# Patient Record
Sex: MALE | Race: BLACK OR AFRICAN AMERICAN | NOT HISPANIC OR LATINO | Employment: OTHER | URBAN - METROPOLITAN AREA
[De-identification: names, ages, dates, MRNs, and addresses within clinical notes are randomized per-mention and may not be internally consistent; named-entity substitution may affect disease eponyms.]

---

## 2017-04-13 DIAGNOSIS — E11.9 TYPE 2 DIABETES MELLITUS WITHOUT COMPLICATION: ICD-10-CM

## 2017-05-08 ENCOUNTER — PATIENT OUTREACH (OUTPATIENT)
Dept: ADMINISTRATIVE | Facility: HOSPITAL | Age: 62
End: 2017-05-08

## 2017-05-08 NOTE — PROGRESS NOTES
SPOKE WITH PATIENT WHO STATED LAST EYE EXAM 03/15 WITH [facility]. UNABLE TO OBTAIN MEDICAL RECORDS D/T RECORDS DAMAGED IN THE FLOOD. PATIENT STATED WILL CALL TO SCHEDULE [facility]. INSTRUCTED PT  TO HAVE EYE MD SEND DR. VYAS COPY OF EYE EXAM. PT VERBALIZED UNDERSTANDING.

## 2017-08-04 DIAGNOSIS — E11.9 TYPE 2 DIABETES MELLITUS WITHOUT COMPLICATION: ICD-10-CM

## 2017-08-12 DIAGNOSIS — E78.5 HYPERLIPIDEMIA: ICD-10-CM

## 2017-08-12 DIAGNOSIS — E11.69 DIABETES MELLITUS TYPE 2 IN OBESE: ICD-10-CM

## 2017-08-12 DIAGNOSIS — E66.9 DIABETES MELLITUS TYPE 2 IN OBESE: ICD-10-CM

## 2017-08-15 RX ORDER — GLYBURIDE-METFORMIN HYDROCHLORIDE 5; 500 MG/1; MG/1
TABLET ORAL
Qty: 180 TABLET | Refills: 0 | Status: SHIPPED | OUTPATIENT
Start: 2017-08-15 | End: 2017-11-13 | Stop reason: SDUPTHER

## 2017-08-15 RX ORDER — ATORVASTATIN CALCIUM 40 MG/1
TABLET, FILM COATED ORAL
Qty: 90 TABLET | Refills: 0 | Status: SHIPPED | OUTPATIENT
Start: 2017-08-15 | End: 2017-11-15 | Stop reason: SDUPTHER

## 2017-09-27 ENCOUNTER — TELEPHONE (OUTPATIENT)
Dept: INTERNAL MEDICINE | Facility: CLINIC | Age: 62
End: 2017-09-27

## 2017-09-27 NOTE — TELEPHONE ENCOUNTER
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

## 2017-10-03 ENCOUNTER — OFFICE VISIT (OUTPATIENT)
Dept: INTERNAL MEDICINE | Facility: CLINIC | Age: 62
End: 2017-10-03
Payer: COMMERCIAL

## 2017-10-03 ENCOUNTER — TELEPHONE (OUTPATIENT)
Dept: INTERNAL MEDICINE | Facility: CLINIC | Age: 62
End: 2017-10-03

## 2017-10-03 VITALS
BODY MASS INDEX: 45.96 KG/M2 | TEMPERATURE: 99 F | SYSTOLIC BLOOD PRESSURE: 170 MMHG | OXYGEN SATURATION: 96 % | WEIGHT: 315 LBS | HEART RATE: 58 BPM | DIASTOLIC BLOOD PRESSURE: 87 MMHG

## 2017-10-03 DIAGNOSIS — B35.1 ONYCHOMYCOSIS OF TOENAIL: ICD-10-CM

## 2017-10-03 DIAGNOSIS — M10.379 ACUTE GOUT DUE TO RENAL IMPAIRMENT INVOLVING FOOT, UNSPECIFIED LATERALITY: ICD-10-CM

## 2017-10-03 DIAGNOSIS — Z28.39 IMMUNIZATION DEFICIENCY: ICD-10-CM

## 2017-10-03 DIAGNOSIS — Z12.5 SCREENING FOR PROSTATE CANCER: ICD-10-CM

## 2017-10-03 DIAGNOSIS — I10 ESSENTIAL HYPERTENSION: ICD-10-CM

## 2017-10-03 DIAGNOSIS — E78.5 HYPERLIPIDEMIA, UNSPECIFIED HYPERLIPIDEMIA TYPE: ICD-10-CM

## 2017-10-03 LAB
ALBUMIN SERPL BCP-MCNC: 3.2 G/DL
ALP SERPL-CCNC: 70 U/L
ALT SERPL W/O P-5'-P-CCNC: 17 U/L
ANION GAP SERPL CALC-SCNC: 11 MMOL/L
AST SERPL-CCNC: 16 U/L
BACTERIA #/AREA URNS AUTO: ABNORMAL /HPF
BASOPHILS # BLD AUTO: 0.02 K/UL
BASOPHILS NFR BLD: 0.3 %
BILIRUB SERPL-MCNC: 0.7 MG/DL
BILIRUB UR QL STRIP: NEGATIVE
BUN SERPL-MCNC: 16 MG/DL
CALCIUM SERPL-MCNC: 9.3 MG/DL
CHLORIDE SERPL-SCNC: 105 MMOL/L
CHOLEST SERPL-MCNC: 151 MG/DL
CHOLEST/HDLC SERPL: 5 {RATIO}
CLARITY UR REFRACT.AUTO: ABNORMAL
CO2 SERPL-SCNC: 27 MMOL/L
COLOR UR AUTO: YELLOW
CREAT SERPL-MCNC: 1.4 MG/DL
CREAT UR-MCNC: 245 MG/DL
DIFFERENTIAL METHOD: ABNORMAL
EOSINOPHIL # BLD AUTO: 0.2 K/UL
EOSINOPHIL NFR BLD: 3.3 %
ERYTHROCYTE [DISTWIDTH] IN BLOOD BY AUTOMATED COUNT: 14.9 %
EST. GFR  (AFRICAN AMERICAN): >60 ML/MIN/1.73 M^2
EST. GFR  (NON AFRICAN AMERICAN): 53.1 ML/MIN/1.73 M^2
GLUCOSE SERPL-MCNC: 124 MG/DL
GLUCOSE UR QL STRIP: NEGATIVE
HCT VFR BLD AUTO: 44.6 %
HDLC SERPL-MCNC: 30 MG/DL
HDLC SERPL: 19.9 %
HGB BLD-MCNC: 14.8 G/DL
HGB UR QL STRIP: NEGATIVE
KETONES UR QL STRIP: NEGATIVE
LDLC SERPL CALC-MCNC: 99 MG/DL
LEUKOCYTE ESTERASE UR QL STRIP: ABNORMAL
LYMPHOCYTES # BLD AUTO: 2 K/UL
LYMPHOCYTES NFR BLD: 29.7 %
MCH RBC QN AUTO: 29.1 PG
MCHC RBC AUTO-ENTMCNC: 33.2 G/DL
MCV RBC AUTO: 88 FL
MICROALBUMIN UR DL<=1MG/L-MCNC: 10 UG/ML
MICROALBUMIN/CREATININE RATIO: 4.1 UG/MG
MICROSCOPIC COMMENT: ABNORMAL
MONOCYTES # BLD AUTO: 0.6 K/UL
MONOCYTES NFR BLD: 8.3 %
NEUTROPHILS # BLD AUTO: 3.9 K/UL
NEUTROPHILS NFR BLD: 58.2 %
NITRITE UR QL STRIP: NEGATIVE
NONHDLC SERPL-MCNC: 121 MG/DL
PH UR STRIP: 5 [PH] (ref 5–8)
PLATELET # BLD AUTO: 223 K/UL
PMV BLD AUTO: 11.3 FL
POTASSIUM SERPL-SCNC: 3.9 MMOL/L
PROT SERPL-MCNC: 7.2 G/DL
PROT UR QL STRIP: NEGATIVE
RBC # BLD AUTO: 5.08 M/UL
RBC #/AREA URNS AUTO: 0 /HPF (ref 0–4)
SODIUM SERPL-SCNC: 143 MMOL/L
SP GR UR STRIP: 1.02 (ref 1–1.03)
SQUAMOUS #/AREA URNS AUTO: 1 /HPF
TRIGL SERPL-MCNC: 110 MG/DL
TSH SERPL DL<=0.005 MIU/L-ACNC: 2.16 UIU/ML
URATE SERPL-MCNC: 7.8 MG/DL
URN SPEC COLLECT METH UR: ABNORMAL
UROBILINOGEN UR STRIP-ACNC: 2 EU/DL
WBC # BLD AUTO: 6.61 K/UL
WBC #/AREA URNS AUTO: 8 /HPF (ref 0–5)

## 2017-10-03 PROCEDURE — 84550 ASSAY OF BLOOD/URIC ACID: CPT

## 2017-10-03 PROCEDURE — 81001 URINALYSIS AUTO W/SCOPE: CPT

## 2017-10-03 PROCEDURE — 82570 ASSAY OF URINE CREATININE: CPT

## 2017-10-03 PROCEDURE — 85025 COMPLETE CBC W/AUTO DIFF WBC: CPT

## 2017-10-03 PROCEDURE — 84153 ASSAY OF PSA TOTAL: CPT

## 2017-10-03 PROCEDURE — 80061 LIPID PANEL: CPT

## 2017-10-03 PROCEDURE — 90686 IIV4 VACC NO PRSV 0.5 ML IM: CPT | Mod: S$GLB,,, | Performed by: FAMILY MEDICINE

## 2017-10-03 PROCEDURE — 83036 HEMOGLOBIN GLYCOSYLATED A1C: CPT

## 2017-10-03 PROCEDURE — 84443 ASSAY THYROID STIM HORMONE: CPT

## 2017-10-03 PROCEDURE — 99999 PR PBB SHADOW E&M-EST. PATIENT-LVL IV: CPT | Mod: PBBFAC,,, | Performed by: FAMILY MEDICINE

## 2017-10-03 PROCEDURE — 90471 IMMUNIZATION ADMIN: CPT | Mod: S$GLB,,, | Performed by: FAMILY MEDICINE

## 2017-10-03 PROCEDURE — 99214 OFFICE O/P EST MOD 30 MIN: CPT | Mod: 25,S$GLB,, | Performed by: FAMILY MEDICINE

## 2017-10-03 PROCEDURE — 80053 COMPREHEN METABOLIC PANEL: CPT

## 2017-10-03 RX ORDER — HYDROCHLOROTHIAZIDE 25 MG/1
25 TABLET ORAL DAILY
Qty: 90 TABLET | Refills: 1 | Status: SHIPPED | OUTPATIENT
Start: 2017-10-03 | End: 2018-05-28 | Stop reason: SDUPTHER

## 2017-10-03 NOTE — PROGRESS NOTES
Subjective:       Patient ID: Tom Tavarez is a 62 y.o. male.    Chief Complaint: Annual Exam; Gout; and lab work    Annual exam.  Follow-up diabetes, hypertension, hyperlipidemia, elevated BMI.  He reports home glucose is usually 125.  His blood pressure is elevated tod  He had episode 2 weeks ago of gout right ankle followed by left plantar MTP joints.  This resolved with black cherry juice and tylenol.   He reports he has occasional gout flare about every 2 years.  He denies headache chest pain palpitations or edema.  He denies polyuria or polydipsia.  He had an episode last year  glucose.  500 and was seen in emergency room   This was improved with IV fluids      Review of Systems   Constitutional: Negative for activity change, appetite change, fatigue and unexpected weight change.   HENT: Negative for congestion, ear pain, hearing loss, sneezing, sore throat, tinnitus and trouble swallowing.    Eyes: Negative for pain, redness and visual disturbance.        His routine diabetic eye exam.  He reports he will schedule this.   Respiratory: Negative for cough, chest tightness, shortness of breath and wheezing.    Cardiovascular: Negative for chest pain, palpitations and leg swelling.   Gastrointestinal: Negative for abdominal distention, abdominal pain, blood in stool, constipation, diarrhea, nausea, rectal pain and vomiting.   Endocrine: Negative for polydipsia and polyuria.   Genitourinary: Negative for difficulty urinating, dysuria, frequency, hematuria and urgency.   Musculoskeletal: Positive for arthralgias. Negative for back pain, joint swelling, myalgias, neck pain and neck stiffness.        He has occasional hip pain with prolonged walking.  He denies claudication   Skin: Negative for rash and wound.   Neurological: Negative for dizziness, tremors, syncope, light-headedness, numbness and headaches.   Hematological: Negative for adenopathy. Does not bruise/bleed easily.   Psychiatric/Behavioral:  Negative for agitation, confusion, dysphoric mood and sleep disturbance. The patient is not nervous/anxious.        Objective:      Physical Exam   Constitutional: He is oriented to person, place, and time. He appears well-developed and well-nourished.   HENT:   Right Ear: External ear normal.   Left Ear: External ear normal.   Nose: Nose normal.   Mouth/Throat: Oropharynx is clear and moist.   Eyes: Conjunctivae and EOM are normal. Pupils are equal, round, and reactive to light.   Neck: Normal range of motion. Neck supple. No JVD present. No thyromegaly present.   Cardiovascular: Normal rate, regular rhythm, normal heart sounds and intact distal pulses.  Exam reveals no gallop and no friction rub.    No murmur heard.  Pulses:       Dorsalis pedis pulses are 2+ on the right side, and 2+ on the left side.   Pulmonary/Chest: Effort normal and breath sounds normal. No respiratory distress. He has no wheezes. He has no rales.   Abdominal: Soft. Bowel sounds are normal. He exhibits no distension and no mass. There is no tenderness.   Musculoskeletal: Normal range of motion. He exhibits no edema or tenderness.        Right foot: There is deformity (performed thickened toenailsreflected onychomycosis).        Left foot: There is deformity (performed thickened toenailsreflect onychomycosis).   Mild swelling of the right ankle with no pain     Feet:   Right Foot:   Protective Sensation: 10 sites tested. 10 sites sensed.   Skin Integrity: Negative for ulcer, blister, skin breakdown, erythema, warmth, callus or dry skin.   Left Foot:   Protective Sensation: 10 sites tested. 10 sites sensed.   Skin Integrity: Negative for ulcer, blister, skin breakdown, erythema, warmth, callus or dry skin.   Lymphadenopathy:     He has no cervical adenopathy.   Neurological: He is alert and oriented to person, place, and time. He has normal reflexes. He displays normal reflexes. No cranial nerve deficit. He exhibits normal muscle tone.  Coordination normal.   Skin: Skin is warm and dry. No rash noted. He is not diaphoretic.   Psychiatric: He has a normal mood and affect. His behavior is normal. Judgment and thought content normal.       Office Visit on 09/12/2016   Component Date Value Ref Range Status    Hemoglobin A1C 09/13/2016 8.9* 4.5 - 6.2 % Final    Estimated Avg Glucose 09/13/2016 209* 68 - 131 mg/dL Final     Assessment:       1. Uncontrolled type 2 diabetes mellitus with foot ulcer, without long-term current use of insulin    2. Essential hypertension    3. BMI 40.0-44.9, adult    4. Hyperlipidemia, unspecified hyperlipidemia type    5. Acute gout due to renal impairment involving foot, unspecified laterality    6. Screening for prostate cancer    7. Immunization deficiency        Plan:   Flu vaccination today.  He will call insurance regarding Zostavax.  He is interested in bariatric surgery.  He wants to insurance first.  Lab was ordered.he declined podiatry evaluation for onychomycosis of the toenails.  He wants to try Vicks hyperactive in 3 months.  Routine follow-up in 3 months.      Uncontrolled type 2 diabetes mellitus with foot ulcer, without long-term current use of insulin  -     Hemoglobin A1c  -     Microalbumin/creatinine urine ratio    Essential hypertension  -     CBC auto differential  -     Urinalysis  -     Comprehensive metabolic panel  -     TSH    BMI 40.0-44.9, adult    Hyperlipidemia, unspecified hyperlipidemia type  -     Lipid panel    Acute gout due to renal impairment involving foot, unspecified laterality  -     Uric acid    Screening for prostate cancer  -     PSA, Screening    Immunization deficiency  -     Influenza - Quadrivalent (3 years & older) (PF)

## 2017-10-03 NOTE — TELEPHONE ENCOUNTER
Pt states that in his visit it was discussed about something to help for the swelling in his ankles and legs, please advise.

## 2017-10-04 ENCOUNTER — TELEPHONE (OUTPATIENT)
Dept: INTERNAL MEDICINE | Facility: CLINIC | Age: 62
End: 2017-10-04

## 2017-10-04 LAB
COMPLEXED PSA SERPL-MCNC: 2.2 NG/ML
ESTIMATED AVG GLUCOSE: 146 MG/DL
HBA1C MFR BLD HPLC: 6.7 %

## 2017-10-04 NOTE — TELEPHONE ENCOUNTER
----- Message from Angie Hart sent at 10/4/2017  3:03 PM CDT -----  Contact: self   Patient returning call but he received your message and if anything else he will need to know please call him back. Please call back at [phone].      Thanks,  Angie Hart

## 2017-10-06 NOTE — TELEPHONE ENCOUNTER
----- Message from Mariajose Poole sent at 10/5/2017  4:22 PM CDT -----  Contact: patient  Calling concerning a diagnostic code for shingles vaccine for his insurance reference. Please call patient ASAP today @ [phone]. Thanks, gama

## 2017-10-06 NOTE — TELEPHONE ENCOUNTER
----- Message from Fátima Zaldivar sent at 10/6/2017  1:47 PM CDT -----  Contact: self [phone]  States that he is calling to check status on rx for fluid pill. Pt wants rx sent to     [id number] - [place], [place] - [address] AT [facility] [address]  [address]  [place] [address]  Phone: [phone] Fax: [phone]    Please call back at [phone]//thank you acc

## 2017-10-08 RX ORDER — BLOOD SUGAR DIAGNOSTIC
STRIP MISCELLANEOUS
Qty: 200 STRIP | Refills: 3 | Status: SHIPPED | OUTPATIENT
Start: 2017-10-08 | End: 2018-06-05 | Stop reason: SDUPTHER

## 2017-11-13 DIAGNOSIS — E11.69 DIABETES MELLITUS TYPE 2 IN OBESE: ICD-10-CM

## 2017-11-13 DIAGNOSIS — E66.9 DIABETES MELLITUS TYPE 2 IN OBESE: ICD-10-CM

## 2017-11-13 RX ORDER — GLYBURIDE-METFORMIN HYDROCHLORIDE 5; 500 MG/1; MG/1
TABLET ORAL
Qty: 180 TABLET | Refills: 0 | Status: SHIPPED | OUTPATIENT
Start: 2017-11-13 | End: 2018-05-28

## 2017-11-15 DIAGNOSIS — E78.5 HYPERLIPIDEMIA: ICD-10-CM

## 2017-11-15 RX ORDER — ATORVASTATIN CALCIUM 40 MG/1
TABLET, FILM COATED ORAL
Qty: 90 TABLET | Refills: 0 | Status: SHIPPED | OUTPATIENT
Start: 2017-11-15 | End: 2018-05-28 | Stop reason: SDUPTHER

## 2017-12-01 DIAGNOSIS — I10 ESSENTIAL HYPERTENSION: ICD-10-CM

## 2017-12-04 RX ORDER — OLMESARTAN MEDOXOMIL, AMLODIPINE AND HYDROCHLOROTHIAZIDE TABLET 40/10/25 MG 40; 10; 25 MG/1; MG/1; MG/1
1 TABLET ORAL DAILY
Qty: 90 TABLET | Refills: 3
Start: 2017-12-04 | End: 2018-05-28 | Stop reason: SDUPTHER

## 2018-01-16 ENCOUNTER — TELEPHONE (OUTPATIENT)
Dept: INTERNAL MEDICINE | Facility: CLINIC | Age: 63
End: 2018-01-16

## 2018-01-16 NOTE — TELEPHONE ENCOUNTER
----- Message from Michael Mendenhall sent at 1/16/2018  8:13 AM CST -----  Contact: Pt  Pt states he is returning the nurse call, please contact the pt at [phone]

## 2018-05-28 ENCOUNTER — OFFICE VISIT (OUTPATIENT)
Dept: INTERNAL MEDICINE | Facility: CLINIC | Age: 63
End: 2018-05-28
Payer: COMMERCIAL

## 2018-05-28 VITALS
WEIGHT: 307.31 LBS | SYSTOLIC BLOOD PRESSURE: 170 MMHG | HEART RATE: 72 BPM | DIASTOLIC BLOOD PRESSURE: 110 MMHG | OXYGEN SATURATION: 98 % | TEMPERATURE: 99 F | BODY MASS INDEX: 44.1 KG/M2

## 2018-05-28 DIAGNOSIS — E66.9 DIABETES MELLITUS TYPE 2 IN OBESE: ICD-10-CM

## 2018-05-28 DIAGNOSIS — R09.82 POST-NASAL DRIP: ICD-10-CM

## 2018-05-28 DIAGNOSIS — E78.5 HYPERLIPIDEMIA, UNSPECIFIED HYPERLIPIDEMIA TYPE: ICD-10-CM

## 2018-05-28 DIAGNOSIS — I10 ESSENTIAL HYPERTENSION: ICD-10-CM

## 2018-05-28 DIAGNOSIS — B37.2 YEAST DERMATITIS: ICD-10-CM

## 2018-05-28 DIAGNOSIS — E11.69 DIABETES MELLITUS TYPE 2 IN OBESE: ICD-10-CM

## 2018-05-28 DIAGNOSIS — R03.0 ELEVATED BLOOD PRESSURE READING: ICD-10-CM

## 2018-05-28 DIAGNOSIS — N34.2 URETHRITIS: ICD-10-CM

## 2018-05-28 LAB
ALBUMIN SERPL BCP-MCNC: 3.5 G/DL
ALP SERPL-CCNC: 80 U/L
ALT SERPL W/O P-5'-P-CCNC: 15 U/L
ANION GAP SERPL CALC-SCNC: 11 MMOL/L
AST SERPL-CCNC: 14 U/L
BASOPHILS # BLD AUTO: 0.04 K/UL
BASOPHILS NFR BLD: 0.6 %
BILIRUB SERPL-MCNC: 0.8 MG/DL
BUN SERPL-MCNC: 14 MG/DL
CALCIUM SERPL-MCNC: 10 MG/DL
CHLORIDE SERPL-SCNC: 105 MMOL/L
CHOLEST SERPL-MCNC: 198 MG/DL
CHOLEST/HDLC SERPL: 6.4 {RATIO}
CO2 SERPL-SCNC: 27 MMOL/L
CREAT SERPL-MCNC: 1.5 MG/DL
DIFFERENTIAL METHOD: NORMAL
EOSINOPHIL # BLD AUTO: 0.2 K/UL
EOSINOPHIL NFR BLD: 3.7 %
ERYTHROCYTE [DISTWIDTH] IN BLOOD BY AUTOMATED COUNT: 14.4 %
EST. GFR  (AFRICAN AMERICAN): 56.1 ML/MIN/1.73 M^2
EST. GFR  (NON AFRICAN AMERICAN): 48.5 ML/MIN/1.73 M^2
ESTIMATED AVG GLUCOSE: 275 MG/DL
GLUCOSE SERPL-MCNC: 296 MG/DL
HBA1C MFR BLD HPLC: 11.2 %
HCT VFR BLD AUTO: 53 %
HDLC SERPL-MCNC: 31 MG/DL
HDLC SERPL: 15.7 %
HGB BLD-MCNC: 17.7 G/DL
IMM GRANULOCYTES # BLD AUTO: 0.01 K/UL
IMM GRANULOCYTES NFR BLD AUTO: 0.2 %
LDLC SERPL CALC-MCNC: 128.8 MG/DL
LYMPHOCYTES # BLD AUTO: 2.1 K/UL
LYMPHOCYTES NFR BLD: 32.3 %
MCH RBC QN AUTO: 29.5 PG
MCHC RBC AUTO-ENTMCNC: 33.4 G/DL
MCV RBC AUTO: 88 FL
MONOCYTES # BLD AUTO: 0.6 K/UL
MONOCYTES NFR BLD: 9.5 %
NEUTROPHILS # BLD AUTO: 3.5 K/UL
NEUTROPHILS NFR BLD: 53.7 %
NONHDLC SERPL-MCNC: 167 MG/DL
NRBC BLD-RTO: 0 /100 WBC
PLATELET # BLD AUTO: 175 K/UL
PMV BLD AUTO: 12.1 FL
POTASSIUM SERPL-SCNC: 5.1 MMOL/L
PROT SERPL-MCNC: 7.5 G/DL
RBC # BLD AUTO: 6 M/UL
SODIUM SERPL-SCNC: 143 MMOL/L
TRIGL SERPL-MCNC: 191 MG/DL
TSH SERPL DL<=0.005 MIU/L-ACNC: 2.51 UIU/ML
WBC # BLD AUTO: 6.44 K/UL

## 2018-05-28 PROCEDURE — 81001 URINALYSIS AUTO W/SCOPE: CPT

## 2018-05-28 PROCEDURE — 84443 ASSAY THYROID STIM HORMONE: CPT

## 2018-05-28 PROCEDURE — 80053 COMPREHEN METABOLIC PANEL: CPT

## 2018-05-28 PROCEDURE — 3008F BODY MASS INDEX DOCD: CPT | Mod: CPTII,S$GLB,, | Performed by: FAMILY MEDICINE

## 2018-05-28 PROCEDURE — 80061 LIPID PANEL: CPT

## 2018-05-28 PROCEDURE — 3080F DIAST BP >= 90 MM HG: CPT | Mod: CPTII,S$GLB,, | Performed by: FAMILY MEDICINE

## 2018-05-28 PROCEDURE — 82043 UR ALBUMIN QUANTITATIVE: CPT

## 2018-05-28 PROCEDURE — 3077F SYST BP >= 140 MM HG: CPT | Mod: CPTII,S$GLB,, | Performed by: FAMILY MEDICINE

## 2018-05-28 PROCEDURE — 85025 COMPLETE CBC W/AUTO DIFF WBC: CPT

## 2018-05-28 PROCEDURE — 87491 CHLMYD TRACH DNA AMP PROBE: CPT

## 2018-05-28 PROCEDURE — 99214 OFFICE O/P EST MOD 30 MIN: CPT | Mod: S$GLB,,, | Performed by: FAMILY MEDICINE

## 2018-05-28 PROCEDURE — 3044F HG A1C LEVEL LT 7.0%: CPT | Mod: CPTII,S$GLB,, | Performed by: FAMILY MEDICINE

## 2018-05-28 PROCEDURE — 83036 HEMOGLOBIN GLYCOSYLATED A1C: CPT

## 2018-05-28 PROCEDURE — 99999 PR PBB SHADOW E&M-EST. PATIENT-LVL IV: CPT | Mod: PBBFAC,,, | Performed by: FAMILY MEDICINE

## 2018-05-28 RX ORDER — ATORVASTATIN CALCIUM 40 MG/1
TABLET, FILM COATED ORAL
Qty: 30 TABLET | Refills: 0 | Status: SHIPPED | OUTPATIENT
Start: 2018-05-28 | End: 2018-05-28 | Stop reason: SDUPTHER

## 2018-05-28 RX ORDER — FLUTICASONE PROPIONATE 50 MCG
2 SPRAY, SUSPENSION (ML) NASAL DAILY
Qty: 1 BOTTLE | Refills: 11 | Status: SHIPPED | OUTPATIENT
Start: 2018-05-28 | End: 2019-01-23

## 2018-05-28 RX ORDER — ATORVASTATIN CALCIUM 40 MG/1
TABLET, FILM COATED ORAL
Qty: 90 TABLET | Refills: 3 | Status: SHIPPED | OUTPATIENT
Start: 2018-05-28 | End: 2019-06-02 | Stop reason: SDUPTHER

## 2018-05-28 RX ORDER — OLMESARTAN MEDOXOMIL, AMLODIPINE AND HYDROCHLOROTHIAZIDE TABLET 40/10/25 MG 40; 10; 25 MG/1; MG/1; MG/1
1 TABLET ORAL DAILY
Qty: 30 TABLET | Refills: 2
Start: 2018-05-28 | End: 2018-05-28 | Stop reason: SDUPTHER

## 2018-05-28 RX ORDER — METFORMIN HYDROCHLORIDE 500 MG/1
500 TABLET ORAL 2 TIMES DAILY WITH MEALS
Qty: 60 TABLET | Refills: 2 | Status: SHIPPED | OUTPATIENT
Start: 2018-05-28 | End: 2018-09-17 | Stop reason: SDUPTHER

## 2018-05-28 RX ORDER — GLYBURIDE-METFORMIN HYDROCHLORIDE 5; 500 MG/1; MG/1
1 TABLET ORAL 2 TIMES DAILY WITH MEALS
Qty: 180 TABLET | Refills: 0 | Status: CANCELLED | OUTPATIENT
Start: 2018-05-28

## 2018-05-28 RX ORDER — INSULIN GLARGINE 100 [IU]/ML
20 INJECTION, SOLUTION SUBCUTANEOUS NIGHTLY
Qty: 1 BOX | Refills: 2 | Status: SHIPPED | OUTPATIENT
Start: 2018-05-28 | End: 2018-06-05 | Stop reason: SDUPTHER

## 2018-05-28 RX ORDER — HYDROCHLOROTHIAZIDE 25 MG/1
25 TABLET ORAL DAILY
Qty: 90 TABLET | Refills: 1 | Status: SHIPPED | OUTPATIENT
Start: 2018-05-28 | End: 2019-05-28

## 2018-05-28 RX ORDER — FLUCONAZOLE 100 MG/1
100 TABLET ORAL DAILY
Qty: 10 TABLET | Refills: 0 | Status: SHIPPED | OUTPATIENT
Start: 2018-05-28 | End: 2018-06-27

## 2018-05-28 RX ORDER — OLMESARTAN MEDOXOMIL, AMLODIPINE AND HYDROCHLOROTHIAZIDE TABLET 40/10/25 MG 40; 10; 25 MG/1; MG/1; MG/1
1 TABLET ORAL DAILY
Qty: 90 TABLET | Refills: 3 | Status: SHIPPED | OUTPATIENT
Start: 2018-05-28 | End: 2018-05-28 | Stop reason: SDUPTHER

## 2018-05-28 RX ORDER — OLMESARTAN MEDOXOMIL, AMLODIPINE AND HYDROCHLOROTHIAZIDE TABLET 40/10/25 MG 40; 10; 25 MG/1; MG/1; MG/1
1 TABLET ORAL DAILY
Qty: 30 TABLET | Refills: 2 | Status: SHIPPED | OUTPATIENT
Start: 2018-05-28 | End: 2018-09-17 | Stop reason: SDUPTHER

## 2018-05-28 NOTE — PROGRESS NOTES
Subjective:       Patient ID: Tom Tavarez is a 63 y.o. male.    Chief Complaint: Blood Sugar Problem (running 400 for 1 week)    He has diabetes hypertension hyperlipidemia obesity.  He reports being out of his medications for several days.  His blood sugar has been 400.  He took his wife's Lantus 30 units this morning.  He also has some discomfort and itchiness in the perineal region.  He has some penile discharge.  Denies dysuria.  He feels weak.  He denies any chest pain palpitations or shortness of breath or edema.  He denies fever chills.      Review of Systems   Constitutional: Positive for fatigue. Negative for appetite change, chills, fever and unexpected weight change.   HENT: Negative for congestion.    Respiratory: Negative for cough, chest tightness, shortness of breath and wheezing.    Cardiovascular: Negative for chest pain, palpitations and leg swelling.        Lightheadedness but no syncope   Gastrointestinal: Negative for abdominal pain, diarrhea, nausea and vomiting.   Genitourinary: Positive for discharge and frequency. Negative for difficulty urinating, dysuria and hematuria.   Neurological: Positive for weakness and light-headedness. Negative for syncope, facial asymmetry, speech difficulty, numbness and headaches.       Objective:      Physical Exam   Constitutional: He appears well-developed and well-nourished. No distress.   Eyes: Conjunctivae are normal.   Cardiovascular: Normal rate and regular rhythm.  Exam reveals no gallop.    No murmur heard.  Pulmonary/Chest: Effort normal. No respiratory distress. He has no wheezes. He has no rales.   Genitourinary:   Genitourinary Comments: He has otherwise discharge from his penis.   Lymphadenopathy:     He has no cervical adenopathy.   Skin: He is not diaphoretic.   He has some perineal scaling.       Office Visit on 10/03/2017   Component Date Value Ref Range Status    WBC 10/03/2017 6.61  3.90 - 12.70 K/uL Final    RBC 10/03/2017 5.08   4.60 - 6.20 M/uL Final    Hemoglobin 10/03/2017 14.8  14.0 - 18.0 g/dL Final    Hematocrit 10/03/2017 44.6  40.0 - 54.0 % Final    MCV 10/03/2017 88  82 - 98 fL Final    MCH 10/03/2017 29.1  27.0 - 31.0 pg Final    MCHC 10/03/2017 33.2  32.0 - 36.0 g/dL Final    RDW 10/03/2017 14.9* 11.5 - 14.5 % Final    Platelets 10/03/2017 223  150 - 350 K/uL Final    MPV 10/03/2017 11.3  9.2 - 12.9 fL Final    Gran # (ANC) 10/03/2017 3.9  1.8 - 7.7 K/uL Final    Lymph # 10/03/2017 2.0  1.0 - 4.8 K/uL Final    Mono # 10/03/2017 0.6  0.3 - 1.0 K/uL Final    Eos # 10/03/2017 0.2  0.0 - 0.5 K/uL Final    Baso # 10/03/2017 0.02  0.00 - 0.20 K/uL Final    Gran% 10/03/2017 58.2  38.0 - 73.0 % Final    Lymph% 10/03/2017 29.7  18.0 - 48.0 % Final    Mono% 10/03/2017 8.3  4.0 - 15.0 % Final    Eosinophil% 10/03/2017 3.3  0.0 - 8.0 % Final    Basophil% 10/03/2017 0.3  0.0 - 1.9 % Final    Differential Method 10/03/2017 Automated   Final    Specimen UA 10/03/2017 Urine, Clean Catch   Final    Color, UA 10/03/2017 Yellow  Yellow, Straw, Radha Final    Appearance, UA 10/03/2017 Hazy* Clear Final    pH, UA 10/03/2017 5.0  5.0 - 8.0 Final    Specific Gravity, UA 10/03/2017 1.025  1.005 - 1.030 Final    Protein, UA 10/03/2017 Negative  Negative Final    Glucose, UA 10/03/2017 Negative  Negative Final    Ketones, UA 10/03/2017 Negative  Negative Final    Bilirubin (UA) 10/03/2017 Negative  Negative Final    Occult Blood UA 10/03/2017 Negative  Negative Final    Nitrite, UA 10/03/2017 Negative  Negative Final    Urobilinogen, UA 10/03/2017 2.0  <2.0 EU/dL Final    Leukocytes, UA 10/03/2017 Trace* Negative Final    Sodium 10/03/2017 143  136 - 145 mmol/L Final    Potassium 10/03/2017 3.9  3.5 - 5.1 mmol/L Final    Chloride 10/03/2017 105  95 - 110 mmol/L Final    CO2 10/03/2017 27  23 - 29 mmol/L Final    Glucose 10/03/2017 124* 70 - 110 mg/dL Final    BUN, Bld 10/03/2017 16  8 - 23 mg/dL Final    Creatinine  10/03/2017 1.4  0.5 - 1.4 mg/dL Final    Calcium 10/03/2017 9.3  8.7 - 10.5 mg/dL Final    Total Protein 10/03/2017 7.2  6.0 - 8.4 g/dL Final    Albumin 10/03/2017 3.2* 3.5 - 5.2 g/dL Final    Total Bilirubin 10/03/2017 0.7  0.1 - 1.0 mg/dL Final    Alkaline Phosphatase 10/03/2017 70  55 - 135 U/L Final    AST 10/03/2017 16  10 - 40 U/L Final    ALT 10/03/2017 17  10 - 44 U/L Final    Anion Gap 10/03/2017 11  8 - 16 mmol/L Final    eGFR if African American 10/03/2017 >60.0  >60 mL/min/1.73 m^2 Final    eGFR if non African American 10/03/2017 53.1* >60 mL/min/1.73 m^2 Final    Cholesterol 10/03/2017 151  120 - 199 mg/dL Final    Triglycerides 10/03/2017 110  30 - 150 mg/dL Final    HDL 10/03/2017 30* 40 - 75 mg/dL Final    LDL Cholesterol 10/03/2017 99.0  63.0 - 159.0 mg/dL Final    HDL/Chol Ratio 10/03/2017 19.9* 20.0 - 50.0 % Final    Total Cholesterol/HDL Ratio 10/03/2017 5.0  2.0 - 5.0 Final    Non-HDL Cholesterol 10/03/2017 121  mg/dL Final    TSH 10/03/2017 2.158  0.400 - 4.000 uIU/mL Final    Hemoglobin A1C 10/03/2017 6.7* 4.0 - 5.6 % Final    Estimated Avg Glucose 10/03/2017 146* 68 - 131 mg/dL Final    Microalbum.,U,Random 10/03/2017 10.0  ug/mL Final    Creatinine, Random Ur 10/03/2017 245.0  23.0 - 375.0 mg/dL Final    Microalb Creat Ratio 10/03/2017 4.1  0.0 - 30.0 ug/mg Final    Uric Acid 10/03/2017 7.8* 3.4 - 7.0 mg/dL Final    PSA, SCREEN 10/03/2017 2.2  0.00 - 4.00 ng/mL Final    RBC, UA 10/03/2017 0  0 - 4 /hpf Final    WBC, UA 10/03/2017 8* 0 - 5 /hpf Final    Bacteria, UA 10/03/2017 Occasional  None-Occ /hpf Final    Squam Epithel, UA 10/03/2017 1  /hpf Final    Microscopic Comment 10/03/2017 SEE COMMENT   Final     Assessment:       1. Urethritis    2. Hyperlipidemia, unspecified hyperlipidemia type    3. Post-nasal drip    4. Diabetes mellitus type 2 in obese    5. Essential hypertension    6. Yeast dermatitis        Plan:   BMI is 44.  Blood pressure diabetes  uncontrolled.  Probable perineal yeast dermatitis.  Lab was ordered.  Medications refilled.  Start Lantus 20 units at bedtime and metformin 500 mg twice a day.  Diabetic diet was discussed.  Resume blood pressure medications.  Discontinue metformin and glyburide for now.  GC chlamydia culture was done.  Increase water intake.  Rest.  Follow-up in 2 days.    BMI discussed.  Weight reduction with diet and exercise  Urethritis  -     C. trachomatis/N. gonorrhoeae by AMP DNA Urine    Hyperlipidemia, unspecified hyperlipidemia type  -     atorvastatin (LIPITOR) 40 MG tablet; TAKE 1 TABLET DAILY (MAKE AN APPOINTMENT FOR YEARLY EXAM, NEED OFFICE VISIT AND LAB FOR ADDITIONAL REFILLS)  Dispense: 30 tablet; Refill: 0  -     Lipid panel    Post-nasal drip  -     fluticasone (FLONASE) 50 mcg/actuation nasal spray; 2 sprays (100 mcg total) by Each Nare route once daily.  Dispense: 1 Bottle; Refill: 11    Diabetes mellitus type 2 in obese  -     Hemoglobin A1c  -     Microalbumin/creatinine urine ratio  -     insulin glargine (LANTUS SOLOSTAR) 100 unit/mL (3 mL) InPn pen; Inject 20 Units into the skin every evening.  Dispense: 1 Box; Refill: 2  -     metFORMIN (GLUCOPHAGE) 500 MG tablet; Take 1 tablet (500 mg total) by mouth 2 (two) times daily with meals.  Dispense: 60 tablet; Refill: 2    Essential hypertension  -     olmesartan-amLODIPin-hcthiazid (TRIBENZOR) 40-10-25 mg Tab; Take 1 tablet by mouth once daily.  Dispense: 30 tablet; Refill: 2  -     CBC auto differential  -     Urinalysis  -     Comprehensive metabolic panel  -     TSH    Yeast dermatitis  -     fluconazole (DIFLUCAN) 100 MG tablet; Take 1 tablet (100 mg total) by mouth once daily.  Dispense: 10 tablet; Refill: 0    Other orders  -     Cancel: glyBURIDE-metformin 5-500 mg (GLUCOVANCE) 5-500 mg Tab; Take 1 tablet by mouth 2 (two) times daily with meals.  Dispense: 180 tablet; Refill: 0  -     hydroCHLOROthiazide (HYDRODIURIL) 25 MG tablet; Take 1 tablet (25 mg  total) by mouth once daily.  Dispense: 90 tablet; Refill: 1  -     Cancel: blood sugar diagnostic (ONETOUCH ULTRA TEST) Strp;   Dispense: 200 strip; Refill: 3

## 2018-05-29 LAB
BACTERIA #/AREA URNS AUTO: ABNORMAL /HPF
BILIRUB UR QL STRIP: NEGATIVE
C TRACH DNA SPEC QL NAA+PROBE: NOT DETECTED
CLARITY UR REFRACT.AUTO: ABNORMAL
COLOR UR AUTO: YELLOW
CREAT UR-MCNC: 70 MG/DL
GLUCOSE UR QL STRIP: ABNORMAL
HGB UR QL STRIP: ABNORMAL
KETONES UR QL STRIP: ABNORMAL
LEUKOCYTE ESTERASE UR QL STRIP: ABNORMAL
MICROALBUMIN UR DL<=1MG/L-MCNC: 18 UG/ML
MICROALBUMIN/CREATININE RATIO: 25.7 UG/MG
MICROSCOPIC COMMENT: ABNORMAL
N GONORRHOEA DNA SPEC QL NAA+PROBE: NOT DETECTED
NITRITE UR QL STRIP: NEGATIVE
PH UR STRIP: 5 [PH] (ref 5–8)
PROT UR QL STRIP: NEGATIVE
RBC #/AREA URNS AUTO: 21 /HPF (ref 0–4)
SP GR UR STRIP: 1.03 (ref 1–1.03)
SQUAMOUS #/AREA URNS AUTO: 2 /HPF
URN SPEC COLLECT METH UR: ABNORMAL
UROBILINOGEN UR STRIP-ACNC: NEGATIVE EU/DL
WBC #/AREA URNS AUTO: 54 /HPF (ref 0–5)
YEAST UR QL AUTO: ABNORMAL

## 2018-05-30 ENCOUNTER — OFFICE VISIT (OUTPATIENT)
Dept: INTERNAL MEDICINE | Facility: CLINIC | Age: 63
End: 2018-05-30
Payer: COMMERCIAL

## 2018-05-30 VITALS
TEMPERATURE: 98 F | HEART RATE: 66 BPM | HEIGHT: 70 IN | BODY MASS INDEX: 43.81 KG/M2 | OXYGEN SATURATION: 96 % | SYSTOLIC BLOOD PRESSURE: 136 MMHG | DIASTOLIC BLOOD PRESSURE: 90 MMHG | WEIGHT: 306 LBS

## 2018-05-30 DIAGNOSIS — B37.2 YEAST DERMATITIS: ICD-10-CM

## 2018-05-30 DIAGNOSIS — R03.0 ELEVATED BLOOD PRESSURE READING: ICD-10-CM

## 2018-05-30 DIAGNOSIS — I10 ESSENTIAL HYPERTENSION: ICD-10-CM

## 2018-05-30 DIAGNOSIS — N30.01 ACUTE CYSTITIS WITH HEMATURIA: ICD-10-CM

## 2018-05-30 PROCEDURE — 99999 PR PBB SHADOW E&M-EST. PATIENT-LVL III: CPT | Mod: PBBFAC,,, | Performed by: FAMILY MEDICINE

## 2018-05-30 PROCEDURE — 3075F SYST BP GE 130 - 139MM HG: CPT | Mod: CPTII,S$GLB,, | Performed by: FAMILY MEDICINE

## 2018-05-30 PROCEDURE — 99214 OFFICE O/P EST MOD 30 MIN: CPT | Mod: S$GLB,,, | Performed by: FAMILY MEDICINE

## 2018-05-30 PROCEDURE — 3046F HEMOGLOBIN A1C LEVEL >9.0%: CPT | Mod: CPTII,S$GLB,, | Performed by: FAMILY MEDICINE

## 2018-05-30 PROCEDURE — 3008F BODY MASS INDEX DOCD: CPT | Mod: CPTII,S$GLB,, | Performed by: FAMILY MEDICINE

## 2018-05-30 PROCEDURE — 3080F DIAST BP >= 90 MM HG: CPT | Mod: CPTII,S$GLB,, | Performed by: FAMILY MEDICINE

## 2018-05-30 RX ORDER — INSULIN LISPRO 100 [IU]/ML
3-9 INJECTION, SOLUTION INTRAVENOUS; SUBCUTANEOUS
Qty: 10 ML | Refills: 5 | Status: SHIPPED | OUTPATIENT
Start: 2018-05-30 | End: 2019-01-23 | Stop reason: SDUPTHER

## 2018-05-30 RX ORDER — DOXYCYCLINE 100 MG/1
100 CAPSULE ORAL 2 TIMES DAILY
Qty: 20 CAPSULE | Refills: 0 | Status: SHIPPED | OUTPATIENT
Start: 2018-05-30 | End: 2018-07-16 | Stop reason: ALTCHOICE

## 2018-05-30 RX ORDER — INSULIN LISPRO 100 [IU]/ML
3-9 INJECTION, SOLUTION INTRAVENOUS; SUBCUTANEOUS
Qty: 10 ML | Refills: 5 | Status: SHIPPED | OUTPATIENT
Start: 2018-05-30 | End: 2018-05-30 | Stop reason: SDUPTHER

## 2018-05-30 NOTE — PROGRESS NOTES
Subjective:       Patient ID: Tom Tavarez is a 63 y.o. male.    Chief Complaint: discuss labs and blood sugars    Follow-up diabetes uncontrolled, hypertension, elevated blood pressure, yeast dermatitis.  He reports some weakness today.  He has resumed all medications.  He denies headache chest pain palpitations or shortness of breath or edema.  He reports inguinal yeast dermatitis symptomatically is improved.      Review of Systems   Constitutional: Positive for fatigue. Negative for appetite change, chills, diaphoresis, fever and unexpected weight change.   HENT: Negative for congestion.    Respiratory: Negative for cough, chest tightness, shortness of breath and wheezing.    Cardiovascular: Negative for chest pain, palpitations and leg swelling.        Denies lightheadedness or syncope   Gastrointestinal: Negative for abdominal pain.   Genitourinary: Negative for difficulty urinating, dysuria, frequency, hematuria and urgency.   Neurological: Positive for weakness. Negative for syncope, facial asymmetry, speech difficulty, light-headedness and headaches.       Objective:      Physical Exam   Constitutional: He is oriented to person, place, and time. He appears well-developed and well-nourished. No distress.   Eyes: Conjunctivae are normal.   Neck: Neck supple. No JVD present. No thyromegaly present.   Cardiovascular: Normal rate, regular rhythm and normal heart sounds.    No murmur heard.  Pulmonary/Chest: Effort normal and breath sounds normal. No respiratory distress. He has no wheezes.   Abdominal: Soft. Bowel sounds are normal. He exhibits no mass. There is no tenderness.   Lymphadenopathy:     He has no cervical adenopathy.   Neurological: He is alert and oriented to person, place, and time.   Skin: He is not diaphoretic.       Office Visit on 05/28/2018   Component Date Value Ref Range Status    WBC 05/28/2018 6.44  3.90 - 12.70 K/uL Final    RBC 05/28/2018 6.00  4.60 - 6.20 M/uL Final     Hemoglobin 05/28/2018 17.7  14.0 - 18.0 g/dL Final    Hematocrit 05/28/2018 53.0  40.0 - 54.0 % Final    MCV 05/28/2018 88  82 - 98 fL Final    MCH 05/28/2018 29.5  27.0 - 31.0 pg Final    MCHC 05/28/2018 33.4  32.0 - 36.0 g/dL Final    RDW 05/28/2018 14.4  11.5 - 14.5 % Final    Platelets 05/28/2018 175  150 - 350 K/uL Final    MPV 05/28/2018 12.1  9.2 - 12.9 fL Final    Immature Granulocytes 05/28/2018 0.2  0.0 - 0.5 % Final    Gran # (ANC) 05/28/2018 3.5  1.8 - 7.7 K/uL Final    Immature Grans (Abs) 05/28/2018 0.01  0.00 - 0.04 K/uL Final    Lymph # 05/28/2018 2.1  1.0 - 4.8 K/uL Final    Mono # 05/28/2018 0.6  0.3 - 1.0 K/uL Final    Eos # 05/28/2018 0.2  0.0 - 0.5 K/uL Final    Baso # 05/28/2018 0.04  0.00 - 0.20 K/uL Final    nRBC 05/28/2018 0  0 /100 WBC Final    Gran% 05/28/2018 53.7  38.0 - 73.0 % Final    Lymph% 05/28/2018 32.3  18.0 - 48.0 % Final    Mono% 05/28/2018 9.5  4.0 - 15.0 % Final    Eosinophil% 05/28/2018 3.7  0.0 - 8.0 % Final    Basophil% 05/28/2018 0.6  0.0 - 1.9 % Final    Differential Method 05/28/2018 Automated   Final    Specimen UA 05/29/2018 Urine, Clean Catch   Final    Color, UA 05/29/2018 Yellow  Yellow, Straw, Radha Final    Appearance, UA 05/29/2018 Hazy* Clear Final    pH, UA 05/29/2018 5.0  5.0 - 8.0 Final    Specific Gravity, UA 05/29/2018 1.030  1.005 - 1.030 Final    Protein, UA 05/29/2018 Negative  Negative Final    Glucose, UA 05/29/2018 3+* Negative Final    Ketones, UA 05/29/2018 1+* Negative Final    Bilirubin (UA) 05/29/2018 Negative  Negative Final    Occult Blood UA 05/29/2018 2+* Negative Final    Nitrite, UA 05/29/2018 Negative  Negative Final    Urobilinogen, UA 05/29/2018 Negative  <2.0 EU/dL Final    Leukocytes, UA 05/29/2018 3+* Negative Final    Sodium 05/28/2018 143  136 - 145 mmol/L Final    Potassium 05/28/2018 5.1  3.5 - 5.1 mmol/L Final    Chloride 05/28/2018 105  95 - 110 mmol/L Final    CO2 05/28/2018 27  23 - 29  mmol/L Final    Glucose 05/28/2018 296* 70 - 110 mg/dL Final    BUN, Bld 05/28/2018 14  8 - 23 mg/dL Final    Creatinine 05/28/2018 1.5* 0.5 - 1.4 mg/dL Final    Calcium 05/28/2018 10.0  8.7 - 10.5 mg/dL Final    Total Protein 05/28/2018 7.5  6.0 - 8.4 g/dL Final    Albumin 05/28/2018 3.5  3.5 - 5.2 g/dL Final    Total Bilirubin 05/28/2018 0.8  0.1 - 1.0 mg/dL Final    Alkaline Phosphatase 05/28/2018 80  55 - 135 U/L Final    AST 05/28/2018 14  10 - 40 U/L Final    ALT 05/28/2018 15  10 - 44 U/L Final    Anion Gap 05/28/2018 11  8 - 16 mmol/L Final    eGFR if  05/28/2018 56.1* >60 mL/min/1.73 m^2 Final    eGFR if non African American 05/28/2018 48.5* >60 mL/min/1.73 m^2 Final    Cholesterol 05/28/2018 198  120 - 199 mg/dL Final    Triglycerides 05/28/2018 191* 30 - 150 mg/dL Final    HDL 05/28/2018 31* 40 - 75 mg/dL Final    LDL Cholesterol 05/28/2018 128.8  63.0 - 159.0 mg/dL Final    HDL/Chol Ratio 05/28/2018 15.7* 20.0 - 50.0 % Final    Total Cholesterol/HDL Ratio 05/28/2018 6.4* 2.0 - 5.0 Final    Non-HDL Cholesterol 05/28/2018 167  mg/dL Final    TSH 05/28/2018 2.507  0.400 - 4.000 uIU/mL Final    Hemoglobin A1C 05/28/2018 11.2* 4.0 - 5.6 % Final    Estimated Avg Glucose 05/28/2018 275* 68 - 131 mg/dL Final    Microalbum.,U,Random 05/29/2018 18.0  ug/mL Final    Creatinine, Random Ur 05/29/2018 70.0  23.0 - 375.0 mg/dL Final    Microalb Creat Ratio 05/29/2018 25.7  0.0 - 30.0 ug/mg Final    Chlamydia, Amplified DNA 05/28/2018 Not Detected  Not Detected Final    N gonorrhoeae, amplified DNA 05/28/2018 Not Detected  Not Detected Final    RBC, UA 05/29/2018 21* 0 - 4 /hpf Final    WBC, UA 05/29/2018 54* 0 - 5 /hpf Final    Bacteria, UA 05/29/2018 Rare  None-Occ /hpf Final    Yeast, UA 05/29/2018 MODERATE  None Final    Squam Epithel, UA 05/29/2018 2  /hpf Final    Microscopic Comment 05/29/2018 SEE COMMENT   Final     Assessment:       1. Uncontrolled type 2  diabetes mellitus without complication, with long-term current use of insulin        Plan:     1. Uncontrolled type 2 diabetes mellitus without complication, with long-term current use of insulin    - POCT glucose 450  Start sliding scale with humalog and increase lantus 20 to 40 units HS ov 1wk   Increase water intake  Diet discussed    2. Essential hypertension  Blood pressure improved and now 130/90 continue current medication    3. Elevated blood pressure reading  Blood pressure improved    4. Yeast dermatitis  Symptomatically improved and continue Diflucan.    5. Acute cystitis with hematuria  Increase water intake and doxycycline 100 mg twice a day.      Uncontrolled type 2 diabetes mellitus without complication, with long-term current use of insulin  -     POCT glucose

## 2018-05-31 ENCOUNTER — TELEPHONE (OUTPATIENT)
Dept: INTERNAL MEDICINE | Facility: CLINIC | Age: 63
End: 2018-05-31

## 2018-05-31 NOTE — TELEPHONE ENCOUNTER
----- Message from Agustin Cottrell sent at 5/31/2018  1:38 PM CDT -----  Contact: pt  Call pt at [phone] (home)   Regarding prescriptions going to wrong pharmacy

## 2018-06-01 ENCOUNTER — TELEPHONE (OUTPATIENT)
Dept: INTERNAL MEDICINE | Facility: CLINIC | Age: 63
End: 2018-06-01

## 2018-06-01 RX ORDER — INSULIN LISPRO 100 [IU]/ML
INJECTION, SOLUTION INTRAVENOUS; SUBCUTANEOUS
Qty: 1 BOX | Refills: 3 | Status: SHIPPED | OUTPATIENT
Start: 2018-06-01 | End: 2019-04-17 | Stop reason: SDUPTHER

## 2018-06-01 NOTE — TELEPHONE ENCOUNTER
Patient states that Dr. Lemus had just prescribed this medication for him. He says that he has the Humalog vial with no syringes to draw up the insulin with, because he was not prescribed any. He states that this is his first time being prescribed this medication, he doesn't have any other information to go off of.    Please Advise

## 2018-06-01 NOTE — TELEPHONE ENCOUNTER
Returned patient phone call.  Informed patient that previous message was sent to Dr. Sandhu for an order for the syringes since Dr. Lemus is out of the office today.  Patient verbally understood.

## 2018-06-01 NOTE — TELEPHONE ENCOUNTER
Patient called stating that he needs an order for syringes to be sent to his pharmacy.  Called patient pharmacy to see which ones he uses.  Patient does not have an order on file at the pharmacy.  Please advise if an order could be sent in.

## 2018-06-01 NOTE — TELEPHONE ENCOUNTER
I need the patient to tell us which kind of syringes he is using.  We don't have any orders and apparently his pharmacy does not either.  He should be able to read you the information from his syringes.  Check that these are for his Humalog/insulin lispro, 3 times daily.

## 2018-06-01 NOTE — TELEPHONE ENCOUNTER
----- Message from Jimmy Ortiz sent at 6/1/2018  1:41 PM CDT -----  Contact: pt   Pt returning call from Lisha. paty call back  ..[phone] (home)

## 2018-06-01 NOTE — TELEPHONE ENCOUNTER
----- Message from Fátima Zaldivar sent at 6/1/2018  8:26 AM CDT -----  Contact: self [phone]  States that he needs and order for insulin needles. Pt uses     [id number] - [place], [place] - [address] AT [facility] OF [address]  [address]  [place] [address]  Phone: [phone] Fax: [phone]    Please call back at [phone]//thank you acc

## 2018-06-01 NOTE — TELEPHONE ENCOUNTER
Order for syringes created.  I spoke with the patient.  He was on Humalog pens in the past.  I am sending a prescription to the pharmacy for the Humalog pen with the same instructions for sliding scale per Dr. Lemus's orders.  He may not be able to get that filled now because he already got the vial form.  He will use the syringes if necessary for now with vial until he can fill the pen order

## 2018-06-01 NOTE — TELEPHONE ENCOUNTER
----- Message from Rohini Owens sent at 6/1/2018  8:58 AM CDT -----  Contact: PT   Pt returning nurse call, request call back ..[phone] (zrtu)

## 2018-06-05 ENCOUNTER — OFFICE VISIT (OUTPATIENT)
Dept: INTERNAL MEDICINE | Facility: CLINIC | Age: 63
End: 2018-06-05
Payer: COMMERCIAL

## 2018-06-05 VITALS
SYSTOLIC BLOOD PRESSURE: 138 MMHG | BODY MASS INDEX: 43.51 KG/M2 | HEART RATE: 79 BPM | WEIGHT: 303.25 LBS | TEMPERATURE: 99 F | OXYGEN SATURATION: 95 % | DIASTOLIC BLOOD PRESSURE: 83 MMHG

## 2018-06-05 DIAGNOSIS — R11.10 VOMITING, INTRACTABILITY OF VOMITING NOT SPECIFIED, PRESENCE OF NAUSEA NOT SPECIFIED, UNSPECIFIED VOMITING TYPE: ICD-10-CM

## 2018-06-05 DIAGNOSIS — E66.9 DIABETES MELLITUS TYPE 2 IN OBESE: ICD-10-CM

## 2018-06-05 DIAGNOSIS — B37.2 YEAST DERMATITIS: ICD-10-CM

## 2018-06-05 DIAGNOSIS — E11.69 DIABETES MELLITUS TYPE 2 IN OBESE: ICD-10-CM

## 2018-06-05 DIAGNOSIS — I10 ESSENTIAL HYPERTENSION: ICD-10-CM

## 2018-06-05 DIAGNOSIS — R53.1 WEAKNESS: ICD-10-CM

## 2018-06-05 LAB
ALBUMIN SERPL BCP-MCNC: 3.4 G/DL
ALP SERPL-CCNC: 72 U/L
ALT SERPL W/O P-5'-P-CCNC: 18 U/L
ANION GAP SERPL CALC-SCNC: 13 MMOL/L
AST SERPL-CCNC: 18 U/L
BASOPHILS # BLD AUTO: 0.04 K/UL
BASOPHILS NFR BLD: 0.5 %
BILIRUB SERPL-MCNC: 0.7 MG/DL
BUN SERPL-MCNC: 31 MG/DL
CALCIUM SERPL-MCNC: 9.4 MG/DL
CHLORIDE SERPL-SCNC: 101 MMOL/L
CO2 SERPL-SCNC: 24 MMOL/L
CREAT SERPL-MCNC: 1.7 MG/DL
DIFFERENTIAL METHOD: NORMAL
EOSINOPHIL # BLD AUTO: 0.2 K/UL
EOSINOPHIL NFR BLD: 2 %
ERYTHROCYTE [DISTWIDTH] IN BLOOD BY AUTOMATED COUNT: 14.2 %
EST. GFR  (AFRICAN AMERICAN): 48.2 ML/MIN/1.73 M^2
EST. GFR  (NON AFRICAN AMERICAN): 41.7 ML/MIN/1.73 M^2
GLUCOSE SERPL-MCNC: 269 MG/DL
HCT VFR BLD AUTO: 51.4 %
HGB BLD-MCNC: 17.2 G/DL
IMM GRANULOCYTES # BLD AUTO: 0.01 K/UL
IMM GRANULOCYTES NFR BLD AUTO: 0.1 %
LYMPHOCYTES # BLD AUTO: 2.3 K/UL
LYMPHOCYTES NFR BLD: 30.4 %
MCH RBC QN AUTO: 29.4 PG
MCHC RBC AUTO-ENTMCNC: 33.5 G/DL
MCV RBC AUTO: 88 FL
MONOCYTES # BLD AUTO: 0.8 K/UL
MONOCYTES NFR BLD: 10.4 %
NEUTROPHILS # BLD AUTO: 4.2 K/UL
NEUTROPHILS NFR BLD: 56.6 %
NRBC BLD-RTO: 0 /100 WBC
PLATELET # BLD AUTO: 202 K/UL
PMV BLD AUTO: 12.7 FL
POTASSIUM SERPL-SCNC: 3.7 MMOL/L
PROT SERPL-MCNC: 7.3 G/DL
RBC # BLD AUTO: 5.85 M/UL
SODIUM SERPL-SCNC: 138 MMOL/L
WBC # BLD AUTO: 7.4 K/UL

## 2018-06-05 PROCEDURE — 3075F SYST BP GE 130 - 139MM HG: CPT | Mod: CPTII,S$GLB,, | Performed by: FAMILY MEDICINE

## 2018-06-05 PROCEDURE — 99214 OFFICE O/P EST MOD 30 MIN: CPT | Mod: S$GLB,,, | Performed by: FAMILY MEDICINE

## 2018-06-05 PROCEDURE — 3079F DIAST BP 80-89 MM HG: CPT | Mod: CPTII,S$GLB,, | Performed by: FAMILY MEDICINE

## 2018-06-05 PROCEDURE — 80053 COMPREHEN METABOLIC PANEL: CPT

## 2018-06-05 PROCEDURE — 99999 PR PBB SHADOW E&M-EST. PATIENT-LVL III: CPT | Mod: PBBFAC,,, | Performed by: FAMILY MEDICINE

## 2018-06-05 PROCEDURE — 85025 COMPLETE CBC W/AUTO DIFF WBC: CPT

## 2018-06-05 PROCEDURE — 3008F BODY MASS INDEX DOCD: CPT | Mod: CPTII,S$GLB,, | Performed by: FAMILY MEDICINE

## 2018-06-05 PROCEDURE — 3046F HEMOGLOBIN A1C LEVEL >9.0%: CPT | Mod: CPTII,S$GLB,, | Performed by: FAMILY MEDICINE

## 2018-06-05 RX ORDER — INSULIN GLARGINE 100 [IU]/ML
60 INJECTION, SOLUTION SUBCUTANEOUS NIGHTLY
Qty: 1 BOX | Refills: 2
Start: 2018-06-05 | End: 2018-08-13 | Stop reason: SDUPTHER

## 2018-06-05 NOTE — PATIENT INSTRUCTIONS
Diet: Diabetes  Food is an important tool that you can use to control diabetes and stay healthy. Eating well-balanced meals in the correct amounts will help you control your blood glucose levels and prevent low blood sugar reactions. It will also help you reduce the health risks of diabetes. There is no one specific diet that is right for everyone with diabetes. But there are general guidelines to follow. A registered dietitian (RD) will create a tailored diet approach thats just right for you. He or she will also help you plan healthy meals and snacks. If you have any questions, call your dietitian for advice.     Guidelines for success  Talk with your healthcare provider before starting a diabetes diet or weight loss program. If you haven't talked with a dietitian yet, ask your provider for a referral. The following guidelines can help you succeed:  · Select foods from the 6 food groups below. Your dietitian will help you find food choices within each group. He or she will also show you serving sizes and how many servings you can have at each meal.  ¨ Grains, beans, and starchy vegetables  ¨ Vegetables  ¨ Fruit  ¨ Milk or yogurt  ¨ Meat, poultry, fish, or tofu  ¨ Healthy fats  · Check your blood sugar levels as directed by your provider. Take any medicine as prescribed by your provider.  · Learn to read food labels and pick the right portion sizes.  · Eat only the amount of food in your meal plan. Eat about the same amount of food at regular times each day. Dont skip meals. Eat meals 4 to 5 hours apart, with snacks in between.  · Limit alcohol. It raises blood sugar levels. Drink water or calorie-free diet drinks that use safe sweeteners.  · Eat less fat to help lower your risk of heart disease. Use nonfat or low-fat dairy products and lean meats. Avoid fried foods. Use cooking oils that are unsaturated, such as olive, canola, or peanut oil.  · Talk with your dietitian about safe sugar substitutes.  · Avoid  added salt. It can contribute to high blood pressure, which can cause heart disease. People with diabetes already have a risk of high blood pressure and heart disease.  · Stay at a healthy weight. If you need to lose weight, cut down on your portion sizes. But dont skip meals. Exercise is an important part of any weight management program. Talk with your provider about an exercise program thats right for you.  · For more information about the best diet plan for you, talk with a registered dietitian (RD). To find an RD in your area, contact:  ¨ Academy of Nutrition and Dietetics www.eatright.org  ¨ The American Diabetes Association [phone] www.diabetes.org  Date Last Reviewed: 8/1/2016 © 2000-2017 [employer]. [address] [place] [address], [place] [address]. All rights reserved. This information is not intended as a substitute for professional medical care. Always follow your healthcare professional's instructions.

## 2018-06-05 NOTE — PROGRESS NOTES
Subjective:       Patient ID: Tom Tavarez is a 63 y.o. male.    Chief Complaint: f/u on blood sugar; weakness from the waist down; and Emesis    Follow-up uncontrolled diabetes hypertension weakness.  He continues polyuria polydipsia.  He feels weak in general.  He had 3 episodes of vomiting the last being 2 days ago.  Home blood sugars on a sliding scale (260, 262, 240, 234.  He is on Levemir 40 units a day and sliding scale Humalog.  He denies headache chest pain palpitations or edema.      Emesis    Associated symptoms include coughing. Pertinent negatives include no abdominal pain, chest pain, chills, diarrhea, fever or headaches.     Review of Systems   Constitutional: Positive for appetite change and fatigue. Negative for activity change, chills, diaphoresis and fever.   HENT: Negative for congestion.    Respiratory: Positive for cough. Negative for chest tightness, shortness of breath and wheezing.         Occasional cough.   Cardiovascular: Negative for chest pain, palpitations and leg swelling.        Denies syncope   Gastrointestinal: Positive for nausea and vomiting. Negative for abdominal distention, abdominal pain, constipation and diarrhea.   Endocrine: Positive for polydipsia and polyuria.   Genitourinary: Positive for frequency. Negative for urgency.   Skin:        He reports improvement in yeast  dermatitis   Neurological: Positive for weakness. Negative for syncope, speech difficulty, light-headedness and headaches.       Objective:      Physical Exam   Constitutional: He appears well-developed and well-nourished. No distress.   Eyes: Conjunctivae are normal.   Cardiovascular: Normal rate and regular rhythm.  Exam reveals no gallop.    No murmur heard.  Pulmonary/Chest: Effort normal and breath sounds normal. No respiratory distress. He has no wheezes. He has no rales.   Abdominal: Soft. Bowel sounds are normal. He exhibits no distension and no mass. There is no tenderness.   Lymphadenopathy:      He has no cervical adenopathy.   Skin: He is not diaphoretic.       Office Visit on 05/28/2018   Component Date Value Ref Range Status    WBC 05/28/2018 6.44  3.90 - 12.70 K/uL Final    RBC 05/28/2018 6.00  4.60 - 6.20 M/uL Final    Hemoglobin 05/28/2018 17.7  14.0 - 18.0 g/dL Final    Hematocrit 05/28/2018 53.0  40.0 - 54.0 % Final    MCV 05/28/2018 88  82 - 98 fL Final    MCH 05/28/2018 29.5  27.0 - 31.0 pg Final    MCHC 05/28/2018 33.4  32.0 - 36.0 g/dL Final    RDW 05/28/2018 14.4  11.5 - 14.5 % Final    Platelets 05/28/2018 175  150 - 350 K/uL Final    MPV 05/28/2018 12.1  9.2 - 12.9 fL Final    Immature Granulocytes 05/28/2018 0.2  0.0 - 0.5 % Final    Gran # (ANC) 05/28/2018 3.5  1.8 - 7.7 K/uL Final    Immature Grans (Abs) 05/28/2018 0.01  0.00 - 0.04 K/uL Final    Lymph # 05/28/2018 2.1  1.0 - 4.8 K/uL Final    Mono # 05/28/2018 0.6  0.3 - 1.0 K/uL Final    Eos # 05/28/2018 0.2  0.0 - 0.5 K/uL Final    Baso # 05/28/2018 0.04  0.00 - 0.20 K/uL Final    nRBC 05/28/2018 0  0 /100 WBC Final    Gran% 05/28/2018 53.7  38.0 - 73.0 % Final    Lymph% 05/28/2018 32.3  18.0 - 48.0 % Final    Mono% 05/28/2018 9.5  4.0 - 15.0 % Final    Eosinophil% 05/28/2018 3.7  0.0 - 8.0 % Final    Basophil% 05/28/2018 0.6  0.0 - 1.9 % Final    Differential Method 05/28/2018 Automated   Final    Specimen UA 05/29/2018 Urine, Clean Catch   Final    Color, UA 05/29/2018 Yellow  Yellow, Straw, Radha Final    Appearance, UA 05/29/2018 Hazy* Clear Final    pH, UA 05/29/2018 5.0  5.0 - 8.0 Final    Specific Gravity, UA 05/29/2018 1.030  1.005 - 1.030 Final    Protein, UA 05/29/2018 Negative  Negative Final    Glucose, UA 05/29/2018 3+* Negative Final    Ketones, UA 05/29/2018 1+* Negative Final    Bilirubin (UA) 05/29/2018 Negative  Negative Final    Occult Blood UA 05/29/2018 2+* Negative Final    Nitrite, UA 05/29/2018 Negative  Negative Final    Urobilinogen, UA 05/29/2018 Negative  <2.0 EU/dL  Final    Leukocytes, UA 05/29/2018 3+* Negative Final    Sodium 05/28/2018 143  136 - 145 mmol/L Final    Potassium 05/28/2018 5.1  3.5 - 5.1 mmol/L Final    Chloride 05/28/2018 105  95 - 110 mmol/L Final    CO2 05/28/2018 27  23 - 29 mmol/L Final    Glucose 05/28/2018 296* 70 - 110 mg/dL Final    BUN, Bld 05/28/2018 14  8 - 23 mg/dL Final    Creatinine 05/28/2018 1.5* 0.5 - 1.4 mg/dL Final    Calcium 05/28/2018 10.0  8.7 - 10.5 mg/dL Final    Total Protein 05/28/2018 7.5  6.0 - 8.4 g/dL Final    Albumin 05/28/2018 3.5  3.5 - 5.2 g/dL Final    Total Bilirubin 05/28/2018 0.8  0.1 - 1.0 mg/dL Final    Alkaline Phosphatase 05/28/2018 80  55 - 135 U/L Final    AST 05/28/2018 14  10 - 40 U/L Final    ALT 05/28/2018 15  10 - 44 U/L Final    Anion Gap 05/28/2018 11  8 - 16 mmol/L Final    eGFR if  05/28/2018 56.1* >60 mL/min/1.73 m^2 Final    eGFR if non African American 05/28/2018 48.5* >60 mL/min/1.73 m^2 Final    Cholesterol 05/28/2018 198  120 - 199 mg/dL Final    Triglycerides 05/28/2018 191* 30 - 150 mg/dL Final    HDL 05/28/2018 31* 40 - 75 mg/dL Final    LDL Cholesterol 05/28/2018 128.8  63.0 - 159.0 mg/dL Final    HDL/Chol Ratio 05/28/2018 15.7* 20.0 - 50.0 % Final    Total Cholesterol/HDL Ratio 05/28/2018 6.4* 2.0 - 5.0 Final    Non-HDL Cholesterol 05/28/2018 167  mg/dL Final    TSH 05/28/2018 2.507  0.400 - 4.000 uIU/mL Final    Hemoglobin A1C 05/28/2018 11.2* 4.0 - 5.6 % Final    Estimated Avg Glucose 05/28/2018 275* 68 - 131 mg/dL Final    Microalbum.,U,Random 05/29/2018 18.0  ug/mL Final    Creatinine, Random Ur 05/29/2018 70.0  23.0 - 375.0 mg/dL Final    Microalb Creat Ratio 05/29/2018 25.7  0.0 - 30.0 ug/mg Final    Chlamydia, Amplified DNA 05/28/2018 Not Detected  Not Detected Final    N gonorrhoeae, amplified DNA 05/28/2018 Not Detected  Not Detected Final    RBC, UA 05/29/2018 21* 0 - 4 /hpf Final    WBC, UA 05/29/2018 54* 0 - 5 /hpf Final    Bacteria,  UA 05/29/2018 Rare  None-Occ /hpf Final    Yeast, UA 05/29/2018 MODERATE  None Final    Squam Epithel, UA 05/29/2018 2  /hpf Final    Microscopic Comment 05/29/2018 SEE COMMENT   Final     Assessment:       1. Diabetes mellitus type 2 in obese        Plan:   Discussed improved hydration.  Increase Levemir 40 units and start 60 units a day.  He currently is taking 9 units of Humalog 3 times a day .  Continue sliding scale.  CBC CMP ordered.  Discussed diabetic diet detail.  Follow-up in one week.  Blood pressure is 138 over 83 control.  Pulse of 79 and regular.      Diabetes mellitus type 2 in obese  -     blood sugar diagnostic (ONETOUCH ULTRA TEST) Strp; USE 1 STRIP TWICE A DAY  Dispense: 200 strip; Refill: 3  -     insulin glargine (LANTUS SOLOSTAR) 100 unit/mL (3 mL) InPn pen; Inject 60 Units into the skin every evening.  Dispense: 1 Box; Refill: 2  -     CBC auto differential  -     Comprehensive metabolic panel

## 2018-06-12 ENCOUNTER — OFFICE VISIT (OUTPATIENT)
Dept: INTERNAL MEDICINE | Facility: CLINIC | Age: 63
End: 2018-06-12
Payer: COMMERCIAL

## 2018-06-12 VITALS
TEMPERATURE: 98 F | BODY MASS INDEX: 43.81 KG/M2 | SYSTOLIC BLOOD PRESSURE: 107 MMHG | DIASTOLIC BLOOD PRESSURE: 77 MMHG | WEIGHT: 305.31 LBS | OXYGEN SATURATION: 100 % | HEART RATE: 68 BPM

## 2018-06-12 DIAGNOSIS — I10 ESSENTIAL HYPERTENSION: ICD-10-CM

## 2018-06-12 LAB
ESTIMATED AVG GLUCOSE: 289 MG/DL
GLUCOSE SERPL-MCNC: 145 MG/DL
HBA1C MFR BLD HPLC: 11.7 %

## 2018-06-12 PROCEDURE — 83036 HEMOGLOBIN GLYCOSYLATED A1C: CPT

## 2018-06-12 PROCEDURE — 3008F BODY MASS INDEX DOCD: CPT | Mod: CPTII,S$GLB,, | Performed by: FAMILY MEDICINE

## 2018-06-12 PROCEDURE — 99213 OFFICE O/P EST LOW 20 MIN: CPT | Mod: S$GLB,,, | Performed by: FAMILY MEDICINE

## 2018-06-12 PROCEDURE — 3046F HEMOGLOBIN A1C LEVEL >9.0%: CPT | Mod: CPTII,S$GLB,, | Performed by: FAMILY MEDICINE

## 2018-06-12 PROCEDURE — 3078F DIAST BP <80 MM HG: CPT | Mod: CPTII,S$GLB,, | Performed by: FAMILY MEDICINE

## 2018-06-12 PROCEDURE — 3074F SYST BP LT 130 MM HG: CPT | Mod: CPTII,S$GLB,, | Performed by: FAMILY MEDICINE

## 2018-06-12 PROCEDURE — 82947 ASSAY GLUCOSE BLOOD QUANT: CPT

## 2018-06-12 PROCEDURE — 99999 PR PBB SHADOW E&M-EST. PATIENT-LVL IV: CPT | Mod: PBBFAC,,, | Performed by: FAMILY MEDICINE

## 2018-06-12 NOTE — PROGRESS NOTES
Subjective:       Patient ID: Tom Tavarez is a 63 y.o. male.    Chief Complaint: Follow-up    Follow-up diabetes uncontrolled and hypertension.  He reduced Lantus 60 units and now taking 40 units a day.  His home glucose fasting is been 104 and 135.  He has not used Humalog in several days due to glucose  improvement.  He feels better.  Energy levels are improved and appetite has improved.  Denies chest pain and palpitations shortness of breath or edema denies hypoglycemic symptoms.      Review of Systems   Constitutional: Negative for activity change, appetite change, fatigue and unexpected weight change.   Respiratory: Negative for cough and shortness of breath.    Cardiovascular: Negative for chest pain and palpitations.   Endocrine: Negative for polydipsia and polyuria.   Genitourinary: Negative for frequency.   Neurological: Negative for weakness and headaches.       Objective:      Physical Exam   Constitutional: He appears well-developed and well-nourished. No distress.   Cardiovascular: Normal rate and regular rhythm.    No murmur heard.  Pulmonary/Chest: Effort normal and breath sounds normal.       Office Visit on 06/05/2018   Component Date Value Ref Range Status    WBC 06/05/2018 7.40  3.90 - 12.70 K/uL Final    RBC 06/05/2018 5.85  4.60 - 6.20 M/uL Final    Hemoglobin 06/05/2018 17.2  14.0 - 18.0 g/dL Final    Hematocrit 06/05/2018 51.4  40.0 - 54.0 % Final    MCV 06/05/2018 88  82 - 98 fL Final    MCH 06/05/2018 29.4  27.0 - 31.0 pg Final    MCHC 06/05/2018 33.5  32.0 - 36.0 g/dL Final    RDW 06/05/2018 14.2  11.5 - 14.5 % Final    Platelets 06/05/2018 202  150 - 350 K/uL Final    MPV 06/05/2018 12.7  9.2 - 12.9 fL Final    Immature Granulocytes 06/05/2018 0.1  0.0 - 0.5 % Final    Gran # (ANC) 06/05/2018 4.2  1.8 - 7.7 K/uL Final    Immature Grans (Abs) 06/05/2018 0.01  0.00 - 0.04 K/uL Final    Lymph # 06/05/2018 2.3  1.0 - 4.8 K/uL Final    Mono # 06/05/2018 0.8  0.3 - 1.0 K/uL  Final    Eos # 06/05/2018 0.2  0.0 - 0.5 K/uL Final    Baso # 06/05/2018 0.04  0.00 - 0.20 K/uL Final    nRBC 06/05/2018 0  0 /100 WBC Final    Gran% 06/05/2018 56.6  38.0 - 73.0 % Final    Lymph% 06/05/2018 30.4  18.0 - 48.0 % Final    Mono% 06/05/2018 10.4  4.0 - 15.0 % Final    Eosinophil% 06/05/2018 2.0  0.0 - 8.0 % Final    Basophil% 06/05/2018 0.5  0.0 - 1.9 % Final    Differential Method 06/05/2018 Automated   Final    Sodium 06/05/2018 138  136 - 145 mmol/L Final    Potassium 06/05/2018 3.7  3.5 - 5.1 mmol/L Final    Chloride 06/05/2018 101  95 - 110 mmol/L Final    CO2 06/05/2018 24  23 - 29 mmol/L Final    Glucose 06/05/2018 269* 70 - 110 mg/dL Final    BUN, Bld 06/05/2018 31* 8 - 23 mg/dL Final    Creatinine 06/05/2018 1.7* 0.5 - 1.4 mg/dL Final    Calcium 06/05/2018 9.4  8.7 - 10.5 mg/dL Final    Total Protein 06/05/2018 7.3  6.0 - 8.4 g/dL Final    Albumin 06/05/2018 3.4* 3.5 - 5.2 g/dL Final    Total Bilirubin 06/05/2018 0.7  0.1 - 1.0 mg/dL Final    Alkaline Phosphatase 06/05/2018 72  55 - 135 U/L Final    AST 06/05/2018 18  10 - 40 U/L Final    ALT 06/05/2018 18  10 - 44 U/L Final    Anion Gap 06/05/2018 13  8 - 16 mmol/L Final    eGFR if  06/05/2018 48.2* >60 mL/min/1.73 m^2 Final    eGFR if non African American 06/05/2018 41.7* >60 mL/min/1.73 m^2 Final     Assessment:       1. Uncontrolled type 2 diabetes mellitus without complication, with long-term current use of insulin        Plan:     Blood  medication.  Lab was ordered.  Continue as is and follow-up in one month.  Referral for diabetic eye exam.  Discussed deferring  for one month due to recent elevated glucose.    Uncontrolled type 2 diabetes mellitus without complication, with long-term current use of insulin  -     Glucose, fasting  -     Hemoglobin A1c  -     Ambulatory referral to Optometry

## 2018-06-14 ENCOUNTER — TELEPHONE (OUTPATIENT)
Dept: INTERNAL MEDICINE | Facility: CLINIC | Age: 63
End: 2018-06-14

## 2018-06-14 NOTE — TELEPHONE ENCOUNTER
----- Message from Jackie Wild sent at 6/14/2018 12:27 PM CDT -----  Contact: pt   States he's rtn nurses call and can be reached at [phone]//thanks/dbw

## 2018-06-19 ENCOUNTER — PATIENT OUTREACH (OUTPATIENT)
Dept: ADMINISTRATIVE | Facility: HOSPITAL | Age: 63
End: 2018-06-19

## 2018-06-19 NOTE — PROGRESS NOTES
Schedule diabetic eye exam on 07/10/18 at 10:00 am with Dr. Lucia Langley. Patient in agreement and vocalize understanding. I will send appointment reminder in mail today.

## 2018-07-02 ENCOUNTER — PATIENT OUTREACH (OUTPATIENT)
Dept: ADMINISTRATIVE | Facility: HOSPITAL | Age: 63
End: 2018-07-02

## 2018-07-02 DIAGNOSIS — E78.5 HYPERLIPIDEMIA, UNSPECIFIED HYPERLIPIDEMIA TYPE: Primary | Chronic | ICD-10-CM

## 2018-07-02 DIAGNOSIS — I10 ESSENTIAL HYPERTENSION: Chronic | ICD-10-CM

## 2018-07-02 DIAGNOSIS — M10.379 ACUTE GOUT DUE TO RENAL IMPAIRMENT INVOLVING FOOT, UNSPECIFIED LATERALITY: ICD-10-CM

## 2018-07-02 NOTE — LETTER
July 2, 2018        Tom Tavarez  [address] [place] [address]      Dear Mr. Tavarez,    In the process of reviewing your chart for your upcoming appointment, Adolfo Lemus MD has selected you for enrollment in [facility] Outpatient Case Management Program.  Adolfo Lemus MD feels you may benefit from this ENTIRELY FREE SERVICE due to your chronic health conditions and multiple medications.     Here at [facility], we care about all of your healthcare needs.  A team of nurses and social workers will work closely with you and your doctors to develop an individualized care plan that can assist you in achieving your healthcare goals. This may include teaching you about your health conditions and medications, discussions about diet and beneficial lifestyle changes, as well as connections to helpful community resources and support services for you and your caregivers.     If you are interested in this FREE SERVICE, you may reply to this message through your [facility] portal and I will have a  you to enroll in the program.  If you have any questions or concerns, please do not hesitate to contact me or you can talk to your Physician during your upcoming appointment.     As always, thank you for choosing [facility] for your healthcare needs.     Sincerely,   Yeni ALBERTO LPN Care Coordinator  [facility]  [phone]

## 2018-07-16 ENCOUNTER — OFFICE VISIT (OUTPATIENT)
Dept: INTERNAL MEDICINE | Facility: CLINIC | Age: 63
End: 2018-07-16
Payer: COMMERCIAL

## 2018-07-16 ENCOUNTER — TELEPHONE (OUTPATIENT)
Dept: OPHTHALMOLOGY | Facility: CLINIC | Age: 63
End: 2018-07-16

## 2018-07-16 VITALS
HEART RATE: 56 BPM | SYSTOLIC BLOOD PRESSURE: 132 MMHG | TEMPERATURE: 98 F | DIASTOLIC BLOOD PRESSURE: 74 MMHG | WEIGHT: 309.75 LBS | HEIGHT: 70 IN | OXYGEN SATURATION: 99 % | BODY MASS INDEX: 44.34 KG/M2

## 2018-07-16 DIAGNOSIS — I10 ESSENTIAL HYPERTENSION: ICD-10-CM

## 2018-07-16 PROBLEM — R11.10 VOMITING: Status: RESOLVED | Noted: 2018-06-05 | Resolved: 2018-07-16

## 2018-07-16 PROBLEM — N34.2 URETHRITIS: Status: RESOLVED | Noted: 2018-05-28 | Resolved: 2018-07-16

## 2018-07-16 PROBLEM — Z12.5 SCREENING FOR PROSTATE CANCER: Status: RESOLVED | Noted: 2017-10-03 | Resolved: 2018-07-16

## 2018-07-16 PROCEDURE — 82947 ASSAY GLUCOSE BLOOD QUANT: CPT

## 2018-07-16 PROCEDURE — 3075F SYST BP GE 130 - 139MM HG: CPT | Mod: CPTII,S$GLB,, | Performed by: FAMILY MEDICINE

## 2018-07-16 PROCEDURE — 83036 HEMOGLOBIN GLYCOSYLATED A1C: CPT

## 2018-07-16 PROCEDURE — 99999 PR PBB SHADOW E&M-EST. PATIENT-LVL III: CPT | Mod: PBBFAC,,, | Performed by: FAMILY MEDICINE

## 2018-07-16 PROCEDURE — 3078F DIAST BP <80 MM HG: CPT | Mod: CPTII,S$GLB,, | Performed by: FAMILY MEDICINE

## 2018-07-16 PROCEDURE — 3046F HEMOGLOBIN A1C LEVEL >9.0%: CPT | Mod: CPTII,S$GLB,, | Performed by: FAMILY MEDICINE

## 2018-07-16 PROCEDURE — 3008F BODY MASS INDEX DOCD: CPT | Mod: CPTII,S$GLB,, | Performed by: FAMILY MEDICINE

## 2018-07-16 PROCEDURE — 99213 OFFICE O/P EST LOW 20 MIN: CPT | Mod: S$GLB,,, | Performed by: FAMILY MEDICINE

## 2018-07-16 NOTE — TELEPHONE ENCOUNTER
----- Message from Angie Hart sent at 7/16/2018 11:53 AM CDT -----  Contact: self   Patient would like to know if he can be seen today or if not if there is any paperwork to be completed can he come in today to do it. Patient is currently in the area. Please call back at [phone].      Thanks,  Angie Hart

## 2018-07-16 NOTE — TELEPHONE ENCOUNTER
Spoke with patient and told him that there is no paperwork to be done tomorrow at the check in desk just verification of personal information .

## 2018-07-16 NOTE — PROGRESS NOTES
Subjective:       Patient ID: Tom Tavarez is a 63 y.o. male.    Chief Complaint: Follow-up    Follow-up diabetes hypertension.  He reports improvement in home fasting glucoses.  He denies hypoglycemic symptoms.  He has used Lantus 60 units at bedtime and NovoLog sliding scale 3-5-7 or 9 units as needed.      Review of Systems   Constitutional: Negative for appetite change, diaphoresis, fatigue and unexpected weight change.   Respiratory: Negative for cough, shortness of breath and wheezing.    Cardiovascular: Negative for chest pain, palpitations and leg swelling.   Gastrointestinal: Negative for abdominal pain.   Endocrine: Negative for polydipsia and polyuria.   Genitourinary: Negative for difficulty urinating and flank pain.   Neurological: Negative for headaches.       Objective:      Physical Exam   Constitutional: He appears well-developed and well-nourished. No distress.   Cardiovascular: Normal rate and regular rhythm.    No murmur heard.  Pulmonary/Chest: Effort normal and breath sounds normal.       Office Visit on 06/12/2018   Component Date Value Ref Range Status    Glucose, Fasting 06/12/2018 145* 70 - 110 mg/dL Final    Hemoglobin A1C 06/12/2018 11.7* 4.0 - 5.6 % Final    Estimated Avg Glucose 06/12/2018 289* 68 - 131 mg/dL Final     Assessment:       1. Uncontrolled type 2 diabetes mellitus without complication, with long-term current use of insulin    2. Essential hypertension        Plan:   Glucose A1c ordered.  Blood pressure is controlled on current medications.  Discussed medications diet and exercise.  Continue increase water intake.  Follow up in 1 month.      Uncontrolled type 2 diabetes mellitus without complication, with long-term current use of insulin  -     Glucose, fasting  -     Hemoglobin A1c    Essential hypertension

## 2018-07-17 LAB
ESTIMATED AVG GLUCOSE: 249 MG/DL
GLUCOSE SERPL-MCNC: 125 MG/DL
HBA1C MFR BLD HPLC: 10.3 %

## 2018-08-10 ENCOUNTER — TELEPHONE (OUTPATIENT)
Dept: INTERNAL MEDICINE | Facility: CLINIC | Age: 63
End: 2018-08-10

## 2018-08-10 NOTE — TELEPHONE ENCOUNTER
----- Message from Rohini Owens sent at 8/10/2018  9:36 AM CDT -----  Contact: Pt  Pt request call back to get an refill for 90 day supply on his Lances sent to [facility].  States the need Prior Authorization .[phone] (home)     ...  [facility] - [place], [place] - [address] [place] [address]  Phone: [phone] Fax: [phone]

## 2018-08-10 NOTE — TELEPHONE ENCOUNTER
Patient called in regards to needing a PA for his test strips. He states that [employer] sent over a PA fax form that needs to be filled out. Pt was advised that the PA form would be complete. Pt verbally understood the information given.

## 2018-08-13 ENCOUNTER — PATIENT OUTREACH (OUTPATIENT)
Dept: ADMINISTRATIVE | Facility: HOSPITAL | Age: 63
End: 2018-08-13

## 2018-08-13 DIAGNOSIS — E66.9 DIABETES MELLITUS TYPE 2 IN OBESE: ICD-10-CM

## 2018-08-13 DIAGNOSIS — E11.69 DIABETES MELLITUS TYPE 2 IN OBESE: ICD-10-CM

## 2018-08-13 RX ORDER — INSULIN GLARGINE 100 [IU]/ML
60 INJECTION, SOLUTION SUBCUTANEOUS NIGHTLY
Qty: 1 BOX | Refills: 2 | Status: SHIPPED | OUTPATIENT
Start: 2018-08-13 | End: 2018-11-13 | Stop reason: SDUPTHER

## 2018-08-13 NOTE — TELEPHONE ENCOUNTER
----- Message from Chelsy Pearson sent at 8/13/2018  9:35 AM CDT -----  Pt at [phone]///states he is needing a prescription [facility] to [facility] for med//Lantus//he is out of town so he rescheduled his appt with Dr Lemus to 8-27-18//please call when sent in//he may not be able to answer so please leave a message//isamar//pam

## 2018-08-13 NOTE — TELEPHONE ENCOUNTER
Called patient to advised.  Patient then stated that he has been out of his lantus for 6 days. Patient then asked how much would 1 pen cost at the [facility].  I advised patient that he would have to check with [facility] to get the price. Patient verbally understood.  Patient did not want prescription to [facility] to [facility], due to the fact that [facility] and less [facility].

## 2018-08-13 NOTE — LETTER
August 13, 2018        [place] Tavarez  [address] [place] [place] [address]      Dear Mr. Tavarez,    You have an upcoming appointment with Adolfo Lemus MD on 08/27/18.      Your chart is indicating you may be due for the following and I will be happy to assist you in scheduling any needed appointments:  Health Maintenance Due   Topic    Zoster Vaccine     Eye Exam     Influenza Vaccine     Foot Exam           If you have had any of the above done at another facility, please bring the records or information with you so that your record at [facility] will be complete.    We will be happy to assist you with scheduling any necessary appointments or you may contact the [facility] appointment desk at [phone] to schedule at your convenience.     Thank you for choosing [facility] for your healthcare needs,    Yeni ALBERTO LPN Care Coordinator  [facility]  [phone]

## 2018-09-17 ENCOUNTER — APPOINTMENT (OUTPATIENT)
Dept: RADIOLOGY | Facility: HOSPITAL | Age: 63
End: 2018-09-17
Attending: FAMILY MEDICINE
Payer: COMMERCIAL

## 2018-09-17 ENCOUNTER — OFFICE VISIT (OUTPATIENT)
Dept: INTERNAL MEDICINE | Facility: CLINIC | Age: 63
End: 2018-09-17
Payer: COMMERCIAL

## 2018-09-17 VITALS
WEIGHT: 310.88 LBS | TEMPERATURE: 98 F | SYSTOLIC BLOOD PRESSURE: 138 MMHG | OXYGEN SATURATION: 100 % | BODY MASS INDEX: 44.6 KG/M2 | HEART RATE: 54 BPM | DIASTOLIC BLOOD PRESSURE: 80 MMHG

## 2018-09-17 DIAGNOSIS — I10 ESSENTIAL HYPERTENSION: Chronic | ICD-10-CM

## 2018-09-17 DIAGNOSIS — Z28.39 IMMUNIZATION DEFICIENCY: ICD-10-CM

## 2018-09-17 DIAGNOSIS — N18.30 CHRONIC KIDNEY DISEASE (CKD), STAGE III (MODERATE): ICD-10-CM

## 2018-09-17 DIAGNOSIS — Z82.49 FH: THORACIC AORTIC ANEURYSM: ICD-10-CM

## 2018-09-17 DIAGNOSIS — E11.69 DIABETES MELLITUS TYPE 2 IN OBESE: ICD-10-CM

## 2018-09-17 DIAGNOSIS — E66.9 DIABETES MELLITUS TYPE 2 IN OBESE: ICD-10-CM

## 2018-09-17 LAB
ALBUMIN SERPL BCP-MCNC: 3.5 G/DL
ALP SERPL-CCNC: 65 U/L
ALT SERPL W/O P-5'-P-CCNC: 14 U/L
ANION GAP SERPL CALC-SCNC: 12 MMOL/L
AST SERPL-CCNC: 16 U/L
BILIRUB SERPL-MCNC: 0.8 MG/DL
BUN SERPL-MCNC: 21 MG/DL
CALCIUM SERPL-MCNC: 9.6 MG/DL
CHLORIDE SERPL-SCNC: 107 MMOL/L
CO2 SERPL-SCNC: 27 MMOL/L
CREAT SERPL-MCNC: 1.5 MG/DL
EST. GFR  (AFRICAN AMERICAN): 56.1 ML/MIN/1.73 M^2
EST. GFR  (NON AFRICAN AMERICAN): 48.5 ML/MIN/1.73 M^2
ESTIMATED AVG GLUCOSE: 192 MG/DL
GLUCOSE SERPL-MCNC: 148 MG/DL
HBA1C MFR BLD HPLC: 8.3 %
POTASSIUM SERPL-SCNC: 4.5 MMOL/L
PROT SERPL-MCNC: 7.6 G/DL
SODIUM SERPL-SCNC: 146 MMOL/L

## 2018-09-17 PROCEDURE — 90471 IMMUNIZATION ADMIN: CPT | Mod: S$GLB,,, | Performed by: FAMILY MEDICINE

## 2018-09-17 PROCEDURE — 90686 IIV4 VACC NO PRSV 0.5 ML IM: CPT | Mod: S$GLB,,, | Performed by: FAMILY MEDICINE

## 2018-09-17 PROCEDURE — 99999 PR PBB SHADOW E&M-EST. PATIENT-LVL V: CPT | Mod: PBBFAC,,, | Performed by: FAMILY MEDICINE

## 2018-09-17 PROCEDURE — 71046 X-RAY EXAM CHEST 2 VIEWS: CPT | Mod: TC,FY,PO

## 2018-09-17 PROCEDURE — 80053 COMPREHEN METABOLIC PANEL: CPT

## 2018-09-17 PROCEDURE — 3008F BODY MASS INDEX DOCD: CPT | Mod: CPTII,S$GLB,, | Performed by: FAMILY MEDICINE

## 2018-09-17 PROCEDURE — 3079F DIAST BP 80-89 MM HG: CPT | Mod: CPTII,S$GLB,, | Performed by: FAMILY MEDICINE

## 2018-09-17 PROCEDURE — 3046F HEMOGLOBIN A1C LEVEL >9.0%: CPT | Mod: CPTII,S$GLB,, | Performed by: FAMILY MEDICINE

## 2018-09-17 PROCEDURE — 99214 OFFICE O/P EST MOD 30 MIN: CPT | Mod: 25,S$GLB,, | Performed by: FAMILY MEDICINE

## 2018-09-17 PROCEDURE — 3075F SYST BP GE 130 - 139MM HG: CPT | Mod: CPTII,S$GLB,, | Performed by: FAMILY MEDICINE

## 2018-09-17 PROCEDURE — 83036 HEMOGLOBIN GLYCOSYLATED A1C: CPT

## 2018-09-17 PROCEDURE — 71046 X-RAY EXAM CHEST 2 VIEWS: CPT | Mod: 26,,, | Performed by: RADIOLOGY

## 2018-09-17 RX ORDER — METFORMIN HYDROCHLORIDE 500 MG/1
500 TABLET ORAL 2 TIMES DAILY WITH MEALS
Qty: 180 TABLET | Refills: 3 | Status: SHIPPED | OUTPATIENT
Start: 2018-09-17 | End: 2020-04-23 | Stop reason: SDUPTHER

## 2018-09-17 RX ORDER — OLMESARTAN MEDOXOMIL, AMLODIPINE AND HYDROCHLOROTHIAZIDE TABLET 40/10/25 MG 40; 10; 25 MG/1; MG/1; MG/1
1 TABLET ORAL DAILY
Qty: 90 TABLET | Refills: 3 | Status: SHIPPED | OUTPATIENT
Start: 2018-09-17 | End: 2020-04-23 | Stop reason: SDUPTHER

## 2018-09-17 NOTE — PROGRESS NOTES
Subjective:       Patient ID: Tom Tavarez is a 63 y.o. male.    Chief Complaint: Follow-up (diabetes)    Follow-up diabetes hypertension chronic kidney disease. He reports nocturia has improved from every hour to currently 1 time at night.  Fasting glucose at home was 140.  He denies increased thirst but states he drinks much water during the day and has polyuria.  He denies headache chest pain palpitations shortness of breath or edema. The he has avoided NSAIDs due to chronic kidney disease. He is scheduled for diabetic eye exam today.      Review of Systems   Constitutional: Negative for appetite change, diaphoresis, fatigue and unexpected weight change.   Respiratory: Negative for cough, chest tightness, shortness of breath and wheezing.    Cardiovascular: Negative for chest pain, palpitations and leg swelling.        Denies lightheadedness syncope   Gastrointestinal: Negative for abdominal distention, abdominal pain, diarrhea and nausea.   Endocrine: Positive for polydipsia and polyuria.   Genitourinary: Positive for frequency. Negative for difficulty urinating, dysuria, hematuria and urgency.   Neurological: Negative for dizziness, syncope, weakness, light-headedness and headaches.       Objective:      Physical Exam   Constitutional: He is oriented to person, place, and time. He appears well-developed and well-nourished. No distress.   Neck: Neck supple. No thyromegaly present.   Cardiovascular: Normal rate, regular rhythm and normal heart sounds.   No murmur heard.  Pulmonary/Chest: Effort normal and breath sounds normal. No respiratory distress. He has no wheezes.   Abdominal: Soft. Bowel sounds are normal. He exhibits no mass. There is no tenderness.   Lymphadenopathy:     He has no cervical adenopathy.   Neurological: He is alert and oriented to person, place, and time.   Skin: He is not diaphoretic.       Office Visit on 07/16/2018   Component Date Value Ref Range Status    Glucose, Fasting  07/16/2018 125* 70 - 110 mg/dL Final    Hemoglobin A1C 07/16/2018 10.3* 4.0 - 5.6 % Final    Estimated Avg Glucose 07/16/2018 249* 68 - 131 mg/dL Final     Assessment:       1. Essential hypertension    2. Uncontrolled type 2 diabetes mellitus without complication, with long-term current use of insulin    3. Immunization deficiency    4. FH: thoracic aortic aneurysm    5. Diabetes mellitus type 2 in obese        Plan:   Blood pressure is controlled.  Refill medication.  CMP A1c was ordered.  Refill metformin.  Health maintenance reviewed.  He is scheduled for diabetic eye exam today.  Flu vaccination given today.  His mother sister and grandmother had aneurysms probably of the thoracic aorta.  Chest x-ray ordered.  Follow-up in 1 month.      Essential hypertension  -     Comprehensive metabolic panel  -     olmesartan-amLODIPin-hcthiazid (TRIBENZOR) 40-10-25 mg Tab; Take 1 tablet by mouth once daily.  Dispense: 90 tablet; Refill: 3    Uncontrolled type 2 diabetes mellitus without complication, with long-term current use of insulin  -     Comprehensive metabolic panel  -     Hemoglobin A1c    Immunization deficiency    FH: thoracic aortic aneurysm  -     X-Ray Chest PA And Lateral; Future; Expected date: 09/17/2018    Diabetes mellitus type 2 in obese  -     metFORMIN (GLUCOPHAGE) 500 MG tablet; Take 1 tablet (500 mg total) by mouth 2 (two) times daily with meals.  Dispense: 180 tablet; Refill: 3    Other orders  -     Influenza - Quadrivalent (3 years & older) (PF)

## 2018-09-19 ENCOUNTER — TELEPHONE (OUTPATIENT)
Dept: INTERNAL MEDICINE | Facility: CLINIC | Age: 63
End: 2018-09-19

## 2018-09-19 NOTE — TELEPHONE ENCOUNTER
----- Message from Lady Hawkins sent at 9/19/2018  4:31 PM CDT -----  Contact: Pt  Pt called and stated he was returning a call regarding his results. He also stated to leave his results on his voicemail. He be reached at [phone].      Thanks,  TF

## 2018-09-19 NOTE — TELEPHONE ENCOUNTER
----- Message from Marii Bravo sent at 9/19/2018  4:00 PM CDT -----  Contact: pt  States he's returning a nurse call. Please call pt at [phone]. Thank you

## 2018-10-17 RX ORDER — HYDROCHLOROTHIAZIDE 25 MG/1
TABLET ORAL
Qty: 90 TABLET | Refills: 1 | Status: SHIPPED | OUTPATIENT
Start: 2018-10-17 | End: 2020-03-19

## 2018-10-23 ENCOUNTER — OFFICE VISIT (OUTPATIENT)
Dept: INTERNAL MEDICINE | Facility: CLINIC | Age: 63
End: 2018-10-23
Payer: COMMERCIAL

## 2018-10-23 ENCOUNTER — TELEPHONE (OUTPATIENT)
Dept: INTERNAL MEDICINE | Facility: CLINIC | Age: 63
End: 2018-10-23

## 2018-10-23 VITALS
OXYGEN SATURATION: 99 % | DIASTOLIC BLOOD PRESSURE: 84 MMHG | SYSTOLIC BLOOD PRESSURE: 129 MMHG | BODY MASS INDEX: 45.1 KG/M2 | HEART RATE: 56 BPM | WEIGHT: 315 LBS | TEMPERATURE: 97 F | HEIGHT: 70 IN

## 2018-10-23 DIAGNOSIS — I10 ESSENTIAL HYPERTENSION: ICD-10-CM

## 2018-10-23 DIAGNOSIS — B35.1 ONYCHOMYCOSIS OF TOENAIL: ICD-10-CM

## 2018-10-23 DIAGNOSIS — E78.5 HYPERLIPIDEMIA, UNSPECIFIED HYPERLIPIDEMIA TYPE: ICD-10-CM

## 2018-10-23 LAB
ALBUMIN SERPL BCP-MCNC: 3.3 G/DL
ALP SERPL-CCNC: 65 U/L
ALT SERPL W/O P-5'-P-CCNC: 17 U/L
ANION GAP SERPL CALC-SCNC: 8 MMOL/L
AST SERPL-CCNC: 16 U/L
BILIRUB SERPL-MCNC: 0.7 MG/DL
BUN SERPL-MCNC: 21 MG/DL
CALCIUM SERPL-MCNC: 9.8 MG/DL
CHLORIDE SERPL-SCNC: 105 MMOL/L
CHOLEST SERPL-MCNC: 144 MG/DL
CHOLEST/HDLC SERPL: 4.6 {RATIO}
CO2 SERPL-SCNC: 25 MMOL/L
CREAT SERPL-MCNC: 1.4 MG/DL
EST. GFR  (AFRICAN AMERICAN): >60 ML/MIN/1.73 M^2
EST. GFR  (NON AFRICAN AMERICAN): 52.7 ML/MIN/1.73 M^2
ESTIMATED AVG GLUCOSE: 160 MG/DL
GLUCOSE SERPL-MCNC: 124 MG/DL
HBA1C MFR BLD HPLC: 7.2 %
HDLC SERPL-MCNC: 31 MG/DL
HDLC SERPL: 21.5 %
LDLC SERPL CALC-MCNC: 88.8 MG/DL
NONHDLC SERPL-MCNC: 113 MG/DL
POTASSIUM SERPL-SCNC: 4.2 MMOL/L
PROT SERPL-MCNC: 7.3 G/DL
SODIUM SERPL-SCNC: 138 MMOL/L
TRIGL SERPL-MCNC: 121 MG/DL

## 2018-10-23 PROCEDURE — 99214 OFFICE O/P EST MOD 30 MIN: CPT | Mod: S$GLB,,, | Performed by: FAMILY MEDICINE

## 2018-10-23 PROCEDURE — 3045F PR MOST RECENT HEMOGLOBIN A1C LEVEL 7.0-9.0%: CPT | Mod: CPTII,S$GLB,, | Performed by: FAMILY MEDICINE

## 2018-10-23 PROCEDURE — 99999 PR PBB SHADOW E&M-EST. PATIENT-LVL III: CPT | Mod: PBBFAC,,, | Performed by: FAMILY MEDICINE

## 2018-10-23 PROCEDURE — 3079F DIAST BP 80-89 MM HG: CPT | Mod: CPTII,S$GLB,, | Performed by: FAMILY MEDICINE

## 2018-10-23 PROCEDURE — 3008F BODY MASS INDEX DOCD: CPT | Mod: CPTII,S$GLB,, | Performed by: FAMILY MEDICINE

## 2018-10-23 PROCEDURE — 80053 COMPREHEN METABOLIC PANEL: CPT

## 2018-10-23 PROCEDURE — 80061 LIPID PANEL: CPT

## 2018-10-23 PROCEDURE — 83036 HEMOGLOBIN GLYCOSYLATED A1C: CPT

## 2018-10-23 PROCEDURE — 3074F SYST BP LT 130 MM HG: CPT | Mod: CPTII,S$GLB,, | Performed by: FAMILY MEDICINE

## 2018-10-23 NOTE — TELEPHONE ENCOUNTER
----- Message from Nu Cameron sent at 10/23/2018  8:04 AM CDT -----  [place], advised he might have to wait to be seen..[phone]

## 2018-10-23 NOTE — PROGRESS NOTES
Subjective:       Patient ID: Tom Tavarez is a 63 y.o. male.    Chief Complaint: Follow-up and Sinus Problem    Follow-up uncontrolled diabetes hypertension hyperlipidemia.  He denies polyuria polydipsia or hypoglycemic symptoms.  He denies headache chest pain palpitations shortness of breath.  Has occasional ankle swelling. He reports diabetic eye exam was done 1 month ago and will get a copy for record.  He has had weight gain since last visit that he feels is related to decreased level of physical activity.  He continues to follow diabetic diet.      Review of Systems   Constitutional: Negative for appetite change, diaphoresis, fatigue and unexpected weight change.   Respiratory: Negative for cough, chest tightness, shortness of breath and wheezing.    Cardiovascular: Positive for leg swelling. Negative for chest pain and palpitations.        No lightheadedness syncope.  Occasional ankle swelling   Gastrointestinal: Negative for abdominal pain.   Endocrine: Negative for polydipsia and polyuria.   Genitourinary: Negative for difficulty urinating.   Neurological: Negative for dizziness, syncope, speech difficulty, weakness, light-headedness and headaches.       Objective:      Physical Exam   Constitutional: He is oriented to person, place, and time. He appears well-developed and well-nourished. No distress.   Neck: Neck supple. No thyromegaly present.   Cardiovascular: Normal rate, regular rhythm and normal heart sounds.   No murmur heard.  Pulses:       Dorsalis pedis pulses are 2+ on the right side, and 2+ on the left side.   Pulmonary/Chest: Effort normal and breath sounds normal. No respiratory distress. He has no wheezes.   Abdominal: Soft. Bowel sounds are normal. He exhibits no mass. There is no tenderness.   Musculoskeletal:        Right foot: There is deformity (Mild thickening of the [facility] toenail related to onychomycosis is treatments with over-the-counter antifungal medication).        Left foot:  There is deformity ( onychomycosis of big toenail with mild thickening).   Feet:   Right Foot:   Protective Sensation: 10 sites tested. 10 sites sensed.   Skin Integrity: Negative for ulcer, blister, skin breakdown, erythema, warmth, callus or dry skin.   Left Foot:   Protective Sensation: 10 sites tested. 10 sites sensed.   Skin Integrity: Negative for ulcer, blister, skin breakdown, erythema, warmth, callus or dry skin.   Lymphadenopathy:     He has no cervical adenopathy.   Neurological: He is alert and oriented to person, place, and time.   Skin: He is not diaphoretic.       Office Visit on 09/17/2018   Component Date Value Ref Range Status    Sodium 09/17/2018 146* 136 - 145 mmol/L Final    Potassium 09/17/2018 4.5  3.5 - 5.1 mmol/L Final    Chloride 09/17/2018 107  95 - 110 mmol/L Final    CO2 09/17/2018 27  23 - 29 mmol/L Final    Glucose 09/17/2018 148* 70 - 110 mg/dL Final    BUN, Bld 09/17/2018 21  8 - 23 mg/dL Final    Creatinine 09/17/2018 1.5* 0.5 - 1.4 mg/dL Final    Calcium 09/17/2018 9.6  8.7 - 10.5 mg/dL Final    Total Protein 09/17/2018 7.6  6.0 - 8.4 g/dL Final    Albumin 09/17/2018 3.5  3.5 - 5.2 g/dL Final    Total Bilirubin 09/17/2018 0.8  0.1 - 1.0 mg/dL Final    Alkaline Phosphatase 09/17/2018 65  55 - 135 U/L Final    AST 09/17/2018 16  10 - 40 U/L Final    ALT 09/17/2018 14  10 - 44 U/L Final    Anion Gap 09/17/2018 12  8 - 16 mmol/L Final    eGFR if  09/17/2018 56.1* >60 mL/min/1.73 m^2 Final    eGFR if non African American 09/17/2018 48.5* >60 mL/min/1.73 m^2 Final    Hemoglobin A1C 09/17/2018 8.3* 4.0 - 5.6 % Final    Estimated Avg Glucose 09/17/2018 192* 68 - 131 mg/dL Final     Assessment:       1. Uncontrolled type 2 diabetes mellitus without complication, with long-term current use of insulin        Plan:     Blood pressure is controlled 129/84.  Continue current medications.  CMP A1c lipids was ordered.  The copy of recent diabetic eye exam.  Foot  exam was done today.  Discussed diet exercise and return in 3 months    Uncontrolled type 2 diabetes mellitus without complication, with long-term current use of insulin  -     Comprehensive metabolic panel  -     Lipid panel  -     Hemoglobin A1c

## 2018-10-24 ENCOUNTER — TELEPHONE (OUTPATIENT)
Dept: INTERNAL MEDICINE | Facility: CLINIC | Age: 63
End: 2018-10-24

## 2018-10-24 NOTE — TELEPHONE ENCOUNTER
----- Message from Pretty Walters sent at 10/24/2018  2:05 PM CDT -----  Contact: Patient  Patient needs to know if any eye exam results has been sent to them, Dr Sheets office [facility], please call patient back at  [phone]. Thank you

## 2018-10-25 ENCOUNTER — TELEPHONE (OUTPATIENT)
Dept: INTERNAL MEDICINE | Facility: CLINIC | Age: 63
End: 2018-10-25

## 2018-10-25 ENCOUNTER — PATIENT MESSAGE (OUTPATIENT)
Dept: INTERNAL MEDICINE | Facility: CLINIC | Age: 63
End: 2018-10-25

## 2018-10-25 NOTE — TELEPHONE ENCOUNTER
----- Message from Mariajose Poole sent at 10/25/2018  4:03 PM CDT -----  Contact: Clarita with [facility]  Patient states the physician notes that were sent was from [facility] were not received by Dr. Lemus's office. Please call Clarita @ [phone]. Thanks, gama

## 2018-11-13 DIAGNOSIS — E66.9 DIABETES MELLITUS TYPE 2 IN OBESE: ICD-10-CM

## 2018-11-13 DIAGNOSIS — E11.69 DIABETES MELLITUS TYPE 2 IN OBESE: ICD-10-CM

## 2018-11-14 RX ORDER — INSULIN GLARGINE 100 [IU]/ML
INJECTION, SOLUTION SUBCUTANEOUS
Qty: 45 ML | Refills: 0 | Status: SHIPPED | OUTPATIENT
Start: 2018-11-14 | End: 2019-01-23

## 2019-01-09 ENCOUNTER — PATIENT OUTREACH (OUTPATIENT)
Dept: ADMINISTRATIVE | Facility: HOSPITAL | Age: 64
End: 2019-01-09

## 2019-01-23 ENCOUNTER — OFFICE VISIT (OUTPATIENT)
Dept: INTERNAL MEDICINE | Facility: CLINIC | Age: 64
End: 2019-01-23
Payer: COMMERCIAL

## 2019-01-23 VITALS
WEIGHT: 315 LBS | SYSTOLIC BLOOD PRESSURE: 128 MMHG | BODY MASS INDEX: 45.1 KG/M2 | HEART RATE: 53 BPM | OXYGEN SATURATION: 96 % | DIASTOLIC BLOOD PRESSURE: 62 MMHG | TEMPERATURE: 98 F | HEIGHT: 70 IN

## 2019-01-23 DIAGNOSIS — E11.69 DIABETES MELLITUS TYPE 2 IN OBESE: ICD-10-CM

## 2019-01-23 DIAGNOSIS — E66.9 DIABETES MELLITUS TYPE 2 IN OBESE: ICD-10-CM

## 2019-01-23 DIAGNOSIS — N18.30 CHRONIC KIDNEY DISEASE (CKD), STAGE III (MODERATE): ICD-10-CM

## 2019-01-23 DIAGNOSIS — J30.9 CHRONIC ALLERGIC RHINITIS: ICD-10-CM

## 2019-01-23 DIAGNOSIS — I10 ESSENTIAL HYPERTENSION: ICD-10-CM

## 2019-01-23 LAB
ALBUMIN SERPL BCP-MCNC: 3.2 G/DL
ALP SERPL-CCNC: 69 U/L
ALT SERPL W/O P-5'-P-CCNC: 15 U/L
ANION GAP SERPL CALC-SCNC: 11 MMOL/L
AST SERPL-CCNC: 14 U/L
BILIRUB SERPL-MCNC: 0.6 MG/DL
BUN SERPL-MCNC: 18 MG/DL
CALCIUM SERPL-MCNC: 9.4 MG/DL
CHLORIDE SERPL-SCNC: 105 MMOL/L
CO2 SERPL-SCNC: 24 MMOL/L
CREAT SERPL-MCNC: 1.3 MG/DL
EST. GFR  (AFRICAN AMERICAN): >60 ML/MIN/1.73 M^2
EST. GFR  (NON AFRICAN AMERICAN): 57.7 ML/MIN/1.73 M^2
ESTIMATED AVG GLUCOSE: 200 MG/DL
GLUCOSE SERPL-MCNC: 80 MG/DL
HBA1C MFR BLD HPLC: 8.6 %
POTASSIUM SERPL-SCNC: 4.3 MMOL/L
PROT SERPL-MCNC: 7.4 G/DL
SODIUM SERPL-SCNC: 140 MMOL/L

## 2019-01-23 PROCEDURE — 3078F DIAST BP <80 MM HG: CPT | Mod: CPTII,S$GLB,, | Performed by: FAMILY MEDICINE

## 2019-01-23 PROCEDURE — 99999 PR PBB SHADOW E&M-EST. PATIENT-LVL IV: CPT | Mod: PBBFAC,,, | Performed by: FAMILY MEDICINE

## 2019-01-23 PROCEDURE — 99999 PR PBB SHADOW E&M-EST. PATIENT-LVL IV: ICD-10-PCS | Mod: PBBFAC,,, | Performed by: FAMILY MEDICINE

## 2019-01-23 PROCEDURE — 3078F PR MOST RECENT DIASTOLIC BLOOD PRESSURE < 80 MM HG: ICD-10-PCS | Mod: CPTII,S$GLB,, | Performed by: FAMILY MEDICINE

## 2019-01-23 PROCEDURE — 3008F PR BODY MASS INDEX (BMI) DOCUMENTED: ICD-10-PCS | Mod: CPTII,S$GLB,, | Performed by: FAMILY MEDICINE

## 2019-01-23 PROCEDURE — 3008F BODY MASS INDEX DOCD: CPT | Mod: CPTII,S$GLB,, | Performed by: FAMILY MEDICINE

## 2019-01-23 PROCEDURE — 83036 HEMOGLOBIN GLYCOSYLATED A1C: CPT

## 2019-01-23 PROCEDURE — 99214 OFFICE O/P EST MOD 30 MIN: CPT | Mod: S$GLB,,, | Performed by: FAMILY MEDICINE

## 2019-01-23 PROCEDURE — 3074F SYST BP LT 130 MM HG: CPT | Mod: CPTII,S$GLB,, | Performed by: FAMILY MEDICINE

## 2019-01-23 PROCEDURE — 99214 PR OFFICE/OUTPT VISIT, EST, LEVL IV, 30-39 MIN: ICD-10-PCS | Mod: S$GLB,,, | Performed by: FAMILY MEDICINE

## 2019-01-23 PROCEDURE — 3045F PR MOST RECENT HEMOGLOBIN A1C LEVEL 7.0-9.0%: CPT | Mod: CPTII,S$GLB,, | Performed by: FAMILY MEDICINE

## 2019-01-23 PROCEDURE — 80053 COMPREHEN METABOLIC PANEL: CPT

## 2019-01-23 PROCEDURE — 3045F PR MOST RECENT HEMOGLOBIN A1C LEVEL 7.0-9.0%: ICD-10-PCS | Mod: CPTII,S$GLB,, | Performed by: FAMILY MEDICINE

## 2019-01-23 PROCEDURE — 3074F PR MOST RECENT SYSTOLIC BLOOD PRESSURE < 130 MM HG: ICD-10-PCS | Mod: CPTII,S$GLB,, | Performed by: FAMILY MEDICINE

## 2019-01-23 RX ORDER — AZELASTINE 1 MG/ML
1 SPRAY, METERED NASAL 2 TIMES DAILY
Qty: 30 ML | Refills: 2 | Status: SHIPPED | OUTPATIENT
Start: 2019-01-23 | End: 2020-11-09 | Stop reason: SDUPTHER

## 2019-01-23 RX ORDER — INSULIN LISPRO 100 [IU]/ML
3-9 INJECTION, SOLUTION INTRAVENOUS; SUBCUTANEOUS
Qty: 10 ML | Refills: 5 | Status: SHIPPED | OUTPATIENT
Start: 2019-01-23 | End: 2019-06-24

## 2019-01-23 RX ORDER — INSULIN GLARGINE 100 [IU]/ML
64 INJECTION, SOLUTION SUBCUTANEOUS NIGHTLY
Qty: 45 ML | Refills: 0 | Status: SHIPPED | OUTPATIENT
Start: 2019-01-23 | End: 2019-04-17 | Stop reason: SDUPTHER

## 2019-01-23 NOTE — PROGRESS NOTES
Subjective:       Patient ID: Tom Tavarez is a 63 y.o. male.    Chief Complaint: Follow-up    Follow-up uncontrolled diabetes elevated BMI hypertension hyperlipidemia he gained several lb since last visit.  He denies headache chest pain palpitations shortness of breath or edema. He denies polyuria polydipsia hypoglycemic symptoms.  He reports home glucoses have been elevated in the 160-200 range.  He reports not always follow diabetic diet.  He does report walk much during the day.      Diabetes   He has type 2 diabetes mellitus. No MedicAlert identification noted. Pertinent negatives for hypoglycemia include no confusion, dizziness, headaches, hunger, mood changes, nervousness/anxiousness, pallor, seizures, sleepiness, speech difficulty, sweats or tremors. Associated symptoms include blurred vision, fatigue and polyuria. Pertinent negatives for diabetes include no chest pain, no foot paresthesias, no foot ulcerations, no polydipsia, no polyphagia, no visual change, no weakness and no weight loss. Pertinent negatives for hypoglycemia complications include no blackouts, no hospitalization, no nocturnal hypoglycemia, no required assistance and no required glucagon injection. Symptoms are stable. Pertinent negatives for diabetic complications include no autonomic neuropathy, CVA, heart disease, impotence, nephropathy, peripheral neuropathy, PVD or retinopathy. There are no known risk factors for coronary artery disease. Current diabetic treatment includes diet, insulin injections and oral agent (monotherapy). He is compliant with treatment most of the time. He is currently taking insulin pre-breakfast and at bedtime. Insulin injections are given by patient. Rotation sites for injection include the abdominal wall. His weight is stable. He is following a generally healthy and low salt diet. Meal planning includes avoidance of concentrated sweets. He has not had a previous visit with a dietitian. He participates in  exercise every other day. He monitors blood glucose at home 3-4 x per day. Blood glucose monitoring compliance is good. His home blood glucose trend is fluctuating minimally. He does not see a podiatrist.Eye exam is current.     Review of Systems   Constitutional: Positive for fatigue. Negative for activity change, appetite change, diaphoresis, unexpected weight change and weight loss.   HENT: Positive for congestion, sinus pressure and sneezing. Negative for sore throat.         Flonase is no longer working   Eyes: Positive for blurred vision. Negative for visual disturbance.   Respiratory: Positive for cough. Negative for chest tightness, shortness of breath and wheezing.         One-week duration of slightly productive cough   Cardiovascular: Negative for chest pain, palpitations and leg swelling.        Denies lightheadedness   Endocrine: Positive for polyuria. Negative for polydipsia and polyphagia.        Denies hypoglycemic symptoms   Genitourinary: Negative for impotence.   Skin: Negative for pallor.   Neurological: Negative for dizziness, tremors, seizures, speech difficulty, weakness and headaches.   Psychiatric/Behavioral: Negative for confusion. The patient is not nervous/anxious.        Objective:      Physical Exam   Constitutional: He is oriented to person, place, and time. He appears well-developed and well-nourished. No distress.   HENT:   Right Ear: External ear normal.   Left Ear: External ear normal.   Mouth/Throat: Oropharynx is clear and moist.   Nasal congestion   Neck: Neck supple. No thyromegaly present.   Cardiovascular: Normal rate, regular rhythm and normal heart sounds.   No murmur heard.  Pulmonary/Chest: Effort normal and breath sounds normal. No respiratory distress. He has no wheezes.   Abdominal: Soft. Bowel sounds are normal. He exhibits no mass. There is no tenderness.   Lymphadenopathy:     He has no cervical adenopathy.   Neurological: He is alert and oriented to person, place,  and time.   Skin: He is not diaphoretic.       Office Visit on 10/23/2018   Component Date Value Ref Range Status    Sodium 10/23/2018 138  136 - 145 mmol/L Final    Potassium 10/23/2018 4.2  3.5 - 5.1 mmol/L Final    Chloride 10/23/2018 105  95 - 110 mmol/L Final    CO2 10/23/2018 25  23 - 29 mmol/L Final    Glucose 10/23/2018 124* 70 - 110 mg/dL Final    BUN, Bld 10/23/2018 21  8 - 23 mg/dL Final    Creatinine 10/23/2018 1.4  0.5 - 1.4 mg/dL Final    Calcium 10/23/2018 9.8  8.7 - 10.5 mg/dL Final    Total Protein 10/23/2018 7.3  6.0 - 8.4 g/dL Final    Albumin 10/23/2018 3.3* 3.5 - 5.2 g/dL Final    Total Bilirubin 10/23/2018 0.7  0.1 - 1.0 mg/dL Final    Alkaline Phosphatase 10/23/2018 65  55 - 135 U/L Final    AST 10/23/2018 16  10 - 40 U/L Final    ALT 10/23/2018 17  10 - 44 U/L Final    Anion Gap 10/23/2018 8  8 - 16 mmol/L Final    eGFR if African American 10/23/2018 >60.0  >60 mL/min/1.73 m^2 Final    eGFR if non  10/23/2018 52.7* >60 mL/min/1.73 m^2 Final    Cholesterol 10/23/2018 144  120 - 199 mg/dL Final    Triglycerides 10/23/2018 121  30 - 150 mg/dL Final    HDL 10/23/2018 31* 40 - 75 mg/dL Final    LDL Cholesterol 10/23/2018 88.8  63.0 - 159.0 mg/dL Final    HDL/Chol Ratio 10/23/2018 21.5  20.0 - 50.0 % Final    Total Cholesterol/HDL Ratio 10/23/2018 4.6  2.0 - 5.0 Final    Non-HDL Cholesterol 10/23/2018 113  mg/dL Final    Hemoglobin A1C 10/23/2018 7.2* 4.0 - 5.6 % Final    Estimated Avg Glucose 10/23/2018 160* 68 - 131 mg/dL Final     Assessment:       1. Uncontrolled type 2 diabetes mellitus without complication, with long-term current use of insulin    2. Diabetes mellitus type 2 in obese    3. BMI 45.0-49.9, adult        Plan:   Discussed elevated BMI.  He is interested in bariatric surgery evaluation.  Orders placed.  Will increase nighttime Lantus by 4 units.  Start 64 units.  Saline nose spray acid spray for nasal congestion. Lab was ordered.  He  declined hypertension and diabetic digital medicine program.  Follow-up in 3 months.      Uncontrolled type 2 diabetes mellitus without complication, with long-term current use of insulin    Diabetes mellitus type 2 in obese  -     insulin (LANTUS SOLOSTAR U-100 INSULIN) glargine 100 units/mL (3mL) SubQ pen; Inject 64 Units into the skin every evening.  Dispense: 45 mL; Refill: 0  -     Comprehensive metabolic panel  -     Hemoglobin A1c    BMI 45.0-49.9, adult  -     Ambulatory consult to Bariatric Surgery    Other orders  -     Cancel: Diabetes Digital Medicine (DDMP) Enrollment Order  -     Cancel: Diabetes Digital Medicine (DDMP): Assign Onboarding Questionnaires  -     Cancel: Hypertension Digital Medicine (HDMP) Enrollment Order  -     Cancel: Hypertension Digital Medicine (HDMP): Assign Onboarding Questionnaires  -     azelastine (ASTELIN) 137 mcg (0.1 %) nasal spray; 1 spray (137 mcg total) by Nasal route 2 (two) times daily.  Dispense: 30 mL; Refill: 2

## 2019-01-30 ENCOUNTER — CLINICAL SUPPORT (OUTPATIENT)
Dept: CARDIOLOGY | Facility: CLINIC | Age: 64
End: 2019-01-30
Payer: COMMERCIAL

## 2019-01-30 ENCOUNTER — HOSPITAL ENCOUNTER (OUTPATIENT)
Dept: RADIOLOGY | Facility: HOSPITAL | Age: 64
Discharge: HOME | End: 2019-01-30
Attending: SURGERY
Payer: COMMERCIAL

## 2019-01-30 ENCOUNTER — OFFICE VISIT (OUTPATIENT)
Dept: SURGERY | Facility: CLINIC | Age: 64
End: 2019-01-30
Payer: COMMERCIAL

## 2019-01-30 VITALS
DIASTOLIC BLOOD PRESSURE: 90 MMHG | TEMPERATURE: 100 F | SYSTOLIC BLOOD PRESSURE: 166 MMHG | HEIGHT: 71 IN | BODY MASS INDEX: 44.1 KG/M2 | WEIGHT: 315 LBS | HEART RATE: 66 BPM

## 2019-01-30 DIAGNOSIS — E78.5 HYPERLIPIDEMIA, UNSPECIFIED HYPERLIPIDEMIA TYPE: Chronic | ICD-10-CM

## 2019-01-30 DIAGNOSIS — E66.01 OBESITY, CLASS III, BMI 40-49.9 (MORBID OBESITY): ICD-10-CM

## 2019-01-30 DIAGNOSIS — E78.5 HYPERLIPIDEMIA, UNSPECIFIED HYPERLIPIDEMIA TYPE: Primary | Chronic | ICD-10-CM

## 2019-01-30 DIAGNOSIS — I10 ESSENTIAL HYPERTENSION: Chronic | ICD-10-CM

## 2019-01-30 DIAGNOSIS — K42.9 UMBILICAL HERNIA WITHOUT OBSTRUCTION AND WITHOUT GANGRENE: ICD-10-CM

## 2019-01-30 PROCEDURE — 71046 XR CHEST PA AND LATERAL: ICD-10-PCS | Mod: 26,,, | Performed by: RADIOLOGY

## 2019-01-30 PROCEDURE — 3008F PR BODY MASS INDEX (BMI) DOCUMENTED: ICD-10-PCS | Mod: CPTII,S$GLB,, | Performed by: SURGERY

## 2019-01-30 PROCEDURE — 93000 ELECTROCARDIOGRAM COMPLETE: CPT | Mod: S$GLB,,, | Performed by: INTERNAL MEDICINE

## 2019-01-30 PROCEDURE — 3080F PR MOST RECENT DIASTOLIC BLOOD PRESSURE >= 90 MM HG: ICD-10-PCS | Mod: CPTII,S$GLB,, | Performed by: SURGERY

## 2019-01-30 PROCEDURE — 3077F SYST BP >= 140 MM HG: CPT | Mod: CPTII,S$GLB,, | Performed by: SURGERY

## 2019-01-30 PROCEDURE — 99204 OFFICE O/P NEW MOD 45 MIN: CPT | Mod: S$GLB,,, | Performed by: SURGERY

## 2019-01-30 PROCEDURE — 3008F BODY MASS INDEX DOCD: CPT | Mod: CPTII,S$GLB,, | Performed by: SURGERY

## 2019-01-30 PROCEDURE — 93000 EKG 12-LEAD: ICD-10-PCS | Mod: S$GLB,,, | Performed by: INTERNAL MEDICINE

## 2019-01-30 PROCEDURE — 71046 X-RAY EXAM CHEST 2 VIEWS: CPT | Mod: 26,,, | Performed by: RADIOLOGY

## 2019-01-30 PROCEDURE — 3077F PR MOST RECENT SYSTOLIC BLOOD PRESSURE >= 140 MM HG: ICD-10-PCS | Mod: CPTII,S$GLB,, | Performed by: SURGERY

## 2019-01-30 PROCEDURE — 3045F PR MOST RECENT HEMOGLOBIN A1C LEVEL 7.0-9.0%: ICD-10-PCS | Mod: CPTII,S$GLB,, | Performed by: SURGERY

## 2019-01-30 PROCEDURE — 99999 PR PBB SHADOW E&M-EST. PATIENT-LVL IV: CPT | Mod: PBBFAC,,, | Performed by: SURGERY

## 2019-01-30 PROCEDURE — 3045F PR MOST RECENT HEMOGLOBIN A1C LEVEL 7.0-9.0%: CPT | Mod: CPTII,S$GLB,, | Performed by: SURGERY

## 2019-01-30 PROCEDURE — 3080F DIAST BP >= 90 MM HG: CPT | Mod: CPTII,S$GLB,, | Performed by: SURGERY

## 2019-01-30 PROCEDURE — 99204 PR OFFICE/OUTPT VISIT, NEW, LEVL IV, 45-59 MIN: ICD-10-PCS | Mod: S$GLB,,, | Performed by: SURGERY

## 2019-01-30 PROCEDURE — 99999 PR PBB SHADOW E&M-EST. PATIENT-LVL IV: ICD-10-PCS | Mod: PBBFAC,,, | Performed by: SURGERY

## 2019-01-30 PROCEDURE — 71046 X-RAY EXAM CHEST 2 VIEWS: CPT | Mod: TC

## 2019-01-30 NOTE — LETTER
January 30, 2019      Adolfo Lemus MD  [address] [place] [address] [address] [place] [id number]           [facility]  [address] [facility]  [place] [address]  Phone: [phone]  Fax: [phone]          Patient: Tom Tavarez   MR Number: [id number]   YOB: 1955   Date of Visit: 1/30/2019       Dear Dr. Adolof Lemus:    Thank you for referring Tom Tavarez to me for evaluation. Attached you will find relevant portions of my assessment and plan of care.    If you have questions, please do not hesitate to call me. I look forward to following Tom Tavarez along with you.    Sincerely,    To Stovall MD    Enclosure  CC:  No Recipients    If you would like to receive this communication electronically, please contact [email or web address] or [phone] to request more information on [employer] [facility] access.    For providers and/or their staff who would like to refer a patient to [facility], please contact us through our one-stop-shop provider referral line, [facility] , at [phone].    If you feel you have received this communication in error or would no longer like to receive these types of communications, please e-mail [email or web address]

## 2019-01-31 NOTE — PROGRESS NOTES
BARIATRIC NEW PATIENT EVALUATION    CHIEF COMPLAINT:   Morbid obesity with a BMI of 45.5 cm and inability to lose weight.    HISTORY OF PRESENT ILLNESS:  Tom Tavarez is a 63 y.o.-year-old male presenting for morbid obesity with a BMI of 45.56 and inability to lose weight. The patient has tried diet and exercise but unable to keep down to the weight he would like to be at.  He is concerned about his health conditions and would like to be healthy years who is not taking as many medications.    HPI    CO-MORBIDITIES:  diabetes mellitus, dyslipidemia and hypertension    PAST MEDICAL HISTORY:  Past Medical History:   Diagnosis Date    Diabetes mellitus type 2 in obese 11/2006    Hyperlipidemia     Hypertension 1985    Obesity, Class III, BMI 40-49.9 (morbid obesity) 1985    Umbilical hernia         PAST SURGICAL HISTORY:  Past Surgical History:   Procedure Laterality Date    G U surgery  1982    biopsy of testicle infertility w/u       FAMILY HISTORY:  Family History   Problem Relation Age of Onset    Hypertension Mother     Heart failure Mother         SOCIAL HISTORY:   reports that he has quit smoking. he has never used smokeless tobacco. He reports that he does not drink alcohol or use drugs.     MEDICATIONS:  Current Outpatient Medications on File Prior to Visit   Medication Sig Dispense Refill    atorvastatin (LIPITOR) 40 MG tablet TAKE 1 TABLET DAILY (MAKE AN APPOINTMENT FOR YEARLY EXAM, NEED OFFICE VISIT AND LAB FOR ADDITIONAL REFILLS) 90 tablet 3    azelastine (ASTELIN) 137 mcg (0.1 %) nasal spray 1 spray (137 mcg total) by Nasal route 2 (two) times daily. 30 mL 2    blood sugar diagnostic (ONETOUCH ULTRA TEST) Strp USE 1 STRIP TWICE A  strip 3    hydroCHLOROthiazide (HYDRODIURIL) 25 MG tablet Take 1 tablet (25 mg total) by mouth once daily. 90 tablet 1    hydroCHLOROthiazide (HYDRODIURIL) 25 MG tablet TAKE 1 TABLET DAILY 90 tablet 1    insulin (LANTUS SOLOSTAR U-100 INSULIN) glargine  "100 units/mL (3mL) SubQ pen Inject 64 Units into the skin every evening. 45 mL 0    insulin lispro (HUMALOG KWIKPEN) 100 unit/mL InPn pen Inject 3-9 U SQ TID AC. For -180 use 3U. If -210 use 5U. If -240 use 7U. If  or more use 9U 1 Box 3    insulin lispro 100 unit/mL injection Inject 3-9 Units into the skin 3 (three) times daily before meals. If glucose 150-180 use 3 units  If glucose 181-210 use 5 units   If glucose 211-240 use 7 units   If glucose 241 or more use 9 units 10 mL 5    insulin syr/ndl U100 half florencio (BD INSULIN SYRINGE HALF UNIT) 0.3 mL 31 gauge x 5/16" Syrg 1 each by Misc.(Non-Drug; Combo Route) route before meals as needed. 100 Syringe 6    metFORMIN (GLUCOPHAGE) 500 MG tablet Take 1 tablet (500 mg total) by mouth 2 (two) times daily with meals. 180 tablet 3    olmesartan-amLODIPin-hcthiazid (TRIBENZOR) 40-10-25 mg Tab Take 1 tablet by mouth once daily. 90 tablet 3    ONETOUCH ULTRA BLUE TEST STRIP Strp USE 1 STRIP TWICE A  strip 3     No current facility-administered medications on file prior to visit.        Medications have been reviewed.    ALLERGIES:  Review of patient's allergies indicates:  No Known Allergies    Allergies have been reviewed.    ROS:  Review of Systems   Constitutional: Negative for activity change, appetite change, chills, diaphoresis, fatigue, fever and unexpected weight change.   HENT: Negative for congestion, hearing loss, rhinorrhea, sore throat and trouble swallowing.    Eyes: Negative for discharge and visual disturbance.   Respiratory: Negative for apnea, cough, choking, chest tightness, shortness of breath, wheezing and stridor.    Cardiovascular: Negative for chest pain, palpitations and leg swelling.   Gastrointestinal: Negative for abdominal distention, abdominal pain, anal bleeding, blood in stool, constipation, diarrhea, nausea, rectal pain and vomiting.   Endocrine: Negative for cold intolerance, heat intolerance, polydipsia, " polyphagia and polyuria.   Genitourinary: Negative for difficulty urinating, dysuria, frequency, hematuria and urgency.   Musculoskeletal: Negative for arthralgias, back pain, joint swelling, myalgias and neck pain.   Skin: Negative for color change, pallor, rash and wound.   Neurological: Negative for dizziness, syncope, weakness, light-headedness, numbness and headaches.   Hematological: Negative for adenopathy. Does not bruise/bleed easily.   Psychiatric/Behavioral: Negative for agitation, confusion, decreased concentration, dysphoric mood and sleep disturbance. The patient is not nervous/anxious.        PE:  Physical Exam   Constitutional: He is oriented to person, place, and time. He appears well-developed and well-nourished. No distress.   HENT:   Head: Normocephalic and atraumatic.   Right Ear: External ear normal.   Left Ear: External ear normal.   Eyes: Conjunctivae and EOM are normal. Pupils are equal, round, and reactive to light. No scleral icterus.   Neck: Normal range of motion. Neck supple. No tracheal deviation present. No thyromegaly present.   Cardiovascular: Normal rate, regular rhythm, normal heart sounds and intact distal pulses. Exam reveals no gallop and no friction rub.   No murmur heard.  Pulmonary/Chest: Effort normal and breath sounds normal. No respiratory distress. He has no wheezes. He has no rales. He exhibits no tenderness.   Abdominal: Soft. Bowel sounds are normal. He exhibits no distension. There is no tenderness. A hernia (Umbilical reducible) is present.   Musculoskeletal: Normal range of motion. He exhibits no edema, tenderness or deformity.   Lymphadenopathy:     He has no cervical adenopathy.   Neurological: He is alert and oriented to person, place, and time.   Skin: Skin is warm and dry. No rash noted. He is not diaphoretic. No erythema. No pallor.   Psychiatric: He has a normal mood and affect. His behavior is normal. Judgment and thought content normal.   Vitals  reviewed.      DIAGNOSIS:  1.  Morbid obesity with a BMI of 5.56 and inability to lose weight.  2. Co-morbidities: diabetes mellitus, dyslipidemia and hypertension    PLAN:  The patient is a good candidate for Bariatric Surgery. He is interested in sleeve gastrectomy. The surgery and post-op care was discussed in detail with the patient. All questions were answered.    He understands that bariatric surgery is a tool to aid in weight loss and that he needs to be committed to the diet and exercise post-operatively for successful weight loss. Discussed with patient that bariatric surgery is not the easy way out and that it will take plenty of dedication on the patient's part to be successful. Also discussed the possibility of weight regain if the patient strays from the diet guidelines or exercise requirements. Patient verbalized understanding and wishes to proceed with the work-up.    The patient is a good candidate for operatively-induced weight loss.  The patient's comorbidities can significantly improve from weight loss following surgery.    We discussed preliminary steps of the program including the evaluation process.  I have outlined the risks of the procedures including, but not limited to, bleeding, slippage, erosion, stricture, death, deep venous thrombosis, pulmonary embolus, healing issues including intra-abdominal leakage or perforation with abscess or need for drainage or reoperation, wound infection, need for open surgery, injury to intra-abdominal organs or bleeding requiring transfusion, intensive care unit care, and possible prolonged hospitalization.      Other long-term issues were discussed including, but not limited to, permanent change in volume of food and type of foods eaten and importance of ongoing long-term followup.  I advised the patient that if they can lose weight before surgery, it will reduce his operative risk.  The patient understands and wishes to proceed.    PLAN: The patient is  interested in proceeding with laparoscopic vertical sleeve gastrectomy with possible robotic assistance. We will start the next step of the evaluation process to include acquiring letters of medical necessity and insurance preapproval.  In addition, he will be sent for a pre-surgical psychological evaluation.  Once insurance approves request or the patient has made other financial arrangements for surgery, we will schedule the following preoperative tests:  EKG, chest x-ray, full panel of baseline labs and an upper endoscopy to evaluate the extent of reflux and/or presence of a hiatal hernia.  The patient will also see the dietician preoperatively.  We will see him back for a final visit after the above have been completed.      Referring Physician: Adolfo Lemus MD  RTC: As scheduled.

## 2019-02-05 ENCOUNTER — TELEPHONE (OUTPATIENT)
Dept: ENDOSCOPY | Facility: HOSPITAL | Age: 64
End: 2019-02-05

## 2019-02-05 NOTE — TELEPHONE ENCOUNTER
Endoscopy Scheduling Questionnaire:    Call Type:Outgoing call    1. Have you been admitted overnight to the hospital in the past 3 months? no  2. Do you get CP and SOB while walking up a flight of stairs? no  3. Have you had a stent placed in the past 12 months? no  4. Have you had a stroke or heart attack in the past 6 months? no  5. Have you had any chest pain in the past 3 months? no      If so, have you been evaluated by your PCP or Cardiologist? no  6. Do you take weight loss medications? no  7. Have you been diagnosed with Diverticulitis within the past 3 months? no  8. Are you having any GI symptoms that you feel need to be evaluated prior to your procedure? no  9. Are you on dialysis? no  10. Are you diabetic? yes  11. Do you have any other health issues that you feel might limit your ability to safely have the procedure and/or sedation? no  12. Is the patient over 81 yo? no        If so, has the patient been seen by their PCP or GI in the last 3 months? N/A       -I have reviewed the last colonoscopy for recommendations regarding surveillance and bowel prep  Yes  -I have reviewed the patient's medications and allergies. He is not on high risk medications and will require cardiac clearance. A clearance request NA  -I have verified the pharmacy information. The prep being used is NA.    Date Endoscopy Scheduled: Date: 3/8/2019  Or  Date Gastro office visit Scheduled: NA

## 2019-02-06 ENCOUNTER — INITIAL CONSULT (OUTPATIENT)
Dept: PSYCHIATRY | Facility: CLINIC | Age: 64
End: 2019-02-06
Payer: COMMERCIAL

## 2019-02-06 ENCOUNTER — NUTRITION (OUTPATIENT)
Dept: NUTRITION | Facility: CLINIC | Age: 64
End: 2019-02-06
Payer: COMMERCIAL

## 2019-02-06 VITALS — BODY MASS INDEX: 44.1 KG/M2 | HEIGHT: 71 IN | WEIGHT: 315 LBS

## 2019-02-06 DIAGNOSIS — Z01.818 ENCOUNTER FOR OTHER PREPROCEDURAL EXAMINATION: Primary | ICD-10-CM

## 2019-02-06 DIAGNOSIS — E11.9 TYPE 2 DIABETES MELLITUS WITHOUT COMPLICATION, UNSPECIFIED WHETHER LONG TERM INSULIN USE: ICD-10-CM

## 2019-02-06 DIAGNOSIS — E78.5 HYPERLIPIDEMIA, UNSPECIFIED HYPERLIPIDEMIA TYPE: Primary | ICD-10-CM

## 2019-02-06 DIAGNOSIS — E66.01 MORBID (SEVERE) OBESITY DUE TO EXCESS CALORIES: ICD-10-CM

## 2019-02-06 DIAGNOSIS — I10 ESSENTIAL HYPERTENSION: ICD-10-CM

## 2019-02-06 DIAGNOSIS — E66.01 OBESITY, CLASS III, BMI 40-49.9 (MORBID OBESITY): ICD-10-CM

## 2019-02-06 PROCEDURE — G0108 PR DIAB MANAGE TRN  PER INDIV: ICD-10-PCS | Mod: S$GLB,,, | Performed by: NUTRITIONIST

## 2019-02-06 PROCEDURE — 99999 PR PBB SHADOW E&M-EST. PATIENT-LVL I: ICD-10-PCS | Mod: PBBFAC,,, | Performed by: SOCIAL WORKER

## 2019-02-06 PROCEDURE — 99999 PR PBB SHADOW E&M-EST. PATIENT-LVL III: CPT | Mod: PBBFAC,,,

## 2019-02-06 PROCEDURE — 90791 PSYCH DIAGNOSTIC EVALUATION: CPT | Mod: S$GLB,,, | Performed by: SOCIAL WORKER

## 2019-02-06 PROCEDURE — 90791 PR PSYCHIATRIC DIAGNOSTIC EVALUATION: ICD-10-PCS | Mod: S$GLB,,, | Performed by: SOCIAL WORKER

## 2019-02-06 PROCEDURE — 99999 PR PBB SHADOW E&M-EST. PATIENT-LVL III: ICD-10-PCS | Mod: PBBFAC,,,

## 2019-02-06 PROCEDURE — G0108 DIAB MANAGE TRN  PER INDIV: HCPCS | Mod: S$GLB,,, | Performed by: NUTRITIONIST

## 2019-02-06 PROCEDURE — 99999 PR PBB SHADOW E&M-EST. PATIENT-LVL I: CPT | Mod: PBBFAC,,, | Performed by: SOCIAL WORKER

## 2019-02-06 NOTE — PROGRESS NOTES
"NUTRITIONAL CONSULT    Referring Physician: Dr. Stovall  Reason for MNT Referral: Initial assessment for sleeve gastrectomy work-up    PAST MEDICAL HISTORY:   63 y.o. male presents with a BMI of Body mass index is 44.43 kg/m²..  Weight history includes highest weight @ 330 lbs and lowest weight @ 169 lbs. Pt reports significant weight gains when he quit smoking.  Dieting attempts include exercising, working on portions (portion control), and following a diabetic diet.    Past Medical History:   Diagnosis Date    Diabetes mellitus type 2 in obese 2006    Hyperlipidemia     Hypertension     Obesity, Class III, BMI 40-49.9 (morbid obesity)     Umbilical hernia        CLINICAL DATA:  63 y.o.-year-old Black or  male.  Height: 71"  Weight: 318 lbs  IBW: 166 lbs  BMI: 44.43  The patient's goal weight (60 % EBW): 227 lbs  Personal goal weight: 200 lbs    Goal for Bariatric Surgery: to improve health, to improve quality of life, to lose weight and to prevent future medical conditions. Pt also reports he would like to be able to be more active with his grandson.    NUTRITION & HEALTH HISTORY:  Greatest challenge: irregular meal patterns    Current diet recall:   Breakfast: (7:00am) oatmeal OR a piece of toast with 1 egg OR a bowl of cereal (cheerios or wheaties) and unsweetened vanilla almond milk  Lunch: (11:00am-12:00pm) saltines (unsalted) crackers OR glass of orange juice   Dinner: (7:00pm) mustard greens or cabbage with baked chicken or fish or turkey   Pt reports going to bed around 10-12pm   Beverages: 7-8 16 oz bottles of water, coffee with splenda    Current Diet:  Meal pattern: 3 eating times daily, pt doesn't eat enough throughout the day leading to larger portions consumed at next meal time  Protein supplements: none  Snackin / day  Vegetables: Likes a variety. Eats daily.  Fruits: Likes a variety. Eats 2-3 times per week.  Beverages: water and coffee without sugar  Dining out: " Weekly. Mostly fast food, restaurants and take-out.  Cooking at home: Daily. Mostly baked, grilled, smothered and fried meat, fish and vegetables.    Exercise:  Past exercise: Adequate. Walking mostly, pt reports going to [employer] 2-3 times weekly to walk around.    Current exercise: Pt reports plans to start going to the [employer], pt plans to engage in weight training and swim     Restrictions to exercise: none    Vitamins / Minerals / Herbs:   Vitamin D and MVI daily per pt during consult.    Food and Medication Interactions: Reviewed.  Hydrochlorothiazide: caution with calcium or vitamin D supplementation, avoid natural licorice.  Metformin: prescribed diabetic diet, avoid guar gum >6 hours upon taking rx (decreases absorption)    Labs:  (1/30/19)  H, Creatinine 1.5 H, Albumin 3.2 L, GFR 56.1/48.5 L Vitamin D 8 L, Fe 44 L, HgbA1c 8.9 H    Food Allergies:   NKFA    Social:  No work.  Lives with self.  Grocery shopping and food prep by self.  Patient believes family will be supportive after surgery.  Alcohol: None.  Smoking: None.    ASSESSMENT:  · Patient reports attempts at weight loss, only to regain lost weight.  · Patient demonstrated knowledge of healthy eating behaviors and exercise patterns; admits to not eating healthy and not exercising at this point.  · Patient states willingness to change lifestyle and make behavior modifications.  · Expect good  compliance after surgery at this time.    PES:  Overweight/Obesity related to disordered eating pattern as evidenced by BMI of 44 (obesity class III), increased body adiposity, and pt reports of inability to lose a significant amount of weight through conventional methods alone.    BARIATRIC DIET DISCUSSION:  Discussed diet after surgery and related to patients food record.  Reviewed diet progression before and after surgery.  Stressed importance of exercise and its role in achieving weight loss goals.  Answered all questions.    RECOMMENDATIONS:  Patient is a  good candidate for bariatric surgery.    Needs additional visit(s) with RD.    PLAN:  1084-7417 calories daily  Increase number of meals daily and decrease portions (pt @ 3 meals daily - increase to 5 meals daily)  Have a source of protein at each meal  Resume work-up for surgery.  Continue to review Bariatric Nutrition Guidebook at home and call with any questions.  Work on Bariatric Nutrition Checklist.  Work on gradually cutting back on starchy CHO in the diet.  Begin trying various protein supplements to determine preference.  5-6 meals per day.  Start including protein supplements in the diet plan daily.  Increase exercise.  Start shopping for bariatric vitamins & minerals.  Return to clinic.    SESSION TIME:  60 minutes

## 2019-02-07 ENCOUNTER — TELEPHONE (OUTPATIENT)
Dept: ENDOSCOPY | Facility: HOSPITAL | Age: 64
End: 2019-02-07

## 2019-02-07 NOTE — PROGRESS NOTES
Psychiatry Initial Visit (PhD/LCSW)  Diagnostic Interview - CPT 14994    Date: 2/6/2019    Site: [place]    Referral source:   To Stovall MD, General Surgery    Clinical status of patient: Outpatient    Tom Tavarez, a 63 y.o. male, for initial evaluation visit.  Met with patient.    Chief complaint/reason for encounter: Psychiatric Evaluation prior to bariatric surgery    History of present illness:   63 year old  male presented as referral from the [facility] bariatric team for psychiatric pre-procedural examination prior to proposed bariatric surgery.  The patient has a current BMI of around 45, has gain the preponderance of his excess weight in the past 10 years--which was when he quit cold turkey his decades-long 3-pack per day cigarette habit, -- and was diagnosed with type II diabetes after the weight gain.  He reported he realized he replaced smoking with eating and is concerned for his health, with hypertension and hyperlipidemia as well as the diabetes.  Identified as highly athletic earlier in life and now is frustrated by his physical limitations that the excess weight creates for him.  Patient denied any symptoms of depression, chronic anxiety, mood swings, cognitive or psychosis symptoms, or anger management issues.  He presented as quite personable, well-spoken, expressing logical, linear thought.  Denied having smoked for the past roughly 15 years.  Denied any alcohol consumed in over 40 years, denied any recreational drug use.  He endorsed a 2 to 3 cup per day coffee habit.  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  His weight gain did not significantly accelerate after [facility], but instead, according to the patient, had been on a steady climb over the past 10 years, so roughly 7 of his last gainfully employed years.  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   40 years and  counting.  The patient has no identified mental health concerns, though endorsing that he believes food may have replaced cigarettes as an emotional habit, and he stated awareness that he needs to rebuild a healthy relationship with food.      Pain: noncontributory    Symptoms:   · Mood: denied  · Anxiety: denied  · Substance abuse: denied  · Cognitive functioning: denied  · Health behaviors: increased calories consumed since quiting smoking    Psychiatric history: none    Medical history: Type II diabetes mellitis, hypertension, hyperlipidemia, controlled, and morbid obesity with BMI of 45.56.      Family history of psychiatric illness: none    Social history (marriage, employment, etc.):  [place] born [place] raised,  2nd oldest of 13 siblings, same father, 3 different mothers.  Father just  in Mid-January.  Described childhood as happy, having been a good student, raised primarily by his father and step-mother.  He was active in athletic activities and enjoyed friendships.  Graduated high school in , atteniding 3 years of college at [employer] And some summer terms at [employer].   for 40 years and counting, has raised 2 biological nieces as his own daughters with his wife; now enjoying grandchildren.  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  No  history; career in insurance sales, retiring as of  due to health.  No guns at home.  He said he helps look after his very young grandsons, which is enjoyable on some level but physically taxing, especially with his obesity related limitations.      Substance use:   Alcohol: none--in over 40 years   Drugs: none   Tobacco: none   Caffeine: two to three cups of coffee per day lately    Current medications and drug reactions (include OTC, herbal): see medication list      Strengths and liabilities: Strength: Patient accepts guidance/feedback, Strength: Patient is  expressive/articulate., Strength: Patient is intelligent., Strength: Patient is motivated for change., Strength: Patient has positive support network., Strength: Patient has reasonable judgment.    Current Evaluation:     Mental Status Exam:  General Appearance:  age appropriate, casually dressed, obese   Speech: normal tone, normal rate, normal pitch, normal volume      Level of Cooperation: cooperative      Thought Processes: normal and logical   Mood: steady      Thought Content: normal, no suicidality, no homicidality, delusions, or paranoia   Affect: congruent and appropriate   Orientation: Oriented x3   Memory: remote >  intact; recent > intact   Attention Span & Concentration: intact   Fund of General Knowledge: intact and appropriate to age and level of education   Abstract Reasoning: not formally assessed   Judgment & Insight: good     Language  intact     Diagnostic Impression - Plan:       ICD-10-CM ICD-9-CM   1. Encounter for other preprocedural examination Z01.818 V72.83   2. Morbid (severe) obesity due to excess calories E66.01 278.01   3. Type 2 diabetes mellitus without complication, unspecified whether long term insulin use E11.9 250.00   4. Body mass index 45.0-49.9, adult Z68.42 V85.42       Plan:psychiatrically cleared for bariatric surgery     Return to Clinic: as needed    Length of Service (minutes): 45

## 2019-02-08 ENCOUNTER — PATIENT MESSAGE (OUTPATIENT)
Dept: DIABETES | Facility: CLINIC | Age: 64
End: 2019-02-08

## 2019-02-20 ENCOUNTER — TELEPHONE (OUTPATIENT)
Dept: SURGERY | Facility: CLINIC | Age: 64
End: 2019-02-20

## 2019-02-20 NOTE — TELEPHONE ENCOUNTER
----- Message from Shilpa Bhat RD sent at 2/20/2019 12:14 PM CST -----  Kaiden Hensley!    We need to reschedule Mr. Tavarez's pre-op appt so we can get a sx date. :)    Could you assist?    Thanks!!!  Shilpa

## 2019-03-05 PROBLEM — K21.9 GERD (GASTROESOPHAGEAL REFLUX DISEASE): Status: ACTIVE | Noted: 2019-03-05

## 2019-03-06 ENCOUNTER — TELEPHONE (OUTPATIENT)
Dept: GASTROENTEROLOGY | Facility: CLINIC | Age: 64
End: 2019-03-06

## 2019-03-06 NOTE — TELEPHONE ENCOUNTER
Called and left message for pt - told that we had rec'd his message about cancelling his colonoscopy for now.  I have taken him off of the list for 03/08/2019.

## 2019-03-06 NOTE — TELEPHONE ENCOUNTER
Regarding: RE:Reminder for Upcoming Procedure  Contact: [phone]  ----- Message from [facility], System Message sent at 3/2/2019  8:49 AM CST -----    Dr. Hill I ask to put procedure on hold until I see if its worthwhile to switch to Medicare or stay where I am, the $4400 is a factor for me to wait. I'm waiting to do surgery until a later date.Thanks for understanding  ----- Message -----  From: Karina Hill MD  Sent: 3/1/2019  8:30 AM CST  To: Tom Tavarez  Subject: Reminder for Upcoming Procedure  [facility] [address] [place] [id number]    03/01/2019      Dear your,      This is a reminder for your upcoming procedure with Karina Hill MD on 3/8/2019. We will contact you again before the day of your procedure with your scheduled arrival time.       If you have questions or scheduling concerns, you can contact your physicians office:   Karina Hill MD  Phone Number: [phone]      Sincerely,     [facility] [address] [place] [id number]

## 2019-04-03 ENCOUNTER — TELEPHONE (OUTPATIENT)
Dept: INTERNAL MEDICINE | Facility: CLINIC | Age: 64
End: 2019-04-03

## 2019-04-03 NOTE — TELEPHONE ENCOUNTER
----- Message from Brina Quiroz sent at 4/3/2019 12:12 PM CDT -----  Contact: Patient  Patient called to speak with the nurse; he stated his blood sugar has been going up and he's been trying monitor it for about 2 weeks now. He stated he just checked it and it was 419 and that was 1 hour after taking the Humalog. He didn't take the Lantus. He has dry mouth and is urinating a lot. He can be contacted at [phone].    Thanks,  Brina

## 2019-04-03 NOTE — TELEPHONE ENCOUNTER
Ov tomorrow morning.  He needs to take his Lantus today if he did not take it.  Also needs to take an additional 10 units of Humalog now.  Drink plenty of water.

## 2019-04-04 ENCOUNTER — OFFICE VISIT (OUTPATIENT)
Dept: INTERNAL MEDICINE | Facility: CLINIC | Age: 64
End: 2019-04-04
Payer: COMMERCIAL

## 2019-04-04 VITALS
BODY MASS INDEX: 43.52 KG/M2 | DIASTOLIC BLOOD PRESSURE: 74 MMHG | HEIGHT: 71 IN | SYSTOLIC BLOOD PRESSURE: 126 MMHG | WEIGHT: 310.88 LBS | TEMPERATURE: 98 F | HEART RATE: 55 BPM | OXYGEN SATURATION: 96 %

## 2019-04-04 DIAGNOSIS — Z28.39 IMMUNIZATION DEFICIENCY: ICD-10-CM

## 2019-04-04 DIAGNOSIS — I10 ESSENTIAL HYPERTENSION: ICD-10-CM

## 2019-04-04 DIAGNOSIS — Z12.5 SCREENING FOR PROSTATE CANCER: ICD-10-CM

## 2019-04-04 DIAGNOSIS — R53.1 WEAKNESS: ICD-10-CM

## 2019-04-04 LAB
ALBUMIN SERPL BCP-MCNC: 3.4 G/DL (ref 3.5–5.2)
ALP SERPL-CCNC: 63 U/L (ref 55–135)
ALT SERPL W/O P-5'-P-CCNC: 23 U/L (ref 10–44)
ANION GAP SERPL CALC-SCNC: 11 MMOL/L (ref 8–16)
AST SERPL-CCNC: 22 U/L (ref 10–40)
BILIRUB SERPL-MCNC: 0.9 MG/DL (ref 0.1–1)
BUN SERPL-MCNC: 22 MG/DL (ref 8–23)
CALCIUM SERPL-MCNC: 10.1 MG/DL (ref 8.7–10.5)
CHLORIDE SERPL-SCNC: 102 MMOL/L (ref 95–110)
CO2 SERPL-SCNC: 28 MMOL/L (ref 23–29)
COMPLEXED PSA SERPL-MCNC: 3.3 NG/ML (ref 0–4)
CREAT SERPL-MCNC: 1.5 MG/DL (ref 0.5–1.4)
EST. GFR  (AFRICAN AMERICAN): 55.7 ML/MIN/1.73 M^2
EST. GFR  (NON AFRICAN AMERICAN): 48.2 ML/MIN/1.73 M^2
ESTIMATED AVG GLUCOSE: 275 MG/DL (ref 68–131)
GLUCOSE SERPL-MCNC: 147 MG/DL (ref 70–110)
HBA1C MFR BLD HPLC: 11.2 % (ref 4–5.6)
MAGNESIUM SERPL-MCNC: 2.1 MG/DL (ref 1.6–2.6)
POTASSIUM SERPL-SCNC: 3.9 MMOL/L (ref 3.5–5.1)
PROT SERPL-MCNC: 7.4 G/DL (ref 6–8.4)
SODIUM SERPL-SCNC: 141 MMOL/L (ref 136–145)

## 2019-04-04 PROCEDURE — 3008F PR BODY MASS INDEX (BMI) DOCUMENTED: ICD-10-PCS | Mod: CPTII,S$GLB,, | Performed by: FAMILY MEDICINE

## 2019-04-04 PROCEDURE — 3074F PR MOST RECENT SYSTOLIC BLOOD PRESSURE < 130 MM HG: ICD-10-PCS | Mod: CPTII,S$GLB,, | Performed by: FAMILY MEDICINE

## 2019-04-04 PROCEDURE — 90670 PCV13 VACCINE IM: CPT | Mod: S$GLB,,, | Performed by: FAMILY MEDICINE

## 2019-04-04 PROCEDURE — 3078F PR MOST RECENT DIASTOLIC BLOOD PRESSURE < 80 MM HG: ICD-10-PCS | Mod: CPTII,S$GLB,, | Performed by: FAMILY MEDICINE

## 2019-04-04 PROCEDURE — 80053 COMPREHEN METABOLIC PANEL: CPT

## 2019-04-04 PROCEDURE — 1101F PT FALLS ASSESS-DOCD LE1/YR: CPT | Mod: CPTII,S$GLB,, | Performed by: FAMILY MEDICINE

## 2019-04-04 PROCEDURE — 90670 PNEUMOCOCCAL CONJUGATE VACCINE 13-VALENT LESS THAN 5YO & GREATER THAN: ICD-10-PCS | Mod: S$GLB,,, | Performed by: FAMILY MEDICINE

## 2019-04-04 PROCEDURE — 84153 ASSAY OF PSA TOTAL: CPT

## 2019-04-04 PROCEDURE — 83036 HEMOGLOBIN GLYCOSYLATED A1C: CPT

## 2019-04-04 PROCEDURE — 99999 PR PBB SHADOW E&M-EST. PATIENT-LVL III: CPT | Mod: PBBFAC,,, | Performed by: FAMILY MEDICINE

## 2019-04-04 PROCEDURE — 3045F PR MOST RECENT HEMOGLOBIN A1C LEVEL 7.0-9.0%: ICD-10-PCS | Mod: CPTII,S$GLB,, | Performed by: FAMILY MEDICINE

## 2019-04-04 PROCEDURE — 99999 PR PBB SHADOW E&M-EST. PATIENT-LVL III: ICD-10-PCS | Mod: PBBFAC,,, | Performed by: FAMILY MEDICINE

## 2019-04-04 PROCEDURE — 99214 OFFICE O/P EST MOD 30 MIN: CPT | Mod: 25,S$GLB,, | Performed by: FAMILY MEDICINE

## 2019-04-04 PROCEDURE — 83735 ASSAY OF MAGNESIUM: CPT

## 2019-04-04 PROCEDURE — 1101F PR PT FALLS ASSESS DOC 0-1 FALLS W/OUT INJ PAST YR: ICD-10-PCS | Mod: CPTII,S$GLB,, | Performed by: FAMILY MEDICINE

## 2019-04-04 PROCEDURE — 3078F DIAST BP <80 MM HG: CPT | Mod: CPTII,S$GLB,, | Performed by: FAMILY MEDICINE

## 2019-04-04 PROCEDURE — 36415 COLL VENOUS BLD VENIPUNCTURE: CPT | Mod: S$GLB,,, | Performed by: FAMILY MEDICINE

## 2019-04-04 PROCEDURE — 3074F SYST BP LT 130 MM HG: CPT | Mod: CPTII,S$GLB,, | Performed by: FAMILY MEDICINE

## 2019-04-04 PROCEDURE — 36415 PR COLLECTION VENOUS BLOOD,VENIPUNCTURE: ICD-10-PCS | Mod: S$GLB,,, | Performed by: FAMILY MEDICINE

## 2019-04-04 PROCEDURE — 99214 PR OFFICE/OUTPT VISIT, EST, LEVL IV, 30-39 MIN: ICD-10-PCS | Mod: 25,S$GLB,, | Performed by: FAMILY MEDICINE

## 2019-04-04 PROCEDURE — 90471 IMMUNIZATION ADMIN: CPT | Mod: S$GLB,,, | Performed by: FAMILY MEDICINE

## 2019-04-04 PROCEDURE — 90471 PNEUMOCOCCAL CONJUGATE VACCINE 13-VALENT LESS THAN 5YO & GREATER THAN: ICD-10-PCS | Mod: S$GLB,,, | Performed by: FAMILY MEDICINE

## 2019-04-04 PROCEDURE — 3008F BODY MASS INDEX DOCD: CPT | Mod: CPTII,S$GLB,, | Performed by: FAMILY MEDICINE

## 2019-04-04 PROCEDURE — 3045F PR MOST RECENT HEMOGLOBIN A1C LEVEL 7.0-9.0%: CPT | Mod: CPTII,S$GLB,, | Performed by: FAMILY MEDICINE

## 2019-04-04 RX ORDER — CALCIUM CARB/VITAMIN D3/VIT K1 500-100-40
TABLET,CHEWABLE ORAL
Refills: 6 | COMMUNITY
Start: 2019-02-13 | End: 2020-11-09 | Stop reason: SDUPTHER

## 2019-04-04 NOTE — PROGRESS NOTES
Subjective:       Patient ID: Tom Tavarez is a 64 y.o. male.    Chief Complaint: Blood Sugar Problem (elevated)    Reports polyuria polydipsia fatigue muscle weakness.  Glucoses at home have been in the 400 range.  He has been using Lantus 64 units at night metformin 500 mg twice a day she will longer sliding scale using 3-5-7-9 units twice a day as needed.  He reports glucose today was in the 200 range.  He was given additional dose of the Humalog yesterday at 10 units.  Recent lab reviewed including normal CBC and thyroid function.  He reports he has had diabetic teaching in the past.  He understands his diet and follows it mostly.  He also has tried to walk more for exercise.    Review of Systems   Constitutional: Positive for fatigue. Negative for activity change, appetite change, chills, fever and unexpected weight change.   HENT: Positive for congestion and sneezing.    Eyes: Negative for visual disturbance.   Respiratory: Negative for cough, chest tightness, shortness of breath and wheezing.    Cardiovascular: Negative for chest pain, palpitations and leg swelling.        Denies lightheadedness   Gastrointestinal: Negative for abdominal pain, diarrhea, nausea and vomiting.   Endocrine: Negative for cold intolerance, heat intolerance, polydipsia and polyuria.   Genitourinary: Positive for frequency. Negative for difficulty urinating, hematuria and urgency.   Neurological: Positive for weakness. Negative for light-headedness, numbness and headaches.       Objective:      Physical Exam   Constitutional: He is oriented to person, place, and time. He appears well-developed and well-nourished. No distress.   Neck: Neck supple. No JVD present. No thyromegaly present.   Cardiovascular: Normal rate, regular rhythm and normal heart sounds.   No murmur heard.  Pulmonary/Chest: Effort normal and breath sounds normal. No respiratory distress. He has no wheezes.   Abdominal: Soft. Bowel sounds are normal. He exhibits  no mass. There is no tenderness.   Lymphadenopathy:     He has no cervical adenopathy.   Neurological: He is alert and oriented to person, place, and time.   Skin: He is not diaphoretic.       Lab Visit on 01/30/2019   Component Date Value Ref Range Status    WBC 01/30/2019 7.19  3.90 - 12.70 K/uL Final    RBC 01/30/2019 5.42  4.60 - 6.20 M/uL Final    Hemoglobin 01/30/2019 15.6  14.0 - 18.0 g/dL Final    Hematocrit 01/30/2019 48.5  40.0 - 54.0 % Final    MCV 01/30/2019 90  82 - 98 fL Final    MCH 01/30/2019 28.8  27.0 - 31.0 pg Final    MCHC 01/30/2019 32.2  32.0 - 36.0 g/dL Final    RDW 01/30/2019 14.3  11.5 - 14.5 % Final    Platelets 01/30/2019 241  150 - 350 K/uL Final    MPV 01/30/2019 10.8  9.2 - 12.9 fL Final    Immature Granulocytes 01/30/2019 0.3  0.0 - 0.5 % Final    Gran # (ANC) 01/30/2019 4.3  1.8 - 7.7 K/uL Final    Immature Grans (Abs) 01/30/2019 0.02  0.00 - 0.04 K/uL Final    Lymph # 01/30/2019 2.0  1.0 - 4.8 K/uL Final    Mono # 01/30/2019 0.5  0.3 - 1.0 K/uL Final    Eos # 01/30/2019 0.3  0.0 - 0.5 K/uL Final    Baso # 01/30/2019 0.04  0.00 - 0.20 K/uL Final    nRBC 01/30/2019 0  0 /100 WBC Final    Gran% 01/30/2019 59.8  38.0 - 73.0 % Final    Lymph% 01/30/2019 28.4  18.0 - 48.0 % Final    Mono% 01/30/2019 7.4  4.0 - 15.0 % Final    Eosinophil% 01/30/2019 3.5  0.0 - 8.0 % Final    Basophil% 01/30/2019 0.6  0.0 - 1.9 % Final    Differential Method 01/30/2019 Automated   Final    Sodium 01/30/2019 136  136 - 145 mmol/L Final    Potassium 01/30/2019 4.0  3.5 - 5.1 mmol/L Final    Chloride 01/30/2019 101  95 - 110 mmol/L Final    CO2 01/30/2019 25  23 - 29 mmol/L Final    Glucose 01/30/2019 171* 70 - 110 mg/dL Final    BUN, Bld 01/30/2019 15  8 - 23 mg/dL Final    Creatinine 01/30/2019 1.5* 0.5 - 1.4 mg/dL Final    Calcium 01/30/2019 9.3  8.7 - 10.5 mg/dL Final    Total Protein 01/30/2019 7.5  6.0 - 8.4 g/dL Final    Albumin 01/30/2019 3.2* 3.5 - 5.2 g/dL Final     Total Bilirubin 01/30/2019 0.6  0.1 - 1.0 mg/dL Final    Alkaline Phosphatase 01/30/2019 72  55 - 135 U/L Final    AST 01/30/2019 12  10 - 40 U/L Final    ALT 01/30/2019 17  10 - 44 U/L Final    Anion Gap 01/30/2019 10  8 - 16 mmol/L Final    eGFR if  01/30/2019 56.1* >60 mL/min/1.73 m^2 Final    eGFR if non African American 01/30/2019 48.5* >60 mL/min/1.73 m^2 Final    Vit D, 25-Hydroxy 01/30/2019 8* 30 - 96 ng/mL Final    Vitamin B-12 01/30/2019 534  210 - 950 pg/mL Final    Iron 01/30/2019 44* 45 - 160 ug/dL Final    Transferrin 01/30/2019 198* 200 - 375 mg/dL Final    TIBC 01/30/2019 293  250 - 450 ug/dL Final    Saturated Iron 01/30/2019 15* 20 - 50 % Final    Prealbumin 01/30/2019 16* 20 - 43 mg/dL Final    Hemoglobin A1C 01/30/2019 8.9* 4.0 - 5.6 % Final    Estimated Avg Glucose 01/30/2019 209* 68 - 131 mg/dL Final    Free T4 01/30/2019 0.83  0.71 - 1.51 ng/dL Final    TSH 01/30/2019 3.088  0.400 - 4.000 uIU/mL Final    Thiamine 01/30/2019 60  38 - 122 ug/L Final     Assessment:       1. Uncontrolled type 2 diabetes mellitus without complication, with long-term current use of insulin    2. Essential hypertension    3. BMI 45.0-49.9, adult    4. Screening for prostate cancer        Plan:     Blood pressure is controlled 126/74.  Pulse is 55.  CMP A1c magnesium level ordered.  Prevnar 13 given today.  Health maintenance reviewed.  Increase Lantus 64 units and start taking 70 units at bedtime.  Increase you walk sliding scale to 5-7-9 units.  He is eating 2 meals a day.  Diet discussed.  Exercise discussed.  Maintain hydration discussed.  Follow-up in 1 week.    Uncontrolled type 2 diabetes mellitus without complication, with long-term current use of insulin  -     Comprehensive metabolic panel  -     Hemoglobin A1c  -     Magnesium; Future; Expected date: 04/04/2019    Essential hypertension    BMI 45.0-49.9, adult    Screening for prostate cancer  -     PSA, Screening    Other  orders  -     (In Office Administered) Pneumococcal Conjugate Vaccine (13 Valent) (IM)

## 2019-04-17 ENCOUNTER — OFFICE VISIT (OUTPATIENT)
Dept: INTERNAL MEDICINE | Facility: CLINIC | Age: 64
End: 2019-04-17
Payer: COMMERCIAL

## 2019-04-17 VITALS
WEIGHT: 314.06 LBS | BODY MASS INDEX: 43.97 KG/M2 | HEIGHT: 71 IN | TEMPERATURE: 98 F | OXYGEN SATURATION: 96 % | HEART RATE: 54 BPM | SYSTOLIC BLOOD PRESSURE: 124 MMHG | DIASTOLIC BLOOD PRESSURE: 72 MMHG

## 2019-04-17 DIAGNOSIS — I10 ESSENTIAL HYPERTENSION: ICD-10-CM

## 2019-04-17 DIAGNOSIS — E11.69 DIABETES MELLITUS TYPE 2 IN OBESE: ICD-10-CM

## 2019-04-17 DIAGNOSIS — E66.9 DIABETES MELLITUS TYPE 2 IN OBESE: ICD-10-CM

## 2019-04-17 PROBLEM — R03.0 ELEVATED BLOOD PRESSURE READING: Status: RESOLVED | Noted: 2018-05-28 | Resolved: 2019-04-17

## 2019-04-17 LAB
ESTIMATED AVG GLUCOSE: 269 MG/DL (ref 68–131)
GLUCOSE SERPL-MCNC: 172 MG/DL (ref 70–110)
HBA1C MFR BLD HPLC: 11 % (ref 4–5.6)

## 2019-04-17 PROCEDURE — 3078F DIAST BP <80 MM HG: CPT | Mod: CPTII,S$GLB,, | Performed by: FAMILY MEDICINE

## 2019-04-17 PROCEDURE — 36415 COLL VENOUS BLD VENIPUNCTURE: CPT | Mod: S$GLB,,, | Performed by: FAMILY MEDICINE

## 2019-04-17 PROCEDURE — 3008F BODY MASS INDEX DOCD: CPT | Mod: CPTII,S$GLB,, | Performed by: FAMILY MEDICINE

## 2019-04-17 PROCEDURE — 1101F PR PT FALLS ASSESS DOC 0-1 FALLS W/OUT INJ PAST YR: ICD-10-PCS | Mod: CPTII,S$GLB,, | Performed by: FAMILY MEDICINE

## 2019-04-17 PROCEDURE — 3046F HEMOGLOBIN A1C LEVEL >9.0%: CPT | Mod: CPTII,S$GLB,, | Performed by: FAMILY MEDICINE

## 2019-04-17 PROCEDURE — 99213 OFFICE O/P EST LOW 20 MIN: CPT | Mod: S$GLB,,, | Performed by: FAMILY MEDICINE

## 2019-04-17 PROCEDURE — 1101F PT FALLS ASSESS-DOCD LE1/YR: CPT | Mod: CPTII,S$GLB,, | Performed by: FAMILY MEDICINE

## 2019-04-17 PROCEDURE — 99999 PR PBB SHADOW E&M-EST. PATIENT-LVL III: ICD-10-PCS | Mod: PBBFAC,,, | Performed by: FAMILY MEDICINE

## 2019-04-17 PROCEDURE — 3046F PR MOST RECENT HEMOGLOBIN A1C LEVEL > 9.0%: ICD-10-PCS | Mod: CPTII,S$GLB,, | Performed by: FAMILY MEDICINE

## 2019-04-17 PROCEDURE — 99999 PR PBB SHADOW E&M-EST. PATIENT-LVL III: CPT | Mod: PBBFAC,,, | Performed by: FAMILY MEDICINE

## 2019-04-17 PROCEDURE — 99213 PR OFFICE/OUTPT VISIT, EST, LEVL III, 20-29 MIN: ICD-10-PCS | Mod: S$GLB,,, | Performed by: FAMILY MEDICINE

## 2019-04-17 PROCEDURE — 3074F SYST BP LT 130 MM HG: CPT | Mod: CPTII,S$GLB,, | Performed by: FAMILY MEDICINE

## 2019-04-17 PROCEDURE — 3074F PR MOST RECENT SYSTOLIC BLOOD PRESSURE < 130 MM HG: ICD-10-PCS | Mod: CPTII,S$GLB,, | Performed by: FAMILY MEDICINE

## 2019-04-17 PROCEDURE — 82947 ASSAY GLUCOSE BLOOD QUANT: CPT

## 2019-04-17 PROCEDURE — 3078F PR MOST RECENT DIASTOLIC BLOOD PRESSURE < 80 MM HG: ICD-10-PCS | Mod: CPTII,S$GLB,, | Performed by: FAMILY MEDICINE

## 2019-04-17 PROCEDURE — 3008F PR BODY MASS INDEX (BMI) DOCUMENTED: ICD-10-PCS | Mod: CPTII,S$GLB,, | Performed by: FAMILY MEDICINE

## 2019-04-17 PROCEDURE — 36415 PR COLLECTION VENOUS BLOOD,VENIPUNCTURE: ICD-10-PCS | Mod: S$GLB,,, | Performed by: FAMILY MEDICINE

## 2019-04-17 PROCEDURE — 83036 HEMOGLOBIN GLYCOSYLATED A1C: CPT

## 2019-04-17 RX ORDER — INSULIN LISPRO 100 [IU]/ML
INJECTION, SOLUTION INTRAVENOUS; SUBCUTANEOUS
Qty: 1 BOX | Refills: 3 | Status: SHIPPED | OUTPATIENT
Start: 2019-04-17 | End: 2019-06-24

## 2019-04-17 RX ORDER — INSULIN GLARGINE 100 [IU]/ML
64 INJECTION, SOLUTION SUBCUTANEOUS NIGHTLY
Qty: 45 ML | Refills: 0 | Status: SHIPPED | OUTPATIENT
Start: 2019-04-17 | End: 2019-06-18 | Stop reason: SDUPTHER

## 2019-04-17 NOTE — PROGRESS NOTES
Subjective:       Patient ID: Tom Tavarez is a 64 y.o. male.    Chief Complaint: Follow-up and Blood Sugar Problem (running high)    Follow-up uncontrolled diabetes.  He currently is on Lantus 70 units at bedtime and sliding scale Humalog twice a day using to 10 units at bedtime.  He is followed his diet he reports.  He also reports that he had a donut this morning.  Home glucoses were in the 400 range at home and now 200 range.  He denies hypoglycemic symptoms.    Review of Systems   Constitutional: Negative for chills and fever.   Respiratory: Negative for cough and shortness of breath.    Cardiovascular: Negative for chest pain and palpitations.   Neurological: Negative for light-headedness and headaches.       Objective:      Physical Exam   Constitutional: He appears well-developed. No distress.   Cardiovascular: Normal rate and regular rhythm.   Pulmonary/Chest: Effort normal and breath sounds normal.       Office Visit on 04/04/2019   Component Date Value Ref Range Status    Sodium 04/04/2019 141  136 - 145 mmol/L Final    Potassium 04/04/2019 3.9  3.5 - 5.1 mmol/L Final    Chloride 04/04/2019 102  95 - 110 mmol/L Final    CO2 04/04/2019 28  23 - 29 mmol/L Final    Glucose 04/04/2019 147* 70 - 110 mg/dL Final    BUN, Bld 04/04/2019 22  8 - 23 mg/dL Final    Creatinine 04/04/2019 1.5* 0.5 - 1.4 mg/dL Final    Calcium 04/04/2019 10.1  8.7 - 10.5 mg/dL Final    Total Protein 04/04/2019 7.4  6.0 - 8.4 g/dL Final    Albumin 04/04/2019 3.4* 3.5 - 5.2 g/dL Final    Total Bilirubin 04/04/2019 0.9  0.1 - 1.0 mg/dL Final    Alkaline Phosphatase 04/04/2019 63  55 - 135 U/L Final    AST 04/04/2019 22  10 - 40 U/L Final    ALT 04/04/2019 23  10 - 44 U/L Final    Anion Gap 04/04/2019 11  8 - 16 mmol/L Final    eGFR if  04/04/2019 55.7* >60 mL/min/1.73 m^2 Final    eGFR if non African American 04/04/2019 48.2* >60 mL/min/1.73 m^2 Final    Hemoglobin A1C 04/04/2019 11.2* 4.0 - 5.6 %  Final    Estimated Avg Glucose 04/04/2019 275* 68 - 131 mg/dL Final    PSA, SCREEN 04/04/2019 3.3  0.00 - 4.00 ng/mL Final    Magnesium 04/04/2019 2.1  1.6 - 2.6 mg/dL Final     Assessment:       1. Uncontrolled type 2 diabetes mellitus without complication, with long-term current use of insulin    2. Diabetes mellitus type 2 in obese        Plan:     Glucose A1c ordered.  Discussed need for AAA screening colonoscopy.  He wants to defer these.  Follow-up in 1 month.  Blood pressure controlled 124/72    Uncontrolled type 2 diabetes mellitus without complication, with long-term current use of insulin  -     Glucose, fasting  -     Hemoglobin A1c    Diabetes mellitus type 2 in obese

## 2019-04-18 DIAGNOSIS — E11.65 UNCONTROLLED TYPE 2 DIABETES MELLITUS WITH HYPERGLYCEMIA: Primary | ICD-10-CM

## 2019-05-06 RX ORDER — HYDROCHLOROTHIAZIDE 25 MG/1
TABLET ORAL
Qty: 90 TABLET | Refills: 1 | Status: SHIPPED | OUTPATIENT
Start: 2019-05-06 | End: 2020-03-19

## 2019-05-14 ENCOUNTER — PATIENT OUTREACH (OUTPATIENT)
Dept: ADMINISTRATIVE | Facility: HOSPITAL | Age: 64
End: 2019-05-14

## 2019-06-02 DIAGNOSIS — E78.5 HYPERLIPIDEMIA, UNSPECIFIED HYPERLIPIDEMIA TYPE: ICD-10-CM

## 2019-06-03 RX ORDER — ATORVASTATIN CALCIUM 40 MG/1
TABLET, FILM COATED ORAL
Qty: 90 TABLET | Refills: 3 | Status: SHIPPED | OUTPATIENT
Start: 2019-06-03 | End: 2020-04-23 | Stop reason: SDUPTHER

## 2019-06-18 ENCOUNTER — TELEPHONE (OUTPATIENT)
Dept: INTERNAL MEDICINE | Facility: CLINIC | Age: 64
End: 2019-06-18

## 2019-06-18 DIAGNOSIS — E11.69 DIABETES MELLITUS TYPE 2 IN OBESE: ICD-10-CM

## 2019-06-18 DIAGNOSIS — E66.9 DIABETES MELLITUS TYPE 2 IN OBESE: ICD-10-CM

## 2019-06-18 RX ORDER — INSULIN GLARGINE 100 [IU]/ML
INJECTION, SOLUTION SUBCUTANEOUS
Qty: 45 ML | Refills: 0 | Status: SHIPPED | OUTPATIENT
Start: 2019-06-18 | End: 2019-08-19 | Stop reason: SDUPTHER

## 2019-06-18 NOTE — TELEPHONE ENCOUNTER
----- Message from Gunnar Cortes sent at 6/18/2019 11:05 AM CDT -----  Contact: Pt  Please give pt a call at .[phone] (home) he is calling to speak with nurse regarding some questions about Humalog.

## 2019-06-18 NOTE — TELEPHONE ENCOUNTER
Pt states that the Humalog is not covered. Pt states that he will call back to let us know what they are.

## 2019-06-20 ENCOUNTER — PATIENT MESSAGE (OUTPATIENT)
Dept: INTERNAL MEDICINE | Facility: CLINIC | Age: 64
End: 2019-06-20

## 2019-06-20 DIAGNOSIS — E66.9 DIABETES MELLITUS TYPE 2 IN OBESE: ICD-10-CM

## 2019-06-20 DIAGNOSIS — E11.69 DIABETES MELLITUS TYPE 2 IN OBESE: ICD-10-CM

## 2019-06-20 RX ORDER — INSULIN GLARGINE 100 [IU]/ML
INJECTION, SOLUTION SUBCUTANEOUS
Qty: 45 ML | Refills: 0 | Status: CANCELLED | OUTPATIENT
Start: 2019-06-20

## 2019-06-24 RX ORDER — INSULIN ASPART 100 [IU]/ML
INJECTION, SOLUTION INTRAVENOUS; SUBCUTANEOUS
Qty: 1 BOX | Refills: 2 | Status: SHIPPED | OUTPATIENT
Start: 2019-06-24 | End: 2019-07-01 | Stop reason: SDUPTHER

## 2019-06-24 NOTE — TELEPHONE ENCOUNTER
Sig - Route: Inject 3-9 Units into the skin 3 (three) times daily before meals. If glucose 150-180 use 3 units   If glucose 181-210 use 5 units   If glucose 211-240 use 7 units   If glucose 241 or more use 9 units - Subcutaneous

## 2019-06-28 ENCOUNTER — PATIENT MESSAGE (OUTPATIENT)
Dept: INTERNAL MEDICINE | Facility: CLINIC | Age: 64
End: 2019-06-28

## 2019-07-01 ENCOUNTER — TELEPHONE (OUTPATIENT)
Dept: INTERNAL MEDICINE | Facility: CLINIC | Age: 64
End: 2019-07-01

## 2019-07-01 ENCOUNTER — PATIENT MESSAGE (OUTPATIENT)
Dept: INTERNAL MEDICINE | Facility: CLINIC | Age: 64
End: 2019-07-01

## 2019-07-01 RX ORDER — INSULIN LISPRO 100 [IU]/ML
3-9 INJECTION, SOLUTION INTRAVENOUS; SUBCUTANEOUS
Qty: 10 ML | Refills: 3 | Status: SHIPPED | OUTPATIENT
Start: 2019-07-01 | End: 2020-01-26

## 2019-07-01 NOTE — TELEPHONE ENCOUNTER
----- Message from [place] sent at 7/1/2019 10:08 AM CDT -----  ..Type:  Patient Returning Call    Who Called:pt   Who Left Message for Patient:  Does the patient know what this is regarding?: discuss medication   Would the patient rather a call back or a response via [facility]? Call back   Best Call Back Number: [phone]  Additional Information: pt is requesting a call to discuss a prescription

## 2019-07-01 NOTE — TELEPHONE ENCOUNTER
Pt states that he was going to change insurances and was being changed to Novolog instead of Humalog. Pt is no longer going with that insurance and is going to stay with current insurance and would like to have the Humalog instead of Novolog. Raina advise

## 2019-07-15 ENCOUNTER — PATIENT OUTREACH (OUTPATIENT)
Dept: ADMINISTRATIVE | Facility: HOSPITAL | Age: 64
End: 2019-07-15

## 2019-07-23 ENCOUNTER — OFFICE VISIT (OUTPATIENT)
Dept: INTERNAL MEDICINE | Facility: CLINIC | Age: 64
End: 2019-07-23
Payer: COMMERCIAL

## 2019-07-23 VITALS
HEART RATE: 59 BPM | WEIGHT: 315 LBS | TEMPERATURE: 99 F | DIASTOLIC BLOOD PRESSURE: 78 MMHG | SYSTOLIC BLOOD PRESSURE: 118 MMHG | OXYGEN SATURATION: 98 % | HEIGHT: 71 IN | BODY MASS INDEX: 44.1 KG/M2

## 2019-07-23 DIAGNOSIS — L29.9 ITCHY SKIN: ICD-10-CM

## 2019-07-23 DIAGNOSIS — I10 ESSENTIAL HYPERTENSION: ICD-10-CM

## 2019-07-23 DIAGNOSIS — N50.89 TESTICLE SWELLING: ICD-10-CM

## 2019-07-23 DIAGNOSIS — N18.30 CHRONIC KIDNEY DISEASE (CKD), STAGE III (MODERATE): ICD-10-CM

## 2019-07-23 LAB
ALBUMIN SERPL BCP-MCNC: 3.1 G/DL (ref 3.5–5.2)
ALP SERPL-CCNC: 64 U/L (ref 55–135)
ALT SERPL W/O P-5'-P-CCNC: 16 U/L (ref 10–44)
ANION GAP SERPL CALC-SCNC: 10 MMOL/L (ref 8–16)
AST SERPL-CCNC: 19 U/L (ref 10–40)
BILIRUB SERPL-MCNC: 0.4 MG/DL (ref 0.1–1)
BUN SERPL-MCNC: 17 MG/DL (ref 8–23)
CALCIUM SERPL-MCNC: 10 MG/DL (ref 8.7–10.5)
CHLORIDE SERPL-SCNC: 103 MMOL/L (ref 95–110)
CO2 SERPL-SCNC: 28 MMOL/L (ref 23–29)
CREAT SERPL-MCNC: 1.5 MG/DL (ref 0.5–1.4)
EST. GFR  (AFRICAN AMERICAN): 55.7 ML/MIN/1.73 M^2
EST. GFR  (NON AFRICAN AMERICAN): 48.2 ML/MIN/1.73 M^2
ESTIMATED AVG GLUCOSE: 226 MG/DL (ref 68–131)
GLUCOSE SERPL-MCNC: 82 MG/DL (ref 70–110)
HBA1C MFR BLD HPLC: 9.5 % (ref 4–5.6)
POTASSIUM SERPL-SCNC: 4.3 MMOL/L (ref 3.5–5.1)
PROT SERPL-MCNC: 7.7 G/DL (ref 6–8.4)
SODIUM SERPL-SCNC: 141 MMOL/L (ref 136–145)

## 2019-07-23 PROCEDURE — 1101F PT FALLS ASSESS-DOCD LE1/YR: CPT | Mod: CPTII,S$GLB,, | Performed by: FAMILY MEDICINE

## 2019-07-23 PROCEDURE — 3074F SYST BP LT 130 MM HG: CPT | Mod: CPTII,S$GLB,, | Performed by: FAMILY MEDICINE

## 2019-07-23 PROCEDURE — 1101F PR PT FALLS ASSESS DOC 0-1 FALLS W/OUT INJ PAST YR: ICD-10-PCS | Mod: CPTII,S$GLB,, | Performed by: FAMILY MEDICINE

## 2019-07-23 PROCEDURE — 3046F PR MOST RECENT HEMOGLOBIN A1C LEVEL > 9.0%: ICD-10-PCS | Mod: CPTII,S$GLB,, | Performed by: FAMILY MEDICINE

## 2019-07-23 PROCEDURE — 3078F DIAST BP <80 MM HG: CPT | Mod: CPTII,S$GLB,, | Performed by: FAMILY MEDICINE

## 2019-07-23 PROCEDURE — 99999 PR PBB SHADOW E&M-EST. PATIENT-LVL III: CPT | Mod: PBBFAC,,, | Performed by: FAMILY MEDICINE

## 2019-07-23 PROCEDURE — 81001 URINALYSIS AUTO W/SCOPE: CPT

## 2019-07-23 PROCEDURE — 80053 COMPREHEN METABOLIC PANEL: CPT

## 2019-07-23 PROCEDURE — 3046F HEMOGLOBIN A1C LEVEL >9.0%: CPT | Mod: CPTII,S$GLB,, | Performed by: FAMILY MEDICINE

## 2019-07-23 PROCEDURE — 3078F PR MOST RECENT DIASTOLIC BLOOD PRESSURE < 80 MM HG: ICD-10-PCS | Mod: CPTII,S$GLB,, | Performed by: FAMILY MEDICINE

## 2019-07-23 PROCEDURE — 99214 PR OFFICE/OUTPT VISIT, EST, LEVL IV, 30-39 MIN: ICD-10-PCS | Mod: S$GLB,,, | Performed by: FAMILY MEDICINE

## 2019-07-23 PROCEDURE — 3008F BODY MASS INDEX DOCD: CPT | Mod: CPTII,S$GLB,, | Performed by: FAMILY MEDICINE

## 2019-07-23 PROCEDURE — 83036 HEMOGLOBIN GLYCOSYLATED A1C: CPT

## 2019-07-23 PROCEDURE — 99999 PR PBB SHADOW E&M-EST. PATIENT-LVL III: ICD-10-PCS | Mod: PBBFAC,,, | Performed by: FAMILY MEDICINE

## 2019-07-23 PROCEDURE — 99214 OFFICE O/P EST MOD 30 MIN: CPT | Mod: S$GLB,,, | Performed by: FAMILY MEDICINE

## 2019-07-23 PROCEDURE — 3074F PR MOST RECENT SYSTOLIC BLOOD PRESSURE < 130 MM HG: ICD-10-PCS | Mod: CPTII,S$GLB,, | Performed by: FAMILY MEDICINE

## 2019-07-23 PROCEDURE — 3008F PR BODY MASS INDEX (BMI) DOCUMENTED: ICD-10-PCS | Mod: CPTII,S$GLB,, | Performed by: FAMILY MEDICINE

## 2019-07-23 NOTE — PROGRESS NOTES
Subjective:       Patient ID: Tom Tavarez is a 64 y.o. male.    Chief Complaint: Diabetes    Follow-up uncontrolled diabetes hypertension itchiness left scrotal swelling. He is taking Lantus 70 units at bedtime and using Humalog if needed for elevated glucose.  He does not use home log and a daily basis.  His fasting blood sugars at home have been in the 140 range.  He denies headache chest pain palpitations shortness of breath.  He reports 1-2 weeks duration of itchiness mild in various spots.  It is possibly associated with his traveling to [place].  He has had 1-2 weeks duration of left scrotal swelling with no pain. No penile discharge. No fever chills.  No scrotal pain.    Review of Systems   Constitutional: Negative for activity change, appetite change, chills, diaphoresis and fever.   Respiratory: Negative for cough, chest tightness, shortness of breath and wheezing.    Cardiovascular: Negative for chest pain, palpitations and leg swelling.        Denies lightheadedness   Gastrointestinal: Negative for abdominal distention, abdominal pain, diarrhea and nausea.   Genitourinary: Positive for scrotal swelling. Negative for difficulty urinating, discharge, dysuria, flank pain, frequency, genital sores, hematuria, penile pain, penile swelling, testicular pain and urgency.   Skin:        Itchiness       Objective:      Physical Exam   Constitutional: He is oriented to person, place, and time. He appears well-developed and well-nourished. No distress.   Neck: Neck supple. No thyromegaly present.   Cardiovascular: Normal rate, regular rhythm and normal heart sounds.   No murmur heard.  Pulmonary/Chest: Effort normal and breath sounds normal. No respiratory distress. He has no wheezes.   Abdominal: Soft. Bowel sounds are normal. He exhibits no mass. There is no tenderness.   Genitourinary:   Genitourinary Comments: Mildly enlarged left testicle.  Mild tenderness to palpation.  Epididymis is in normal position  with no tenderness. No scrotal redness.  No scrotal edema.   Lymphadenopathy:     He has no cervical adenopathy.   Neurological: He is alert and oriented to person, place, and time.   Skin: Skin is warm and dry. No rash noted. He is not diaphoretic. No erythema.       Office Visit on 04/17/2019   Component Date Value Ref Range Status    Glucose, Fasting 04/17/2019 172* 70 - 110 mg/dL Final    Hemoglobin A1C 04/17/2019 11.0* 4.0 - 5.6 % Final    Estimated Avg Glucose 04/17/2019 269* 68 - 131 mg/dL Final     Assessment:       1. Uncontrolled type 2 diabetes mellitus without complication, with long-term current use of insulin    2. Testicle swelling        Plan:     A1c CMP ordered to check for diabetes as well as chronic kidney disease. Ultrasound testicle for scrotal swelling.  Urinalysis ordered.  Recommendations pending results.    Uncontrolled type 2 diabetes mellitus without complication, with long-term current use of insulin  -     Comprehensive metabolic panel  -     Hemoglobin A1c    Testicle swelling  -     US Scrotum And Testicles; Future; Expected date: 07/23/2019  -     Cancel: Urinalysis  -     Urinalysis; Future; Expected date: 07/23/2019

## 2019-07-24 ENCOUNTER — HOSPITAL ENCOUNTER (OUTPATIENT)
Dept: RADIOLOGY | Facility: HOSPITAL | Age: 64
Discharge: HOME | End: 2019-07-24
Attending: FAMILY MEDICINE
Payer: COMMERCIAL

## 2019-07-24 DIAGNOSIS — N50.89 TESTICLE SWELLING: ICD-10-CM

## 2019-07-24 LAB
BACTERIA #/AREA URNS AUTO: ABNORMAL /HPF
BILIRUB UR QL STRIP: NEGATIVE
CLARITY UR REFRACT.AUTO: ABNORMAL
COLOR UR AUTO: YELLOW
GLUCOSE UR QL STRIP: NEGATIVE
HGB UR QL STRIP: NEGATIVE
KETONES UR QL STRIP: NEGATIVE
LEUKOCYTE ESTERASE UR QL STRIP: ABNORMAL
MICROSCOPIC COMMENT: ABNORMAL
NITRITE UR QL STRIP: NEGATIVE
PH UR STRIP: 6 [PH] (ref 5–8)
PROT UR QL STRIP: NEGATIVE
RBC #/AREA URNS AUTO: 1 /HPF (ref 0–4)
SP GR UR STRIP: 1.02 (ref 1–1.03)
URN SPEC COLLECT METH UR: ABNORMAL
WBC #/AREA URNS AUTO: 36 /HPF (ref 0–5)

## 2019-07-24 PROCEDURE — 76870 US EXAM SCROTUM: CPT | Mod: TC

## 2019-07-24 PROCEDURE — 76870 US EXAM SCROTUM: CPT | Mod: 26,,, | Performed by: RADIOLOGY

## 2019-07-24 PROCEDURE — 76870 US SCROTUM AND TESTICLES: ICD-10-PCS | Mod: 26,,, | Performed by: RADIOLOGY

## 2019-07-24 RX ORDER — DOXYCYCLINE HYCLATE 100 MG
100 TABLET ORAL 2 TIMES DAILY
Qty: 20 TABLET | Refills: 0 | Status: SHIPPED | OUTPATIENT
Start: 2019-07-24 | End: 2019-08-14

## 2019-07-25 ENCOUNTER — PATIENT OUTREACH (OUTPATIENT)
Dept: ADMINISTRATIVE | Facility: HOSPITAL | Age: 64
End: 2019-07-25

## 2019-07-25 ENCOUNTER — PATIENT MESSAGE (OUTPATIENT)
Dept: INTERNAL MEDICINE | Facility: CLINIC | Age: 64
End: 2019-07-25

## 2019-07-25 NOTE — PROGRESS NOTES
DIABETES REPORT:Spoke to patient about scheduling PCP 10-28-19 appt, patient verbalized understanding of needed appt. Pt would like lab same day as krzysztof 10-28-19 Lab scheduled . Krzysztof scheduled.  Appt slip mailed.

## 2019-08-05 ENCOUNTER — TELEPHONE (OUTPATIENT)
Dept: INTERNAL MEDICINE | Facility: CLINIC | Age: 64
End: 2019-08-05

## 2019-08-05 RX ORDER — CIPROFLOXACIN 500 MG/1
500 TABLET ORAL 2 TIMES DAILY
Qty: 20 TABLET | Refills: 0 | Status: SHIPPED | OUTPATIENT
Start: 2019-08-05 | End: 2020-03-19

## 2019-08-05 NOTE — TELEPHONE ENCOUNTER
----- Message from Gunnar Cortes sent at 8/5/2019 10:20 AM CDT -----  Contact: Pt  Please give pt a call at .[phone] (home) regarding some issues he is still having with a prescription.

## 2019-08-05 NOTE — TELEPHONE ENCOUNTER
Patient was informed of his new Rx being sent to the pharmacy and verbally understood the information given. Patient was also informed of him needing to do sit baths twice per day and stated that he only takes showers, no baths. Patient sis schedule an appointment on 8/14/19 for his f/u and verbally understood.

## 2019-08-05 NOTE — TELEPHONE ENCOUNTER
Patient called in regards to informing of his doxycycline not working for him. He states that both of his testicles are swollen now. LOV: 07/23/19. Patient wants to know if another antibiotic can be called in for him.      Please Advise

## 2019-08-14 ENCOUNTER — OFFICE VISIT (OUTPATIENT)
Dept: INTERNAL MEDICINE | Facility: CLINIC | Age: 64
End: 2019-08-14
Payer: COMMERCIAL

## 2019-08-14 VITALS
HEART RATE: 56 BPM | SYSTOLIC BLOOD PRESSURE: 136 MMHG | TEMPERATURE: 98 F | HEIGHT: 71 IN | DIASTOLIC BLOOD PRESSURE: 70 MMHG | BODY MASS INDEX: 44.1 KG/M2 | WEIGHT: 315 LBS | OXYGEN SATURATION: 97 %

## 2019-08-14 DIAGNOSIS — E11.69 DIABETES MELLITUS TYPE 2 IN OBESE: ICD-10-CM

## 2019-08-14 DIAGNOSIS — N50.89 TESTICLE SWELLING: Primary | ICD-10-CM

## 2019-08-14 DIAGNOSIS — E66.9 DIABETES MELLITUS TYPE 2 IN OBESE: ICD-10-CM

## 2019-08-14 LAB
BILIRUB UR QL STRIP: NEGATIVE
CLARITY UR REFRACT.AUTO: CLEAR
COLOR UR AUTO: YELLOW
GLUCOSE UR QL STRIP: NEGATIVE
HGB UR QL STRIP: NEGATIVE
KETONES UR QL STRIP: NEGATIVE
LEUKOCYTE ESTERASE UR QL STRIP: NEGATIVE
NITRITE UR QL STRIP: NEGATIVE
PH UR STRIP: 5 [PH] (ref 5–8)
PROT UR QL STRIP: NEGATIVE
SP GR UR STRIP: 1.02 (ref 1–1.03)
URN SPEC COLLECT METH UR: NORMAL

## 2019-08-14 PROCEDURE — 3046F HEMOGLOBIN A1C LEVEL >9.0%: CPT | Mod: CPTII,S$GLB,, | Performed by: FAMILY MEDICINE

## 2019-08-14 PROCEDURE — 83036 HEMOGLOBIN GLYCOSYLATED A1C: CPT

## 2019-08-14 PROCEDURE — 3075F PR MOST RECENT SYSTOLIC BLOOD PRESS GE 130-139MM HG: ICD-10-PCS | Mod: CPTII,S$GLB,, | Performed by: FAMILY MEDICINE

## 2019-08-14 PROCEDURE — 99999 PR PBB SHADOW E&M-EST. PATIENT-LVL IV: ICD-10-PCS | Mod: PBBFAC,,, | Performed by: FAMILY MEDICINE

## 2019-08-14 PROCEDURE — 3078F PR MOST RECENT DIASTOLIC BLOOD PRESSURE < 80 MM HG: ICD-10-PCS | Mod: CPTII,S$GLB,, | Performed by: FAMILY MEDICINE

## 2019-08-14 PROCEDURE — 99213 OFFICE O/P EST LOW 20 MIN: CPT | Mod: S$GLB,,, | Performed by: FAMILY MEDICINE

## 2019-08-14 PROCEDURE — 99213 PR OFFICE/OUTPT VISIT, EST, LEVL III, 20-29 MIN: ICD-10-PCS | Mod: S$GLB,,, | Performed by: FAMILY MEDICINE

## 2019-08-14 PROCEDURE — 3008F PR BODY MASS INDEX (BMI) DOCUMENTED: ICD-10-PCS | Mod: CPTII,S$GLB,, | Performed by: FAMILY MEDICINE

## 2019-08-14 PROCEDURE — 3008F BODY MASS INDEX DOCD: CPT | Mod: CPTII,S$GLB,, | Performed by: FAMILY MEDICINE

## 2019-08-14 PROCEDURE — 1101F PR PT FALLS ASSESS DOC 0-1 FALLS W/OUT INJ PAST YR: ICD-10-PCS | Mod: CPTII,S$GLB,, | Performed by: FAMILY MEDICINE

## 2019-08-14 PROCEDURE — 3078F DIAST BP <80 MM HG: CPT | Mod: CPTII,S$GLB,, | Performed by: FAMILY MEDICINE

## 2019-08-14 PROCEDURE — 1101F PT FALLS ASSESS-DOCD LE1/YR: CPT | Mod: CPTII,S$GLB,, | Performed by: FAMILY MEDICINE

## 2019-08-14 PROCEDURE — 81003 URINALYSIS AUTO W/O SCOPE: CPT

## 2019-08-14 PROCEDURE — 3046F PR MOST RECENT HEMOGLOBIN A1C LEVEL > 9.0%: ICD-10-PCS | Mod: CPTII,S$GLB,, | Performed by: FAMILY MEDICINE

## 2019-08-14 PROCEDURE — 3075F SYST BP GE 130 - 139MM HG: CPT | Mod: CPTII,S$GLB,, | Performed by: FAMILY MEDICINE

## 2019-08-14 PROCEDURE — 99999 PR PBB SHADOW E&M-EST. PATIENT-LVL IV: CPT | Mod: PBBFAC,,, | Performed by: FAMILY MEDICINE

## 2019-08-14 NOTE — PROGRESS NOTES
Subjective:       Patient ID: Tom Tavarez is a 64 y.o. male.    Chief Complaint: Follow-up    Follow-up more tight is epididymitis bilaterally. He currently is on Cipro.  He reports swelling pain is much reduced.  He no longer has pain on left side and has lessened pain on the right side.  Also follow-up diabetes.  He is on Lantus 70 units a day and Humalog 10 units twice a day before meals.  Home blood sugars have been 99 fasting, 104, 126.  He denies hypoglycemia    Review of Systems   Constitutional: Negative for chills and fever.   Respiratory: Negative for cough and shortness of breath.    Cardiovascular: Negative for chest pain and palpitations.   Endocrine: Negative for polydipsia and polyuria.   Genitourinary: Positive for testicular pain. Negative for difficulty urinating, discharge, dysuria and scrotal swelling.       Objective:      Physical Exam   Constitutional: He appears well-developed and well-nourished. No distress.   Cardiovascular: Normal rate and regular rhythm.   Pulmonary/Chest: Effort normal.   Genitourinary:   Genitourinary Comments: Left testicle is normal with no swelling or tenderness.  Right testicle is normal in size with some mild epididymal tenderness       Office Visit on 07/23/2019   Component Date Value Ref Range Status    Sodium 07/23/2019 141  136 - 145 mmol/L Final    Potassium 07/23/2019 4.3  3.5 - 5.1 mmol/L Final    Chloride 07/23/2019 103  95 - 110 mmol/L Final    CO2 07/23/2019 28  23 - 29 mmol/L Final    Glucose 07/23/2019 82  70 - 110 mg/dL Final    BUN, Bld 07/23/2019 17  8 - 23 mg/dL Final    Creatinine 07/23/2019 1.5* 0.5 - 1.4 mg/dL Final    Calcium 07/23/2019 10.0  8.7 - 10.5 mg/dL Final    Total Protein 07/23/2019 7.7  6.0 - 8.4 g/dL Final    Albumin 07/23/2019 3.1* 3.5 - 5.2 g/dL Final    Total Bilirubin 07/23/2019 0.4  0.1 - 1.0 mg/dL Final    Alkaline Phosphatase 07/23/2019 64  55 - 135 U/L Final    AST 07/23/2019 19  10 - 40 U/L Final    ALT  07/23/2019 16  10 - 44 U/L Final    Anion Gap 07/23/2019 10  8 - 16 mmol/L Final    eGFR if  07/23/2019 55.7* >60 mL/min/1.73 m^2 Final    eGFR if non  07/23/2019 48.2* >60 mL/min/1.73 m^2 Final    Hemoglobin A1C 07/23/2019 9.5* 4.0 - 5.6 % Final    Estimated Avg Glucose 07/23/2019 226* 68 - 131 mg/dL Final    Specimen UA 07/24/2019 Urine, Unspecified   Final    Color, UA 07/24/2019 Yellow  Yellow, Straw, Radha Final    Appearance, UA 07/24/2019 Hazy* Clear Final    pH, UA 07/24/2019 6.0  5.0 - 8.0 Final    Specific Gravity, UA 07/24/2019 1.020  1.005 - 1.030 Final    Protein, UA 07/24/2019 Negative  Negative Final    Glucose, UA 07/24/2019 Negative  Negative Final    Ketones, UA 07/24/2019 Negative  Negative Final    Bilirubin (UA) 07/24/2019 Negative  Negative Final    Occult Blood UA 07/24/2019 Negative  Negative Final    Nitrite, UA 07/24/2019 Negative  Negative Final    Leukocytes, UA 07/24/2019 2+* Negative Final    RBC, UA 07/24/2019 1  0 - 4 /hpf Final    WBC, UA 07/24/2019 36* 0 - 5 /hpf Final    Bacteria 07/24/2019 Rare  None-Occ /hpf Final    Microscopic Comment 07/24/2019 SEE COMMENT   Final     Assessment:       1. Diabetes mellitus type 2 in obese        Plan:   A1c urinalysis ordered.  Complete Cipro.  Follow-up in 6 weeks.      Diabetes mellitus type 2 in obese  -     Urinalysis  -     Hemoglobin A1c

## 2019-08-15 LAB
ESTIMATED AVG GLUCOSE: 203 MG/DL (ref 68–131)
HBA1C MFR BLD HPLC: 8.7 % (ref 4–5.6)

## 2019-08-19 DIAGNOSIS — E66.9 DIABETES MELLITUS TYPE 2 IN OBESE: ICD-10-CM

## 2019-08-19 DIAGNOSIS — E11.69 DIABETES MELLITUS TYPE 2 IN OBESE: ICD-10-CM

## 2019-08-19 RX ORDER — INSULIN GLARGINE 100 [IU]/ML
74 INJECTION, SOLUTION SUBCUTANEOUS NIGHTLY
Qty: 45 ML | Refills: 3 | Status: SHIPPED | OUTPATIENT
Start: 2019-08-19 | End: 2020-04-20 | Stop reason: SDUPTHER

## 2019-09-23 ENCOUNTER — PATIENT MESSAGE (OUTPATIENT)
Dept: INTERNAL MEDICINE | Facility: CLINIC | Age: 64
End: 2019-09-23

## 2019-09-25 ENCOUNTER — OFFICE VISIT (OUTPATIENT)
Dept: INTERNAL MEDICINE | Facility: CLINIC | Age: 64
End: 2019-09-25
Payer: COMMERCIAL

## 2019-09-25 VITALS
HEART RATE: 57 BPM | HEIGHT: 63 IN | OXYGEN SATURATION: 98 % | TEMPERATURE: 98 F | SYSTOLIC BLOOD PRESSURE: 122 MMHG | DIASTOLIC BLOOD PRESSURE: 76 MMHG | BODY MASS INDEX: 55.81 KG/M2 | WEIGHT: 315 LBS

## 2019-09-25 DIAGNOSIS — N50.89 TESTICLE SWELLING: ICD-10-CM

## 2019-09-25 DIAGNOSIS — I10 ESSENTIAL HYPERTENSION: ICD-10-CM

## 2019-09-25 DIAGNOSIS — E66.9 DIABETES MELLITUS TYPE 2 IN OBESE: Primary | ICD-10-CM

## 2019-09-25 DIAGNOSIS — Z28.39 IMMUNIZATION DEFICIENCY: ICD-10-CM

## 2019-09-25 DIAGNOSIS — E11.69 DIABETES MELLITUS TYPE 2 IN OBESE: Primary | ICD-10-CM

## 2019-09-25 DIAGNOSIS — Z02.89 ENCOUNTER FOR COMPLETION OF FORM WITH PATIENT: ICD-10-CM

## 2019-09-25 PROCEDURE — 99214 OFFICE O/P EST MOD 30 MIN: CPT | Mod: 25,S$GLB,, | Performed by: FAMILY MEDICINE

## 2019-09-25 PROCEDURE — 99999 PR PBB SHADOW E&M-EST. PATIENT-LVL IV: ICD-10-PCS | Mod: PBBFAC,,, | Performed by: FAMILY MEDICINE

## 2019-09-25 PROCEDURE — 36415 PR COLLECTION VENOUS BLOOD,VENIPUNCTURE: ICD-10-PCS | Mod: S$GLB,,, | Performed by: FAMILY MEDICINE

## 2019-09-25 PROCEDURE — 3074F SYST BP LT 130 MM HG: CPT | Mod: CPTII,S$GLB,, | Performed by: FAMILY MEDICINE

## 2019-09-25 PROCEDURE — 90732 PNEUMOCOCCAL POLYSACCHARIDE VACCINE 23-VALENT =>2YO SQ IM: ICD-10-PCS | Mod: S$GLB,,, | Performed by: FAMILY MEDICINE

## 2019-09-25 PROCEDURE — 3078F DIAST BP <80 MM HG: CPT | Mod: CPTII,S$GLB,, | Performed by: FAMILY MEDICINE

## 2019-09-25 PROCEDURE — 90662 IIV NO PRSV INCREASED AG IM: CPT | Mod: S$GLB,,, | Performed by: FAMILY MEDICINE

## 2019-09-25 PROCEDURE — 99214 PR OFFICE/OUTPT VISIT, EST, LEVL IV, 30-39 MIN: ICD-10-PCS | Mod: 25,S$GLB,, | Performed by: FAMILY MEDICINE

## 2019-09-25 PROCEDURE — 1101F PR PT FALLS ASSESS DOC 0-1 FALLS W/OUT INJ PAST YR: ICD-10-PCS | Mod: CPTII,S$GLB,, | Performed by: FAMILY MEDICINE

## 2019-09-25 PROCEDURE — 1101F PT FALLS ASSESS-DOCD LE1/YR: CPT | Mod: CPTII,S$GLB,, | Performed by: FAMILY MEDICINE

## 2019-09-25 PROCEDURE — 3008F PR BODY MASS INDEX (BMI) DOCUMENTED: ICD-10-PCS | Mod: CPTII,S$GLB,, | Performed by: FAMILY MEDICINE

## 2019-09-25 PROCEDURE — 82947 ASSAY GLUCOSE BLOOD QUANT: CPT

## 2019-09-25 PROCEDURE — 90662 FLU VACCINE - HIGH DOSE (65+) PRESERVATIVE FREE IM: ICD-10-PCS | Mod: S$GLB,,, | Performed by: FAMILY MEDICINE

## 2019-09-25 PROCEDURE — 90471 FLU VACCINE - HIGH DOSE (65+) PRESERVATIVE FREE IM: ICD-10-PCS | Mod: S$GLB,,, | Performed by: FAMILY MEDICINE

## 2019-09-25 PROCEDURE — 90472 IMMUNIZATION ADMIN EACH ADD: CPT | Mod: S$GLB,,, | Performed by: FAMILY MEDICINE

## 2019-09-25 PROCEDURE — 3078F PR MOST RECENT DIASTOLIC BLOOD PRESSURE < 80 MM HG: ICD-10-PCS | Mod: CPTII,S$GLB,, | Performed by: FAMILY MEDICINE

## 2019-09-25 PROCEDURE — 3045F PR MOST RECENT HEMOGLOBIN A1C LEVEL 7.0-9.0%: CPT | Mod: CPTII,S$GLB,, | Performed by: FAMILY MEDICINE

## 2019-09-25 PROCEDURE — 3008F BODY MASS INDEX DOCD: CPT | Mod: CPTII,S$GLB,, | Performed by: FAMILY MEDICINE

## 2019-09-25 PROCEDURE — 90472 PNEUMOCOCCAL POLYSACCHARIDE VACCINE 23-VALENT =>2YO SQ IM: ICD-10-PCS | Mod: S$GLB,,, | Performed by: FAMILY MEDICINE

## 2019-09-25 PROCEDURE — 3045F PR MOST RECENT HEMOGLOBIN A1C LEVEL 7.0-9.0%: ICD-10-PCS | Mod: CPTII,S$GLB,, | Performed by: FAMILY MEDICINE

## 2019-09-25 PROCEDURE — 90732 PPSV23 VACC 2 YRS+ SUBQ/IM: CPT | Mod: S$GLB,,, | Performed by: FAMILY MEDICINE

## 2019-09-25 PROCEDURE — 99999 PR PBB SHADOW E&M-EST. PATIENT-LVL IV: CPT | Mod: PBBFAC,,, | Performed by: FAMILY MEDICINE

## 2019-09-25 PROCEDURE — 3074F PR MOST RECENT SYSTOLIC BLOOD PRESSURE < 130 MM HG: ICD-10-PCS | Mod: CPTII,S$GLB,, | Performed by: FAMILY MEDICINE

## 2019-09-25 PROCEDURE — 83036 HEMOGLOBIN GLYCOSYLATED A1C: CPT

## 2019-09-25 PROCEDURE — 36415 COLL VENOUS BLD VENIPUNCTURE: CPT | Mod: S$GLB,,, | Performed by: FAMILY MEDICINE

## 2019-09-25 PROCEDURE — 90471 IMMUNIZATION ADMIN: CPT | Mod: S$GLB,,, | Performed by: FAMILY MEDICINE

## 2019-09-25 PROCEDURE — 82043 UR ALBUMIN QUANTITATIVE: CPT

## 2019-09-25 NOTE — PROGRESS NOTES
Subjective:       Patient ID: Tom Tavarez is a 64 y.o. male.    Chief Complaint: 6 weeks follow-up and disability form    Follow-up diabetes hypertension testicular swelling immunization deficiency.  He reports follow diabetic diet.  He denies polyuria polydipsia hypoglycemic symptoms.  Over the last several months is A1c has gradually improved.  He reports no further testicular pain or swelling. He denies any dysuria urgency hematuria.  He denies headache chest pain palpitations shortness of breath or edema.  He has had pneumococcal 23 some years ago and Prevnar 13 last year.  He is not up-to-date on diabetic eye exam and will be getting this scheduled to more.  He will get copy for our records.  He has not had a colonoscopy and wants to get this done with his wife later this year.    Diabetes   No MedicAlert identification noted. Pertinent negatives for hypoglycemia include no dizziness, headaches, hunger, mood changes, sleepiness or sweats. Associated symptoms include blurred vision. Pertinent negatives for diabetes include no chest pain, no fatigue, no foot paresthesias, no foot ulcerations, no visual change, no weakness and no weight loss. Pertinent negatives for hypoglycemia complications include no blackouts, no hospitalization, no nocturnal hypoglycemia, no required assistance and no required glucagon injection. Symptoms are stable. Pertinent negatives for diabetic complications include no autonomic neuropathy, CVA, heart disease, impotence, nephropathy, peripheral neuropathy, PVD or retinopathy. Risk factors for coronary artery disease include family history, hypertension, obesity, diabetes mellitus and male sex. Current diabetic treatment includes insulin injections and oral agent (monotherapy). He is compliant with treatment all of the time. Insulin injections are given by patient. Rotation sites for injection include the abdominal wall. His weight is fluctuating minimally. He is following a  diabetic and low salt diet. When asked about meal planning, he reported none. He has not had a previous visit with a dietitian. He participates in exercise three times a week. He monitors blood glucose at home 3-4 x per day. Blood glucose monitoring compliance is excellent. His home blood glucose trend is decreasing rapidly. He does not see a podiatrist.Eye exam is not current.     Review of Systems   Constitutional: Negative for activity change, appetite change, diaphoresis, fatigue, unexpected weight change and weight loss.   Eyes: Positive for blurred vision.   Respiratory: Negative for cough, chest tightness, shortness of breath and wheezing.    Cardiovascular: Negative for chest pain, palpitations and leg swelling.        Denies lightheadedness   Gastrointestinal: Negative for abdominal distention, abdominal pain, blood in stool, constipation and diarrhea.   Genitourinary: Negative for difficulty urinating, dysuria, frequency, hematuria, impotence, scrotal swelling, testicular pain and urgency.   Musculoskeletal:        Chronic intermittent pain left hip restricted his walking.  He has requested handicap license form.   Neurological: Negative for dizziness, weakness, light-headedness and headaches.       Objective:      Physical Exam   Constitutional: He is oriented to person, place, and time. He appears well-developed and well-nourished. No distress.   Neck: No JVD present. No tracheal deviation present. No thyromegaly present.   Cardiovascular: Normal rate, regular rhythm and normal heart sounds. Exam reveals no gallop.   No murmur heard.  Pulmonary/Chest: Effort normal and breath sounds normal. No respiratory distress. He has no wheezes. He has no rales.   Abdominal: Soft. Bowel sounds are normal. He exhibits no distension and no mass. There is no tenderness.   Lymphadenopathy:     He has no cervical adenopathy.   Neurological: He is alert and oriented to person, place, and time.   Skin: Skin is warm and  dry. He is not diaphoretic.       Office Visit on 08/14/2019   Component Date Value Ref Range Status    Specimen UA 08/14/2019 Urine, Clean Catch   Final    Color, UA 08/14/2019 Yellow  Yellow, Straw, Radha Final    Appearance, UA 08/14/2019 Clear  Clear Final    pH, UA 08/14/2019 5.0  5.0 - 8.0 Final    Specific Gravity, UA 08/14/2019 1.020  1.005 - 1.030 Final    Protein, UA 08/14/2019 Negative  Negative Final    Glucose, UA 08/14/2019 Negative  Negative Final    Ketones, UA 08/14/2019 Negative  Negative Final    Bilirubin (UA) 08/14/2019 Negative  Negative Final    Occult Blood UA 08/14/2019 Negative  Negative Final    Nitrite, UA 08/14/2019 Negative  Negative Final    Leukocytes, UA 08/14/2019 Negative  Negative Final    Hemoglobin A1C 08/14/2019 8.7* 4.0 - 5.6 % Final    Estimated Avg Glucose 08/14/2019 203* 68 - 131 mg/dL Final     Assessment:       1. Diabetes mellitus type 2 in obese    2. Essential hypertension    3. Testicle swelling    4. Immunization deficiency        Plan:     Blood pressure 122/76.  Glucose A1c microalbumin creatinine ratio ordered.  Handicap license form completed.  Flu VAX pneumococcal 23 given today.  Discussed need for diabetic eye exam discussed need for colonoscopy follow-up in 4 months.  Diabetes mellitus type 2 in obese  -     Hemoglobin A1c  -     Microalbumin/creatinine urine ratio  -     Glucose, fasting    Essential hypertension    Testicle swelling    Immunization deficiency    Other orders  -     Influenza - High Dose (65+) (PF) (IM)  -     (In Office Administered) Pneumococcal Polysaccharide Vaccine (23 Valent) (SQ/IM)

## 2019-09-26 LAB
ALBUMIN/CREAT UR: 6.1 UG/MG (ref 0–30)
CREAT UR-MCNC: 197 MG/DL (ref 23–375)
ESTIMATED AVG GLUCOSE: 174 MG/DL (ref 68–131)
GLUCOSE SERPL-MCNC: 156 MG/DL (ref 70–110)
HBA1C MFR BLD HPLC: 7.7 % (ref 4–5.6)
MICROALBUMIN UR DL<=1MG/L-MCNC: 12 UG/ML

## 2019-10-25 DIAGNOSIS — Z12.11 COLON CANCER SCREENING: ICD-10-CM

## 2019-11-01 DIAGNOSIS — E11.9 TYPE 2 DIABETES MELLITUS WITHOUT COMPLICATION: ICD-10-CM

## 2019-11-08 ENCOUNTER — PES CALL (OUTPATIENT)
Dept: ADMINISTRATIVE | Facility: CLINIC | Age: 64
End: 2019-11-08

## 2019-11-22 ENCOUNTER — PATIENT MESSAGE (OUTPATIENT)
Dept: ADMINISTRATIVE | Facility: HOSPITAL | Age: 64
End: 2019-11-22

## 2019-11-27 ENCOUNTER — PES CALL (OUTPATIENT)
Dept: ADMINISTRATIVE | Facility: CLINIC | Age: 64
End: 2019-11-27

## 2020-01-09 ENCOUNTER — PATIENT OUTREACH (OUTPATIENT)
Dept: ADMINISTRATIVE | Facility: HOSPITAL | Age: 65
End: 2020-01-09

## 2020-01-26 RX ORDER — INSULIN LISPRO 100 [IU]/ML
INJECTION, SOLUTION INTRAVENOUS; SUBCUTANEOUS
Qty: 10 ML | Refills: 3 | Status: SHIPPED | OUTPATIENT
Start: 2020-01-26 | End: 2021-06-08 | Stop reason: SDUPTHER

## 2020-03-19 ENCOUNTER — PATIENT MESSAGE (OUTPATIENT)
Dept: INTERNAL MEDICINE | Facility: CLINIC | Age: 65
End: 2020-03-19

## 2020-03-19 RX ORDER — HYDROCHLOROTHIAZIDE 25 MG/1
25 TABLET ORAL DAILY
Qty: 90 TABLET | Refills: 0 | Status: SHIPPED | OUTPATIENT
Start: 2020-03-19 | End: 2020-09-21

## 2020-04-20 ENCOUNTER — PATIENT MESSAGE (OUTPATIENT)
Dept: INTERNAL MEDICINE | Facility: CLINIC | Age: 65
End: 2020-04-20

## 2020-04-20 DIAGNOSIS — E66.9 DIABETES MELLITUS TYPE 2 IN OBESE: ICD-10-CM

## 2020-04-20 DIAGNOSIS — E11.69 DIABETES MELLITUS TYPE 2 IN OBESE: ICD-10-CM

## 2020-04-20 RX ORDER — INSULIN GLARGINE 100 [IU]/ML
74 INJECTION, SOLUTION SUBCUTANEOUS NIGHTLY
Qty: 60 ML | Refills: 3 | Status: SHIPPED | OUTPATIENT
Start: 2020-04-20 | End: 2020-11-10 | Stop reason: SDUPTHER

## 2020-04-20 NOTE — TELEPHONE ENCOUNTER
Pt is requesting a refill on insulin with 5 boxes instead of 3-cheaper to get more. Please advise.

## 2020-04-23 ENCOUNTER — PATIENT MESSAGE (OUTPATIENT)
Dept: INTERNAL MEDICINE | Facility: CLINIC | Age: 65
End: 2020-04-23

## 2020-04-23 DIAGNOSIS — E66.9 DIABETES MELLITUS TYPE 2 IN OBESE: ICD-10-CM

## 2020-04-23 DIAGNOSIS — E78.5 HYPERLIPIDEMIA, UNSPECIFIED HYPERLIPIDEMIA TYPE: ICD-10-CM

## 2020-04-23 DIAGNOSIS — E11.69 DIABETES MELLITUS TYPE 2 IN OBESE: ICD-10-CM

## 2020-04-23 DIAGNOSIS — I10 ESSENTIAL HYPERTENSION: Chronic | ICD-10-CM

## 2020-04-23 RX ORDER — OLMESARTAN MEDOXOMIL, AMLODIPINE AND HYDROCHLOROTHIAZIDE TABLET 40/10/25 MG 40; 10; 25 MG/1; MG/1; MG/1
1 TABLET ORAL DAILY
Qty: 90 TABLET | Refills: 1 | Status: SHIPPED | OUTPATIENT
Start: 2020-04-23 | End: 2021-03-03 | Stop reason: SDUPTHER

## 2020-04-23 RX ORDER — METFORMIN HYDROCHLORIDE 500 MG/1
500 TABLET ORAL 2 TIMES DAILY WITH MEALS
Qty: 180 TABLET | Refills: 1 | Status: SHIPPED | OUTPATIENT
Start: 2020-04-23 | End: 2021-01-20 | Stop reason: SDUPTHER

## 2020-04-23 RX ORDER — ATORVASTATIN CALCIUM 40 MG/1
TABLET, FILM COATED ORAL
Qty: 90 TABLET | Refills: 1 | Status: SHIPPED | OUTPATIENT
Start: 2020-04-23 | End: 2020-11-09 | Stop reason: SDUPTHER

## 2020-07-02 RX ORDER — LANCETS
EACH MISCELLANEOUS
Qty: 200 EACH | Refills: 3 | Status: SHIPPED | OUTPATIENT
Start: 2020-07-02 | End: 2020-10-07 | Stop reason: SDUPTHER

## 2020-07-02 RX ORDER — INSULIN PUMP SYRINGE, 3 ML
EACH MISCELLANEOUS
Qty: 1 EACH | Refills: 0 | Status: SHIPPED | OUTPATIENT
Start: 2020-07-02 | End: 2021-07-02

## 2020-09-29 ENCOUNTER — PATIENT MESSAGE (OUTPATIENT)
Dept: INTERNAL MEDICINE | Facility: CLINIC | Age: 65
End: 2020-09-29

## 2020-10-07 ENCOUNTER — PATIENT OUTREACH (OUTPATIENT)
Dept: ADMINISTRATIVE | Facility: HOSPITAL | Age: 65
End: 2020-10-07

## 2020-10-07 DIAGNOSIS — E66.9 DIABETES MELLITUS TYPE 2 IN OBESE: ICD-10-CM

## 2020-10-07 DIAGNOSIS — E11.69 DIABETES MELLITUS TYPE 2 IN OBESE: ICD-10-CM

## 2020-10-07 RX ORDER — LANCETS
EACH MISCELLANEOUS
Qty: 200 EACH | Refills: 3 | Status: SHIPPED | OUTPATIENT
Start: 2020-10-07 | End: 2020-11-09

## 2020-10-07 RX ORDER — BLOOD SUGAR DIAGNOSTIC
STRIP MISCELLANEOUS
Qty: 200 STRIP | Refills: 3 | Status: SHIPPED | OUTPATIENT
Start: 2020-10-07 | End: 2021-11-09

## 2020-10-07 NOTE — TELEPHONE ENCOUNTER
----- Message from Sabrina Bey sent at 10/7/2020 10:34 AM CDT -----  Regarding: pharm. call  .Type:  Pharmacy Calling to Clarify an RX    Name of Caller: Annie   Pharmacy Name: [facility]   Prescription Name: lancets 1 touch 100 33g , 1 touch ultra strips 50   What do they need to clarify?: requesting 90 day w/ refills   Best Call Back Number: # [phone] and  fax# [phone]   Additional Information:

## 2020-10-08 ENCOUNTER — OFFICE VISIT (OUTPATIENT)
Dept: INTERNAL MEDICINE | Facility: CLINIC | Age: 65
End: 2020-10-08
Payer: COMMERCIAL

## 2020-10-08 ENCOUNTER — LAB VISIT (OUTPATIENT)
Dept: LAB | Facility: HOSPITAL | Age: 65
End: 2020-10-08
Attending: FAMILY MEDICINE
Payer: COMMERCIAL

## 2020-10-08 VITALS
OXYGEN SATURATION: 97 % | DIASTOLIC BLOOD PRESSURE: 92 MMHG | SYSTOLIC BLOOD PRESSURE: 162 MMHG | TEMPERATURE: 98 F | HEART RATE: 74 BPM | BODY MASS INDEX: 44.1 KG/M2 | WEIGHT: 315 LBS | RESPIRATION RATE: 18 BRPM | HEIGHT: 71 IN

## 2020-10-08 DIAGNOSIS — I10 ESSENTIAL HYPERTENSION: ICD-10-CM

## 2020-10-08 DIAGNOSIS — E11.65 UNCONTROLLED TYPE 2 DIABETES MELLITUS WITH HYPERGLYCEMIA: ICD-10-CM

## 2020-10-08 DIAGNOSIS — B35.1 ONYCHOMYCOSIS OF TOENAIL: ICD-10-CM

## 2020-10-08 DIAGNOSIS — I10 ESSENTIAL HYPERTENSION: Primary | ICD-10-CM

## 2020-10-08 DIAGNOSIS — N18.30 STAGE 3 CHRONIC KIDNEY DISEASE, UNSPECIFIED WHETHER STAGE 3A OR 3B CKD: ICD-10-CM

## 2020-10-08 DIAGNOSIS — Z28.39 IMMUNIZATION DEFICIENCY: ICD-10-CM

## 2020-10-08 DIAGNOSIS — Z12.5 SCREENING FOR PROSTATE CANCER: ICD-10-CM

## 2020-10-08 LAB
ALBUMIN SERPL BCP-MCNC: 3.5 G/DL (ref 3.5–5.2)
ALP SERPL-CCNC: 64 U/L (ref 55–135)
ALT SERPL W/O P-5'-P-CCNC: 19 U/L (ref 10–44)
ANION GAP SERPL CALC-SCNC: 13 MMOL/L (ref 8–16)
AST SERPL-CCNC: 15 U/L (ref 10–40)
BASOPHILS # BLD AUTO: 0.04 K/UL (ref 0–0.2)
BASOPHILS NFR BLD: 0.6 % (ref 0–1.9)
BILIRUB SERPL-MCNC: 0.5 MG/DL (ref 0.1–1)
BUN SERPL-MCNC: 15 MG/DL (ref 8–23)
CALCIUM SERPL-MCNC: 9.5 MG/DL (ref 8.7–10.5)
CHLORIDE SERPL-SCNC: 102 MMOL/L (ref 95–110)
CHOLEST SERPL-MCNC: 172 MG/DL (ref 120–199)
CHOLEST/HDLC SERPL: 5.2 {RATIO} (ref 2–5)
CO2 SERPL-SCNC: 22 MMOL/L (ref 23–29)
CREAT SERPL-MCNC: 1.3 MG/DL (ref 0.5–1.4)
DIFFERENTIAL METHOD: ABNORMAL
EOSINOPHIL # BLD AUTO: 0.2 K/UL (ref 0–0.5)
EOSINOPHIL NFR BLD: 2.8 % (ref 0–8)
ERYTHROCYTE [DISTWIDTH] IN BLOOD BY AUTOMATED COUNT: 14.7 % (ref 11.5–14.5)
EST. GFR  (AFRICAN AMERICAN): >60 ML/MIN/1.73 M^2
EST. GFR  (NON AFRICAN AMERICAN): 56.9 ML/MIN/1.73 M^2
GLUCOSE SERPL-MCNC: 80 MG/DL (ref 70–110)
HCT VFR BLD AUTO: 51.7 % (ref 40–54)
HDLC SERPL-MCNC: 33 MG/DL (ref 40–75)
HDLC SERPL: 19.2 % (ref 20–50)
HGB BLD-MCNC: 16.4 G/DL (ref 14–18)
IMM GRANULOCYTES # BLD AUTO: 0.01 K/UL (ref 0–0.04)
IMM GRANULOCYTES NFR BLD AUTO: 0.1 % (ref 0–0.5)
LDLC SERPL CALC-MCNC: 116.4 MG/DL (ref 63–159)
LYMPHOCYTES # BLD AUTO: 2.3 K/UL (ref 1–4.8)
LYMPHOCYTES NFR BLD: 33.7 % (ref 18–48)
MCH RBC QN AUTO: 29.3 PG (ref 27–31)
MCHC RBC AUTO-ENTMCNC: 31.7 G/DL (ref 32–36)
MCV RBC AUTO: 92 FL (ref 82–98)
MONOCYTES # BLD AUTO: 0.6 K/UL (ref 0.3–1)
MONOCYTES NFR BLD: 8.3 % (ref 4–15)
NEUTROPHILS # BLD AUTO: 3.8 K/UL (ref 1.8–7.7)
NEUTROPHILS NFR BLD: 54.5 % (ref 38–73)
NONHDLC SERPL-MCNC: 139 MG/DL
NRBC BLD-RTO: 0 /100 WBC
PLATELET # BLD AUTO: 221 K/UL (ref 150–350)
PMV BLD AUTO: 11.4 FL (ref 9.2–12.9)
POTASSIUM SERPL-SCNC: 3.7 MMOL/L (ref 3.5–5.1)
PROT SERPL-MCNC: 7.6 G/DL (ref 6–8.4)
RBC # BLD AUTO: 5.6 M/UL (ref 4.6–6.2)
SODIUM SERPL-SCNC: 137 MMOL/L (ref 136–145)
TRIGL SERPL-MCNC: 113 MG/DL (ref 30–150)
TSH SERPL DL<=0.005 MIU/L-ACNC: 2.01 UIU/ML (ref 0.4–4)
WBC # BLD AUTO: 6.89 K/UL (ref 3.9–12.7)

## 2020-10-08 PROCEDURE — 1125F PR PAIN SEVERITY QUANTIFIED, PAIN PRESENT: ICD-10-PCS | Mod: S$GLB,,, | Performed by: FAMILY MEDICINE

## 2020-10-08 PROCEDURE — 3080F PR MOST RECENT DIASTOLIC BLOOD PRESSURE >= 90 MM HG: ICD-10-PCS | Mod: CPTII,S$GLB,, | Performed by: FAMILY MEDICINE

## 2020-10-08 PROCEDURE — 3080F DIAST BP >= 90 MM HG: CPT | Mod: CPTII,S$GLB,, | Performed by: FAMILY MEDICINE

## 2020-10-08 PROCEDURE — 3077F PR MOST RECENT SYSTOLIC BLOOD PRESSURE >= 140 MM HG: ICD-10-PCS | Mod: CPTII,S$GLB,, | Performed by: FAMILY MEDICINE

## 2020-10-08 PROCEDURE — 99214 OFFICE O/P EST MOD 30 MIN: CPT | Mod: 25,S$GLB,, | Performed by: FAMILY MEDICINE

## 2020-10-08 PROCEDURE — 1125F AMNT PAIN NOTED PAIN PRSNT: CPT | Mod: S$GLB,,, | Performed by: FAMILY MEDICINE

## 2020-10-08 PROCEDURE — 80061 LIPID PANEL: CPT

## 2020-10-08 PROCEDURE — 85025 COMPLETE CBC W/AUTO DIFF WBC: CPT

## 2020-10-08 PROCEDURE — 3077F SYST BP >= 140 MM HG: CPT | Mod: CPTII,S$GLB,, | Performed by: FAMILY MEDICINE

## 2020-10-08 PROCEDURE — 90694 VACC AIIV4 NO PRSRV 0.5ML IM: CPT | Mod: S$GLB,,, | Performed by: FAMILY MEDICINE

## 2020-10-08 PROCEDURE — 84443 ASSAY THYROID STIM HORMONE: CPT

## 2020-10-08 PROCEDURE — 1159F MED LIST DOCD IN RCRD: CPT | Mod: S$GLB,,, | Performed by: FAMILY MEDICINE

## 2020-10-08 PROCEDURE — 80053 COMPREHEN METABOLIC PANEL: CPT

## 2020-10-08 PROCEDURE — 3008F PR BODY MASS INDEX (BMI) DOCUMENTED: ICD-10-PCS | Mod: CPTII,S$GLB,, | Performed by: FAMILY MEDICINE

## 2020-10-08 PROCEDURE — 1159F PR MEDICATION LIST DOCUMENTED IN MEDICAL RECORD: ICD-10-PCS | Mod: S$GLB,,, | Performed by: FAMILY MEDICINE

## 2020-10-08 PROCEDURE — 90471 FLU VACCINE - QUADRIVALENT - ADJUVANTED: ICD-10-PCS | Mod: S$GLB,,, | Performed by: FAMILY MEDICINE

## 2020-10-08 PROCEDURE — 83036 HEMOGLOBIN GLYCOSYLATED A1C: CPT

## 2020-10-08 PROCEDURE — 99214 PR OFFICE/OUTPT VISIT, EST, LEVL IV, 30-39 MIN: ICD-10-PCS | Mod: 25,S$GLB,, | Performed by: FAMILY MEDICINE

## 2020-10-08 PROCEDURE — 99999 PR PBB SHADOW E&M-EST. PATIENT-LVL V: CPT | Mod: PBBFAC,,, | Performed by: FAMILY MEDICINE

## 2020-10-08 PROCEDURE — 99999 PR PBB SHADOW E&M-EST. PATIENT-LVL V: ICD-10-PCS | Mod: PBBFAC,,, | Performed by: FAMILY MEDICINE

## 2020-10-08 PROCEDURE — 1101F PT FALLS ASSESS-DOCD LE1/YR: CPT | Mod: CPTII,S$GLB,, | Performed by: FAMILY MEDICINE

## 2020-10-08 PROCEDURE — 1101F PR PT FALLS ASSESS DOC 0-1 FALLS W/OUT INJ PAST YR: ICD-10-PCS | Mod: CPTII,S$GLB,, | Performed by: FAMILY MEDICINE

## 2020-10-08 PROCEDURE — 90471 IMMUNIZATION ADMIN: CPT | Mod: S$GLB,,, | Performed by: FAMILY MEDICINE

## 2020-10-08 PROCEDURE — 90694 FLU VACCINE - QUADRIVALENT - ADJUVANTED: ICD-10-PCS | Mod: S$GLB,,, | Performed by: FAMILY MEDICINE

## 2020-10-08 PROCEDURE — 36415 COLL VENOUS BLD VENIPUNCTURE: CPT

## 2020-10-08 PROCEDURE — 3008F BODY MASS INDEX DOCD: CPT | Mod: CPTII,S$GLB,, | Performed by: FAMILY MEDICINE

## 2020-10-08 NOTE — PROGRESS NOTES
Subjective:       Patient ID: Tom Tavarez is a 65 y.o. male.    Chief Complaint: Annual Exam    Annual exam.  Follow-up hypertension diabetes chronic kidney disease immunization deficiency.  He denies headache chest pain palpitations shortness of breath or edema.  He is using Lantus as as venkat log before each meal.  He is scheduled for diabetic eye exam    Review of Systems   Constitutional: Negative for appetite change, chills, diaphoresis, fatigue and fever.   HENT: Negative for congestion.    Eyes: Negative for visual disturbance.   Respiratory: Negative for cough, chest tightness, shortness of breath and wheezing.    Cardiovascular: Negative for chest pain, palpitations and leg swelling.        Denies lightheadedness   Gastrointestinal: Negative for abdominal distention, abdominal pain, constipation, diarrhea and nausea.   Genitourinary: Negative for difficulty urinating, dysuria, frequency, hematuria and urgency.   Neurological: Negative for dizziness, weakness, light-headedness and headaches.       Objective:      Physical Exam  Constitutional:       General: He is not in acute distress.     Appearance: He is not ill-appearing or diaphoretic.   HENT:      Nose: Nose normal.   Eyes:      Conjunctiva/sclera: Conjunctivae normal.   Neck:      Musculoskeletal: No muscular tenderness.      Comments: 2+ carotid pulses.  Thyroid is normal  Cardiovascular:      Rate and Rhythm: Normal rate and regular rhythm.      Pulses:           Dorsalis pedis pulses are 2+ on the right side and 2+ on the left side.      Heart sounds: No murmur. No gallop.    Pulmonary:      Effort: Pulmonary effort is normal. No respiratory distress.      Breath sounds: No wheezing, rhonchi or rales.   Abdominal:      General: Abdomen is flat. Bowel sounds are normal. There is no distension.      Palpations: There is no mass.      Tenderness: There is no abdominal tenderness.   Musculoskeletal:      Right foot: No deformity.      Left foot:  No deformity.   Feet:      Right foot:      Protective Sensation: 10 sites tested. 10 sites sensed.      Skin integrity: Dry skin present.      Toenail Condition: Right toenails are abnormally thick and long.      Left foot:      Protective Sensation: 10 sites tested. 10 sites sensed.      Skin integrity: Dry skin present.      Toenail Condition: Left toenails are abnormally thick and long.   Lymphadenopathy:      Cervical: No cervical adenopathy.   Skin:     General: Skin is warm and dry.      Coloration: Skin is not pale.      Findings: No erythema.   Neurological:      Mental Status: He is alert and oriented to person, place, and time.   Psychiatric:         Mood and Affect: Mood normal.         Behavior: Behavior normal.         Thought Content: Thought content normal.         Judgment: Judgment normal.         Office Visit on 09/25/2019   Component Date Value Ref Range Status    Hemoglobin A1C 09/25/2019 7.7* 4.0 - 5.6 % Final    Estimated Avg Glucose 09/25/2019 174* 68 - 131 mg/dL Final    Microalbum.,U,Random 09/26/2019 12.0  ug/mL Final    Creatinine, Random Ur 09/26/2019 197.0  23.0 - 375.0 mg/dL Final    Microalb Creat Ratio 09/26/2019 6.1  0.0 - 30.0 ug/mg Final    Glucose, Fasting 09/25/2019 156* 70 - 110 mg/dL Final     Assessment:       1. Essential hypertension    2. Uncontrolled type 2 diabetes mellitus with hyperglycemia        Plan:     Blood pressure elevated.  Will recheck on return.  Lab was ordered.  Flu VAX given today.  Discussed need shingles immunization.  Podiatry referral for diabetic foot exam and onychomycosis.  He is scheduled for diabetic eye exam.  Follow-up in 1 month.  Monitor glucose at home.  Bring record on return.  Handicap license form was completed  BMI discussed.  Diet exercise.    Essential hypertension  -     CBC auto differential; Future; Expected date: 10/08/2020  -     Urinalysis; Future; Expected date: 10/08/2020  -     Comprehensive Metabolic Panel; Future;  Expected date: 10/08/2020  -     Lipid Panel; Future; Expected date: 10/08/2020  -     TSH; Future; Expected date: 10/08/2020    Uncontrolled type 2 diabetes mellitus with hyperglycemia  -     Hemoglobin A1C; Future; Expected date: 10/08/2020  -     Microalbumin/Creatinine Ratio, Urine; Future; Expected date: 10/08/2020  -     Ambulatory referral/consult to Podiatry; Future; Expected date: 10/15/2020    Other orders  -     Influenza (FLUAD) - Quadrivalent (Adjuvanted) *Preferred* (65+) (PF)

## 2020-10-09 LAB
ESTIMATED AVG GLUCOSE: 237 MG/DL (ref 68–131)
HBA1C MFR BLD HPLC: 9.9 % (ref 4–5.6)

## 2020-10-15 ENCOUNTER — PATIENT OUTREACH (OUTPATIENT)
Dept: ADMINISTRATIVE | Facility: HOSPITAL | Age: 65
End: 2020-10-15

## 2020-10-15 NOTE — PROGRESS NOTES
I spoke with pt that does take home BP readings. Pt is not at home today but will be there tomorrow. Pt has asked me to call him back tomorrow around 10am.

## 2020-10-19 ENCOUNTER — PATIENT OUTREACH (OUTPATIENT)
Dept: ADMINISTRATIVE | Facility: HOSPITAL | Age: 65
End: 2020-10-19

## 2020-10-19 ENCOUNTER — TELEPHONE (OUTPATIENT)
Dept: INTERNAL MEDICINE | Facility: CLINIC | Age: 65
End: 2020-10-19

## 2020-10-27 ENCOUNTER — PATIENT OUTREACH (OUTPATIENT)
Dept: ADMINISTRATIVE | Facility: HOSPITAL | Age: 65
End: 2020-10-27

## 2020-10-30 ENCOUNTER — PATIENT MESSAGE (OUTPATIENT)
Dept: ADMINISTRATIVE | Facility: HOSPITAL | Age: 65
End: 2020-10-30

## 2020-11-02 ENCOUNTER — TELEPHONE (OUTPATIENT)
Dept: INTERNAL MEDICINE | Facility: CLINIC | Age: 65
End: 2020-11-02

## 2020-11-02 NOTE — TELEPHONE ENCOUNTER
Patients home BP reading is 136/84 per LPN-CC note on 10/19/2020. Patient is scheduled to see PCP on 11/09/2020 for 1 month f/u.

## 2020-11-06 ENCOUNTER — PATIENT OUTREACH (OUTPATIENT)
Dept: ADMINISTRATIVE | Facility: OTHER | Age: 65
End: 2020-11-06

## 2020-11-06 NOTE — PROGRESS NOTES
Health Maintenance Due   Topic Date Due    Shingles Vaccine (1 of 2) 03/13/2004    Colorectal Cancer Screening  03/13/2004    Abdominal Aortic Aneurysm Screening  03/13/2019     Updates were requested from care everywhere.  Chart was reviewed for overdue Proactive [facility] Encounters ([facility]) topics (CRS, Breast Cancer Screening, Eye exam)  Health Maintenance has been updated.  LINKS immunization registry triggered.  Immunizations were reconciled.

## 2020-11-09 ENCOUNTER — OFFICE VISIT (OUTPATIENT)
Dept: INTERNAL MEDICINE | Facility: CLINIC | Age: 65
End: 2020-11-09
Payer: MEDICARE

## 2020-11-09 ENCOUNTER — HOSPITAL ENCOUNTER (OUTPATIENT)
Dept: RADIOLOGY | Facility: HOSPITAL | Age: 65
Discharge: HOME | End: 2020-11-09
Attending: FAMILY MEDICINE
Payer: MEDICARE

## 2020-11-09 VITALS
SYSTOLIC BLOOD PRESSURE: 146 MMHG | BODY MASS INDEX: 44.1 KG/M2 | WEIGHT: 315 LBS | DIASTOLIC BLOOD PRESSURE: 76 MMHG | HEIGHT: 71 IN | HEART RATE: 72 BPM | OXYGEN SATURATION: 98 % | TEMPERATURE: 97 F

## 2020-11-09 DIAGNOSIS — E11.65 UNCONTROLLED TYPE 2 DIABETES MELLITUS WITH HYPERGLYCEMIA: ICD-10-CM

## 2020-11-09 DIAGNOSIS — E78.5 HYPERLIPIDEMIA, UNSPECIFIED HYPERLIPIDEMIA TYPE: ICD-10-CM

## 2020-11-09 DIAGNOSIS — M19.042 PRIMARY OSTEOARTHRITIS OF LEFT HAND: ICD-10-CM

## 2020-11-09 DIAGNOSIS — Z12.11 COLON CANCER SCREENING: ICD-10-CM

## 2020-11-09 DIAGNOSIS — N18.30 STAGE 3 CHRONIC KIDNEY DISEASE, UNSPECIFIED WHETHER STAGE 3A OR 3B CKD: ICD-10-CM

## 2020-11-09 DIAGNOSIS — M16.12 PRIMARY OSTEOARTHRITIS OF LEFT HIP: Primary | ICD-10-CM

## 2020-11-09 PROCEDURE — 1125F AMNT PAIN NOTED PAIN PRSNT: CPT | Mod: S$GLB,,, | Performed by: FAMILY MEDICINE

## 2020-11-09 PROCEDURE — 3008F PR BODY MASS INDEX (BMI) DOCUMENTED: ICD-10-PCS | Mod: S$GLB,,, | Performed by: FAMILY MEDICINE

## 2020-11-09 PROCEDURE — 1125F PR PAIN SEVERITY QUANTIFIED, PAIN PRESENT: ICD-10-PCS | Mod: S$GLB,,, | Performed by: FAMILY MEDICINE

## 2020-11-09 PROCEDURE — 99214 OFFICE O/P EST MOD 30 MIN: CPT | Mod: S$GLB,,, | Performed by: FAMILY MEDICINE

## 2020-11-09 PROCEDURE — 3046F HEMOGLOBIN A1C LEVEL >9.0%: CPT | Mod: S$GLB,,, | Performed by: FAMILY MEDICINE

## 2020-11-09 PROCEDURE — 3078F PR MOST RECENT DIASTOLIC BLOOD PRESSURE < 80 MM HG: ICD-10-PCS | Mod: S$GLB,,, | Performed by: FAMILY MEDICINE

## 2020-11-09 PROCEDURE — 1101F PT FALLS ASSESS-DOCD LE1/YR: CPT | Mod: S$GLB,,, | Performed by: FAMILY MEDICINE

## 2020-11-09 PROCEDURE — 3008F BODY MASS INDEX DOCD: CPT | Mod: S$GLB,,, | Performed by: FAMILY MEDICINE

## 2020-11-09 PROCEDURE — 1159F MED LIST DOCD IN RCRD: CPT | Mod: S$GLB,,, | Performed by: FAMILY MEDICINE

## 2020-11-09 PROCEDURE — 73502 X-RAY EXAM HIP UNI 2-3 VIEWS: CPT | Mod: TC,LT

## 2020-11-09 PROCEDURE — 99999 PR PBB SHADOW E&M-EST. PATIENT-LVL III: CPT | Mod: PBBFAC,,, | Performed by: FAMILY MEDICINE

## 2020-11-09 PROCEDURE — 1101F PR PT FALLS ASSESS DOC 0-1 FALLS W/OUT INJ PAST YR: ICD-10-PCS | Mod: S$GLB,,, | Performed by: FAMILY MEDICINE

## 2020-11-09 PROCEDURE — 73502 XR HIP 2 VIEW LEFT: ICD-10-PCS | Mod: 26,LT,, | Performed by: RADIOLOGY

## 2020-11-09 PROCEDURE — 3077F PR MOST RECENT SYSTOLIC BLOOD PRESSURE >= 140 MM HG: ICD-10-PCS | Mod: S$GLB,,, | Performed by: FAMILY MEDICINE

## 2020-11-09 PROCEDURE — 99214 PR OFFICE/OUTPT VISIT, EST, LEVL IV, 30-39 MIN: ICD-10-PCS | Mod: S$GLB,,, | Performed by: FAMILY MEDICINE

## 2020-11-09 PROCEDURE — 3077F SYST BP >= 140 MM HG: CPT | Mod: S$GLB,,, | Performed by: FAMILY MEDICINE

## 2020-11-09 PROCEDURE — 99999 PR PBB SHADOW E&M-EST. PATIENT-LVL III: ICD-10-PCS | Mod: PBBFAC,,, | Performed by: FAMILY MEDICINE

## 2020-11-09 PROCEDURE — 1159F PR MEDICATION LIST DOCUMENTED IN MEDICAL RECORD: ICD-10-PCS | Mod: S$GLB,,, | Performed by: FAMILY MEDICINE

## 2020-11-09 PROCEDURE — 3046F PR MOST RECENT HEMOGLOBIN A1C LEVEL > 9.0%: ICD-10-PCS | Mod: S$GLB,,, | Performed by: FAMILY MEDICINE

## 2020-11-09 PROCEDURE — 73502 X-RAY EXAM HIP UNI 2-3 VIEWS: CPT | Mod: 26,LT,, | Performed by: RADIOLOGY

## 2020-11-09 PROCEDURE — 3078F DIAST BP <80 MM HG: CPT | Mod: S$GLB,,, | Performed by: FAMILY MEDICINE

## 2020-11-09 RX ORDER — LANCETS 33 GAUGE
EACH MISCELLANEOUS
COMMUNITY
Start: 2020-10-07 | End: 2021-04-09 | Stop reason: SDUPTHER

## 2020-11-09 RX ORDER — AZELASTINE 1 MG/ML
1 SPRAY, METERED NASAL 2 TIMES DAILY
Qty: 30 ML | Refills: 2 | Status: SHIPPED | OUTPATIENT
Start: 2020-11-09 | End: 2020-12-09

## 2020-11-09 RX ORDER — ATORVASTATIN CALCIUM 40 MG/1
TABLET, FILM COATED ORAL
Qty: 90 TABLET | Refills: 1 | Status: SHIPPED | OUTPATIENT
Start: 2020-11-09 | End: 2021-03-03 | Stop reason: SDUPTHER

## 2020-11-09 RX ORDER — CALCIUM CARB/VITAMIN D3/VIT K1 500-100-40
TABLET,CHEWABLE ORAL
Qty: 100 EACH | Refills: 3 | Status: SHIPPED | OUTPATIENT
Start: 2020-11-09 | End: 2020-12-10

## 2020-11-09 NOTE — PROGRESS NOTES
Subjective:       Patient ID: Tom Tavarez is a 65 y.o. male.    Chief Complaint: Follow-up    Follow-up hypertension uncontrolled diabetes left hip pain chronic allergic rhinitis.  He denies headache chest pain palpitations shortness of breath or edema.  Reports blood sugars at have been in the 90 range fasting 120 range in the evenings.  Has not used Humalog in a month on a sliding scale.  Lantus varies from 74-84 units depending his blood sugar.  Has intermittent pain in the left hip worse with standing.  Denies any back pain radiation.  He would like refill on Astelin for chronic nasal congestion.  In addition he has chronic kidney disease stage 3.  He is avoiding NSAIDs.    Review of Systems   Constitutional: Negative for activity change, appetite change, chills and fever.   HENT: Positive for congestion and rhinorrhea.    Respiratory: Negative for cough, chest tightness, shortness of breath and wheezing.    Cardiovascular: Negative for chest pain, palpitations and leg swelling.        Denies lightheadedness   Gastrointestinal: Negative for abdominal distention, abdominal pain, diarrhea and nausea.   Musculoskeletal: Positive for arthralgias.        Left hip pain   Neurological: Negative for dizziness, weakness, light-headedness and headaches.       Objective:      Physical Exam  Constitutional:       General: He is not in acute distress.     Appearance: He is not diaphoretic.   Cardiovascular:      Rate and Rhythm: Normal rate and regular rhythm.      Heart sounds: No murmur. No gallop.    Pulmonary:      Effort: Pulmonary effort is normal. No respiratory distress.      Breath sounds: Normal breath sounds. No wheezing, rhonchi or rales.   Abdominal:      General: Abdomen is flat. There is no distension.      Palpations: There is no mass.      Tenderness: There is no abdominal tenderness.   Musculoskeletal:      Comments: No lumbar tenderness no hip tenderness range of motion internal external rotation  left hip   Skin:     General: Skin is warm and dry.   Neurological:      Mental Status: He is alert.   Psychiatric:         Mood and Affect: Mood normal.         Thought Content: Thought content normal.         Lab Visit on 10/08/2020   Component Date Value Ref Range Status    WBC 10/08/2020 6.89  3.90 - 12.70 K/uL Final    RBC 10/08/2020 5.60  4.60 - 6.20 M/uL Final    Hemoglobin 10/08/2020 16.4  14.0 - 18.0 g/dL Final    Hematocrit 10/08/2020 51.7  40.0 - 54.0 % Final    MCV 10/08/2020 92  82 - 98 fL Final    MCH 10/08/2020 29.3  27.0 - 31.0 pg Final    MCHC 10/08/2020 31.7* 32.0 - 36.0 g/dL Final    RDW 10/08/2020 14.7* 11.5 - 14.5 % Final    Platelets 10/08/2020 221  150 - 350 K/uL Final    MPV 10/08/2020 11.4  9.2 - 12.9 fL Final    Immature Granulocytes 10/08/2020 0.1  0.0 - 0.5 % Final    Gran # (ANC) 10/08/2020 3.8  1.8 - 7.7 K/uL Final    Immature Grans (Abs) 10/08/2020 0.01  0.00 - 0.04 K/uL Final    Lymph # 10/08/2020 2.3  1.0 - 4.8 K/uL Final    Mono # 10/08/2020 0.6  0.3 - 1.0 K/uL Final    Eos # 10/08/2020 0.2  0.0 - 0.5 K/uL Final    Baso # 10/08/2020 0.04  0.00 - 0.20 K/uL Final    nRBC 10/08/2020 0  0 /100 WBC Final    Gran % 10/08/2020 54.5  38.0 - 73.0 % Final    Lymph % 10/08/2020 33.7  18.0 - 48.0 % Final    Mono % 10/08/2020 8.3  4.0 - 15.0 % Final    Eosinophil % 10/08/2020 2.8  0.0 - 8.0 % Final    Basophil % 10/08/2020 0.6  0.0 - 1.9 % Final    Differential Method 10/08/2020 Automated   Final    Sodium 10/08/2020 137  136 - 145 mmol/L Final    Potassium 10/08/2020 3.7  3.5 - 5.1 mmol/L Final    Chloride 10/08/2020 102  95 - 110 mmol/L Final    CO2 10/08/2020 22* 23 - 29 mmol/L Final    Glucose 10/08/2020 80  70 - 110 mg/dL Final    BUN 10/08/2020 15  8 - 23 mg/dL Final    Creatinine 10/08/2020 1.3  0.5 - 1.4 mg/dL Final    Calcium 10/08/2020 9.5  8.7 - 10.5 mg/dL Final    Total Protein 10/08/2020 7.6  6.0 - 8.4 g/dL Final    Albumin 10/08/2020 3.5  3.5 - 5.2  g/dL Final    Total Bilirubin 10/08/2020 0.5  0.1 - 1.0 mg/dL Final    Alkaline Phosphatase 10/08/2020 64  55 - 135 U/L Final    AST 10/08/2020 15  10 - 40 U/L Final    ALT 10/08/2020 19  10 - 44 U/L Final    Anion Gap 10/08/2020 13  8 - 16 mmol/L Final    eGFR if African American 10/08/2020 >60.0  >60 mL/min/1.73 m^2 Final    eGFR if non African American 10/08/2020 56.9* >60 mL/min/1.73 m^2 Final    Cholesterol 10/08/2020 172  120 - 199 mg/dL Final    Triglycerides 10/08/2020 113  30 - 150 mg/dL Final    HDL 10/08/2020 33* 40 - 75 mg/dL Final    LDL Cholesterol 10/08/2020 116.4  63.0 - 159.0 mg/dL Final    HDL/Cholesterol Ratio 10/08/2020 19.2* 20.0 - 50.0 % Final    Total Cholesterol/HDL Ratio 10/08/2020 5.2* 2.0 - 5.0 Final    Non-HDL Cholesterol 10/08/2020 139  mg/dL Final    TSH 10/08/2020 2.012  0.400 - 4.000 uIU/mL Final    Hemoglobin A1C 10/08/2020 9.9* 4.0 - 5.6 % Final    Estimated Avg Glucose 10/08/2020 237* 68 - 131 mg/dL Final     Assessment:       1. Primary osteoarthritis of left hand    2. Uncontrolled type 2 diabetes mellitus with hyperglycemia    3. Controlled type 2 diabetes mellitus with stage 3 chronic kidney disease, with long-term current use of insulin    4. Hyperlipidemia, unspecified hyperlipidemia type    5. Colon cancer screening        Plan:   Blood pressure last visit 162/92 blood pressure today 144 over 76.  X-ray left hip check A1c fecal immunoassay test ordered he has check insurance coverage regards Shingrix immunization.  Continue avoid NSAIDs for chronic renal disease.  Astelin refilled.  Follow-up 1 month.      Primary osteoarthritis of left hand  -     X-Ray Hip 2 View Left; Future; Expected date: 11/09/2020    Uncontrolled type 2 diabetes mellitus with hyperglycemia  -     Hemoglobin A1C; Future; Expected date: 11/09/2020    Controlled type 2 diabetes mellitus with stage 3 chronic kidney disease, with long-term current use of insulin    Hyperlipidemia,  "unspecified hyperlipidemia type  -     atorvastatin (LIPITOR) 40 MG tablet; TAKE 1 TABLET DAILY, MAKE AN APPOINTMENT FOR YEARLY EXAM, NEED OFFICE VISIT AND LAB FOR ADDITIONAL REFILLS  Dispense: 90 tablet; Refill: 1    Colon cancer screening  -     Fecal Immunochemical Test (iFOBT); Future; Expected date: 11/09/2020    Other orders  -     azelastine (ASTELIN) 137 mcg (0.1 %) nasal spray; 1 spray (137 mcg total) by Nasal route 2 (two) times daily.  Dispense: 30 mL; Refill: 2  -     insulin syringe-needle U-100 0.3 mL 31 gauge x 5/16" Syrg; Use qd  Dispense: 100 each; Refill: 3            "

## 2020-11-10 DIAGNOSIS — E66.9 DIABETES MELLITUS TYPE 2 IN OBESE: ICD-10-CM

## 2020-11-10 DIAGNOSIS — E11.69 DIABETES MELLITUS TYPE 2 IN OBESE: ICD-10-CM

## 2020-11-11 ENCOUNTER — TELEPHONE (OUTPATIENT)
Dept: ORTHOPEDICS | Facility: CLINIC | Age: 65
End: 2020-11-11

## 2020-11-11 DIAGNOSIS — M25.559 HIP PAIN: Primary | ICD-10-CM

## 2020-11-11 RX ORDER — INSULIN GLARGINE 100 [IU]/ML
84 INJECTION, SOLUTION SUBCUTANEOUS NIGHTLY
Qty: 60 ML | Refills: 3 | Status: SHIPPED | OUTPATIENT
Start: 2020-11-11 | End: 2021-01-20 | Stop reason: SDUPTHER

## 2020-11-12 ENCOUNTER — TELEPHONE (OUTPATIENT)
Dept: ORTHOPEDICS | Facility: CLINIC | Age: 65
End: 2020-11-12

## 2020-11-12 ENCOUNTER — HOSPITAL ENCOUNTER (OUTPATIENT)
Dept: RADIOLOGY | Facility: HOSPITAL | Age: 65
Discharge: HOME | End: 2020-11-12
Attending: PHYSICAL MEDICINE & REHABILITATION
Payer: MEDICARE

## 2020-11-12 ENCOUNTER — OFFICE VISIT (OUTPATIENT)
Dept: ORTHOPEDICS | Facility: CLINIC | Age: 65
End: 2020-11-12
Payer: MEDICARE

## 2020-11-12 VITALS
DIASTOLIC BLOOD PRESSURE: 92 MMHG | HEART RATE: 64 BPM | HEIGHT: 71 IN | BODY MASS INDEX: 44.1 KG/M2 | WEIGHT: 315 LBS | SYSTOLIC BLOOD PRESSURE: 172 MMHG

## 2020-11-12 DIAGNOSIS — M54.9 DORSALGIA, UNSPECIFIED: ICD-10-CM

## 2020-11-12 DIAGNOSIS — M25.559 HIP PAIN: Primary | ICD-10-CM

## 2020-11-12 DIAGNOSIS — M25.559 HIP PAIN: ICD-10-CM

## 2020-11-12 PROCEDURE — 72100 X-RAY EXAM L-S SPINE 2/3 VWS: CPT | Mod: 26,,, | Performed by: RADIOLOGY

## 2020-11-12 PROCEDURE — 3008F BODY MASS INDEX DOCD: CPT | Mod: S$GLB,,, | Performed by: PHYSICAL MEDICINE & REHABILITATION

## 2020-11-12 PROCEDURE — 3077F PR MOST RECENT SYSTOLIC BLOOD PRESSURE >= 140 MM HG: ICD-10-PCS | Mod: S$GLB,,, | Performed by: PHYSICAL MEDICINE & REHABILITATION

## 2020-11-12 PROCEDURE — 72100 XR LUMBAR SPINE AP AND LATERAL: ICD-10-PCS | Mod: 26,,, | Performed by: RADIOLOGY

## 2020-11-12 PROCEDURE — 1159F PR MEDICATION LIST DOCUMENTED IN MEDICAL RECORD: ICD-10-PCS | Mod: S$GLB,,, | Performed by: PHYSICAL MEDICINE & REHABILITATION

## 2020-11-12 PROCEDURE — 1125F AMNT PAIN NOTED PAIN PRSNT: CPT | Mod: S$GLB,,, | Performed by: PHYSICAL MEDICINE & REHABILITATION

## 2020-11-12 PROCEDURE — 3077F SYST BP >= 140 MM HG: CPT | Mod: S$GLB,,, | Performed by: PHYSICAL MEDICINE & REHABILITATION

## 2020-11-12 PROCEDURE — 99204 PR OFFICE/OUTPT VISIT, NEW, LEVL IV, 45-59 MIN: ICD-10-PCS | Mod: S$GLB,,, | Performed by: PHYSICAL MEDICINE & REHABILITATION

## 2020-11-12 PROCEDURE — 72100 X-RAY EXAM L-S SPINE 2/3 VWS: CPT | Mod: TC

## 2020-11-12 PROCEDURE — 1159F MED LIST DOCD IN RCRD: CPT | Mod: S$GLB,,, | Performed by: PHYSICAL MEDICINE & REHABILITATION

## 2020-11-12 PROCEDURE — 3288F FALL RISK ASSESSMENT DOCD: CPT | Mod: S$GLB,,, | Performed by: PHYSICAL MEDICINE & REHABILITATION

## 2020-11-12 PROCEDURE — 1101F PT FALLS ASSESS-DOCD LE1/YR: CPT | Mod: S$GLB,,, | Performed by: PHYSICAL MEDICINE & REHABILITATION

## 2020-11-12 PROCEDURE — 1101F PR PT FALLS ASSESS DOC 0-1 FALLS W/OUT INJ PAST YR: ICD-10-PCS | Mod: S$GLB,,, | Performed by: PHYSICAL MEDICINE & REHABILITATION

## 2020-11-12 PROCEDURE — 3080F DIAST BP >= 90 MM HG: CPT | Mod: S$GLB,,, | Performed by: PHYSICAL MEDICINE & REHABILITATION

## 2020-11-12 PROCEDURE — 99999 PR PBB SHADOW E&M-EST. PATIENT-LVL V: CPT | Mod: PBBFAC,,, | Performed by: PHYSICAL MEDICINE & REHABILITATION

## 2020-11-12 PROCEDURE — 3008F PR BODY MASS INDEX (BMI) DOCUMENTED: ICD-10-PCS | Mod: S$GLB,,, | Performed by: PHYSICAL MEDICINE & REHABILITATION

## 2020-11-12 PROCEDURE — 3080F PR MOST RECENT DIASTOLIC BLOOD PRESSURE >= 90 MM HG: ICD-10-PCS | Mod: S$GLB,,, | Performed by: PHYSICAL MEDICINE & REHABILITATION

## 2020-11-12 PROCEDURE — 99204 OFFICE O/P NEW MOD 45 MIN: CPT | Mod: S$GLB,,, | Performed by: PHYSICAL MEDICINE & REHABILITATION

## 2020-11-12 PROCEDURE — 1125F PR PAIN SEVERITY QUANTIFIED, PAIN PRESENT: ICD-10-PCS | Mod: S$GLB,,, | Performed by: PHYSICAL MEDICINE & REHABILITATION

## 2020-11-12 PROCEDURE — 99999 PR PBB SHADOW E&M-EST. PATIENT-LVL V: ICD-10-PCS | Mod: PBBFAC,,, | Performed by: PHYSICAL MEDICINE & REHABILITATION

## 2020-11-12 PROCEDURE — 3288F PR FALLS RISK ASSESSMENT DOCUMENTED: ICD-10-PCS | Mod: S$GLB,,, | Performed by: PHYSICAL MEDICINE & REHABILITATION

## 2020-11-12 RX ORDER — NAPROXEN 500 MG/1
500 TABLET ORAL 2 TIMES DAILY WITH MEALS
Qty: 60 TABLET | Refills: 0 | Status: SHIPPED | OUTPATIENT
Start: 2020-11-12 | End: 2022-02-17 | Stop reason: SDUPTHER

## 2020-11-12 NOTE — PROGRESS NOTES
SPORTS MEDICINE / PM&R New Patient Visit :    Referring Physician: Adolfo Lemus MD    Chief Complaint   Patient presents with    Left Hip - Pain       HPI: This is a 65 y.o.  male being seen in clinic today for evaluation of Pain of the Left Hip      The problem 4 years ago and progressively worsened recently. He feels sharp, aching, intermittent, pain at rest and pain with movement pain in and on his left hip.  The symptoms are actually more in the left buttock with some radiation to the lateral hip.  He strongly denies any pain in the groin area.  He denies significant back pain or pain that radiates down the leg.  He denies numbness and tingling.  The symptoms are worsening. He has tried tylenol without improvement. He has not tried therapy. Patient states that his pain is 6/10 today. He also states that his pain increases when walking, standing or sitting for long periods.      History obtained from patient.    Past family, medical, social, surgical history, and vital signs reviewed in chart.    Review of Systems   Constitutional: Negative for chills, fever and weight loss.   HENT: Negative for hearing loss and sore throat.    Eyes: Negative for blurred vision, photophobia and pain.   Respiratory: Negative for shortness of breath.    Cardiovascular: Negative for chest pain.   Gastrointestinal: Negative for abdominal pain.   Genitourinary: Negative for dysuria.   Musculoskeletal: Positive for joint pain.   Skin: Negative for rash.   Neurological: Negative for tingling and headaches.   Endo/Heme/Allergies: Does not bruise/bleed easily.   Psychiatric/Behavioral: Negative for depression.       General    Nursing note and vitals reviewed.  Constitutional: He is oriented to person, place, and time. He appears well-developed and well-nourished.   Morbidly obese   HENT:   Head: Normocephalic and atraumatic.   Eyes: Conjunctivae and EOM are normal. Pupils are equal, round, and reactive to light.   Neck: Neck  supple.   Cardiovascular: Intact distal pulses.    Pulmonary/Chest: Effort normal. No respiratory distress.   Abdominal: He exhibits no distension.   Neurological: He is alert and oriented to person, place, and time.   Psychiatric: He has a normal mood and affect.     General Musculoskeletal Exam   Pelvic Obliquity: none        Right Hip Exam   Right hip exam is normal.     Inspection   Swelling: absent  Bruising: absent  No deformity of hip.  Erythema: absent    Range of Motion   The patient has normal right hip ROM.  Abduction: normal   Adduction: normal   Extension: normal   Flexion: normal   External rotation: normal   Internal rotation: normal     Tests   Pain w/ forced internal rotation (JAVIER): absent  Pain w/ forced external rotation (FADIR): absent  Trendelenburg Test: positive  Circumduction test: negative  Snapping Hip (internal): negative    Other   Sensation: normal  Left Hip Exam   Left hip exam is normal.    Inspection   Swelling: absent  No deformity of hip.  Erythema: absent  Bruising: absent    Range of Motion   The patient has normal left hip ROM.  Abduction: normal   Adduction: normal   Extension: normal   Flexion: normal   External rotation: normal   Internal rotation: normal     Tests   Pain w/ forced internal rotation (JAVIER): absent  Pain w/ forced external rotation (FADIR): absent  Trendelenburg Test: positive  Circumduction test: negative  Stinchfield test: negative  Snapping Hip (internal): negative    Other   Sensation: normal    Comments:  Tender palpation through left lateral to inferior buttocks and somewhat over greater trochanter.  Mild tenderness near left PSIS area and somewhat over left sacral border.  No pain was reproduced with typical hip joint testing      Back (L-Spine & T-Spine) / Neck (C-Spine) Exam   Back exam is normal.      Muscle Strength   Right Lower Extremity   Hip Abduction: 3/5   Hip Adduction: 4/5   Hip Flexion: 5/5   Left Lower Extremity   Hip Abduction: 3/5    Hip Adduction: 4/5   Hip Flexion: 4/5     Reflexes     Left Side  Achilles:  1+  Quadriceps:  1+    Right Side   Achilles:  1+  Quadriceps:  1+    Vascular Exam     Right Pulses  Dorsalis Pedis:      2+          Left Pulses  Dorsalis Pedis:      2+              X-ray Hip 2 View Left    Result Date: 11/9/2020  EXAMINATION: XR HIP 2 VIEW LEFT CLINICAL HISTORY: Primary osteoarthritis, left hand TECHNIQUE: AP view of the pelvis and frog leg lateral view of the left hip were performed. COMPARISON: None FINDINGS: No fracture.  Femoral heads are seated within the acetabula.  No evidence of femoral head avascular necrosis.  Mild bilateral hip osteoarthritis.  No suspicious osseous abnormality.     As above. Electronically signed by: Harman Newsome Date:    11/09/2020 Time:    15:07       IMPRESSION/PLAN: This is a 65 y.o.  male with:    Hip pain  -     Ambulatory referral/consult to Orthopedics  -     X-Ray Lumbar Spine Ap And Lateral; Future; Expected date: 11/12/2020  -     Ambulatory referral/consult to Physical/Occupational Therapy; Future; Expected date: 11/19/2020  -     naproxen (NAPROSYN) 500 MG tablet; Take 1 tablet (500 mg total) by mouth 2 (two) times daily with meals.  Dispense: 60 tablet; Refill: 0    Dorsalgia, unspecified  -     X-Ray Lumbar Spine Ap And Lateral; Future; Expected date: 11/12/2020  -     Ambulatory referral/consult to Physical/Occupational Therapy; Future; Expected date: 11/19/2020  -     naproxen (NAPROSYN) 500 MG tablet; Take 1 tablet (500 mg total) by mouth 2 (two) times daily with meals.  Dispense: 60 tablet; Refill: 0        The diagnosis and treatment options were discussed with Tom in detail.  We personally reviewed his imaging today in clinic.  To my view there is no significant hip joint pathology.  We discussed that the area of his pain is more consistent with pain source coming from the back or SI joint, or possibly muscular.  I did reach out to his PCP Dr. Lemus during the  clinic visit to inquire whether the patient could take anti-inflammatories.  Dr. Lemus notes that naproxen would be okay.  We will try naproxen 500 mg twice daily.  I strongly recommended a course of physical therapy and talked the patient into it.  If he does not find it to be beneficial or if he can do a better job of exercising on his own, then he is free to try.  He will go to [facility] near his home.  Because of think most of this pain is coming from the back will have him obtain lumbar x-rays on his way out of clinic today and I will notify him of the results.  If he is not improving with this plan he will need referral to either Neurosurgery or Pain Medicine Clinic.  He was provided with this plan in writing. All of his questions were answered. He will follow up with me in 4 to 6 weeks.     Disclaimer: This note was prepared using a voice recognition system and is likely to have sound alike errors within the text.     Claribel Holloway M.D.  Sports Medicine

## 2020-11-12 NOTE — LETTER
November 12, 2020      Adolfo Lemus MD  [address] [facility] [address]           [facility]  [address] [facility] [address]  Phone: [phone]  Fax: [phone]          Patient: Tom Tavarez   MR Number: [id number]   YOB: 1955   Date of Visit: 11/12/2020       Dear Dr. Adolfo Lemus:    Thank you for referring Tom Tavarez to me for evaluation. Attached you will find relevant portions of my assessment and plan of care.    If you have questions, please do not hesitate to call me. I look forward to following Tom Tavarez along with you.    Sincerely,    Claribel Holloway MD    Enclosure  CC:  No Recipients    If you would like to receive this communication electronically, please contact [email or web address] or [phone] to request more information on [employer] access.    For providers and/or their staff who would like to refer a patient to [facility], please contact us through our one-stop-shop provider referral line, [facility], at [phone].    If you feel you have received this communication in error or would no longer like to receive these types of communications, please e-mail [email or web address]

## 2020-11-19 ENCOUNTER — PES CALL (OUTPATIENT)
Dept: ADMINISTRATIVE | Facility: CLINIC | Age: 65
End: 2020-11-19

## 2020-12-09 ENCOUNTER — OFFICE VISIT (OUTPATIENT)
Dept: INTERNAL MEDICINE | Facility: CLINIC | Age: 65
End: 2020-12-09
Payer: MEDICARE

## 2020-12-09 ENCOUNTER — LAB VISIT (OUTPATIENT)
Dept: LAB | Facility: HOSPITAL | Age: 65
End: 2020-12-09
Attending: FAMILY MEDICINE
Payer: MEDICARE

## 2020-12-09 VITALS
WEIGHT: 315 LBS | BODY MASS INDEX: 44.1 KG/M2 | HEIGHT: 71 IN | DIASTOLIC BLOOD PRESSURE: 88 MMHG | OXYGEN SATURATION: 98 % | SYSTOLIC BLOOD PRESSURE: 144 MMHG | HEART RATE: 54 BPM

## 2020-12-09 VITALS
HEART RATE: 58 BPM | HEIGHT: 71 IN | WEIGHT: 315 LBS | DIASTOLIC BLOOD PRESSURE: 80 MMHG | BODY MASS INDEX: 44.1 KG/M2 | OXYGEN SATURATION: 98 % | SYSTOLIC BLOOD PRESSURE: 150 MMHG | TEMPERATURE: 99 F

## 2020-12-09 DIAGNOSIS — E78.5 HYPERLIPIDEMIA ASSOCIATED WITH TYPE 2 DIABETES MELLITUS: ICD-10-CM

## 2020-12-09 DIAGNOSIS — Z91.89 POTENTIAL FOR COGNITIVE IMPAIRMENT: ICD-10-CM

## 2020-12-09 DIAGNOSIS — Z00.00 ENCOUNTER FOR PREVENTIVE HEALTH EXAMINATION: Primary | ICD-10-CM

## 2020-12-09 DIAGNOSIS — I10 ESSENTIAL HYPERTENSION: ICD-10-CM

## 2020-12-09 DIAGNOSIS — I10 HYPERTENSION, UNSPECIFIED TYPE: ICD-10-CM

## 2020-12-09 DIAGNOSIS — J30.9 CHRONIC ALLERGIC RHINITIS: ICD-10-CM

## 2020-12-09 DIAGNOSIS — I70.0 AORTIC ATHEROSCLEROSIS: ICD-10-CM

## 2020-12-09 DIAGNOSIS — E11.65 UNCONTROLLED TYPE 2 DIABETES MELLITUS WITH HYPERGLYCEMIA: ICD-10-CM

## 2020-12-09 DIAGNOSIS — N18.30 STAGE 3 CHRONIC KIDNEY DISEASE, UNSPECIFIED WHETHER STAGE 3A OR 3B CKD: ICD-10-CM

## 2020-12-09 DIAGNOSIS — Z74.09 OTHER REDUCED MOBILITY: ICD-10-CM

## 2020-12-09 DIAGNOSIS — E11.65 UNCONTROLLED TYPE 2 DIABETES MELLITUS WITH HYPERGLYCEMIA: Primary | ICD-10-CM

## 2020-12-09 DIAGNOSIS — E11.69 HYPERLIPIDEMIA ASSOCIATED WITH TYPE 2 DIABETES MELLITUS: ICD-10-CM

## 2020-12-09 DIAGNOSIS — E78.5 HYPERLIPIDEMIA, UNSPECIFIED HYPERLIPIDEMIA TYPE: ICD-10-CM

## 2020-12-09 DIAGNOSIS — E66.01 OBESITY, CLASS III, BMI 40-49.9 (MORBID OBESITY): ICD-10-CM

## 2020-12-09 PROCEDURE — 3079F DIAST BP 80-89 MM HG: CPT | Mod: S$GLB,,, | Performed by: FAMILY MEDICINE

## 2020-12-09 PROCEDURE — 3077F PR MOST RECENT SYSTOLIC BLOOD PRESSURE >= 140 MM HG: ICD-10-PCS | Mod: S$GLB,,, | Performed by: FAMILY MEDICINE

## 2020-12-09 PROCEDURE — 1101F PT FALLS ASSESS-DOCD LE1/YR: CPT | Mod: S$GLB,,, | Performed by: NURSE PRACTITIONER

## 2020-12-09 PROCEDURE — 3079F DIAST BP 80-89 MM HG: CPT | Mod: S$GLB,,, | Performed by: NURSE PRACTITIONER

## 2020-12-09 PROCEDURE — 99214 OFFICE O/P EST MOD 30 MIN: CPT | Mod: S$GLB,,, | Performed by: FAMILY MEDICINE

## 2020-12-09 PROCEDURE — 3052F HG A1C>EQUAL 8.0%<EQUAL 9.0%: CPT | Mod: S$GLB,,, | Performed by: NURSE PRACTITIONER

## 2020-12-09 PROCEDURE — 3052F HG A1C>EQUAL 8.0%<EQUAL 9.0%: CPT | Mod: S$GLB,,, | Performed by: FAMILY MEDICINE

## 2020-12-09 PROCEDURE — 99999 PR PBB SHADOW E&M-EST. PATIENT-LVL IV: CPT | Mod: PBBFAC,,, | Performed by: NURSE PRACTITIONER

## 2020-12-09 PROCEDURE — 1125F PR PAIN SEVERITY QUANTIFIED, PAIN PRESENT: ICD-10-PCS | Mod: S$GLB,,, | Performed by: NURSE PRACTITIONER

## 2020-12-09 PROCEDURE — 1125F AMNT PAIN NOTED PAIN PRSNT: CPT | Mod: S$GLB,,, | Performed by: FAMILY MEDICINE

## 2020-12-09 PROCEDURE — 99999 PR PBB SHADOW E&M-EST. PATIENT-LVL IV: CPT | Mod: PBBFAC,,, | Performed by: FAMILY MEDICINE

## 2020-12-09 PROCEDURE — 3008F PR BODY MASS INDEX (BMI) DOCUMENTED: ICD-10-PCS | Mod: S$GLB,,, | Performed by: NURSE PRACTITIONER

## 2020-12-09 PROCEDURE — 3079F PR MOST RECENT DIASTOLIC BLOOD PRESSURE 80-89 MM HG: ICD-10-PCS | Mod: S$GLB,,, | Performed by: NURSE PRACTITIONER

## 2020-12-09 PROCEDURE — 3077F SYST BP >= 140 MM HG: CPT | Mod: S$GLB,,, | Performed by: NURSE PRACTITIONER

## 2020-12-09 PROCEDURE — 3052F PR MOST RECENT HEMOGLOBIN A1C LEVEL 8.0 - < 9.0%: ICD-10-PCS | Mod: S$GLB,,, | Performed by: FAMILY MEDICINE

## 2020-12-09 PROCEDURE — 3288F FALL RISK ASSESSMENT DOCD: CPT | Mod: S$GLB,,, | Performed by: FAMILY MEDICINE

## 2020-12-09 PROCEDURE — 99999 PR PBB SHADOW E&M-EST. PATIENT-LVL IV: ICD-10-PCS | Mod: PBBFAC,,, | Performed by: FAMILY MEDICINE

## 2020-12-09 PROCEDURE — 3077F SYST BP >= 140 MM HG: CPT | Mod: S$GLB,,, | Performed by: FAMILY MEDICINE

## 2020-12-09 PROCEDURE — G0439 PR MEDICARE ANNUAL WELLNESS SUBSEQUENT VISIT: ICD-10-PCS | Mod: S$GLB,,, | Performed by: NURSE PRACTITIONER

## 2020-12-09 PROCEDURE — 1159F MED LIST DOCD IN RCRD: CPT | Mod: S$GLB,,, | Performed by: FAMILY MEDICINE

## 2020-12-09 PROCEDURE — 3288F PR FALLS RISK ASSESSMENT DOCUMENTED: ICD-10-PCS | Mod: S$GLB,,, | Performed by: FAMILY MEDICINE

## 2020-12-09 PROCEDURE — 99214 PR OFFICE/OUTPT VISIT, EST, LEVL IV, 30-39 MIN: ICD-10-PCS | Mod: S$GLB,,, | Performed by: FAMILY MEDICINE

## 2020-12-09 PROCEDURE — 1125F PR PAIN SEVERITY QUANTIFIED, PAIN PRESENT: ICD-10-PCS | Mod: S$GLB,,, | Performed by: FAMILY MEDICINE

## 2020-12-09 PROCEDURE — 3008F BODY MASS INDEX DOCD: CPT | Mod: S$GLB,,, | Performed by: FAMILY MEDICINE

## 2020-12-09 PROCEDURE — 3052F PR MOST RECENT HEMOGLOBIN A1C LEVEL 8.0 - < 9.0%: ICD-10-PCS | Mod: S$GLB,,, | Performed by: NURSE PRACTITIONER

## 2020-12-09 PROCEDURE — 3008F PR BODY MASS INDEX (BMI) DOCUMENTED: ICD-10-PCS | Mod: S$GLB,,, | Performed by: FAMILY MEDICINE

## 2020-12-09 PROCEDURE — 3008F BODY MASS INDEX DOCD: CPT | Mod: S$GLB,,, | Performed by: NURSE PRACTITIONER

## 2020-12-09 PROCEDURE — 3288F FALL RISK ASSESSMENT DOCD: CPT | Mod: S$GLB,,, | Performed by: NURSE PRACTITIONER

## 2020-12-09 PROCEDURE — 1158F ADVNC CARE PLAN TLK DOCD: CPT | Mod: S$GLB,,, | Performed by: NURSE PRACTITIONER

## 2020-12-09 PROCEDURE — 3288F PR FALLS RISK ASSESSMENT DOCUMENTED: ICD-10-PCS | Mod: S$GLB,,, | Performed by: NURSE PRACTITIONER

## 2020-12-09 PROCEDURE — 1125F AMNT PAIN NOTED PAIN PRSNT: CPT | Mod: S$GLB,,, | Performed by: NURSE PRACTITIONER

## 2020-12-09 PROCEDURE — 36415 COLL VENOUS BLD VENIPUNCTURE: CPT

## 2020-12-09 PROCEDURE — 1101F PR PT FALLS ASSESS DOC 0-1 FALLS W/OUT INJ PAST YR: ICD-10-PCS | Mod: S$GLB,,, | Performed by: NURSE PRACTITIONER

## 2020-12-09 PROCEDURE — 1158F PR ADVANCE CARE PLANNING DISCUSS DOCUMENTED IN MEDICAL RECORD: ICD-10-PCS | Mod: S$GLB,,, | Performed by: NURSE PRACTITIONER

## 2020-12-09 PROCEDURE — 1101F PT FALLS ASSESS-DOCD LE1/YR: CPT | Mod: S$GLB,,, | Performed by: FAMILY MEDICINE

## 2020-12-09 PROCEDURE — 1159F PR MEDICATION LIST DOCUMENTED IN MEDICAL RECORD: ICD-10-PCS | Mod: S$GLB,,, | Performed by: FAMILY MEDICINE

## 2020-12-09 PROCEDURE — 99999 PR PBB SHADOW E&M-EST. PATIENT-LVL IV: ICD-10-PCS | Mod: PBBFAC,,, | Performed by: NURSE PRACTITIONER

## 2020-12-09 PROCEDURE — 3079F PR MOST RECENT DIASTOLIC BLOOD PRESSURE 80-89 MM HG: ICD-10-PCS | Mod: S$GLB,,, | Performed by: FAMILY MEDICINE

## 2020-12-09 PROCEDURE — 3077F PR MOST RECENT SYSTOLIC BLOOD PRESSURE >= 140 MM HG: ICD-10-PCS | Mod: S$GLB,,, | Performed by: NURSE PRACTITIONER

## 2020-12-09 PROCEDURE — G0439 PPPS, SUBSEQ VISIT: HCPCS | Mod: S$GLB,,, | Performed by: NURSE PRACTITIONER

## 2020-12-09 PROCEDURE — 1101F PR PT FALLS ASSESS DOC 0-1 FALLS W/OUT INJ PAST YR: ICD-10-PCS | Mod: S$GLB,,, | Performed by: FAMILY MEDICINE

## 2020-12-09 PROCEDURE — 83036 HEMOGLOBIN GLYCOSYLATED A1C: CPT

## 2020-12-09 RX ORDER — HYDROCHLOROTHIAZIDE 50 MG/1
50 TABLET ORAL DAILY
Qty: 90 TABLET | Refills: 3
Start: 2020-12-09 | End: 2021-01-20 | Stop reason: SDUPTHER

## 2020-12-09 NOTE — PROGRESS NOTES
"  Tom Tavarez presented for a  Medicare AWV and comprehensive Health Risk Assessment today. The following components were reviewed and updated:    · Medical history  · Family History  · Social history  · Allergies and Current Medications  · Health Risk Assessment  · Health Maintenance  · Care Team         ** See Completed Assessments for Annual Wellness Visit within the encounter summary.**         The following assessments were completed:  · Living Situation  · CAGE  · Depression Screening  · Timed Get Up and Go  · Whisper Test  · Cognitive Function Screening  · Nutrition Screening  · ADL Screening  · PAQ Screening        Vitals:    12/09/20 1415   BP: (!) 144/88   BP Location: Right arm   Patient Position: Sitting   Pulse: (!) 54   SpO2: 98%   Weight: (!) 149.7 kg (330 lb 0.5 oz)   Height: 5' 11" (1.803 m)     Body mass index is 46.03 kg/m².  Physical Exam  Vitals signs and nursing note reviewed.   Constitutional:       Appearance: He is well-developed.   HENT:      Head: Normocephalic.   Cardiovascular:      Rate and Rhythm: Normal rate and regular rhythm.      Heart sounds: Normal heart sounds. No murmur.   Pulmonary:      Effort: Pulmonary effort is normal. No respiratory distress.      Breath sounds: Normal breath sounds.   Abdominal:      Palpations: Abdomen is soft. There is no mass.      Tenderness: There is no abdominal tenderness.   Musculoskeletal: Normal range of motion.   Skin:     General: Skin is warm and dry.   Neurological:      Mental Status: He is alert and oriented to person, place, and time.      Motor: No abnormal muscle tone.   Psychiatric:         Speech: Speech normal.         Behavior: Behavior normal.               Diagnoses and health risks identified today and associated recommendations/orders:    1. Encounter for preventive health examination  Patient will receive Shingrix and Tetanus vaccine at pharmacy.    He will discuss PSA with PCP.      Per PCP's note 12/9/2020, will check US " aorta in 6 months.     2. Hypertension, unspecified type  Saw PCP prior to appointment.   Continue current treatment plan as previously prescribed with your PCP.     3. Hyperlipidemia associated with type 2 diabetes mellitus  A1C 8.8  Continue current treatment plan as previously prescribed with your PCP.     4. Aortic atherosclerosis  Xray 11/2020  Continue current treatment plan as previously prescribed with your PCP.     5. Stage 3 chronic kidney disease, unspecified whether stage 3a or 3b CKD  Advised to avoid NSAIDs.   Stable and controlled. Continue current treatment plan as previously prescribed with your PCP.     6. Obesity, Class III, BMI 40-49.9 (morbid obesity)  Encouraged healthy diet and exercise as tolerated to help bring BMI into normal range.   Continue current treatment plan as previously prescribed with your PCP.      7. Potential for cognitive impairment  Abnormal cognitive function screening.   Denies memory difficulties.   Advised to follow up with PCP for further evaluation and recommendations. He expressed understanding.      8. Other reduced mobility  Abnormal timed get up and go test. Denies any falls in the last 12 months.   Fall precautions reviewed with patient. Advised to follow up with PCP for further recommendations. Patient expressed understanding.         Provided Tom with a 5-10 year written screening schedule and personal prevention plan. Recommendations were developed using the USPSTF age appropriate recommendations. Education, counseling, and referrals were provided as needed. After Visit Summary printed and given to patient which includes a list of additional screenings\tests needed.    Follow up in about 1 year (around 12/9/2021) for awv.    Radha Shankar NP  I offered to discuss end of life issues, including information on how to make advance directives that the patient could use to name someone who would make medical decisions on their behalf if they became too ill to  make themselves.    ___Patient declined  _X_Patient is interested, I provided paper work and offered to discuss.

## 2020-12-09 NOTE — Clinical Note
Your patient was seen today for AWV. Abnormalities bolded. Please review.   Thank you,   JOHN Anton

## 2020-12-09 NOTE — PATIENT INSTRUCTIONS
Counseling and Referral of Other Preventative  (Italic type indicates deductible and co-insurance are waived)    Patient Name: Tom Tavarez  Today's Date: 12/9/2020    Health Maintenance       Date Due Completion Date    Shingles Vaccine (1 of 2) 03/13/2004 ---    Colorectal Cancer Screening 03/13/2004 ---    Abdominal Aortic Aneurysm Screening 03/13/2019 ---    TETANUS VACCINE 11/12/2020 11/12/2010    Hemoglobin A1c 05/09/2021 11/9/2020    Foot Exam 10/08/2021 10/8/2020    Lipid Panel 10/08/2021 10/8/2020    Urine Microalbumin 10/09/2021 10/9/2020    Eye Exam 10/13/2021 10/13/2020    High Dose Statin 12/09/2021 12/9/2020        No orders of the defined types were placed in this encounter.    The following information is provided to all patients.  This information is to help you find resources for any of the problems found today that may be affecting your health:                Living healthy guide: www.[facility].[place].[facility]      Understanding Diabetes: www.diabetes.org      Eating healthy: www.cdc.gov/healthyweight      [facility] home safety checklist: www.cdc.gov/steadi/patient.html      Agency on Aging: www.goea.[place].gov      Alcoholics anonymous (AA): www.aa.org      Physical Activity: www.anne.nih.gov/fn0tsen      Tobacco use: www.quitwithusla.org

## 2020-12-09 NOTE — PROGRESS NOTES
Subjective:       Patient ID: Tom Tavarez is a 65 y.o. male.    Chief Complaint: Follow-up    Follow-up hypertension hyperlipidemia chronic nasal congestion uncontrolled diabetes.  He denies headache chest pain palpitations shortness of breath.  He has chronic nasal congestion in the evenings he reports Astelin is no longer helping.  He reports blood sugar take of 96.  Denies hypoglycemia.  Currently is on Lantus 64 units a day.    Review of Systems   Constitutional: Negative for appetite change, chills, fever and unexpected weight change.   HENT: Positive for congestion.    Respiratory: Positive for cough. Negative for chest tightness, shortness of breath and wheezing.    Cardiovascular: Negative for chest pain, palpitations and leg swelling.        Denies lightheadedness   Endocrine: Negative for polydipsia and polyuria.   Musculoskeletal: Positive for arthralgias.   Neurological: Negative for dizziness, weakness, light-headedness and headaches.       Objective:      Physical Exam  Constitutional:       General: He is not in acute distress.     Appearance: He is not ill-appearing or diaphoretic.   Cardiovascular:      Rate and Rhythm: Normal rate and regular rhythm.      Heart sounds: No murmur. No gallop.    Pulmonary:      Effort: Pulmonary effort is normal. No respiratory distress.      Breath sounds: No wheezing, rhonchi or rales.   Skin:     General: Skin is warm and dry.      Coloration: Skin is not pale.      Findings: No erythema.   Neurological:      Mental Status: He is alert.   Psychiatric:         Mood and Affect: Mood normal.         Behavior: Behavior normal.         Thought Content: Thought content normal.         Judgment: Judgment normal.         Lab Visit on 11/09/2020   Component Date Value Ref Range Status    Hemoglobin A1C 11/09/2020 8.8* 4.0 - 5.6 % Final    Estimated Avg Glucose 11/09/2020 206* 68 - 131 mg/dL Final     Assessment:       1. Uncontrolled type 2 diabetes mellitus with  hyperglycemia        Plan:     Diet discussed.  A1c ordered.  Dry saline nose spray for congestion.  Blood pressure elevated.  Increase HCTZ and take 50 mg a day.  X-ray of the lumbar spine with a 3 cm aortic dilatation with aortic atherosclerosis.  Ultrasound aorta in 6 months.    Uncontrolled type 2 diabetes mellitus with hyperglycemia  -     Hemoglobin A1C; Future; Expected date: 12/09/2020    Other orders  -     hydroCHLOROthiazide (HYDRODIURIL) 50 MG tablet; Take 1 tablet (50 mg total) by mouth once daily.  Dispense: 90 tablet; Refill: 3

## 2020-12-10 ENCOUNTER — PATIENT MESSAGE (OUTPATIENT)
Dept: INTERNAL MEDICINE | Facility: CLINIC | Age: 65
End: 2020-12-10

## 2020-12-10 LAB
ESTIMATED AVG GLUCOSE: 183 MG/DL (ref 68–131)
HBA1C MFR BLD HPLC: 8 % (ref 4–5.6)

## 2020-12-10 RX ORDER — PEN NEEDLE, DIABETIC 30 GX3/16"
1 NEEDLE, DISPOSABLE MISCELLANEOUS DAILY
Qty: 100 EACH | Refills: 3 | Status: SHIPPED | OUTPATIENT
Start: 2020-12-10 | End: 2021-07-19 | Stop reason: SDUPTHER

## 2020-12-16 ENCOUNTER — TELEPHONE (OUTPATIENT)
Dept: INTERNAL MEDICINE | Facility: CLINIC | Age: 65
End: 2020-12-16

## 2020-12-16 ENCOUNTER — PATIENT OUTREACH (OUTPATIENT)
Dept: ADMINISTRATIVE | Facility: HOSPITAL | Age: 65
End: 2020-12-16

## 2020-12-16 NOTE — TELEPHONE ENCOUNTER
Patient contacted to obtain a home BP or schedule nurse visit. Patients BP on 12/14/2020 was 132/84.

## 2020-12-16 NOTE — PROGRESS NOTES
HTN Report. Patient contacted to obtain a home BP or schedule a nurse visit to recheck BP. Patient states his home BP on 12/14/2020 was 132/84. Patient does have an appointment scheduled with his PCP on 01/11/2021 for 1 month follow up. Remote BP will be entered.

## 2021-01-11 ENCOUNTER — LAB VISIT (OUTPATIENT)
Dept: LAB | Facility: HOSPITAL | Age: 66
End: 2021-01-11
Attending: FAMILY MEDICINE
Payer: MEDICARE

## 2021-01-11 ENCOUNTER — OFFICE VISIT (OUTPATIENT)
Dept: INTERNAL MEDICINE | Facility: CLINIC | Age: 66
End: 2021-01-11
Payer: MEDICARE

## 2021-01-11 VITALS
TEMPERATURE: 99 F | WEIGHT: 315 LBS | HEIGHT: 71 IN | SYSTOLIC BLOOD PRESSURE: 122 MMHG | BODY MASS INDEX: 44.1 KG/M2 | HEART RATE: 64 BPM | DIASTOLIC BLOOD PRESSURE: 84 MMHG | OXYGEN SATURATION: 96 %

## 2021-01-11 DIAGNOSIS — I10 HYPERTENSION, UNSPECIFIED TYPE: ICD-10-CM

## 2021-01-11 DIAGNOSIS — R09.81 NASAL CONGESTION: ICD-10-CM

## 2021-01-11 DIAGNOSIS — E11.65 UNCONTROLLED TYPE 2 DIABETES MELLITUS WITH HYPERGLYCEMIA: ICD-10-CM

## 2021-01-11 DIAGNOSIS — E11.65 UNCONTROLLED TYPE 2 DIABETES MELLITUS WITH HYPERGLYCEMIA: Primary | ICD-10-CM

## 2021-01-11 DIAGNOSIS — I70.0 AORTIC ATHEROSCLEROSIS: ICD-10-CM

## 2021-01-11 DIAGNOSIS — I70.0 ARTERIOSCLEROSIS OF ABDOMINAL AORTA: ICD-10-CM

## 2021-01-11 DIAGNOSIS — N18.30 STAGE 3 CHRONIC KIDNEY DISEASE, UNSPECIFIED WHETHER STAGE 3A OR 3B CKD: ICD-10-CM

## 2021-01-11 DIAGNOSIS — K43.9 ABDOMINAL WALL HERNIA: ICD-10-CM

## 2021-01-11 PROCEDURE — 36415 COLL VENOUS BLD VENIPUNCTURE: CPT

## 2021-01-11 PROCEDURE — 3074F SYST BP LT 130 MM HG: CPT | Mod: CPTII,S$GLB,, | Performed by: FAMILY MEDICINE

## 2021-01-11 PROCEDURE — 1101F PR PT FALLS ASSESS DOC 0-1 FALLS W/OUT INJ PAST YR: ICD-10-PCS | Mod: CPTII,S$GLB,, | Performed by: FAMILY MEDICINE

## 2021-01-11 PROCEDURE — 1101F PT FALLS ASSESS-DOCD LE1/YR: CPT | Mod: CPTII,S$GLB,, | Performed by: FAMILY MEDICINE

## 2021-01-11 PROCEDURE — 1159F MED LIST DOCD IN RCRD: CPT | Mod: S$GLB,,, | Performed by: FAMILY MEDICINE

## 2021-01-11 PROCEDURE — 1125F PR PAIN SEVERITY QUANTIFIED, PAIN PRESENT: ICD-10-PCS | Mod: S$GLB,,, | Performed by: FAMILY MEDICINE

## 2021-01-11 PROCEDURE — 1159F PR MEDICATION LIST DOCUMENTED IN MEDICAL RECORD: ICD-10-PCS | Mod: S$GLB,,, | Performed by: FAMILY MEDICINE

## 2021-01-11 PROCEDURE — 99214 OFFICE O/P EST MOD 30 MIN: CPT | Mod: S$GLB,,, | Performed by: FAMILY MEDICINE

## 2021-01-11 PROCEDURE — 99214 PR OFFICE/OUTPT VISIT, EST, LEVL IV, 30-39 MIN: ICD-10-PCS | Mod: S$GLB,,, | Performed by: FAMILY MEDICINE

## 2021-01-11 PROCEDURE — 3079F PR MOST RECENT DIASTOLIC BLOOD PRESSURE 80-89 MM HG: ICD-10-PCS | Mod: CPTII,S$GLB,, | Performed by: FAMILY MEDICINE

## 2021-01-11 PROCEDURE — 3288F PR FALLS RISK ASSESSMENT DOCUMENTED: ICD-10-PCS | Mod: CPTII,S$GLB,, | Performed by: FAMILY MEDICINE

## 2021-01-11 PROCEDURE — 99999 PR PBB SHADOW E&M-EST. PATIENT-LVL III: ICD-10-PCS | Mod: PBBFAC,,, | Performed by: FAMILY MEDICINE

## 2021-01-11 PROCEDURE — 3008F BODY MASS INDEX DOCD: CPT | Mod: CPTII,S$GLB,, | Performed by: FAMILY MEDICINE

## 2021-01-11 PROCEDURE — 1125F AMNT PAIN NOTED PAIN PRSNT: CPT | Mod: S$GLB,,, | Performed by: FAMILY MEDICINE

## 2021-01-11 PROCEDURE — 3079F DIAST BP 80-89 MM HG: CPT | Mod: CPTII,S$GLB,, | Performed by: FAMILY MEDICINE

## 2021-01-11 PROCEDURE — 3288F FALL RISK ASSESSMENT DOCD: CPT | Mod: CPTII,S$GLB,, | Performed by: FAMILY MEDICINE

## 2021-01-11 PROCEDURE — 99999 PR PBB SHADOW E&M-EST. PATIENT-LVL III: CPT | Mod: PBBFAC,,, | Performed by: FAMILY MEDICINE

## 2021-01-11 PROCEDURE — 3074F PR MOST RECENT SYSTOLIC BLOOD PRESSURE < 130 MM HG: ICD-10-PCS | Mod: CPTII,S$GLB,, | Performed by: FAMILY MEDICINE

## 2021-01-11 PROCEDURE — 3052F HG A1C>EQUAL 8.0%<EQUAL 9.0%: CPT | Mod: CPTII,S$GLB,, | Performed by: FAMILY MEDICINE

## 2021-01-11 PROCEDURE — 83036 HEMOGLOBIN GLYCOSYLATED A1C: CPT

## 2021-01-11 PROCEDURE — 3008F PR BODY MASS INDEX (BMI) DOCUMENTED: ICD-10-PCS | Mod: CPTII,S$GLB,, | Performed by: FAMILY MEDICINE

## 2021-01-11 PROCEDURE — 3052F PR MOST RECENT HEMOGLOBIN A1C LEVEL 8.0 - < 9.0%: ICD-10-PCS | Mod: CPTII,S$GLB,, | Performed by: FAMILY MEDICINE

## 2021-01-12 LAB
ESTIMATED AVG GLUCOSE: 180 MG/DL (ref 68–131)
HBA1C MFR BLD HPLC: 7.9 % (ref 4–5.6)

## 2021-01-19 ENCOUNTER — PATIENT MESSAGE (OUTPATIENT)
Dept: INTERNAL MEDICINE | Facility: CLINIC | Age: 66
End: 2021-01-19

## 2021-01-20 DIAGNOSIS — E11.69 DIABETES MELLITUS TYPE 2 IN OBESE: ICD-10-CM

## 2021-01-20 DIAGNOSIS — E66.9 DIABETES MELLITUS TYPE 2 IN OBESE: ICD-10-CM

## 2021-01-20 RX ORDER — INSULIN GLARGINE 100 [IU]/ML
84 INJECTION, SOLUTION SUBCUTANEOUS NIGHTLY
Qty: 60 ML | Refills: 3 | Status: SHIPPED | OUTPATIENT
Start: 2021-01-20 | End: 2021-07-21 | Stop reason: SDUPTHER

## 2021-01-20 RX ORDER — METFORMIN HYDROCHLORIDE 500 MG/1
500 TABLET ORAL 2 TIMES DAILY WITH MEALS
Qty: 180 TABLET | Refills: 1 | Status: SHIPPED | OUTPATIENT
Start: 2021-01-20 | End: 2021-06-16

## 2021-01-20 RX ORDER — HYDROCHLOROTHIAZIDE 50 MG/1
50 TABLET ORAL DAILY
Qty: 90 TABLET | Refills: 3
Start: 2021-01-20 | End: 2021-03-03 | Stop reason: SDUPTHER

## 2021-01-22 ENCOUNTER — TELEPHONE (OUTPATIENT)
Dept: ADMINISTRATIVE | Facility: HOSPITAL | Age: 66
End: 2021-01-22

## 2021-03-03 DIAGNOSIS — I10 ESSENTIAL HYPERTENSION: Chronic | ICD-10-CM

## 2021-03-03 DIAGNOSIS — E78.5 HYPERLIPIDEMIA, UNSPECIFIED HYPERLIPIDEMIA TYPE: ICD-10-CM

## 2021-03-08 RX ORDER — HYDROCHLOROTHIAZIDE 50 MG/1
50 TABLET ORAL DAILY
Qty: 90 TABLET | Refills: 1
Start: 2021-03-08 | End: 2021-04-14 | Stop reason: SDUPTHER

## 2021-03-08 RX ORDER — OLMESARTAN MEDOXOMIL, AMLODIPINE AND HYDROCHLOROTHIAZIDE TABLET 40/10/25 MG 40; 10; 25 MG/1; MG/1; MG/1
1 TABLET ORAL DAILY
Qty: 90 TABLET | Refills: 1 | Status: SHIPPED | OUTPATIENT
Start: 2021-03-08 | End: 2021-08-05

## 2021-03-08 RX ORDER — ATORVASTATIN CALCIUM 40 MG/1
TABLET, FILM COATED ORAL
Qty: 90 TABLET | Refills: 2 | Status: SHIPPED | OUTPATIENT
Start: 2021-03-08 | End: 2021-10-21

## 2021-04-12 RX ORDER — LANCETS 33 GAUGE
1 EACH MISCELLANEOUS DAILY
Qty: 100 EACH | Refills: 3 | Status: SHIPPED | OUTPATIENT
Start: 2021-04-12 | End: 2021-11-09

## 2021-04-14 ENCOUNTER — PATIENT MESSAGE (OUTPATIENT)
Dept: INTERNAL MEDICINE | Facility: CLINIC | Age: 66
End: 2021-04-14

## 2021-04-14 DIAGNOSIS — E66.9 DIABETES MELLITUS TYPE 2 IN OBESE: Primary | ICD-10-CM

## 2021-04-14 DIAGNOSIS — E11.69 DIABETES MELLITUS TYPE 2 IN OBESE: Primary | ICD-10-CM

## 2021-04-17 RX ORDER — HYDROCHLOROTHIAZIDE 50 MG/1
50 TABLET ORAL DAILY
Qty: 90 TABLET | Refills: 2
Start: 2021-04-17 | End: 2021-07-19 | Stop reason: SDUPTHER

## 2021-04-20 ENCOUNTER — TELEPHONE (OUTPATIENT)
Dept: INTERNAL MEDICINE | Facility: CLINIC | Age: 66
End: 2021-04-20

## 2021-04-21 ENCOUNTER — TELEPHONE (OUTPATIENT)
Dept: INTERNAL MEDICINE | Facility: CLINIC | Age: 66
End: 2021-04-21

## 2021-04-21 DIAGNOSIS — E11.69 DIABETES MELLITUS TYPE 2 IN OBESE: ICD-10-CM

## 2021-04-21 DIAGNOSIS — E66.9 DIABETES MELLITUS TYPE 2 IN OBESE: ICD-10-CM

## 2021-04-21 RX ORDER — INSULIN PUMP SYRINGE, 3 ML
EACH MISCELLANEOUS
Qty: 1 EACH | Refills: 0 | Status: SHIPPED | OUTPATIENT
Start: 2021-04-21 | End: 2022-01-18

## 2021-04-21 RX ORDER — LANCETS
EACH MISCELLANEOUS
Qty: 300 EACH | Refills: 3 | Status: SHIPPED | OUTPATIENT
Start: 2021-04-21 | End: 2021-11-09

## 2021-04-28 ENCOUNTER — PATIENT MESSAGE (OUTPATIENT)
Dept: RESEARCH | Facility: HOSPITAL | Age: 66
End: 2021-04-28

## 2021-06-07 ENCOUNTER — PATIENT MESSAGE (OUTPATIENT)
Dept: INTERNAL MEDICINE | Facility: CLINIC | Age: 66
End: 2021-06-07

## 2021-06-08 RX ORDER — INSULIN LISPRO 100 [IU]/ML
INJECTION, SOLUTION INTRAVENOUS; SUBCUTANEOUS
Qty: 10 ML | Refills: 3 | Status: SHIPPED | OUTPATIENT
Start: 2021-06-08 | End: 2021-11-29

## 2021-06-15 DIAGNOSIS — E11.69 DIABETES MELLITUS TYPE 2 IN OBESE: ICD-10-CM

## 2021-06-15 DIAGNOSIS — E66.9 DIABETES MELLITUS TYPE 2 IN OBESE: ICD-10-CM

## 2021-06-17 RX ORDER — METFORMIN HYDROCHLORIDE 500 MG/1
TABLET ORAL
Qty: 180 TABLET | Refills: 0 | Status: SHIPPED | OUTPATIENT
Start: 2021-06-17 | End: 2021-09-28

## 2021-07-08 ENCOUNTER — PATIENT OUTREACH (OUTPATIENT)
Dept: ADMINISTRATIVE | Facility: HOSPITAL | Age: 66
End: 2021-07-08

## 2021-07-08 ENCOUNTER — PATIENT MESSAGE (OUTPATIENT)
Dept: ADMINISTRATIVE | Facility: HOSPITAL | Age: 66
End: 2021-07-08

## 2021-07-19 RX ORDER — HYDROCHLOROTHIAZIDE 50 MG/1
50 TABLET ORAL DAILY
Qty: 90 TABLET | Refills: 2
Start: 2021-07-19 | End: 2021-09-28

## 2021-07-19 RX ORDER — PEN NEEDLE, DIABETIC 30 GX3/16"
1 NEEDLE, DISPOSABLE MISCELLANEOUS DAILY
Qty: 100 EACH | Refills: 3 | Status: SHIPPED | OUTPATIENT
Start: 2021-07-19 | End: 2022-01-24 | Stop reason: SDUPTHER

## 2021-07-21 DIAGNOSIS — E66.9 DIABETES MELLITUS TYPE 2 IN OBESE: ICD-10-CM

## 2021-07-21 DIAGNOSIS — E11.69 DIABETES MELLITUS TYPE 2 IN OBESE: ICD-10-CM

## 2021-07-21 RX ORDER — INSULIN GLARGINE 100 [IU]/ML
84 INJECTION, SOLUTION SUBCUTANEOUS NIGHTLY
Qty: 60 ML | Refills: 3 | Status: SHIPPED | OUTPATIENT
Start: 2021-07-21 | End: 2021-10-14 | Stop reason: SDUPTHER

## 2021-08-04 ENCOUNTER — PATIENT MESSAGE (OUTPATIENT)
Dept: ADMINISTRATIVE | Facility: HOSPITAL | Age: 66
End: 2021-08-04

## 2021-08-04 DIAGNOSIS — I10 ESSENTIAL HYPERTENSION: Chronic | ICD-10-CM

## 2021-08-05 RX ORDER — OLMESARTAN MEDOXOMIL, AMLODIPINE AND HYDROCHLOROTHIAZIDE TABLET 40/10/25 MG 40; 10; 25 MG/1; MG/1; MG/1
1 TABLET ORAL DAILY
Qty: 90 TABLET | Refills: 0 | Status: SHIPPED | OUTPATIENT
Start: 2021-08-05 | End: 2021-10-21

## 2021-09-24 DIAGNOSIS — E11.69 DIABETES MELLITUS TYPE 2 IN OBESE: ICD-10-CM

## 2021-09-24 DIAGNOSIS — E66.9 DIABETES MELLITUS TYPE 2 IN OBESE: ICD-10-CM

## 2021-09-28 RX ORDER — METFORMIN HYDROCHLORIDE 500 MG/1
TABLET ORAL
Qty: 180 TABLET | Refills: 3 | Status: SHIPPED | OUTPATIENT
Start: 2021-09-28 | End: 2022-04-05 | Stop reason: SDUPTHER

## 2021-09-28 RX ORDER — HYDROCHLOROTHIAZIDE 50 MG/1
TABLET ORAL
Qty: 90 TABLET | Refills: 3 | Status: SHIPPED | OUTPATIENT
Start: 2021-09-28 | End: 2022-01-18

## 2021-10-13 ENCOUNTER — PATIENT MESSAGE (OUTPATIENT)
Dept: ADMINISTRATIVE | Facility: HOSPITAL | Age: 66
End: 2021-10-13
Payer: MEDICARE

## 2021-10-13 DIAGNOSIS — E11.9 TYPE 2 DIABETES MELLITUS WITHOUT COMPLICATION: ICD-10-CM

## 2021-10-14 ENCOUNTER — TELEPHONE (OUTPATIENT)
Dept: INTERNAL MEDICINE | Facility: CLINIC | Age: 66
End: 2021-10-14

## 2021-10-14 ENCOUNTER — PATIENT MESSAGE (OUTPATIENT)
Dept: ADMINISTRATIVE | Facility: HOSPITAL | Age: 66
End: 2021-10-14
Payer: MEDICARE

## 2021-10-14 ENCOUNTER — PATIENT OUTREACH (OUTPATIENT)
Dept: ADMINISTRATIVE | Facility: HOSPITAL | Age: 66
End: 2021-10-14

## 2021-10-14 DIAGNOSIS — E66.9 DIABETES MELLITUS TYPE 2 IN OBESE: ICD-10-CM

## 2021-10-14 DIAGNOSIS — E11.65 UNCONTROLLED TYPE 2 DIABETES MELLITUS WITH HYPERGLYCEMIA: Primary | ICD-10-CM

## 2021-10-14 DIAGNOSIS — E11.69 DIABETES MELLITUS TYPE 2 IN OBESE: ICD-10-CM

## 2021-10-14 RX ORDER — INSULIN GLARGINE 100 [IU]/ML
84 INJECTION, SOLUTION SUBCUTANEOUS NIGHTLY
Qty: 60 ML | Refills: 3 | Status: SHIPPED | OUTPATIENT
Start: 2021-10-14 | End: 2021-11-29

## 2021-10-18 ENCOUNTER — PATIENT MESSAGE (OUTPATIENT)
Dept: ADMINISTRATIVE | Facility: HOSPITAL | Age: 66
End: 2021-10-18
Payer: MEDICARE

## 2021-10-21 DIAGNOSIS — E78.5 HYPERLIPIDEMIA, UNSPECIFIED HYPERLIPIDEMIA TYPE: ICD-10-CM

## 2021-10-21 DIAGNOSIS — I10 ESSENTIAL HYPERTENSION: Chronic | ICD-10-CM

## 2021-10-22 RX ORDER — ATORVASTATIN CALCIUM 40 MG/1
TABLET, FILM COATED ORAL
Qty: 90 TABLET | Refills: 0 | Status: SHIPPED | OUTPATIENT
Start: 2021-10-22 | End: 2022-05-04 | Stop reason: SDUPTHER

## 2021-10-23 RX ORDER — OLMESARTAN MEDOXOMIL, AMLODIPINE AND HYDROCHLOROTHIAZIDE TABLET 40/10/25 MG 40; 10; 25 MG/1; MG/1; MG/1
1 TABLET ORAL DAILY
Qty: 90 TABLET | Refills: 0 | Status: SHIPPED | OUTPATIENT
Start: 2021-10-23 | End: 2022-01-25 | Stop reason: HOSPADM

## 2021-10-26 ENCOUNTER — PATIENT MESSAGE (OUTPATIENT)
Dept: INTERNAL MEDICINE | Facility: CLINIC | Age: 66
End: 2021-10-26
Payer: MEDICARE

## 2021-10-27 LAB
LEFT EYE DM RETINOPATHY: NEGATIVE
RIGHT EYE DM RETINOPATHY: NEGATIVE

## 2021-11-09 ENCOUNTER — OFFICE VISIT (OUTPATIENT)
Dept: INTERNAL MEDICINE | Facility: CLINIC | Age: 66
End: 2021-11-09
Payer: MEDICARE

## 2021-11-09 VITALS
BODY MASS INDEX: 44.1 KG/M2 | WEIGHT: 315 LBS | SYSTOLIC BLOOD PRESSURE: 126 MMHG | HEIGHT: 71 IN | TEMPERATURE: 97 F | OXYGEN SATURATION: 99 % | HEART RATE: 71 BPM | DIASTOLIC BLOOD PRESSURE: 80 MMHG

## 2021-11-09 DIAGNOSIS — N18.30 STAGE 3 CHRONIC KIDNEY DISEASE, UNSPECIFIED WHETHER STAGE 3A OR 3B CKD: ICD-10-CM

## 2021-11-09 DIAGNOSIS — E11.65 UNCONTROLLED TYPE 2 DIABETES MELLITUS WITH HYPERGLYCEMIA: Primary | ICD-10-CM

## 2021-11-09 DIAGNOSIS — I71.40 ABDOMINAL AORTIC ANEURYSM (AAA) WITHOUT RUPTURE: ICD-10-CM

## 2021-11-09 DIAGNOSIS — M54.9 DORSALGIA, UNSPECIFIED: ICD-10-CM

## 2021-11-09 DIAGNOSIS — Z12.11 COLON CANCER SCREENING: ICD-10-CM

## 2021-11-09 DIAGNOSIS — I10 HYPERTENSION, UNSPECIFIED TYPE: ICD-10-CM

## 2021-11-09 DIAGNOSIS — M25.559 HIP PAIN: ICD-10-CM

## 2021-11-09 PROCEDURE — 3079F PR MOST RECENT DIASTOLIC BLOOD PRESSURE 80-89 MM HG: ICD-10-PCS | Mod: CPTII,S$GLB,, | Performed by: FAMILY MEDICINE

## 2021-11-09 PROCEDURE — 99499 UNLISTED E&M SERVICE: CPT | Mod: S$GLB,,, | Performed by: FAMILY MEDICINE

## 2021-11-09 PROCEDURE — 3074F PR MOST RECENT SYSTOLIC BLOOD PRESSURE < 130 MM HG: ICD-10-PCS | Mod: CPTII,S$GLB,, | Performed by: FAMILY MEDICINE

## 2021-11-09 PROCEDURE — 1159F MED LIST DOCD IN RCRD: CPT | Mod: CPTII,S$GLB,, | Performed by: FAMILY MEDICINE

## 2021-11-09 PROCEDURE — G0008 FLU VACCINE - QUADRIVALENT - ADJUVANTED: ICD-10-PCS | Mod: S$GLB,,, | Performed by: FAMILY MEDICINE

## 2021-11-09 PROCEDURE — 3074F SYST BP LT 130 MM HG: CPT | Mod: CPTII,S$GLB,, | Performed by: FAMILY MEDICINE

## 2021-11-09 PROCEDURE — 3051F PR MOST RECENT HEMOGLOBIN A1C LEVEL 7.0 - < 8.0%: ICD-10-PCS | Mod: CPTII,S$GLB,, | Performed by: FAMILY MEDICINE

## 2021-11-09 PROCEDURE — 3288F PR FALLS RISK ASSESSMENT DOCUMENTED: ICD-10-PCS | Mod: CPTII,S$GLB,, | Performed by: FAMILY MEDICINE

## 2021-11-09 PROCEDURE — 99214 PR OFFICE/OUTPT VISIT, EST, LEVL IV, 30-39 MIN: ICD-10-PCS | Mod: S$GLB,,, | Performed by: FAMILY MEDICINE

## 2021-11-09 PROCEDURE — 3008F BODY MASS INDEX DOCD: CPT | Mod: CPTII,S$GLB,, | Performed by: FAMILY MEDICINE

## 2021-11-09 PROCEDURE — G0008 ADMIN INFLUENZA VIRUS VAC: HCPCS | Mod: S$GLB,,, | Performed by: FAMILY MEDICINE

## 2021-11-09 PROCEDURE — 99999 PR PBB SHADOW E&M-EST. PATIENT-LVL III: ICD-10-PCS | Mod: PBBFAC,,, | Performed by: FAMILY MEDICINE

## 2021-11-09 PROCEDURE — 99999 PR PBB SHADOW E&M-EST. PATIENT-LVL III: CPT | Mod: PBBFAC,,, | Performed by: FAMILY MEDICINE

## 2021-11-09 PROCEDURE — 99499 RISK ADDL DX/OHS AUDIT: ICD-10-PCS | Mod: S$GLB,,, | Performed by: FAMILY MEDICINE

## 2021-11-09 PROCEDURE — 3051F HG A1C>EQUAL 7.0%<8.0%: CPT | Mod: CPTII,S$GLB,, | Performed by: FAMILY MEDICINE

## 2021-11-09 PROCEDURE — 1159F PR MEDICATION LIST DOCUMENTED IN MEDICAL RECORD: ICD-10-PCS | Mod: CPTII,S$GLB,, | Performed by: FAMILY MEDICINE

## 2021-11-09 PROCEDURE — 99214 OFFICE O/P EST MOD 30 MIN: CPT | Mod: S$GLB,,, | Performed by: FAMILY MEDICINE

## 2021-11-09 PROCEDURE — 1101F PR PT FALLS ASSESS DOC 0-1 FALLS W/OUT INJ PAST YR: ICD-10-PCS | Mod: CPTII,S$GLB,, | Performed by: FAMILY MEDICINE

## 2021-11-09 PROCEDURE — 3288F FALL RISK ASSESSMENT DOCD: CPT | Mod: CPTII,S$GLB,, | Performed by: FAMILY MEDICINE

## 2021-11-09 PROCEDURE — 3008F PR BODY MASS INDEX (BMI) DOCUMENTED: ICD-10-PCS | Mod: CPTII,S$GLB,, | Performed by: FAMILY MEDICINE

## 2021-11-09 PROCEDURE — 90694 FLU VACCINE - QUADRIVALENT - ADJUVANTED: ICD-10-PCS | Mod: S$GLB,,, | Performed by: FAMILY MEDICINE

## 2021-11-09 PROCEDURE — 90694 VACC AIIV4 NO PRSRV 0.5ML IM: CPT | Mod: S$GLB,,, | Performed by: FAMILY MEDICINE

## 2021-11-09 PROCEDURE — 1101F PT FALLS ASSESS-DOCD LE1/YR: CPT | Mod: CPTII,S$GLB,, | Performed by: FAMILY MEDICINE

## 2021-11-09 PROCEDURE — 3079F DIAST BP 80-89 MM HG: CPT | Mod: CPTII,S$GLB,, | Performed by: FAMILY MEDICINE

## 2021-11-15 ENCOUNTER — HOSPITAL ENCOUNTER (OUTPATIENT)
Dept: RADIOLOGY | Facility: HOSPITAL | Age: 66
Discharge: HOME | End: 2021-11-15
Attending: FAMILY MEDICINE
Payer: MEDICARE

## 2021-11-15 DIAGNOSIS — I71.40 ABDOMINAL AORTIC ANEURYSM (AAA) WITHOUT RUPTURE: ICD-10-CM

## 2021-11-15 PROCEDURE — 76775 US EXAM ABDO BACK WALL LIM: CPT | Mod: 26,,, | Performed by: RADIOLOGY

## 2021-11-15 PROCEDURE — 76775 US EXAM ABDO BACK WALL LIM: CPT | Mod: TC

## 2021-11-15 PROCEDURE — 76775 US ABDOMINAL AORTA: ICD-10-PCS | Mod: 26,,, | Performed by: RADIOLOGY

## 2021-11-17 ENCOUNTER — OFFICE VISIT (OUTPATIENT)
Dept: URGENT CARE | Facility: CLINIC | Age: 66
End: 2021-11-17
Payer: MEDICARE

## 2021-11-17 VITALS
HEIGHT: 71 IN | HEART RATE: 62 BPM | WEIGHT: 304 LBS | OXYGEN SATURATION: 96 % | TEMPERATURE: 99 F | RESPIRATION RATE: 12 BRPM | SYSTOLIC BLOOD PRESSURE: 130 MMHG | BODY MASS INDEX: 42.56 KG/M2 | DIASTOLIC BLOOD PRESSURE: 67 MMHG

## 2021-11-17 DIAGNOSIS — E66.9 DIABETES MELLITUS TYPE 2 IN OBESE: ICD-10-CM

## 2021-11-17 DIAGNOSIS — S01.312A LACERATION OF LEFT EAR, INITIAL ENCOUNTER: Primary | ICD-10-CM

## 2021-11-17 DIAGNOSIS — I10 ESSENTIAL HYPERTENSION: ICD-10-CM

## 2021-11-17 DIAGNOSIS — E11.69 DIABETES MELLITUS TYPE 2 IN OBESE: ICD-10-CM

## 2021-11-17 PROCEDURE — 1126F AMNT PAIN NOTED NONE PRSNT: CPT | Mod: CPTII,S$GLB,, | Performed by: NURSE PRACTITIONER

## 2021-11-17 PROCEDURE — 3008F PR BODY MASS INDEX (BMI) DOCUMENTED: ICD-10-PCS | Mod: CPTII,S$GLB,, | Performed by: NURSE PRACTITIONER

## 2021-11-17 PROCEDURE — 3075F SYST BP GE 130 - 139MM HG: CPT | Mod: CPTII,S$GLB,, | Performed by: NURSE PRACTITIONER

## 2021-11-17 PROCEDURE — 99214 PR OFFICE/OUTPT VISIT, EST, LEVL IV, 30-39 MIN: ICD-10-PCS | Mod: 25,S$GLB,, | Performed by: NURSE PRACTITIONER

## 2021-11-17 PROCEDURE — 90471 TDAP VACCINE GREATER THAN OR EQUAL TO 7YO IM: ICD-10-PCS | Mod: S$GLB,,, | Performed by: EMERGENCY MEDICINE

## 2021-11-17 PROCEDURE — 90715 TDAP VACCINE GREATER THAN OR EQUAL TO 7YO IM: ICD-10-PCS | Mod: S$GLB,,, | Performed by: EMERGENCY MEDICINE

## 2021-11-17 PROCEDURE — 3061F PR NEG MICROALBUMINURIA RESULT DOCUMENTED/REVIEW: ICD-10-PCS | Mod: CPTII,S$GLB,, | Performed by: NURSE PRACTITIONER

## 2021-11-17 PROCEDURE — 3066F NEPHROPATHY DOC TX: CPT | Mod: CPTII,S$GLB,, | Performed by: NURSE PRACTITIONER

## 2021-11-17 PROCEDURE — 90471 IMMUNIZATION ADMIN: CPT | Mod: S$GLB,,, | Performed by: EMERGENCY MEDICINE

## 2021-11-17 PROCEDURE — 3046F HEMOGLOBIN A1C LEVEL >9.0%: CPT | Mod: CPTII,S$GLB,, | Performed by: NURSE PRACTITIONER

## 2021-11-17 PROCEDURE — 3008F BODY MASS INDEX DOCD: CPT | Mod: CPTII,S$GLB,, | Performed by: NURSE PRACTITIONER

## 2021-11-17 PROCEDURE — 3078F PR MOST RECENT DIASTOLIC BLOOD PRESSURE < 80 MM HG: ICD-10-PCS | Mod: CPTII,S$GLB,, | Performed by: NURSE PRACTITIONER

## 2021-11-17 PROCEDURE — 1160F RVW MEDS BY RX/DR IN RCRD: CPT | Mod: CPTII,S$GLB,, | Performed by: NURSE PRACTITIONER

## 2021-11-17 PROCEDURE — 99214 OFFICE O/P EST MOD 30 MIN: CPT | Mod: 25,S$GLB,, | Performed by: NURSE PRACTITIONER

## 2021-11-17 PROCEDURE — 1126F PR PAIN SEVERITY QUANTIFIED, NO PAIN PRESENT: ICD-10-PCS | Mod: CPTII,S$GLB,, | Performed by: NURSE PRACTITIONER

## 2021-11-17 PROCEDURE — 3046F PR MOST RECENT HEMOGLOBIN A1C LEVEL > 9.0%: ICD-10-PCS | Mod: CPTII,S$GLB,, | Performed by: NURSE PRACTITIONER

## 2021-11-17 PROCEDURE — 1160F PR REVIEW ALL MEDS BY PRESCRIBER/CLIN PHARMACIST DOCUMENTED: ICD-10-PCS | Mod: CPTII,S$GLB,, | Performed by: NURSE PRACTITIONER

## 2021-11-17 PROCEDURE — 3066F PR DOCUMENTATION OF TREATMENT FOR NEPHROPATHY: ICD-10-PCS | Mod: CPTII,S$GLB,, | Performed by: NURSE PRACTITIONER

## 2021-11-17 PROCEDURE — 1159F PR MEDICATION LIST DOCUMENTED IN MEDICAL RECORD: ICD-10-PCS | Mod: CPTII,S$GLB,, | Performed by: NURSE PRACTITIONER

## 2021-11-17 PROCEDURE — 3078F DIAST BP <80 MM HG: CPT | Mod: CPTII,S$GLB,, | Performed by: NURSE PRACTITIONER

## 2021-11-17 PROCEDURE — 3061F NEG MICROALBUMINURIA REV: CPT | Mod: CPTII,S$GLB,, | Performed by: NURSE PRACTITIONER

## 2021-11-17 PROCEDURE — 3075F PR MOST RECENT SYSTOLIC BLOOD PRESS GE 130-139MM HG: ICD-10-PCS | Mod: CPTII,S$GLB,, | Performed by: NURSE PRACTITIONER

## 2021-11-17 PROCEDURE — 1159F MED LIST DOCD IN RCRD: CPT | Mod: CPTII,S$GLB,, | Performed by: NURSE PRACTITIONER

## 2021-11-17 PROCEDURE — 90715 TDAP VACCINE 7 YRS/> IM: CPT | Mod: S$GLB,,, | Performed by: EMERGENCY MEDICINE

## 2021-11-17 RX ORDER — CEPHALEXIN 500 MG/1
500 CAPSULE ORAL EVERY 12 HOURS
Qty: 14 CAPSULE | Refills: 0 | Status: SHIPPED | OUTPATIENT
Start: 2021-11-17 | End: 2021-11-24

## 2021-11-29 ENCOUNTER — OFFICE VISIT (OUTPATIENT)
Dept: INTERNAL MEDICINE | Facility: CLINIC | Age: 66
End: 2021-11-29
Payer: MEDICARE

## 2021-11-29 VITALS
TEMPERATURE: 98 F | WEIGHT: 313.94 LBS | SYSTOLIC BLOOD PRESSURE: 130 MMHG | DIASTOLIC BLOOD PRESSURE: 74 MMHG | HEIGHT: 71 IN | BODY MASS INDEX: 43.95 KG/M2 | HEART RATE: 66 BPM | OXYGEN SATURATION: 97 %

## 2021-11-29 DIAGNOSIS — E11.65 UNCONTROLLED TYPE 2 DIABETES MELLITUS WITH HYPERGLYCEMIA: Primary | ICD-10-CM

## 2021-11-29 DIAGNOSIS — I10 HYPERTENSION, UNSPECIFIED TYPE: ICD-10-CM

## 2021-11-29 PROCEDURE — 3061F NEG MICROALBUMINURIA REV: CPT | Mod: CPTII,S$GLB,, | Performed by: FAMILY MEDICINE

## 2021-11-29 PROCEDURE — 99999 PR PBB SHADOW E&M-EST. PATIENT-LVL III: ICD-10-PCS | Mod: PBBFAC,,, | Performed by: FAMILY MEDICINE

## 2021-11-29 PROCEDURE — 3066F PR DOCUMENTATION OF TREATMENT FOR NEPHROPATHY: ICD-10-PCS | Mod: CPTII,S$GLB,, | Performed by: FAMILY MEDICINE

## 2021-11-29 PROCEDURE — 3066F NEPHROPATHY DOC TX: CPT | Mod: CPTII,S$GLB,, | Performed by: FAMILY MEDICINE

## 2021-11-29 PROCEDURE — 99999 PR PBB SHADOW E&M-EST. PATIENT-LVL III: CPT | Mod: PBBFAC,,, | Performed by: FAMILY MEDICINE

## 2021-11-29 PROCEDURE — 99214 PR OFFICE/OUTPT VISIT, EST, LEVL IV, 30-39 MIN: ICD-10-PCS | Mod: S$GLB,,, | Performed by: FAMILY MEDICINE

## 2021-11-29 PROCEDURE — 99214 OFFICE O/P EST MOD 30 MIN: CPT | Mod: S$GLB,,, | Performed by: FAMILY MEDICINE

## 2021-11-29 PROCEDURE — 3061F PR NEG MICROALBUMINURIA RESULT DOCUMENTED/REVIEW: ICD-10-PCS | Mod: CPTII,S$GLB,, | Performed by: FAMILY MEDICINE

## 2021-11-29 RX ORDER — FLASH GLUCOSE SENSOR
1 KIT MISCELLANEOUS CONTINUOUS PRN
Qty: 2 KIT | Refills: 5 | Status: SHIPPED | OUTPATIENT
Start: 2021-11-29 | End: 2022-01-18

## 2021-11-29 RX ORDER — HUMAN INSULIN 100 [IU]/ML
45 INJECTION, SUSPENSION SUBCUTANEOUS
Qty: 27 ML | Refills: 11 | Status: SHIPPED | OUTPATIENT
Start: 2021-11-29 | End: 2021-11-29

## 2021-11-29 RX ORDER — FLASH GLUCOSE SCANNING READER
1 EACH MISCELLANEOUS CONTINUOUS
Qty: 1 EACH | Refills: 0 | Status: SHIPPED | OUTPATIENT
Start: 2021-11-29 | End: 2022-01-18 | Stop reason: SDUPTHER

## 2021-12-02 ENCOUNTER — PATIENT MESSAGE (OUTPATIENT)
Dept: INTERNAL MEDICINE | Facility: CLINIC | Age: 66
End: 2021-12-02
Payer: MEDICARE

## 2021-12-03 ENCOUNTER — PATIENT MESSAGE (OUTPATIENT)
Dept: INTERNAL MEDICINE | Facility: CLINIC | Age: 66
End: 2021-12-03
Payer: MEDICARE

## 2021-12-06 ENCOUNTER — PATIENT MESSAGE (OUTPATIENT)
Dept: INTERNAL MEDICINE | Facility: CLINIC | Age: 66
End: 2021-12-06
Payer: MEDICARE

## 2021-12-06 RX ORDER — INSULIN LISPRO 100 [IU]/ML
45 INJECTION, SOLUTION INTRAVENOUS; SUBCUTANEOUS
Qty: 27 ML | Refills: 11 | Status: SHIPPED | OUTPATIENT
Start: 2021-12-06 | End: 2022-01-18

## 2021-12-13 ENCOUNTER — PATIENT MESSAGE (OUTPATIENT)
Dept: INTERNAL MEDICINE | Facility: CLINIC | Age: 66
End: 2021-12-13
Payer: MEDICARE

## 2021-12-14 ENCOUNTER — PATIENT MESSAGE (OUTPATIENT)
Dept: INTERNAL MEDICINE | Facility: CLINIC | Age: 66
End: 2021-12-14
Payer: MEDICARE

## 2021-12-15 RX ORDER — INSULIN HUMAN 100 [IU]/ML
45 INJECTION, SUSPENSION SUBCUTANEOUS 2 TIMES DAILY
Qty: 27 ML | Refills: 11 | Status: SHIPPED | OUTPATIENT
Start: 2021-12-15 | End: 2022-01-18

## 2021-12-15 RX ORDER — SYRING-NEEDL,DISP,INSUL,0.3 ML 31 G X1/4"
1 SYRINGE, EMPTY DISPOSABLE MISCELLANEOUS 2 TIMES DAILY
Qty: 100 EACH | Refills: 5 | Status: SHIPPED | OUTPATIENT
Start: 2021-12-15 | End: 2023-08-01 | Stop reason: SDUPTHER

## 2021-12-21 ENCOUNTER — PATIENT OUTREACH (OUTPATIENT)
Dept: ADMINISTRATIVE | Facility: HOSPITAL | Age: 66
End: 2021-12-21
Payer: MEDICARE

## 2022-01-03 ENCOUNTER — TELEPHONE (OUTPATIENT)
Dept: PRIMARY CARE CLINIC | Facility: CLINIC | Age: 67
End: 2022-01-03
Payer: MEDICARE

## 2022-01-03 ENCOUNTER — NURSE TRIAGE (OUTPATIENT)
Dept: ADMINISTRATIVE | Facility: CLINIC | Age: 67
End: 2022-01-03
Payer: MEDICARE

## 2022-01-03 ENCOUNTER — TELEPHONE (OUTPATIENT)
Dept: INTERNAL MEDICINE | Facility: CLINIC | Age: 67
End: 2022-01-03
Payer: MEDICARE

## 2022-01-03 NOTE — TELEPHONE ENCOUNTER
"Pt's wife calling, states he's been feeling poorly for the last several days, coughing and sneezing, and weak to the point of needing assistance to walk to the bathroom.  Today he is much more weak, difficult for him to even speak, takes a minute for him to respond.  She has been trying to obtain a bp and pulse on him but the monitor keeps giving her the "E" code.  He has not been able to get up and go to the restroom this am, which is very unusual for him.  I advised she call 911 to bring him in to the ED immediately.  She verbalized understanding and will call.  Reason for Disposition   Sounds like a life-threatening emergency to the triager    Additional Information   Negative: SEVERE difficulty breathing (e.g., struggling for each breath, speaks in single words)   Negative: Difficult to awaken or acting confused (e.g., disoriented, slurred speech)   Negative: Bluish (or gray) lips or face now   Negative: Shock suspected (e.g., cold/pale/clammy skin, too weak to stand, low BP, rapid pulse)    Protocols used: CORONAVIRUS (COVID-19) DIAGNOSED OR [id number]    "

## 2022-01-03 NOTE — TELEPHONE ENCOUNTER
Patient is in [facility] testedd covid positive. Informed to keep us updated. Verbally understood.

## 2022-01-03 NOTE — TELEPHONE ENCOUNTER
----- Message from Haile Thomas sent at 1/3/2022  7:53 AM CST -----  Type: Needs Medical Advice  Who Called:  Pau Tavarez (Spouse)     Symptoms (please be specific):  Short of breath-weak  How long has patient had these symptoms:      Best Call Back Number:[phone]  Additional Information: Please call to advise--

## 2022-01-03 NOTE — TELEPHONE ENCOUNTER
----- Message from Joseline Sharma sent at 1/3/2022  9:18 AM CST -----  Contact: [phone] Pau(wife)  Patient is returning a phone call.  Who left a message for the patient: Lisha Olivares MA  Does patient know what this is regarding:  Pt is on his way to [facility] for SOB and high blood sugar  Would you like a call back, or a response through your [facility] portal?:  call back  Comments:

## 2022-01-10 ENCOUNTER — PES CALL (OUTPATIENT)
Dept: ADMINISTRATIVE | Facility: CLINIC | Age: 67
End: 2022-01-10
Payer: MEDICARE

## 2022-01-10 ENCOUNTER — TELEPHONE (OUTPATIENT)
Dept: PRIMARY CARE CLINIC | Facility: CLINIC | Age: 67
End: 2022-01-10
Payer: MEDICARE

## 2022-01-10 NOTE — TELEPHONE ENCOUNTER
Patient states that he is currently in the hospital (Covid +) for over ten days now. He is wanting to know if he can possibly be transferred to a [facility] skilled nursing facility. Patient is currently at [facility].

## 2022-01-10 NOTE — TELEPHONE ENCOUNTER
----- Message from Arik Bellamy sent at 1/10/2022 12:57 PM CST -----  Regarding: Skilled Nursing Facility  Good afternoon, Pt is currently in the hospital and has questions about [facility]. Please call pt back at [phone]. Thanks!

## 2022-01-12 ENCOUNTER — PATIENT MESSAGE (OUTPATIENT)
Dept: ADMINISTRATIVE | Facility: CLINIC | Age: 67
End: 2022-01-12
Payer: MEDICARE

## 2022-01-12 ENCOUNTER — PATIENT OUTREACH (OUTPATIENT)
Dept: ADMINISTRATIVE | Facility: CLINIC | Age: 67
End: 2022-01-12
Payer: MEDICARE

## 2022-01-12 ENCOUNTER — EXTERNAL HOSPITAL ADMISSION (OUTPATIENT)
Dept: ADMINISTRATIVE | Facility: CLINIC | Age: 67
End: 2022-01-12
Payer: MEDICARE

## 2022-01-12 DIAGNOSIS — N18.9 CHRONIC KIDNEY DISEASE, UNSPECIFIED CKD STAGE: ICD-10-CM

## 2022-01-12 NOTE — PATIENT INSTRUCTIONS
Patient Education       COVID-19 Discharge Instructions   About this topic   Coronavirus disease 2019 is also known as COVID-19. It is a viral illness that infects the lungs. It is caused by a virus called SARS-associated coronavirus (SARS-CoV-2).  The signs of COVID-19 most often start a few days after you have been infected. In some people, it takes longer to show signs. Others never show signs of the infection. You may have a cough, fever, shaking chills and it may be hard to breathe. You may be very tired, have muscle aches, a headache or sore throat. Some people have an upset stomach or loose stools. Others lose their sense of smell or taste. You may not have these signs all the time and they may come and go while you are sick.  The virus spreads easily through droplets when you talk, sneeze, or cough. You can pass the virus to others when you are talking close together, singing, hugging, sharing food, or shaking hands. Doctors believe the germs also survive on surfaces like tables, door handles, and telephones. However, this is not a common way that COVID-19 spreads. Doctors believe you can also spread the infection even if you dont have any symptoms, but they do not know how that happens. This is why getting vaccinated is one of the best ways to keep you healthy and slow the spread of the virus.  Some people have a mild case of COVID-19 and are able to stay at home and away from others until they feel better. Others may need to be in the hospital if they are very sick. Some people with COVID-19 can have some symptoms for weeks or months. People with COVID-19 must isolate themselves. You can start to be around others when your doctor says it is safe to do so.       What care is needed at home?   · Ask your doctor what you need to do when you go home. Make sure you ask questions if you do not understand what the doctor says.  · Drink lots of water, juice, or broth to replace fluids lost from a fever.  · You  may use cool mist humidifiers to help ease congestion and coughing.  · Use 2 to 3 pillows to prop yourself up when you lie down to make it easier to breathe and sleep.  · Do not smoke and do not drink beer, wine, and mixed drinks (alcohol).  · To lower the chance of passing the infection to others, get a COVID-19 vaccine after your infection has resolved.  · If you have not been fully vaccinated:  ? Wear a mask over your mouth and nose if you are around others who are not sick. Cloth masks work best if they have more than one layer of fabric.  ? Wash your hands often.  ? Stay home in a separate room, if possible, away from others. Only go out to get medical care.  ? Use a separate bathroom if possible.  ? Do not make food for others.  What follow-up care is needed?   · Your doctor may ask you to make visits to the office to check on your progress. Be sure to keep these visits. Make sure you wear a mask at these visits.  · If you can, tell the staff you have COVID-19 ahead of time so they can take extra care to stop the disease from spreading.  · It may take a few weeks before your health returns to normal.  What drugs may be needed?   The doctor may order drugs to:  · Help with breathing  · Help with fever  · Help with swelling in your airways and lungs  · Control coughing  · Ease a sore throat  · Help a runny or stuffy nose  Will physical activity be limited?   You may have to limit your physical activity. Talk to your doctor about the right amount of activity for you. If you have been very sick with COVID-19, it can take some time to get your strength back.  Will there be any other care needed?   Doctors do not know how long you can pass the virus on to others after you are sick. This is why it is important to stay in a separate room, if possible, when you are sick. For now, doctors are giving general guidelines for you to follow after you have been sick. Before you go around other people, you should:  · Be fever  free for 24 hours without taking any drugs to lower the fever  · Have no symptoms of cough or shortness of breath  · Wait at least 10 days after first having symptoms or your first positive test, and you need to be symptom free as above. Some experts suggest waiting 20 days if you have had a more severe infection.  Talk with your doctor about getting a COVID-19 vaccine.  What problems could happen?   · Fluid loss. This is dehydration.  · Short-term or long-term lung damage  · Heart problems  · Death  When do I need to call the doctor?   · You are having so much trouble breathing that you can only say one or two words at a time.  · You need to sit upright at all times to be able to breathe and/or cannot lie down.  · You are very confused or cannot stay awake.  · Your lips or skin start to turn blue or grey.  · You think you might be having a medical emergency. Some examples of medical emergencies are:  ? Severe chest pain.  ? Not able to speak or move normally.  · You have trouble breathing when talking or sitting still.  · You have new shortness of breath.  · You become weak or dizzy.  · You have very dark urine or do not pass urine for more than 8 hours.  · You have new or worsening COVID-19 symptoms like:  ? Fever  ? Cough  ? Feeling very tired  ? Shaking chills  ? Headache  ? Trouble swallowing  ? Throwing up  ? Loose stools  ? Reddish purple spots on your fingers or toes  Teach Back: Helping You Understand   The Teach Back Method helps you understand the information we are giving you. After you talk with the staff, tell them in your own words what you learned. This helps to make sure the staff has described each thing clearly. It also helps to explain things that may have been confusing. Before going home, make sure you can do these:  · I can tell you about my condition.  · I can tell you what may help ease my breathing.  · I can tell you what I can do to help avoid passing the infection to others.  · I can tell  you what I will do if I have trouble breathing; feel sleepy or confused; or my fingertips, fingernails, skin, or lips are blue.  Where can I learn more?   Centers for Disease Control and Prevention  https://www.cdc.gov/coronavirus/2019-ncov/about/index.html   Centers for Disease Control and Prevention  https://www.cdc.gov/coronavirus/2019-ncov/hcp/disposition-in-home-patients.html   World Health Organization  https://www.who.int/news-room/q-a-detail/m-n-cneeattqryllz   Last Reviewed Date   2021-10-05  Consumer Information Use and Disclaimer   This information is not specific medical advice and does not replace information you receive from your health care provider. This is only a brief summary of general information. It does NOT include all information about conditions, illnesses, injuries, tests, procedures, treatments, therapies, discharge instructions or life-style choices that may apply to you. You must talk with your health care provider for complete information about your health and treatment options. This information should not be used to decide whether or not to accept your health care providers advice, instructions or recommendations. Only your health care provider has the knowledge and training to provide advice that is right for you.  Copyright   Copyright © 2022 UpToDate, Inc. and its affiliates and/or licensors. All rights reserved.  [employer] teaching reviewed with Tom Tavarez . He verbalized understanding.    Education was provided based on the patient's discharge diagnosis using the attached [employer] patient education as a reference.

## 2022-01-12 NOTE — PROGRESS NOTES
C3 nurse spoke with Tom Tavarez for a TCC post hospital discharge follow up call. The patient has a scheduled appointment with Adolfo Lemus MD on 1/19/2022 @ 1340.

## 2022-01-13 ENCOUNTER — TELEPHONE (OUTPATIENT)
Dept: ADMINISTRATIVE | Facility: HOSPITAL | Age: 67
End: 2022-01-13
Payer: MEDICARE

## 2022-01-13 NOTE — TELEPHONE ENCOUNTER
PER [facility]: PLEASE REVIEW AND CONTACT PT. I have attached MNF to the [facility] and it will also be in media.    I made a [facility] call to member -   Mr Tom Tavarez  MRN# [id number]  [birth year]  [id number] from [place] on 1/3-11/22.  He needs an order for diabetic supplies and also if Dr Lemus would refer member to [facility] program - he and spouse have many questions about diet, meds that he was d/cd on.   Per d/c summary- Medication discrepancies:  Hctz 25mg 1 qd (stop Hctz 50mg) but due to swelling in his L foot, mbr took the 50mg tablet this am)  Lantus- told to restart 50u sub q bid.  Prednisone x 7 days on schedule for gout.    Mbr will be sending a msg to Dr Lemus to get med and dosing straight. F/U appt not till 1/19/22.  Attached is MNF for DM supplies - True metrix meter/strips/lancets/pen 3 mos supply x 3 refills x 3x day checks.  Mbr may ask Dr Lemus about the CGM but mbr has to meet the guidelines to qualify.    Thanks  Stella

## 2022-01-18 ENCOUNTER — LAB VISIT (OUTPATIENT)
Dept: LAB | Facility: HOSPITAL | Age: 67
End: 2022-01-18
Attending: FAMILY MEDICINE
Payer: MEDICARE

## 2022-01-18 ENCOUNTER — OFFICE VISIT (OUTPATIENT)
Dept: INTERNAL MEDICINE | Facility: CLINIC | Age: 67
End: 2022-01-18
Payer: MEDICARE

## 2022-01-18 VITALS
WEIGHT: 289.44 LBS | TEMPERATURE: 97 F | HEIGHT: 71 IN | HEART RATE: 104 BPM | OXYGEN SATURATION: 93 % | SYSTOLIC BLOOD PRESSURE: 140 MMHG | DIASTOLIC BLOOD PRESSURE: 82 MMHG | BODY MASS INDEX: 40.52 KG/M2

## 2022-01-18 DIAGNOSIS — I10 PRIMARY HYPERTENSION: ICD-10-CM

## 2022-01-18 DIAGNOSIS — I10 HYPERTENSION, UNSPECIFIED TYPE: ICD-10-CM

## 2022-01-18 DIAGNOSIS — E11.65 UNCONTROLLED TYPE 2 DIABETES MELLITUS WITH HYPERGLYCEMIA: ICD-10-CM

## 2022-01-18 DIAGNOSIS — I70.0 AORTIC ATHEROSCLEROSIS: ICD-10-CM

## 2022-01-18 DIAGNOSIS — Z09 HOSPITAL DISCHARGE FOLLOW-UP: Primary | ICD-10-CM

## 2022-01-18 DIAGNOSIS — N18.30 STAGE 3 CHRONIC KIDNEY DISEASE, UNSPECIFIED WHETHER STAGE 3A OR 3B CKD: ICD-10-CM

## 2022-01-18 DIAGNOSIS — I71.40 ABDOMINAL AORTIC ANEURYSM (AAA) WITHOUT RUPTURE: ICD-10-CM

## 2022-01-18 LAB
ALBUMIN SERPL BCP-MCNC: 2.3 G/DL (ref 3.5–5.2)
ALP SERPL-CCNC: 77 U/L (ref 55–135)
ALT SERPL W/O P-5'-P-CCNC: 19 U/L (ref 10–44)
ANION GAP SERPL CALC-SCNC: 15 MMOL/L (ref 8–16)
AST SERPL-CCNC: 19 U/L (ref 10–40)
BILIRUB SERPL-MCNC: 1.3 MG/DL (ref 0.1–1)
BUN SERPL-MCNC: 24 MG/DL (ref 8–23)
CALCIUM SERPL-MCNC: 8.5 MG/DL (ref 8.7–10.5)
CHLORIDE SERPL-SCNC: 100 MMOL/L (ref 95–110)
CHOLEST SERPL-MCNC: 115 MG/DL (ref 120–199)
CHOLEST/HDLC SERPL: 3 {RATIO} (ref 2–5)
CO2 SERPL-SCNC: 23 MMOL/L (ref 23–29)
CREAT SERPL-MCNC: 1.2 MG/DL (ref 0.5–1.4)
EST. GFR  (AFRICAN AMERICAN): >60 ML/MIN/1.73 M^2
EST. GFR  (NON AFRICAN AMERICAN): >60 ML/MIN/1.73 M^2
ESTIMATED AVG GLUCOSE: 329 MG/DL (ref 68–131)
GLUCOSE SERPL-MCNC: 157 MG/DL (ref 70–110)
HBA1C MFR BLD: 13.1 % (ref 4–5.6)
HDLC SERPL-MCNC: 38 MG/DL (ref 40–75)
HDLC SERPL: 33 % (ref 20–50)
LDLC SERPL CALC-MCNC: 56.2 MG/DL (ref 63–159)
NONHDLC SERPL-MCNC: 77 MG/DL
POTASSIUM SERPL-SCNC: 4.1 MMOL/L (ref 3.5–5.1)
PROT SERPL-MCNC: 6.5 G/DL (ref 6–8.4)
SODIUM SERPL-SCNC: 138 MMOL/L (ref 136–145)
TRIGL SERPL-MCNC: 104 MG/DL (ref 30–150)

## 2022-01-18 PROCEDURE — 1101F PR PT FALLS ASSESS DOC 0-1 FALLS W/OUT INJ PAST YR: ICD-10-PCS | Mod: CPTII,S$GLB,, | Performed by: FAMILY MEDICINE

## 2022-01-18 PROCEDURE — 3079F DIAST BP 80-89 MM HG: CPT | Mod: CPTII,S$GLB,, | Performed by: FAMILY MEDICINE

## 2022-01-18 PROCEDURE — 99499 RISK ADDL DX/OHS AUDIT: ICD-10-PCS | Mod: S$GLB,,, | Performed by: FAMILY MEDICINE

## 2022-01-18 PROCEDURE — 1159F PR MEDICATION LIST DOCUMENTED IN MEDICAL RECORD: ICD-10-PCS | Mod: CPTII,S$GLB,, | Performed by: FAMILY MEDICINE

## 2022-01-18 PROCEDURE — 99499 UNLISTED E&M SERVICE: CPT | Mod: S$GLB,,, | Performed by: FAMILY MEDICINE

## 2022-01-18 PROCEDURE — 99214 PR OFFICE/OUTPT VISIT, EST, LEVL IV, 30-39 MIN: ICD-10-PCS | Mod: S$GLB,,, | Performed by: FAMILY MEDICINE

## 2022-01-18 PROCEDURE — 1159F MED LIST DOCD IN RCRD: CPT | Mod: CPTII,S$GLB,, | Performed by: FAMILY MEDICINE

## 2022-01-18 PROCEDURE — 3008F BODY MASS INDEX DOCD: CPT | Mod: CPTII,S$GLB,, | Performed by: FAMILY MEDICINE

## 2022-01-18 PROCEDURE — 80053 COMPREHEN METABOLIC PANEL: CPT | Performed by: FAMILY MEDICINE

## 2022-01-18 PROCEDURE — 36415 COLL VENOUS BLD VENIPUNCTURE: CPT | Performed by: FAMILY MEDICINE

## 2022-01-18 PROCEDURE — 3077F SYST BP >= 140 MM HG: CPT | Mod: CPTII,S$GLB,, | Performed by: FAMILY MEDICINE

## 2022-01-18 PROCEDURE — 99214 OFFICE O/P EST MOD 30 MIN: CPT | Mod: S$GLB,,, | Performed by: FAMILY MEDICINE

## 2022-01-18 PROCEDURE — 83036 HEMOGLOBIN GLYCOSYLATED A1C: CPT | Performed by: FAMILY MEDICINE

## 2022-01-18 PROCEDURE — 1125F PR PAIN SEVERITY QUANTIFIED, PAIN PRESENT: ICD-10-PCS | Mod: CPTII,S$GLB,, | Performed by: FAMILY MEDICINE

## 2022-01-18 PROCEDURE — 1125F AMNT PAIN NOTED PAIN PRSNT: CPT | Mod: CPTII,S$GLB,, | Performed by: FAMILY MEDICINE

## 2022-01-18 PROCEDURE — 3288F PR FALLS RISK ASSESSMENT DOCUMENTED: ICD-10-PCS | Mod: CPTII,S$GLB,, | Performed by: FAMILY MEDICINE

## 2022-01-18 PROCEDURE — 99999 PR PBB SHADOW E&M-EST. PATIENT-LVL IV: CPT | Mod: PBBFAC,,, | Performed by: FAMILY MEDICINE

## 2022-01-18 PROCEDURE — 3288F FALL RISK ASSESSMENT DOCD: CPT | Mod: CPTII,S$GLB,, | Performed by: FAMILY MEDICINE

## 2022-01-18 PROCEDURE — 1101F PT FALLS ASSESS-DOCD LE1/YR: CPT | Mod: CPTII,S$GLB,, | Performed by: FAMILY MEDICINE

## 2022-01-18 PROCEDURE — 80061 LIPID PANEL: CPT | Performed by: FAMILY MEDICINE

## 2022-01-18 PROCEDURE — 3079F PR MOST RECENT DIASTOLIC BLOOD PRESSURE 80-89 MM HG: ICD-10-PCS | Mod: CPTII,S$GLB,, | Performed by: FAMILY MEDICINE

## 2022-01-18 PROCEDURE — 99999 PR PBB SHADOW E&M-EST. PATIENT-LVL IV: ICD-10-PCS | Mod: PBBFAC,,, | Performed by: FAMILY MEDICINE

## 2022-01-18 PROCEDURE — 3008F PR BODY MASS INDEX (BMI) DOCUMENTED: ICD-10-PCS | Mod: CPTII,S$GLB,, | Performed by: FAMILY MEDICINE

## 2022-01-18 PROCEDURE — 3077F PR MOST RECENT SYSTOLIC BLOOD PRESSURE >= 140 MM HG: ICD-10-PCS | Mod: CPTII,S$GLB,, | Performed by: FAMILY MEDICINE

## 2022-01-18 RX ORDER — INSULIN GLARGINE 100 [IU]/ML
50 INJECTION, SOLUTION SUBCUTANEOUS 2 TIMES DAILY
COMMUNITY
Start: 2022-01-12 | End: 2022-02-17 | Stop reason: SDUPTHER

## 2022-01-18 RX ORDER — FLASH GLUCOSE SENSOR
1 KIT MISCELLANEOUS CONTINUOUS PRN
Qty: 1 KIT | Refills: 0 | Status: SHIPPED | OUTPATIENT
Start: 2022-01-18 | End: 2022-01-24

## 2022-01-18 RX ORDER — FLASH GLUCOSE SCANNING READER
1 EACH MISCELLANEOUS CONTINUOUS
Qty: 6 EACH | Refills: 3 | Status: SHIPPED | OUTPATIENT
Start: 2022-01-18 | End: 2022-01-24

## 2022-01-18 NOTE — PROGRESS NOTES
Patient, Tom Tavarez (MRN #[id number]), presented with a recorded BMI of 40.37 kg/m^2 consistent with the definition of morbid obesity (ICD-10 E66.01). The patient's morbid obesity was monitored, evaluated, addressed and/or treated. This addendum to the medical record is made on 01/18/2022.

## 2022-01-18 NOTE — PROGRESS NOTES
Subjective:       Patient ID: Tom Tavarez is a 66 y.o. male.    Chief Complaint: 2 WEEK FOLLOW UP     Two week possible discharge follow-up COVID-19 and uncontrolled diabetes.  He reports his blood sugar was 438 hospital.  He did not have COVID pneumonia.  He currently has some weakness.  He has lost weight from 03/13 and now 289. Insulin was changed currently taking Lantus 50 units twice a day along with metformin.  He denies chest pain palpitations or edema.  Has some shortness of breath with activity.      Review of Systems   Constitutional: Positive for fatigue. Negative for chills and fever.   HENT: Negative for congestion.    Respiratory: Positive for shortness of breath. Negative for cough, chest tightness and wheezing.    Cardiovascular: Negative for chest pain, palpitations and leg swelling.   Gastrointestinal: Negative for abdominal distention, diarrhea and nausea.   Neurological: Positive for weakness. Negative for dizziness, syncope and light-headedness.       Objective:      Physical Exam  Constitutional:       General: He is not in acute distress.     Appearance: He is not diaphoretic.   Cardiovascular:      Rate and Rhythm: Normal rate and regular rhythm.      Heart sounds: No murmur heard.  No gallop.    Pulmonary:      Effort: Pulmonary effort is normal. No respiratory distress.      Breath sounds: No wheezing, rhonchi or rales.   Abdominal:      General: There is no distension.      Palpations: Abdomen is soft. There is no mass.      Tenderness: There is no abdominal tenderness.   Lymphadenopathy:      Cervical: No cervical adenopathy.   Skin:     General: Skin is warm and dry.      Coloration: Skin is not pale.      Findings: No erythema.   Neurological:      Mental Status: He is oriented to person, place, and time.         Patient Outreach on 12/21/2021   Component Date Value Ref Range Status    Left Eye DM Retinopathy 10/27/2021 Negative   Final    Right Eye DM Retinopathy 10/27/2021  Negative   Final     Assessment:       1. Hospital discharge follow-up    2. Primary hypertension    3. Uncontrolled type 2 diabetes mellitus with hyperglycemia    4. Abdominal aortic aneurysm (AAA) without rupture    5. Hypertension, unspecified type    6. Stage 3 chronic kidney disease, unspecified whether stage 3a or 3b CKD    7. Aortic atherosclerosis        Plan:   Medications  reviewed blood pressure noted lab was ordered.  Need copy of recent x-ray lab reports.  Digital hypertension digital diabetes was ordered.  He would like kristy system which is also ordered.  Follow-up in 2 weeks.  Medical history also includes abdominal aortic aneurysm with aortic atherosclerosis.      Hospital discharge follow-up    Primary hypertension  -     Hypertension Digital Medicine (HDMP) Enrollment Order    Uncontrolled type 2 diabetes mellitus with hyperglycemia  -     Diabetes Digital Medicine (DDMP) Enrollment Order  -     Comprehensive Metabolic Panel; Future; Expected date: 01/18/2022  -     Hemoglobin A1C; Future; Expected date: 01/18/2022  -     Lipid Panel; Future; Expected date: 01/18/2022    Abdominal aortic aneurysm (AAA) without rupture    Hypertension, unspecified type    Stage 3 chronic kidney disease, unspecified whether stage 3a or 3b CKD    Aortic atherosclerosis    Other orders  -     Hypertension Digital Medicine (HDMP): Assign Onboarding Questionnaires  -     Diabetes Digital Medicine (DDMP): Assign Onboarding Questionnaires  -     flash glucose sensor (FREESTYLE KRISTY 14 DAY SENSOR) Kit; 1 each by Misc.(Non-Drug; Combo Route) route continuous prn.  Dispense: 1 kit; Refill: 0  -     flash glucose scanning reader (FREESTYLE KRISTY 14 DAY READER) Misc; 1 each by Misc.(Non-Drug; Combo Route) route continuous.  Dispense: 6 each; Refill: 3

## 2022-01-20 ENCOUNTER — TELEPHONE (OUTPATIENT)
Dept: INTERNAL MEDICINE | Facility: CLINIC | Age: 67
End: 2022-01-20
Payer: MEDICARE

## 2022-01-20 NOTE — TELEPHONE ENCOUNTER
Spoke with pt wife. She states that the pt is extremely short of breath and she would like to get him an appointment scheduled. Agreed on appt date and time.

## 2022-01-24 ENCOUNTER — HOSPITAL ENCOUNTER (OUTPATIENT)
Facility: HOSPITAL | Age: 67
Discharge: HOME | End: 2022-01-25
Attending: EMERGENCY MEDICINE | Admitting: FAMILY MEDICINE
Payer: MEDICARE

## 2022-01-24 ENCOUNTER — OFFICE VISIT (OUTPATIENT)
Dept: INTERNAL MEDICINE | Facility: CLINIC | Age: 67
End: 2022-01-24
Payer: MEDICARE

## 2022-01-24 VITALS
HEIGHT: 71 IN | WEIGHT: 279.44 LBS | BODY MASS INDEX: 39.12 KG/M2 | DIASTOLIC BLOOD PRESSURE: 58 MMHG | OXYGEN SATURATION: 95 % | HEART RATE: 82 BPM | SYSTOLIC BLOOD PRESSURE: 80 MMHG | TEMPERATURE: 97 F

## 2022-01-24 DIAGNOSIS — I95.9 HYPOTENSION, UNSPECIFIED HYPOTENSION TYPE: ICD-10-CM

## 2022-01-24 DIAGNOSIS — R06.02 SHORTNESS OF BREATH: Primary | ICD-10-CM

## 2022-01-24 DIAGNOSIS — J18.9 PNEUMONIA OF LEFT LOWER LOBE DUE TO INFECTIOUS ORGANISM: Primary | ICD-10-CM

## 2022-01-24 DIAGNOSIS — U07.1 COVID-19: ICD-10-CM

## 2022-01-24 DIAGNOSIS — R06.09 DYSPNEA ON EXERTION: ICD-10-CM

## 2022-01-24 DIAGNOSIS — R53.1 WEAKNESS: ICD-10-CM

## 2022-01-24 DIAGNOSIS — N17.9 AKI (ACUTE KIDNEY INJURY): ICD-10-CM

## 2022-01-24 DIAGNOSIS — H53.8 BLURRY VISION: ICD-10-CM

## 2022-01-24 DIAGNOSIS — R07.9 CHEST PAIN: ICD-10-CM

## 2022-01-24 DIAGNOSIS — R04.2 HEMOPTYSIS: ICD-10-CM

## 2022-01-24 DIAGNOSIS — E66.9 OBESITY (BMI 30-39.9): ICD-10-CM

## 2022-01-24 DIAGNOSIS — R79.89 ELEVATED TROPONIN: ICD-10-CM

## 2022-01-24 LAB
ALBUMIN SERPL BCP-MCNC: 2 G/DL (ref 3.5–5.2)
ALP SERPL-CCNC: 76 U/L (ref 55–135)
ALT SERPL W/O P-5'-P-CCNC: 20 U/L (ref 10–44)
ANION GAP SERPL CALC-SCNC: 14 MMOL/L (ref 8–16)
AST SERPL-CCNC: 34 U/L (ref 10–40)
BASOPHILS # BLD AUTO: 0.02 K/UL (ref 0–0.2)
BASOPHILS NFR BLD: 0.3 % (ref 0–1.9)
BILIRUB SERPL-MCNC: 0.8 MG/DL (ref 0.1–1)
BNP SERPL-MCNC: 165 PG/ML (ref 0–99)
BUN SERPL-MCNC: 30 MG/DL (ref 8–23)
CALCIUM SERPL-MCNC: 8.7 MG/DL (ref 8.7–10.5)
CHLORIDE SERPL-SCNC: 104 MMOL/L (ref 95–110)
CK SERPL-CCNC: 47 U/L (ref 20–200)
CO2 SERPL-SCNC: 22 MMOL/L (ref 23–29)
CREAT SERPL-MCNC: 1.6 MG/DL (ref 0.5–1.4)
DIFFERENTIAL METHOD: ABNORMAL
EOSINOPHIL # BLD AUTO: 0.1 K/UL (ref 0–0.5)
EOSINOPHIL NFR BLD: 1.3 % (ref 0–8)
ERYTHROCYTE [DISTWIDTH] IN BLOOD BY AUTOMATED COUNT: 14.8 % (ref 11.5–14.5)
EST. GFR  (AFRICAN AMERICAN): 51 ML/MIN/1.73 M^2
EST. GFR  (NON AFRICAN AMERICAN): 44 ML/MIN/1.73 M^2
GLUCOSE SERPL-MCNC: 102 MG/DL (ref 70–110)
HCT VFR BLD AUTO: 43.2 % (ref 40–54)
HGB BLD-MCNC: 14.1 G/DL (ref 14–18)
IMM GRANULOCYTES # BLD AUTO: 0.02 K/UL (ref 0–0.04)
IMM GRANULOCYTES NFR BLD AUTO: 0.3 % (ref 0–0.5)
LACTATE SERPL-SCNC: 1.5 MMOL/L (ref 0.5–2.2)
LACTATE SERPL-SCNC: 2 MMOL/L (ref 0.5–2.2)
LYMPHOCYTES # BLD AUTO: 1.7 K/UL (ref 1–4.8)
LYMPHOCYTES NFR BLD: 24.4 % (ref 18–48)
MCH RBC QN AUTO: 29.5 PG (ref 27–31)
MCHC RBC AUTO-ENTMCNC: 32.6 G/DL (ref 32–36)
MCV RBC AUTO: 90 FL (ref 82–98)
MONOCYTES # BLD AUTO: 0.5 K/UL (ref 0.3–1)
MONOCYTES NFR BLD: 7.2 % (ref 4–15)
NEUTROPHILS # BLD AUTO: 4.6 K/UL (ref 1.8–7.7)
NEUTROPHILS NFR BLD: 66.5 % (ref 38–73)
NRBC BLD-RTO: 0 /100 WBC
PLATELET # BLD AUTO: 209 K/UL (ref 150–450)
PMV BLD AUTO: 9.3 FL (ref 9.2–12.9)
POCT GLUCOSE: 107 MG/DL (ref 70–110)
POCT GLUCOSE: 77 MG/DL (ref 70–110)
POCT GLUCOSE: 98 MG/DL (ref 70–110)
POTASSIUM SERPL-SCNC: 4.3 MMOL/L (ref 3.5–5.1)
PROCALCITONIN SERPL IA-MCNC: 0.16 NG/ML
PROCALCITONIN SERPL IA-MCNC: 0.19 NG/ML
PROT SERPL-MCNC: 7.6 G/DL (ref 6–8.4)
RBC # BLD AUTO: 4.78 M/UL (ref 4.6–6.2)
SARS-COV-2 RDRP RESP QL NAA+PROBE: NEGATIVE
SODIUM SERPL-SCNC: 140 MMOL/L (ref 136–145)
TROPONIN I SERPL DL<=0.01 NG/ML-MCNC: 0.17 NG/ML (ref 0–0.03)
TROPONIN I SERPL DL<=0.01 NG/ML-MCNC: 0.19 NG/ML (ref 0–0.03)
WBC # BLD AUTO: 6.97 K/UL (ref 3.9–12.7)

## 2022-01-24 PROCEDURE — 84145 PROCALCITONIN (PCT): CPT | Performed by: NURSE PRACTITIONER

## 2022-01-24 PROCEDURE — 25000003 PHARM REV CODE 250: Performed by: NURSE PRACTITIONER

## 2022-01-24 PROCEDURE — 3078F DIAST BP <80 MM HG: CPT | Mod: CPTII,S$GLB,,

## 2022-01-24 PROCEDURE — 99215 PR OFFICE/OUTPT VISIT, EST, LEVL V, 40-54 MIN: ICD-10-PCS | Mod: S$GLB,,,

## 2022-01-24 PROCEDURE — 83605 ASSAY OF LACTIC ACID: CPT | Mod: 91 | Performed by: FAMILY MEDICINE

## 2022-01-24 PROCEDURE — 96372 THER/PROPH/DIAG INJ SC/IM: CPT | Mod: 59

## 2022-01-24 PROCEDURE — 3046F HEMOGLOBIN A1C LEVEL >9.0%: CPT | Mod: CPTII,S$GLB,,

## 2022-01-24 PROCEDURE — 3288F FALL RISK ASSESSMENT DOCD: CPT | Mod: CPTII,S$GLB,,

## 2022-01-24 PROCEDURE — 1159F PR MEDICATION LIST DOCUMENTED IN MEDICAL RECORD: ICD-10-PCS | Mod: CPTII,S$GLB,,

## 2022-01-24 PROCEDURE — 25000003 PHARM REV CODE 250: Performed by: EMERGENCY MEDICINE

## 2022-01-24 PROCEDURE — C9399 UNCLASSIFIED DRUGS OR BIOLOG: HCPCS | Performed by: NURSE PRACTITIONER

## 2022-01-24 PROCEDURE — 84484 ASSAY OF TROPONIN QUANT: CPT | Performed by: NURSE PRACTITIONER

## 2022-01-24 PROCEDURE — 87040 BLOOD CULTURE FOR BACTERIA: CPT | Performed by: EMERGENCY MEDICINE

## 2022-01-24 PROCEDURE — 1159F MED LIST DOCD IN RCRD: CPT | Mod: CPTII,S$GLB,,

## 2022-01-24 PROCEDURE — 1160F PR REVIEW ALL MEDS BY PRESCRIBER/CLIN PHARMACIST DOCUMENTED: ICD-10-PCS | Mod: CPTII,S$GLB,,

## 2022-01-24 PROCEDURE — U0002 COVID-19 LAB TEST NON-CDC: HCPCS | Performed by: EMERGENCY MEDICINE

## 2022-01-24 PROCEDURE — 3288F PR FALLS RISK ASSESSMENT DOCUMENTED: ICD-10-PCS | Mod: CPTII,S$GLB,,

## 2022-01-24 PROCEDURE — 99291 CRITICAL CARE FIRST HOUR: CPT | Mod: 25

## 2022-01-24 PROCEDURE — 84145 PROCALCITONIN (PCT): CPT | Mod: 91 | Performed by: EMERGENCY MEDICINE

## 2022-01-24 PROCEDURE — 63600175 PHARM REV CODE 636 W HCPCS: Performed by: NURSE PRACTITIONER

## 2022-01-24 PROCEDURE — 83605 ASSAY OF LACTIC ACID: CPT | Performed by: EMERGENCY MEDICINE

## 2022-01-24 PROCEDURE — 1101F PR PT FALLS ASSESS DOC 0-1 FALLS W/OUT INJ PAST YR: ICD-10-PCS | Mod: CPTII,S$GLB,,

## 2022-01-24 PROCEDURE — 3046F PR MOST RECENT HEMOGLOBIN A1C LEVEL > 9.0%: ICD-10-PCS | Mod: CPTII,S$GLB,,

## 2022-01-24 PROCEDURE — 94760 N-INVAS EAR/PLS OXIMETRY 1: CPT

## 2022-01-24 PROCEDURE — 63600175 PHARM REV CODE 636 W HCPCS: Performed by: EMERGENCY MEDICINE

## 2022-01-24 PROCEDURE — 3074F SYST BP LT 130 MM HG: CPT | Mod: CPTII,S$GLB,,

## 2022-01-24 PROCEDURE — 82962 GLUCOSE BLOOD TEST: CPT

## 2022-01-24 PROCEDURE — G0378 HOSPITAL OBSERVATION PER HR: HCPCS

## 2022-01-24 PROCEDURE — 94799 UNLISTED PULMONARY SVC/PX: CPT

## 2022-01-24 PROCEDURE — 96361 HYDRATE IV INFUSION ADD-ON: CPT

## 2022-01-24 PROCEDURE — 3074F PR MOST RECENT SYSTOLIC BLOOD PRESSURE < 130 MM HG: ICD-10-PCS | Mod: CPTII,S$GLB,,

## 2022-01-24 PROCEDURE — 99999 PR PBB SHADOW E&M-EST. PATIENT-LVL V: CPT | Mod: PBBFAC,,,

## 2022-01-24 PROCEDURE — 84484 ASSAY OF TROPONIN QUANT: CPT | Mod: 91 | Performed by: NURSE PRACTITIONER

## 2022-01-24 PROCEDURE — 99215 OFFICE O/P EST HI 40 MIN: CPT | Mod: S$GLB,,,

## 2022-01-24 PROCEDURE — 80053 COMPREHEN METABOLIC PANEL: CPT | Performed by: NURSE PRACTITIONER

## 2022-01-24 PROCEDURE — 93010 EKG 12-LEAD: ICD-10-PCS | Mod: ,,, | Performed by: INTERNAL MEDICINE

## 2022-01-24 PROCEDURE — 3078F PR MOST RECENT DIASTOLIC BLOOD PRESSURE < 80 MM HG: ICD-10-PCS | Mod: CPTII,S$GLB,,

## 2022-01-24 PROCEDURE — 3008F BODY MASS INDEX DOCD: CPT | Mod: CPTII,S$GLB,,

## 2022-01-24 PROCEDURE — 93005 ELECTROCARDIOGRAM TRACING: CPT

## 2022-01-24 PROCEDURE — 81000 URINALYSIS NONAUTO W/SCOPE: CPT | Performed by: NURSE PRACTITIONER

## 2022-01-24 PROCEDURE — 1101F PT FALLS ASSESS-DOCD LE1/YR: CPT | Mod: CPTII,S$GLB,,

## 2022-01-24 PROCEDURE — 85025 COMPLETE CBC W/AUTO DIFF WBC: CPT | Performed by: NURSE PRACTITIONER

## 2022-01-24 PROCEDURE — 83880 ASSAY OF NATRIURETIC PEPTIDE: CPT | Performed by: NURSE PRACTITIONER

## 2022-01-24 PROCEDURE — 93010 ELECTROCARDIOGRAM REPORT: CPT | Mod: ,,, | Performed by: INTERNAL MEDICINE

## 2022-01-24 PROCEDURE — 96366 THER/PROPH/DIAG IV INF ADDON: CPT

## 2022-01-24 PROCEDURE — 63600175 PHARM REV CODE 636 W HCPCS: Performed by: FAMILY MEDICINE

## 2022-01-24 PROCEDURE — 1160F RVW MEDS BY RX/DR IN RCRD: CPT | Mod: CPTII,S$GLB,,

## 2022-01-24 PROCEDURE — 3008F PR BODY MASS INDEX (BMI) DOCUMENTED: ICD-10-PCS | Mod: CPTII,S$GLB,,

## 2022-01-24 PROCEDURE — 82550 ASSAY OF CK (CPK): CPT | Performed by: NURSE PRACTITIONER

## 2022-01-24 PROCEDURE — 99999 PR PBB SHADOW E&M-EST. PATIENT-LVL V: ICD-10-PCS | Mod: PBBFAC,,,

## 2022-01-24 PROCEDURE — 96365 THER/PROPH/DIAG IV INF INIT: CPT

## 2022-01-24 RX ORDER — IBUPROFEN 200 MG
24 TABLET ORAL
Status: DISCONTINUED | OUTPATIENT
Start: 2022-01-24 | End: 2022-01-25 | Stop reason: HOSPADM

## 2022-01-24 RX ORDER — IPRATROPIUM BROMIDE AND ALBUTEROL SULFATE 2.5; .5 MG/3ML; MG/3ML
3 SOLUTION RESPIRATORY (INHALATION) EVERY 4 HOURS PRN
Status: DISCONTINUED | OUTPATIENT
Start: 2022-01-24 | End: 2022-01-25 | Stop reason: HOSPADM

## 2022-01-24 RX ORDER — CEFEPIME HYDROCHLORIDE 1 G/50ML
2 INJECTION, SOLUTION INTRAVENOUS
Status: DISCONTINUED | OUTPATIENT
Start: 2022-01-25 | End: 2022-01-25 | Stop reason: HOSPADM

## 2022-01-24 RX ORDER — MAG HYDROX/ALUMINUM HYD/SIMETH 200-200-20
30 SUSPENSION, ORAL (FINAL DOSE FORM) ORAL 4 TIMES DAILY PRN
Status: DISCONTINUED | OUTPATIENT
Start: 2022-01-24 | End: 2022-01-25 | Stop reason: HOSPADM

## 2022-01-24 RX ORDER — IBUPROFEN 200 MG
16 TABLET ORAL
Status: DISCONTINUED | OUTPATIENT
Start: 2022-01-24 | End: 2022-01-25 | Stop reason: HOSPADM

## 2022-01-24 RX ORDER — GUAIFENESIN 100 MG/5ML
200 SOLUTION ORAL EVERY 4 HOURS PRN
Status: DISCONTINUED | OUTPATIENT
Start: 2022-01-24 | End: 2022-01-25 | Stop reason: HOSPADM

## 2022-01-24 RX ORDER — ONDANSETRON 2 MG/ML
4 INJECTION INTRAMUSCULAR; INTRAVENOUS EVERY 8 HOURS PRN
Status: DISCONTINUED | OUTPATIENT
Start: 2022-01-24 | End: 2022-01-25 | Stop reason: HOSPADM

## 2022-01-24 RX ORDER — HYDROCHLOROTHIAZIDE 25 MG/1
25 TABLET ORAL DAILY
Status: DISCONTINUED | OUTPATIENT
Start: 2022-01-25 | End: 2022-01-24

## 2022-01-24 RX ORDER — INSULIN ASPART 100 [IU]/ML
0-5 INJECTION, SOLUTION INTRAVENOUS; SUBCUTANEOUS
Status: DISCONTINUED | OUTPATIENT
Start: 2022-01-24 | End: 2022-01-25 | Stop reason: HOSPADM

## 2022-01-24 RX ORDER — CEFEPIME HYDROCHLORIDE 1 G/50ML
2 INJECTION, SOLUTION INTRAVENOUS
Status: COMPLETED | OUTPATIENT
Start: 2022-01-24 | End: 2022-01-24

## 2022-01-24 RX ORDER — SODIUM CHLORIDE 0.9 % (FLUSH) 0.9 %
10 SYRINGE (ML) INJECTION EVERY 8 HOURS PRN
Status: DISCONTINUED | OUTPATIENT
Start: 2022-01-24 | End: 2022-01-25 | Stop reason: HOSPADM

## 2022-01-24 RX ORDER — ATORVASTATIN CALCIUM 40 MG/1
40 TABLET, FILM COATED ORAL DAILY
Status: DISCONTINUED | OUTPATIENT
Start: 2022-01-25 | End: 2022-01-25 | Stop reason: HOSPADM

## 2022-01-24 RX ORDER — TALC
6 POWDER (GRAM) TOPICAL NIGHTLY PRN
Status: DISCONTINUED | OUTPATIENT
Start: 2022-01-24 | End: 2022-01-25 | Stop reason: HOSPADM

## 2022-01-24 RX ORDER — HYDROCHLOROTHIAZIDE 25 MG/1
25 TABLET ORAL DAILY
COMMUNITY
End: 2022-01-25 | Stop reason: HOSPADM

## 2022-01-24 RX ORDER — SODIUM CHLORIDE 9 MG/ML
INJECTION, SOLUTION INTRAVENOUS
Status: COMPLETED | OUTPATIENT
Start: 2022-01-24 | End: 2022-01-24

## 2022-01-24 RX ORDER — ACETAMINOPHEN 325 MG/1
650 TABLET ORAL EVERY 4 HOURS PRN
Status: DISCONTINUED | OUTPATIENT
Start: 2022-01-24 | End: 2022-01-25 | Stop reason: HOSPADM

## 2022-01-24 RX ORDER — GLUCAGON 1 MG
1 KIT INJECTION
Status: DISCONTINUED | OUTPATIENT
Start: 2022-01-24 | End: 2022-01-25 | Stop reason: HOSPADM

## 2022-01-24 RX ORDER — CEFEPIME HYDROCHLORIDE 1 G/50ML
2 INJECTION, SOLUTION INTRAVENOUS
Status: DISCONTINUED | OUTPATIENT
Start: 2022-01-24 | End: 2022-01-24 | Stop reason: SDUPTHER

## 2022-01-24 RX ORDER — AMLODIPINE BESYLATE 5 MG/1
10 TABLET ORAL DAILY
Status: DISCONTINUED | OUTPATIENT
Start: 2022-01-25 | End: 2022-01-24

## 2022-01-24 RX ORDER — SIMETHICONE 80 MG
1 TABLET,CHEWABLE ORAL 4 TIMES DAILY PRN
Status: DISCONTINUED | OUTPATIENT
Start: 2022-01-24 | End: 2022-01-25 | Stop reason: HOSPADM

## 2022-01-24 RX ORDER — AMOXICILLIN 250 MG
1 CAPSULE ORAL 2 TIMES DAILY
Status: DISCONTINUED | OUTPATIENT
Start: 2022-01-24 | End: 2022-01-25 | Stop reason: HOSPADM

## 2022-01-24 RX ORDER — HEPARIN SODIUM 5000 [USP'U]/ML
5000 INJECTION, SOLUTION INTRAVENOUS; SUBCUTANEOUS EVERY 8 HOURS
Status: DISCONTINUED | OUTPATIENT
Start: 2022-01-24 | End: 2022-01-25 | Stop reason: HOSPADM

## 2022-01-24 RX ADMIN — SENNOSIDES AND DOCUSATE SODIUM 1 TABLET: 50; 8.6 TABLET ORAL at 09:01

## 2022-01-24 RX ADMIN — CEFEPIME HYDROCHLORIDE 2 G: 2 INJECTION, SOLUTION INTRAVENOUS at 06:01

## 2022-01-24 RX ADMIN — HEPARIN SODIUM 5000 UNITS: 5000 INJECTION, SOLUTION INTRAVENOUS; SUBCUTANEOUS at 09:01

## 2022-01-24 RX ADMIN — SODIUM CHLORIDE 1000 ML: 0.9 INJECTION, SOLUTION INTRAVENOUS at 06:01

## 2022-01-24 RX ADMIN — INSULIN DETEMIR 25 UNITS: 100 INJECTION, SOLUTION SUBCUTANEOUS at 09:01

## 2022-01-24 RX ADMIN — SODIUM CHLORIDE: 0.9 INJECTION, SOLUTION INTRAVENOUS at 06:01

## 2022-01-24 NOTE — PROGRESS NOTES
Tom Tavarez  01/24/2022  [id number]    Adolfo Lemus MD  Patient Care Team:  Adolfo Lemus MD as PCP - General (Family Medicine)  Vidhya Meade OD as Consulting Physician (Optometry)  Claribel Holloway MD as Consulting Physician (Sports Medicine)  Augie Sy OD (Optometry)  Has the patient seen any provider outside of the [facility] network since the last visit? (yes). If yes, HIPPA forms completed and records requested.        Visit Type:an urgent visit for an existing problem     Chief Complaint:  Chief Complaint   Patient presents with    Shortness of Breath     This has been going on since he was discharged from [facility] on the 3rd. He said they told him that is normal. He has follow up Dr. Lemus already and has a two week f/u scheduled next week. He said he has like a mass on his left thigh he said when he touches it its like fire       History of Present Illness:  66 Year old male presents today with his wife for SOB  He was admitted into [facility] with COVID/SOB  He received the antibody infusion  He was in the hospital for 9 days  He saw  on 1/18 and he did some blood work  He has an appt with Dr. Lemus on next week  While we he is walking, he gets very SOB  He has been coughing up mucus/blood  He has been coughing up blood for almost four days  He cough ups bright red blood almost every time he coughs   He has been coughing up and he is progressively getting weak and his SOB is increasing    His BP has been low for the last few days  His wife checks his BP at home  He has been taking his BP meds despite his BP being low  His BP was been 90s/70s  Home Health comes once a week to assess  He has home PT that comes twice a week       History:  Past Medical History:   Diagnosis Date    BMI 40.0-44.9, adult 7/21/2016    Colon cancer screening 10/3/2017    Diabetes mellitus type 2 in obese 11/2006    Hyperlipidemia     Hypertension 1985    Obesity, Class III, BMI  40-49.9 (morbid obesity) 1985    Umbilical hernia      Past Surgical History:   Procedure Laterality Date    G U surgery  1982    biopsy of testicle infertility w/u     Family History   Problem Relation Age of Onset    Hypertension Mother     Heart failure Mother      Social History     Socioeconomic History    Marital status:     Number of children: 0    Highest education level: Some college, no degree   Occupational History    Occupation: Retired-Sales     Comment: [employer]   Tobacco Use    Smoking status: Former Smoker    Smokeless tobacco: Never Used    Tobacco comment: 1998 quit    Substance and Sexual Activity    Alcohol use: No     Alcohol/week: 0.0 standard drinks    Drug use: No    Sexual activity: Yes     Partners: Female     Patient Active Problem List   Diagnosis    Diabetes mellitus type 2 in obese    Hyperlipidemia    Essential hypertension    Obesity, Class III, BMI 40-49.9 (morbid obesity)    Hypertension    Umbilical hernia    Uncontrolled type 2 diabetes mellitus without complication, with long-term current use of insulin    Weakness    Immunization deficiency    Yeast dermatitis    Acute gout due to renal impairment involving foot    Onychomycosis of toenail    Acute cystitis with hematuria    Stage 3 chronic kidney disease    FH: thoracic aortic aneurysm    Chronic allergic rhinitis    GERD (gastroesophageal reflux disease)    Itchy skin    Testicle swelling    Uncontrolled type 2 diabetes mellitus with hyperglycemia    Primary osteoarthritis of left hip    Aortic atherosclerosis    Arteriosclerosis of abdominal aorta    Abdominal wall hernia    Nasal congestion    Abdominal aortic aneurysm (AAA) without rupture    Dorsalgia, unspecified    Hip pain    Colon cancer screening    Hospital discharge follow-up     Review of patient's allergies indicates:  No Known Allergies    The following were reviewed at this visit: active problem  "list, medication list, allergies, family history, social history, and health maintenance.    Medications:  Current Outpatient Medications on File Prior to Visit   Medication Sig Dispense Refill    atorvastatin (LIPITOR) 40 MG tablet TAKE 1 TABLET BY MOUTH  DAILY 90 tablet 0    blood sugar diagnostic Strp 1 each by Misc.(Non-Drug; Combo Route) route once daily. 100 each 3    blood sugar diagnostic Strp To check BG 3 times daily, to use with insurance preferred meter 300 each 3    flash glucose scanning reader (FREESTYLE KRISTY 14 DAY READER) Misc 1 each by Misc.(Non-Drug; Combo Route) route continuous. 6 each 3    flash glucose sensor (FREESTYLE KRISTY 14 DAY SENSOR) Kit 1 each by Misc.(Non-Drug; Combo Route) route continuous prn. 1 kit 0    insulin syr/ndl U100 half florencio (BD INSULIN SYRINGE HALF UNIT) 0.3 mL 31 gauge x 5/16" Syrg 1 each by Misc.(Non-Drug; Combo Route) route before meals as needed. 100 Syringe 6    insulin syringe-needle U-100 0.3 mL 31 gauge x 1/4" Syrg 1 each by Misc.(Non-Drug; Combo Route) route 2 (two) times a day. 100 each 5    LANTUS SOLOSTAR U-100 INSULIN glargine 100 units/mL (3mL) SubQ pen SMARTSI Unit(s) SUB-Q Twice Daily      metFORMIN (GLUCOPHAGE) 500 MG tablet TAKE 1 TABLET BY MOUTH  TWICE DAILY WITH MEALS 180 tablet 3    naproxen (NAPROSYN) 500 MG tablet Take 1 tablet (500 mg total) by mouth 2 (two) times daily with meals. 60 tablet 0    olmesartan-amLODIPin-hcthiazid 40-10-25 mg Tab Take 1 tablet by mouth once daily. 90 tablet 0    pen needle, diabetic 31 gauge x 3/16" Ndle 1 each by Misc.(Non-Drug; Combo Route) route once daily. 100 each 3     No current facility-administered medications on file prior to visit.       Medications have been reviewed and reconciled with patient at this visit.  Barriers to medications reviewed with patient.    Adverse reactions to current medications reviewed with patient..    Over the counter medications reviewed and reconciled with " patient.    Exam:  Wt Readings from Last 3 Encounters:   11/29/21 (!) 142.4 kg (313 lb 15 oz)   11/17/21 (!) 137.9 kg (304 lb)   11/09/21 (!) 145.7 kg (321 lb 3.4 oz)     Temp Readings from Last 3 Encounters:   11/29/21 98.3 °F (36.8 °C) (Tympanic)   11/17/21 99 °F (37.2 °C) (Tympanic)   11/09/21 97 °F (36.1 °C) (Tympanic)     BP Readings from Last 3 Encounters:   11/29/21 130/74   11/17/21 130/67   11/09/21 126/80     Pulse Readings from Last 3 Encounters:   11/29/21 66   11/17/21 62   11/09/21 71     There is no height or weight on file to calculate BMI.      Review of Systems   Constitutional: Positive for malaise/fatigue. Negative for chills and fever.   HENT: Negative.  Negative for congestion, sinus pain and sore throat.    Eyes: Positive for blurred vision. Negative for double vision.   Respiratory: Positive for cough, hemoptysis, sputum production and shortness of breath. Negative for wheezing.    Cardiovascular: Negative for chest pain, palpitations and leg swelling.   Gastrointestinal: Negative for abdominal pain, constipation, diarrhea, heartburn, nausea and vomiting.   Genitourinary: Negative.    Musculoskeletal: Positive for joint pain and myalgias.   Skin: Negative.  Negative for rash.   Neurological: Positive for dizziness and weakness.   Endo/Heme/Allergies: Negative.  Negative for polydipsia. Does not bruise/bleed easily.   Psychiatric/Behavioral: Negative for depression and substance abuse.     Physical Exam  Vitals and nursing note reviewed.   Constitutional:       General: He is not in acute distress.     Appearance: He is well-developed. He is obese. He is not diaphoretic.   HENT:      Head: Normocephalic and atraumatic.      Right Ear: External ear normal.      Left Ear: External ear normal.      Nose: Nose normal.   Eyes:      General:         Right eye: No discharge.         Left eye: No discharge.      Conjunctiva/sclera: Conjunctivae normal.      Pupils: Pupils are equal, round, and  reactive to light.   Neck:      Thyroid: No thyromegaly.   Cardiovascular:      Rate and Rhythm: Normal rate and regular rhythm.      Pulses: Normal pulses.      Heart sounds: Normal heart sounds. No murmur heard.      Pulmonary:      Effort: Pulmonary effort is normal. No respiratory distress.      Breath sounds: Normal breath sounds. No wheezing.   Chest:      Chest wall: Tenderness present.   Abdominal:      General: Bowel sounds are normal.   Musculoskeletal:      Cervical back: Normal range of motion and neck supple.      Comments: Using a wheelchair at today's visit    Lymphadenopathy:      Cervical: No cervical adenopathy.   Skin:     Capillary Refill: Capillary refill takes less than 2 seconds.   Neurological:      Mental Status: He is alert and oriented to person, place, and time.      Cranial Nerves: No cranial nerve deficit.   Psychiatric:         Behavior: Behavior normal.         Thought Content: Thought content normal.         Judgment: Judgment normal.         Laboratory Reviewed ({Yes)  Lab Results   Component Value Date    WBC 6.89 10/08/2020    HGB 16.4 10/08/2020    HCT 51.7 10/08/2020     10/08/2020    CHOL 115 (L) 01/18/2022    TRIG 104 01/18/2022    HDL 38 (L) 01/18/2022    ALT 19 01/18/2022    AST 19 01/18/2022     01/18/2022    K 4.1 01/18/2022     01/18/2022    CREATININE 1.2 01/18/2022    BUN 24 (H) 01/18/2022    CO2 23 01/18/2022    TSH 2.012 10/08/2020    PSA 3.3 04/04/2019    GLUF 156 (H) 09/25/2019    HGBA1C 13.1 (H) 01/18/2022     Tom was seen today for shortness of breath.    Diagnoses and all orders for this visit:    Shortness of breath    COVID-19    Obesity (BMI 30-39.9)    Dyspnea on exertion    Blurry vision  -     Ambulatory referral/consult to Optometry; Future    Hemoptysis    Weakness    Hypotension, unspecified hypotension type    Concerned about possible PE and hypotension. Sent patient to ER for eval  Patient and wife were in agreement regarding plan  [facility]  Has an appt with Dr. Lemus on 2/3          Care Plan/Goals: Reviewed   Goals    None         Follow up: No follow-ups on file.    After visit summary was printed and given to patient upon discharge today.  Patient goals and care plan are included in After Visit Summary.

## 2022-01-24 NOTE — ED PROVIDER NOTES
SCRIBE #1 NOTE: I, Cortney Ceja, am scribing for, and in the presence of, Finn Gates MD. I have scribed the entire note.      History      Chief Complaint   Patient presents with    Hypotension     Coming from doctor office in Cancer center. Also reports shortness of breath.       Review of patient's allergies indicates:  No Known Allergies     HPI   HPI    1/24/2022, 5:01 PM   History obtained from the patient      History of Present Illness: Tom Tavarez is a 66 y.o. male patient with PMHx of Colon cancer, DM, HLD, and HTN who presents to the Emergency Department for SOB  which onset 3 days ago. Pt states sxs continue to worse and exacerbated with exertion. Symptoms are constant and moderate in severity. No mitigating or exacerbating factors reported. Patient denies any fever, chills, CP, N/V/D, cough, congestion, leg swelling, abd pain, numbness, weakness, dizziness, and all other sxs at this time. Pt was being seen in the cancer center PTA and was referred to the ED due to HoTN. Pt was dx with COVID-19 on 1/11/22. No further complaints or concerns at this time.        Arrival mode: Personal vehicle      PCP: Adolfo Lemus MD       Past Medical History:  Past Medical History:   Diagnosis Date    BMI 40.0-44.9, adult 7/21/2016    Colon cancer screening 10/3/2017    Diabetes mellitus type 2 in obese 11/2006    Hyperlipidemia     Hypertension 1985    Obesity, Class III, BMI 40-49.9 (morbid obesity) 1985    Umbilical hernia        Past Surgical History:  Past Surgical History:   Procedure Laterality Date    G U surgery  1982    biopsy of testicle infertility w/u         Family History:  Family History   Problem Relation Age of Onset    Hypertension Mother     Heart failure Mother        Social History:  Social History     Tobacco Use    Smoking status: Former Smoker    Smokeless tobacco: Never Used    Tobacco comment: 1998 quit    Substance and Sexual Activity    Alcohol use: No      Alcohol/week: 0.0 standard drinks    Drug use: No    Sexual activity: Yes     Partners: Female       ROS   Review of Systems   Constitutional: Negative for chills and fever.   HENT: Negative for congestion and sore throat.    Eyes: Negative for visual disturbance.   Respiratory: Positive for shortness of breath. Negative for cough and wheezing.    Cardiovascular: Negative for chest pain and leg swelling.   Gastrointestinal: Negative for abdominal pain, diarrhea, nausea and vomiting.   Genitourinary: Negative for dysuria and hematuria.   Musculoskeletal: Negative for back pain.   Skin: Negative for rash.   Neurological: Negative for dizziness, syncope, weakness, numbness and headaches.   Hematological: Does not bruise/bleed easily.   All other systems reviewed and are negative.      Physical Exam      Initial Vitals [01/24/22 1458]   BP Pulse Resp Temp SpO2   111/65 85 20 98 °F (36.7 °C) 95 %      MAP       --          Physical Exam  Nursing Notes and Vital Signs Reviewed.  Constitutional: Patient is in mild distress. Elderly.  Head: Atraumatic. Normocephalic.  Eyes: PERRL. EOM intact. Conjunctivae are not pale. No scleral icterus.  ENT: Mucous membranes are moist. Oropharynx is clear and symmetric.    Neck: Supple. Full ROM. No lymphadenopathy.  Cardiovascular: Regular rate. Regular rhythm. No murmurs, rubs, or gallops. Distal pulses are 2+ and symmetric.  Pulmonary/Chest: No respiratory distress. Decreased breath sounds. No wheezing or rales.  Abdominal: Soft and non-distended.  There is no tenderness.  No rebound, guarding, or rigidity. Good bowel sounds.  Genitourinary: No CVA tenderness  Musculoskeletal: Moves all extremities. No obvious deformities. No edema. No calf tenderness.  Skin: Warm and dry.  Neurological:  Alert, awake, and appropriate.  Normal speech.  No acute focal neurological deficits are appreciated.  Psychiatric: Normal affect. Good eye contact. Appropriate in content.    ED Course   "  Critical Care    Date/Time: 1/24/2022 5:46 PM  Performed by: Finn Gates MD  Authorized by: Finn Gates MD   Direct patient critical care time: 15 minutes  Additional history critical care time: 10 minutes  Ordering / reviewing critical care time: 5 minutes  Documentation critical care time: 10 minutes  Consulting other physicians critical care time: 5 minutes  Total critical care time (exclusive of procedural time) : 45 minutes  Critical care time was exclusive of separately billable procedures and treating other patients and teaching time.  Critical care was necessary to treat or prevent imminent or life-threatening deterioration of the following conditions: respiratory failure.  Critical care was time spent personally by me on the following activities: blood draw for specimens, development of treatment plan with patient or surrogate, discussions with consultants, interpretation of cardiac output measurements, evaluation of patient's response to treatment, examination of patient, obtaining history from patient or surrogate, ordering and performing treatments and interventions, ordering and review of laboratory studies, ordering and review of radiographic studies, pulse oximetry, re-evaluation of patient's condition and review of old charts.        ED Vital Signs:  Vitals:    01/24/22 1458   BP: 111/65   Pulse: 85   Resp: 20   Temp: 98 °F (36.7 °C)   TempSrc: Oral   SpO2: 95%   Height: 5' 11" (1.803 m)       Abnormal Lab Results:  Labs Reviewed   CBC W/ AUTO DIFFERENTIAL - Abnormal; Notable for the following components:       Result Value    RDW 14.8 (*)     All other components within normal limits   COMPREHENSIVE METABOLIC PANEL - Abnormal; Notable for the following components:    CO2 22 (*)     BUN 30 (*)     Creatinine 1.6 (*)     Albumin 2.0 (*)     eGFR if  51 (*)     eGFR if non  44 (*)     All other components within normal limits   TROPONIN I - " Abnormal; Notable for the following components:    Troponin I 0.187 (*)     All other components within normal limits   B-TYPE NATRIURETIC PEPTIDE - Abnormal; Notable for the following components:     (*)     All other components within normal limits   CULTURE, BLOOD   CULTURE, BLOOD   CK   LACTIC ACID, PLASMA   PROCALCITONIN   SARS-COV-2 RNA AMPLIFICATION, QUAL   POCT GLUCOSE        All Lab Results:  Results for orders placed or performed during the hospital encounter of 01/24/22   CBC auto differential   Result Value Ref Range    WBC 6.97 3.90 - 12.70 K/uL    RBC 4.78 4.60 - 6.20 M/uL    Hemoglobin 14.1 14.0 - 18.0 g/dL    Hematocrit 43.2 40.0 - 54.0 %    MCV 90 82 - 98 fL    MCH 29.5 27.0 - 31.0 pg    MCHC 32.6 32.0 - 36.0 g/dL    RDW 14.8 (H) 11.5 - 14.5 %    Platelets 209 150 - 450 K/uL    MPV 9.3 9.2 - 12.9 fL    Immature Granulocytes 0.3 0.0 - 0.5 %    Gran # (ANC) 4.6 1.8 - 7.7 K/uL    Immature Grans (Abs) 0.02 0.00 - 0.04 K/uL    Lymph # 1.7 1.0 - 4.8 K/uL    Mono # 0.5 0.3 - 1.0 K/uL    Eos # 0.1 0.0 - 0.5 K/uL    Baso # 0.02 0.00 - 0.20 K/uL    nRBC 0 0 /100 WBC    Gran % 66.5 38.0 - 73.0 %    Lymph % 24.4 18.0 - 48.0 %    Mono % 7.2 4.0 - 15.0 %    Eosinophil % 1.3 0.0 - 8.0 %    Basophil % 0.3 0.0 - 1.9 %    Differential Method Automated    Comprehensive metabolic panel   Result Value Ref Range    Sodium 140 136 - 145 mmol/L    Potassium 4.3 3.5 - 5.1 mmol/L    Chloride 104 95 - 110 mmol/L    CO2 22 (L) 23 - 29 mmol/L    Glucose 102 70 - 110 mg/dL    BUN 30 (H) 8 - 23 mg/dL    Creatinine 1.6 (H) 0.5 - 1.4 mg/dL    Calcium 8.7 8.7 - 10.5 mg/dL    Total Protein 7.6 6.0 - 8.4 g/dL    Albumin 2.0 (L) 3.5 - 5.2 g/dL    Total Bilirubin 0.8 0.1 - 1.0 mg/dL    Alkaline Phosphatase 76 55 - 135 U/L    AST 34 10 - 40 U/L    ALT 20 10 - 44 U/L    Anion Gap 14 8 - 16 mmol/L    eGFR if African American 51 (A) >60 mL/min/1.73 m^2    eGFR if non African American 44 (A) >60 mL/min/1.73 m^2   CPK   Result Value  Ref Range    CPK 47 20 - 200 U/L   Troponin I   Result Value Ref Range    Troponin I 0.187 (H) 0.000 - 0.026 ng/mL   Brain natriuretic peptide   Result Value Ref Range     (H) 0 - 99 pg/mL   POCT glucose   Result Value Ref Range    POCT Glucose 107 70 - 110 mg/dL         Imaging Results:  Imaging Results          X-Ray Chest 1 View (Final result)  Result time 01/24/22 16:05:34    Final result by Raul Mayo III, MD (01/24/22 16:05:34)                 Impression:      Left lower lobe pneumonia.      Electronically signed by: Raul Mayo MD  Date:    01/24/2022  Time:    16:05             Narrative:    EXAMINATION:  XR CHEST 1 VIEW    CLINICAL HISTORY:  Weakness    FINDINGS:  Comparison is made with 01/30/2019.  The cardiomediastinal silhouette is within normal limits for AP technique. There are ground-glass infiltration in the lateral left lower lobe suspicious for pneumonia.  The remainder of the lungs appear grossly clear of active disease.  No pleural effusion or pneumothorax identified.                                      The EKG was ordered, reviewed, and independently interpreted by the ED provider.  Interpretation time: 15:15  Rate: 97 BPM  Rhythm: Sinus rhythm with short WA with Premature atrial complexes with Aberrant conduction.  Interpretation: Nonspecific T wave abnormality. No STEMI.      The Emergency Provider reviewed the vital signs and test results, which are outlined above.    ED Discussion     5:08 PM: Discussed pt's case with Dr. Patrick (Interventional Cardiology) who recommends pt be admitted to .    5:44 PM: Discussed case with Benigno Guerra (Hospital Medicine). Dr. Lakhani agrees with current care and management of pt and accepts admission.   Admitting Service: Hospital Medicine  Admitting Physician: Dr. Lakhani  Admit to: Obs tele    5:54 PM: Re-evaluated pt. I have discussed test results, shared treatment plan, and the need for admission with patient and family at  bedside. Pt and family express understanding at this time and agree with all information. All questions answered. Pt and family have no further questions or concerns at this time. Pt is ready for admit.               ED Medication(s):  Medications   0.9%  NaCl infusion (has no administration in time range)   cefepime in dextrose 5 % IVPB 2 g (has no administration in time range)   sodium chloride 0.9% bolus 1,000 mL (has no administration in time range)     New Prescriptions    No medications on file            Medical Decision Making    Medical Decision Making:   Clinical Tests:   Lab Tests: Ordered and Reviewed  Radiological Study: Ordered and Reviewed  Medical Tests: Ordered and Reviewed           Scribe Attestation:   Scribe #1: I performed the above scribed service and the documentation accurately describes the services I performed. I attest to the accuracy of the note.    Attending:   Physician Attestation Statement for Scribe #1: I, Finn Gates MD, personally performed the services described in this documentation, as scribed by Cortney Ceja, in my presence, and it is both accurate and complete.          Clinical Impression       ICD-10-CM ICD-9-CM   1. Pneumonia of left lower lobe due to infectious organism  J18.9 486   2. Weakness  R53.1 780.79   3. COVID-19  U07.1 079.89   4. Elevated troponin  R77.8 790.6       Disposition:   Disposition: Placed in Observation (tele)  Condition: Stable       Finn Gates MD  01/25/22 1019

## 2022-01-24 NOTE — FIRST PROVIDER EVALUATION
Medical screening exam completed.  I have conducted a focused provider triage encounter, findings are as follows:    66 year old male with complaint of weakness and SOB.  Pt had near syncopal episode in clinic.  Pt diagnosed with COVID on 1/11/22.

## 2022-01-25 VITALS
BODY MASS INDEX: 41.51 KG/M2 | DIASTOLIC BLOOD PRESSURE: 60 MMHG | RESPIRATION RATE: 18 BRPM | TEMPERATURE: 99 F | OXYGEN SATURATION: 96 % | SYSTOLIC BLOOD PRESSURE: 116 MMHG | WEIGHT: 296.5 LBS | HEIGHT: 71 IN | HEART RATE: 68 BPM

## 2022-01-25 PROBLEM — J18.9 LEFT LOWER LOBE PNEUMONIA: Status: RESOLVED | Noted: 2022-01-24 | Resolved: 2022-01-25

## 2022-01-25 LAB
ALBUMIN SERPL BCP-MCNC: 1.7 G/DL (ref 3.5–5.2)
ALP SERPL-CCNC: 61 U/L (ref 55–135)
ALT SERPL W/O P-5'-P-CCNC: 16 U/L (ref 10–44)
ANION GAP SERPL CALC-SCNC: 13 MMOL/L (ref 8–16)
AST SERPL-CCNC: 17 U/L (ref 10–40)
BACTERIA #/AREA URNS HPF: NORMAL /HPF
BASOPHILS # BLD AUTO: 0.04 K/UL (ref 0–0.2)
BASOPHILS NFR BLD: 0.7 % (ref 0–1.9)
BILIRUB SERPL-MCNC: 0.7 MG/DL (ref 0.1–1)
BILIRUB UR QL STRIP: NEGATIVE
BUN SERPL-MCNC: 27 MG/DL (ref 8–23)
CALCIUM SERPL-MCNC: 8 MG/DL (ref 8.7–10.5)
CHLORIDE SERPL-SCNC: 108 MMOL/L (ref 95–110)
CLARITY UR: CLEAR
CO2 SERPL-SCNC: 20 MMOL/L (ref 23–29)
COLOR UR: YELLOW
CREAT SERPL-MCNC: 1.4 MG/DL (ref 0.5–1.4)
DIFFERENTIAL METHOD: ABNORMAL
EOSINOPHIL # BLD AUTO: 0.1 K/UL (ref 0–0.5)
EOSINOPHIL NFR BLD: 2 % (ref 0–8)
ERYTHROCYTE [DISTWIDTH] IN BLOOD BY AUTOMATED COUNT: 14.8 % (ref 11.5–14.5)
EST. GFR  (AFRICAN AMERICAN): 60 ML/MIN/1.73 M^2
EST. GFR  (NON AFRICAN AMERICAN): 52 ML/MIN/1.73 M^2
GLUCOSE SERPL-MCNC: 87 MG/DL (ref 70–110)
GLUCOSE UR QL STRIP: NEGATIVE
HCT VFR BLD AUTO: 40.3 % (ref 40–54)
HGB BLD-MCNC: 12.8 G/DL (ref 14–18)
HGB UR QL STRIP: NEGATIVE
IMM GRANULOCYTES # BLD AUTO: 0.02 K/UL (ref 0–0.04)
IMM GRANULOCYTES NFR BLD AUTO: 0.4 % (ref 0–0.5)
KETONES UR QL STRIP: ABNORMAL
LEUKOCYTE ESTERASE UR QL STRIP: ABNORMAL
LYMPHOCYTES # BLD AUTO: 1.3 K/UL (ref 1–4.8)
LYMPHOCYTES NFR BLD: 23.8 % (ref 18–48)
MCH RBC QN AUTO: 29.5 PG (ref 27–31)
MCHC RBC AUTO-ENTMCNC: 31.8 G/DL (ref 32–36)
MCV RBC AUTO: 93 FL (ref 82–98)
MICROSCOPIC COMMENT: NORMAL
MONOCYTES # BLD AUTO: 0.4 K/UL (ref 0.3–1)
MONOCYTES NFR BLD: 7.8 % (ref 4–15)
NEUTROPHILS # BLD AUTO: 3.7 K/UL (ref 1.8–7.7)
NEUTROPHILS NFR BLD: 65.3 % (ref 38–73)
NITRITE UR QL STRIP: NEGATIVE
NRBC BLD-RTO: 0 /100 WBC
PH UR STRIP: 6 [PH] (ref 5–8)
PLATELET # BLD AUTO: 204 K/UL (ref 150–450)
PMV BLD AUTO: 9.5 FL (ref 9.2–12.9)
POCT GLUCOSE: 102 MG/DL (ref 70–110)
POTASSIUM SERPL-SCNC: 3.4 MMOL/L (ref 3.5–5.1)
PROT SERPL-MCNC: 6.1 G/DL (ref 6–8.4)
PROT UR QL STRIP: NEGATIVE
RBC # BLD AUTO: 4.34 M/UL (ref 4.6–6.2)
SODIUM SERPL-SCNC: 141 MMOL/L (ref 136–145)
SP GR UR STRIP: 1.02 (ref 1–1.03)
SQUAMOUS #/AREA URNS HPF: 1 /HPF
TROPONIN I SERPL DL<=0.01 NG/ML-MCNC: 0.11 NG/ML (ref 0–0.03)
URN SPEC COLLECT METH UR: ABNORMAL
UROBILINOGEN UR STRIP-ACNC: NEGATIVE EU/DL
WBC # BLD AUTO: 5.62 K/UL (ref 3.9–12.7)
WBC #/AREA URNS HPF: 4 /HPF (ref 0–5)

## 2022-01-25 PROCEDURE — 96366 THER/PROPH/DIAG IV INF ADDON: CPT

## 2022-01-25 PROCEDURE — 25000003 PHARM REV CODE 250: Performed by: NURSE PRACTITIONER

## 2022-01-25 PROCEDURE — 80053 COMPREHEN METABOLIC PANEL: CPT | Performed by: FAMILY MEDICINE

## 2022-01-25 PROCEDURE — 96372 THER/PROPH/DIAG INJ SC/IM: CPT | Performed by: NURSE PRACTITIONER

## 2022-01-25 PROCEDURE — 97161 PT EVAL LOW COMPLEX 20 MIN: CPT

## 2022-01-25 PROCEDURE — 96372 THER/PROPH/DIAG INJ SC/IM: CPT | Mod: 59

## 2022-01-25 PROCEDURE — 85025 COMPLETE CBC W/AUTO DIFF WBC: CPT | Performed by: FAMILY MEDICINE

## 2022-01-25 PROCEDURE — G0378 HOSPITAL OBSERVATION PER HR: HCPCS

## 2022-01-25 PROCEDURE — 36415 COLL VENOUS BLD VENIPUNCTURE: CPT | Performed by: NURSE PRACTITIONER

## 2022-01-25 PROCEDURE — 97530 THERAPEUTIC ACTIVITIES: CPT

## 2022-01-25 PROCEDURE — 27000221 HC OXYGEN, UP TO 24 HOURS

## 2022-01-25 PROCEDURE — C9399 UNCLASSIFIED DRUGS OR BIOLOG: HCPCS | Performed by: NURSE PRACTITIONER

## 2022-01-25 PROCEDURE — 63600175 PHARM REV CODE 636 W HCPCS: Performed by: NURSE PRACTITIONER

## 2022-01-25 PROCEDURE — 97166 OT EVAL MOD COMPLEX 45 MIN: CPT

## 2022-01-25 PROCEDURE — 84484 ASSAY OF TROPONIN QUANT: CPT | Performed by: FAMILY MEDICINE

## 2022-01-25 PROCEDURE — 99900035 HC TECH TIME PER 15 MIN (STAT)

## 2022-01-25 RX ORDER — AMLODIPINE BESYLATE 10 MG/1
10 TABLET ORAL DAILY
Qty: 90 TABLET | Refills: 0 | Status: SHIPPED | OUTPATIENT
Start: 2022-01-25 | End: 2022-04-29 | Stop reason: SDUPTHER

## 2022-01-25 RX ADMIN — INSULIN DETEMIR 25 UNITS: 100 INJECTION, SOLUTION SUBCUTANEOUS at 11:01

## 2022-01-25 RX ADMIN — ATORVASTATIN CALCIUM 40 MG: 40 TABLET, FILM COATED ORAL at 10:01

## 2022-01-25 RX ADMIN — CEFEPIME HYDROCHLORIDE 2 G: 2 INJECTION, SOLUTION INTRAVENOUS at 03:01

## 2022-01-25 RX ADMIN — CEFEPIME HYDROCHLORIDE 2 G: 2 INJECTION, SOLUTION INTRAVENOUS at 10:01

## 2022-01-25 RX ADMIN — HEPARIN SODIUM 5000 UNITS: 5000 INJECTION, SOLUTION INTRAVENOUS; SUBCUTANEOUS at 06:01

## 2022-01-25 NOTE — PHARMACY MED REC
"Admission Medication History     The home medication history was taken by Tommy Carrillo.    You may go to "Admission" then "Reconcile Home Medications" tabs to review and/or act upon these items.      The home medication list has been updated by the Pharmacy department.    Please read ALL comments highlighted in yellow.    Please address this information as you see fit.     Feel free to contact us if you have any questions or require assistance.      Tommy Carrillo  [id number]      Current Outpatient Medications on File Prior to Encounter   Medication Sig Dispense Refill Last Dose    atorvastatin (LIPITOR) 40 MG tablet TAKE 1 TABLET BY MOUTH  DAILY 90 tablet 0 1/24/2022 at Unknown time    hydroCHLOROthiazide (HYDRODIURIL) 25 MG tablet Take 25 mg by mouth once daily.   1/24/2022 at Unknown time    LANTUS SOLOSTAR U-100 INSULIN glargine 100 units/mL (3mL) SubQ pen Inject 50 Units into the skin 2 (two) times a day.   1/24/2022 at Unknown time    metFORMIN (GLUCOPHAGE) 500 MG tablet TAKE 1 TABLET BY MOUTH  TWICE DAILY WITH MEALS 180 tablet 3 1/24/2022 at Unknown time    naproxen (NAPROSYN) 500 MG tablet Take 1 tablet (500 mg total) by mouth 2 (two) times daily with meals. 60 tablet 0 1/23/2022 at Unknown time    olmesartan-amLODIPin-hcthiazid 40-10-25 mg Tab Take 1 tablet by mouth once daily. 90 tablet 0 1/24/2022 at Unknown time    blood sugar diagnostic Strp 1 each by Misc.(Non-Drug; Combo Route) route once daily. 100 each 3     insulin syringe-needle U-100 0.3 mL 31 gauge x 1/4" Syrg 1 each by Misc.(Non-Drug; Combo Route) route 2 (two) times a day. 100 each 5            "

## 2022-01-25 NOTE — ASSESSMENT & PLAN NOTE
-Cr 1.6, consistent with patient baseline   - renal dose meds  - avoid nephrotoxic meds   - avoid events causing decreased renal perfusion

## 2022-01-25 NOTE — ASSESSMENT & PLAN NOTE
Poorly controlled  A1C 13.1 1/18/2022   - accconstantineck AC& HS, Diabetic diet   - Home management: lantus 50 units BID. Give insulin detemir 25 units BID, titrate as needed for goal glucose 140-180

## 2022-01-25 NOTE — HOSPITAL COURSE
Mr. Tavarez is a 66 year old male with a pmh of HTN was last hospitalized at [facility] approx 2 weeks ago with COVID ever since he was discharged his blood pressure has been low. He was taking olmesartan, HCTZ and amlodipine. Was admitted 24h ago for PNA however CXR appears unchanged from a CXR in 2020 and pt has no indices of infection no fever, leukocytosis or cough. He is saturating 96% on room air blood pressure 111/61. Has not received any antihypertensive meds since admission.    Low blood pressure is deemed iatrogenic 2/2 blood pressure meds rather than due to a sepsis/PNA picture. I discussed this with patient he agrees with a  plan to discharge him home today and understands. He has received maximum benefit from this hospitalization will discharge home with instructions to f/u with PCP Dr. Lemus in 1 week or sooner as previously scheduled.

## 2022-01-25 NOTE — ASSESSMENT & PLAN NOTE
-continue cefepime  - follow blood cultures   - continue nebs, decongestants, supplemental O2   -check lactic and procal

## 2022-01-25 NOTE — H&P
[facility] - Emergency Dept.  [facility] Medicine  History & Physical    Patient Name: Tom Tavarez  MRN: [id number]  Patient Class: OP- Observation  Admission Date: 1/24/2022  Attending Physician: Marcelo Lakhani MD   Primary Care Provider: Adolfo Lemus MD         Patient information was obtained from patient, past medical records and ER records.     Subjective:     Principal Problem:Left lower lobe pneumonia    Chief Complaint:   Chief Complaint   Patient presents with    Hypotension     Coming from doctor office in Cancer center. Also reports shortness of breath.        HPI: Tom Tavarez is a 66 y.o. male patient with PMHx of Colon cancer, DM, HLD, and HTN who presented to ED with complaint of SOB, TUTTLE, productive cough/congestion, fatigue and decreased po intake x 3 days. Pt denies fever. Symptoms are constant and moderate in severity. No mitigating or exacerbating factors reported. Pt was being seen in the cancer center PTA and was referred to the ED due to hypotension. Pt was dx with COVID-19 on 1/11/22. ED evaluation: Labs remarkable for trop 0.187, . CXR show LLL pna. Pt received cefepime and 2 liters IVF. Patient admitted to hospital medicine for further care.       Past Medical History:   Diagnosis Date    BMI 40.0-44.9, adult 7/21/2016    Colon cancer screening 10/3/2017    Diabetes mellitus type 2 in obese 11/2006    Hyperlipidemia     Hypertension 1985    Obesity, Class III, BMI 40-49.9 (morbid obesity) 1985    Umbilical hernia        Past Surgical History:   Procedure Laterality Date    G U surgery  1982    biopsy of testicle infertility w/u       Review of patient's allergies indicates:  No Known Allergies    No current facility-administered medications on file prior to encounter.     Current Outpatient Medications on File Prior to Encounter   Medication Sig    atorvastatin (LIPITOR) 40 MG tablet TAKE 1 TABLET BY MOUTH  DAILY    hydroCHLOROthiazide (HYDRODIURIL) 25 MG tablet Take  "25 mg by mouth once daily.    LANTUS SOLOSTAR U-100 INSULIN glargine 100 units/mL (3mL) SubQ pen Inject 50 Units into the skin 2 (two) times a day.    metFORMIN (GLUCOPHAGE) 500 MG tablet TAKE 1 TABLET BY MOUTH  TWICE DAILY WITH MEALS    naproxen (NAPROSYN) 500 MG tablet Take 1 tablet (500 mg total) by mouth 2 (two) times daily with meals.    olmesartan-amLODIPin-hcthiazid 40-10-25 mg Tab Take 1 tablet by mouth once daily.    blood sugar diagnostic Strp 1 each by Misc.(Non-Drug; Combo Route) route once daily.    insulin syringe-needle U-100 0.3 mL 31 gauge x 1/4" Syrg 1 each by Misc.(Non-Drug; Combo Route) route 2 (two) times a day.    [DISCONTINUED] blood sugar diagnostic Strp To check BG 3 times daily, to use with insurance preferred meter    [DISCONTINUED] flash glucose scanning reader (FREESTYLE KRISTY 14 DAY READER) Misc 1 each by Misc.(Non-Drug; Combo Route) route continuous. (Patient not taking: Reported on 1/24/2022)    [DISCONTINUED] flash glucose sensor (FREESTYLE KRISTY 14 DAY SENSOR) Kit 1 each by Misc.(Non-Drug; Combo Route) route continuous prn. (Patient not taking: Reported on 1/24/2022)    [DISCONTINUED] insulin syr/ndl U100 half florencio (BD INSULIN SYRINGE HALF UNIT) 0.3 mL 31 gauge x 5/16" Syrg 1 each by Misc.(Non-Drug; Combo Route) route before meals as needed.    [DISCONTINUED] pen needle, diabetic 31 gauge x 3/16" Ndle 1 each by Misc.(Non-Drug; Combo Route) route once daily.     Family History     Problem Relation (Age of Onset)    Heart failure Mother    Hypertension Mother        Tobacco Use    Smoking status: Former Smoker    Smokeless tobacco: Never Used    Tobacco comment: 1998 quit    Substance and Sexual Activity    Alcohol use: No     Alcohol/week: 0.0 standard drinks    Drug use: No    Sexual activity: Yes     Partners: Female     Review of Systems   Constitutional: Positive for activity change, appetite change and fatigue. Negative for chills and fever.   HENT: Positive for " congestion.    Eyes: Negative for photophobia.   Respiratory: Positive for cough. Negative for chest tightness, shortness of breath and wheezing.    Cardiovascular: Negative for chest pain, palpitations and leg swelling.   Gastrointestinal: Negative for abdominal pain, diarrhea, nausea and vomiting.   Genitourinary: Negative for dysuria, flank pain and hematuria.   Musculoskeletal: Negative for back pain, myalgias and neck pain.   Skin: Negative for rash and wound.   Neurological: Positive for weakness. Negative for dizziness, syncope and headaches.   Psychiatric/Behavioral: Negative for agitation.     Objective:     Vital Signs (Most Recent):  Temp: 98 °F (36.7 °C) (01/24/22 1458)  Pulse: 88 (01/24/22 1810)  Resp: (!) 22 (01/24/22 1810)  BP: 101/67 (01/24/22 1810)  SpO2: (!) 92 % (01/24/22 1810) Vital Signs (24h Range):  Temp:  [97 °F (36.1 °C)-98 °F (36.7 °C)] 98 °F (36.7 °C)  Pulse:  [76-88] 88  Resp:  [20-24] 22  SpO2:  [92 %-97 %] 92 %  BP: ()/(55-67) 101/67     Weight:  (unable to stand)  Body mass index is 38.97 kg/m².    Physical Exam  Vitals and nursing note reviewed.   Constitutional:       Appearance: He is obese.   HENT:      Head: Normocephalic and atraumatic.   Eyes:      Conjunctiva/sclera: Conjunctivae normal.      Pupils: Pupils are equal, round, and reactive to light.   Cardiovascular:      Rate and Rhythm: Normal rate.   Pulmonary:      Effort: Pulmonary effort is normal.   Abdominal:      General: Bowel sounds are normal.      Tenderness: There is no abdominal tenderness. There is no guarding.   Musculoskeletal:         General: Normal range of motion.      Cervical back: Normal range of motion.   Skin:     General: Skin is warm and dry.      Capillary Refill: Capillary refill takes less than 2 seconds.   Neurological:      General: No focal deficit present.      Mental Status: He is alert and oriented to person, place, and time.   Psychiatric:         Mood and Affect: Mood normal.            CRANIAL NERVES     CN III, IV, VI   Pupils are equal, round, and reactive to light.       Significant Labs:   All pertinent labs within the past 24 hours have been reviewed.  CBC:   Recent Labs   Lab 01/24/22  1511   WBC 6.97   HGB 14.1   HCT 43.2        CMP:   Recent Labs   Lab 01/24/22  1511      K 4.3      CO2 22*      BUN 30*   CREATININE 1.6*   CALCIUM 8.7   PROT 7.6   ALBUMIN 2.0*   BILITOT 0.8   ALKPHOS 76   AST 34   ALT 20   ANIONGAP 14   EGFRNONAA 44*     Troponin:   Recent Labs   Lab 01/24/22  1511   TROPONINI 0.187*       Significant Imaging: I have reviewed all pertinent imaging results/findings within the past 24 hours.    Assessment/Plan:     * Left lower lobe pneumonia  -continue cefepime  - follow blood cultures   - continue nebs, decongestants, supplemental O2   -check lactic and procal     GERD (gastroesophageal reflux disease)  -PPI       Stage 3 chronic kidney disease  -Cr 1.6, consistent with patient baseline   - renal dose meds  - avoid nephrotoxic meds   - avoid events causing decreased renal perfusion       Essential hypertension  -patient hypotensive, hold antihypertensives       Hyperlipidemia  -continue statin       Diabetes mellitus type 2 in obese  Poorly controlled  A1C 13.1 1/18/2022   - accucheck AC& HS, Diabetic diet   - Home management: lantus 50 units BID. Give insulin detemir 25 units BID, titrate as needed for goal glucose 140-180       VTE Risk Mitigation (From admission, onward)         Ordered     heparin (porcine) injection 5,000 Units  Every 8 hours         01/24/22 1841     IP VTE HIGH RISK PATIENT  Once         01/24/22 1841     Place sequential compression device  Until discontinued         01/24/22 1841                   Lizzette Montoya NP  Department of Hospital Medicine   [facility] - Emergency Dept.

## 2022-01-25 NOTE — SUBJECTIVE & OBJECTIVE
"Past Medical History:   Diagnosis Date    BMI 40.0-44.9, adult 7/21/2016    Colon cancer screening 10/3/2017    Diabetes mellitus type 2 in obese 11/2006    Hyperlipidemia     Hypertension 1985    Obesity, Class III, BMI 40-49.9 (morbid obesity) 1985    Umbilical hernia        Past Surgical History:   Procedure Laterality Date    G U surgery  1982    biopsy of testicle infertility w/u       Review of patient's allergies indicates:  No Known Allergies    No current facility-administered medications on file prior to encounter.     Current Outpatient Medications on File Prior to Encounter   Medication Sig    atorvastatin (LIPITOR) 40 MG tablet TAKE 1 TABLET BY MOUTH  DAILY    hydroCHLOROthiazide (HYDRODIURIL) 25 MG tablet Take 25 mg by mouth once daily.    LANTUS SOLOSTAR U-100 INSULIN glargine 100 units/mL (3mL) SubQ pen Inject 50 Units into the skin 2 (two) times a day.    metFORMIN (GLUCOPHAGE) 500 MG tablet TAKE 1 TABLET BY MOUTH  TWICE DAILY WITH MEALS    naproxen (NAPROSYN) 500 MG tablet Take 1 tablet (500 mg total) by mouth 2 (two) times daily with meals.    olmesartan-amLODIPin-hcthiazid 40-10-25 mg Tab Take 1 tablet by mouth once daily.    blood sugar diagnostic Strp 1 each by Misc.(Non-Drug; Combo Route) route once daily.    insulin syringe-needle U-100 0.3 mL 31 gauge x 1/4" Syrg 1 each by Misc.(Non-Drug; Combo Route) route 2 (two) times a day.    [DISCONTINUED] blood sugar diagnostic Strp To check BG 3 times daily, to use with insurance preferred meter    [DISCONTINUED] flash glucose scanning reader (FREESTYLE KRISTY 14 DAY READER) Misc 1 each by Misc.(Non-Drug; Combo Route) route continuous. (Patient not taking: Reported on 1/24/2022)    [DISCONTINUED] flash glucose sensor (FREESTYLE KRISTY 14 DAY SENSOR) Kit 1 each by Misc.(Non-Drug; Combo Route) route continuous prn. (Patient not taking: Reported on 1/24/2022)    [DISCONTINUED] insulin syr/ndl U100 half florencio (BD INSULIN SYRINGE HALF " "UNIT) 0.3 mL 31 gauge x 5/16" Syrg 1 each by Misc.(Non-Drug; Combo Route) route before meals as needed.    [DISCONTINUED] pen needle, diabetic 31 gauge x 3/16" Ndle 1 each by Misc.(Non-Drug; Combo Route) route once daily.     Family History     Problem Relation (Age of Onset)    Heart failure Mother    Hypertension Mother        Tobacco Use    Smoking status: Former Smoker    Smokeless tobacco: Never Used    Tobacco comment: 1998 quit    Substance and Sexual Activity    Alcohol use: No     Alcohol/week: 0.0 standard drinks    Drug use: No    Sexual activity: Yes     Partners: Female     Review of Systems   Constitutional: Positive for activity change, appetite change and fatigue. Negative for chills and fever.   HENT: Positive for congestion.    Eyes: Negative for photophobia.   Respiratory: Positive for cough. Negative for chest tightness, shortness of breath and wheezing.    Cardiovascular: Negative for chest pain, palpitations and leg swelling.   Gastrointestinal: Negative for abdominal pain, diarrhea, nausea and vomiting.   Genitourinary: Negative for dysuria, flank pain and hematuria.   Musculoskeletal: Negative for back pain, myalgias and neck pain.   Skin: Negative for rash and wound.   Neurological: Positive for weakness. Negative for dizziness, syncope and headaches.   Psychiatric/Behavioral: Negative for agitation.     Objective:     Vital Signs (Most Recent):  Temp: 98 °F (36.7 °C) (01/24/22 1458)  Pulse: 88 (01/24/22 1810)  Resp: (!) 22 (01/24/22 1810)  BP: 101/67 (01/24/22 1810)  SpO2: (!) 92 % (01/24/22 1810) Vital Signs (24h Range):  Temp:  [97 °F (36.1 °C)-98 °F (36.7 °C)] 98 °F (36.7 °C)  Pulse:  [76-88] 88  Resp:  [20-24] 22  SpO2:  [92 %-97 %] 92 %  BP: ()/(55-67) 101/67     Weight:  (unable to stand)  Body mass index is 38.97 kg/m².    Physical Exam  Vitals and nursing note reviewed.   Constitutional:       Appearance: He is obese.   HENT:      Head: Normocephalic and atraumatic. "   Eyes:      Conjunctiva/sclera: Conjunctivae normal.      Pupils: Pupils are equal, round, and reactive to light.   Cardiovascular:      Rate and Rhythm: Normal rate.   Pulmonary:      Effort: Pulmonary effort is normal.   Abdominal:      General: Bowel sounds are normal.      Tenderness: There is no abdominal tenderness. There is no guarding.   Musculoskeletal:         General: Normal range of motion.      Cervical back: Normal range of motion.   Skin:     General: Skin is warm and dry.      Capillary Refill: Capillary refill takes less than 2 seconds.   Neurological:      General: No focal deficit present.      Mental Status: He is alert and oriented to person, place, and time.   Psychiatric:         Mood and Affect: Mood normal.           CRANIAL NERVES     CN III, IV, VI   Pupils are equal, round, and reactive to light.       Significant Labs:   All pertinent labs within the past 24 hours have been reviewed.  CBC:   Recent Labs   Lab 01/24/22  1511   WBC 6.97   HGB 14.1   HCT 43.2        CMP:   Recent Labs   Lab 01/24/22  1511      K 4.3      CO2 22*      BUN 30*   CREATININE 1.6*   CALCIUM 8.7   PROT 7.6   ALBUMIN 2.0*   BILITOT 0.8   ALKPHOS 76   AST 34   ALT 20   ANIONGAP 14   EGFRNONAA 44*     Troponin:   Recent Labs   Lab 01/24/22  1511   TROPONINI 0.187*       Significant Imaging: I have reviewed all pertinent imaging results/findings within the past 24 hours.

## 2022-01-25 NOTE — ASSESSMENT & PLAN NOTE
--briefly hypotensive   --bp recovering following cessation of all antihypertensives  --dc home  --restart amlodipine tomorrow  --discontinue HCTZ and olmesartan  --f/u with PCP

## 2022-01-25 NOTE — DISCHARGE SUMMARY
[facility] - Emergency Dept.  Hospital Medicine  Discharge Summary      Patient Name: Tom Tavarez  MRN: [id number]  Patient Class: OP- Observation  Admission Date: 1/24/2022  Hospital Length of Stay: 0 days  Discharge Date and Time: No discharge date for patient encounter.  Attending Physician: Marcelo Lakhani MD   Discharging Provider: Marcelo Lakhani MD  Primary Care Provider: Adolfo Lemus MD      HPI:   Tom Tavarez is a 66 y.o. male patient with PMHx of Colon cancer, DM, HLD, and HTN who presented to ED with complaint of SOB, TUTTLE, productive cough/congestion, fatigue and decreased po intake x 3 days. Pt denies fever. Symptoms are constant and moderate in severity. No mitigating or exacerbating factors reported. Pt was being seen in the cancer center PTA and was referred to the ED due to hypotension. Pt was dx with COVID-19 on 1/11/22. ED evaluation: Labs remarkable for trop 0.187, . CXR show LLL pna. Pt received cefepime and 2 liters IVF. Patient admitted to hospital medicine for further care.       * No surgery found *      Hospital Course:   No notes on file     Goals of Care Treatment Preferences:  Code Status: Full Code      Consults:     No new Assessment & Plan notes have been filed under this hospital service since the last note was generated.  Service: Hospital Medicine    Final Active Diagnoses:    Diagnosis Date Noted POA    PRINCIPAL PROBLEM:  Essential hypertension [I10]  Yes     Chronic    GERD (gastroesophageal reflux disease) [K21.9] 03/05/2019 Yes    Stage 3 chronic kidney disease [N18.30] 09/17/2018 Yes    Hyperlipidemia [E78.5]  Yes     Chronic    Diabetes mellitus type 2 in obese [E11.69, E66.9]  Yes      Problems Resolved During this Admission:    Diagnosis Date Noted Date Resolved POA    Left lower lobe pneumonia [J18.9] 01/24/2022 01/25/2022 Yes       Discharged Condition: fair    Disposition: Home or Self Care    Follow Up:   Follow-up Information     Adolfo Lemus MD  "In 1 week.    Specialty: Family Medicine  Why: hospital discharge f/u one week or as prev scheduled  Contact information:  [address] [facility] [address] [phone]                       Patient Instructions:   No discharge procedures on file.    Significant Diagnostic Studies:      Pending Diagnostic Studies:     Procedure Component Value Units Date/Time    CBC with Automated Differential [[id number]] Collected: 01/25/22 0321    Order Status: Sent Lab Status: In process Updated: 01/25/22 0321    Specimen: Blood     Comprehensive Metabolic Panel (CMP) [[id number]] Collected: 01/25/22 0321    Order Status: Sent Lab Status: In process Updated: 01/25/22 0321    Specimen: Blood     Troponin I [[id number]] Collected: 01/25/22 0321    Order Status: Sent Lab Status: In process Updated: 01/25/22 0321    Specimen: Blood          Medications:  Reconciled Home Medications:      Medication List      START taking these medications    amLODIPine 10 MG tablet  Commonly known as: NORVASC  Take 1 tablet (10 mg total) by mouth once daily.        CONTINUE taking these medications    atorvastatin 40 MG tablet  Commonly known as: LIPITOR  TAKE 1 TABLET BY MOUTH  DAILY     blood sugar diagnostic Strp  1 each by Misc.(Non-Drug; Combo Route) route once daily.     insulin syringe-needle U-100 0.3 mL 31 gauge x 1/4" Syrg  1 each by Misc.(Non-Drug; Combo Route) route 2 (two) times a day.     LANTUS SOLOSTAR U-100 INSULIN glargine 100 units/mL (3mL) SubQ pen  Generic drug: insulin  Inject 50 Units into the skin 2 (two) times a day.     metFORMIN 500 MG tablet  Commonly known as: GLUCOPHAGE  TAKE 1 TABLET BY MOUTH  TWICE DAILY WITH MEALS     naproxen 500 MG tablet  Commonly known as: NAPROSYN  Take 1 tablet (500 mg total) by mouth 2 (two) times daily with meals.        STOP taking these medications    hydroCHLOROthiazide 25 MG tablet  Commonly known as: HYDRODIURIL     olmesartan-amLODIPin-hcthiazid 40-10-25 mg Tab      "       Indwelling Lines/Drains at time of discharge:   Lines/Drains/Airways     None                 Time spent on the discharge of patient: 30 minutes         Marcelo Lakhani MD  Department of Hospital Medicine  [facility] - Emergency Dept.

## 2022-01-25 NOTE — PT/OT/SLP EVAL
Physical Therapy Evaluation and Discharge Note    Patient Name:  Tom Tavarez   MRN:  [id number]    Recommendations:     Discharge Recommendations:  home,home health PT   Discharge Equipment Recommendations: none   Barriers to discharge: None    Assessment:     Tom Tavarez is a 66 y.o. male admitted with a medical diagnosis of Essential hypertension. .  At this time, patient is functioning at their prior level of function and does not require further acute PT services.     Recent Surgery: * No surgery found *     Plan:     During this hospitalization, patient does not require further acute PT services.  Please re-consult if situation changes.  D/C PT FROM P.T. SERVICES TO [facility] FOR CONT. GAIT/TE TO TOLERANCE    Subjective     Chief Complaint:   Patient/Family Comments/goals:   Pain/Comfort:  · Pain Rating 1: 6/10  · Location - Side 1: Left  · Location 1: ankle  · Pain Addressed 1: Distraction,Reposition    Patients cultural, spiritual, Rastafari conflicts given the current situation:      Living Environment:  PT LIVES WITH WIFE, 2 STORY HOUSE, BR ON 1ST FLOOR, AMB INDEP OR USES RW COMMUNITY DISTANCES, DOES NOT DRIVE, WIFE ASSISTS WITH ADL'S, PT WAS RECEIVING HHPT 2X A WEEK PTA  Prior to admission, patients level of function was SHANA.  Equipment used at home: walker, rolling.  DME owned (not currently used): none.  Upon discharge, patient will have assistance from WIFE.    Objective:     Communicated with NURSE LOPEZ prior to session.  Patient found supine with blood pressure cuff,pulse ox (continuous),telemetry,peripheral IV upon PT entry to room.    General Precautions: Standard,     Orthopedic Precautions:N/A   Braces: N/A   Respiratory Status: Nasal cannula, flow 2 L/min    Exams:  · Cognitive Exam:  Patient is oriented to Person, Place, Time and Situation  · Postural Exam:  Patient presented with the following abnormalities:    · -       Rounded shoulders  · Sensation:    · -        Intact  · RLE ROM: WFL  · RLE Strength: WFL  · LLE ROM: WFL  · LLE Strength: WFL    Functional Mobility:  · Bed Mobility:     · Rolling Left:  modified independence  · Scooting: modified independence  · Supine to Sit: modified independence  · Transfers:     · Sit to Stand:  supervision with no AD  · Bed to Chair: supervision with  no AD  using  Step Transfer  · Gait: PT ' NO AD WITH SPV, NO LOB OR SOB ON ROOM AIR  · Balance: GOOD    AM-PAC 6 CLICK MOBILITY  Total Score:21     Therapeutic Activities and Exercises:   PT EDUCATED IN ROLE OF P.T. AND POC, PT ENCOURAGED TO INCREASE TIME OOB IN CHAIR, PT EDUCATED IN BLE THEREX TO PERFORM WHILE SEATED OR SUPINE: AP'S, QUAD SETS    AM-PAC 6 CLICK MOBILITY  Total Score:21     Patient left HOB elevated with all lines intact, call button in reach and NURSE notified.    GOALS:   Multidisciplinary Problems     Physical Therapy Goals     Not on file                History:     Past Medical History:   Diagnosis Date    BMI 40.0-44.9, adult 7/21/2016    Colon cancer screening 10/3/2017    Diabetes mellitus type 2 in obese 11/2006    Hyperlipidemia     Hypertension 1985    Obesity, Class III, BMI 40-49.9 (morbid obesity) 1985    Umbilical hernia        Past Surgical History:   Procedure Laterality Date    G U surgery  1982    biopsy of testicle infertility w/u       Time Tracking:     PT Received On: 01/25/22  PT Start Time: 0845     PT Stop Time: 0908  PT Total Time (min): 23 min     Billable Minutes: Evaluation 15 and Therapeutic Activity 8    01/25/2022

## 2022-01-25 NOTE — PT/OT/SLP EVAL
"Occupational Therapy   Evaluation and Discharge Note    Name: Tom Tavarez  MRN: [id number]  Admitting Diagnosis:  Essential hypertension   Recent Surgery: * No surgery found *      Recommendations:     Discharge Recommendations: home  Discharge Equipment Recommendations:  none  Barriers to discharge:  None    Assessment:     Tom Tavarez is a 66 y.o. male with a medical diagnosis of Essential hypertension. At this time, patient is functioning at their prior level of function and does not require further acute OT services.     Plan:     During this hospitalization, patient does not require further acute OT services.  Please re-consult if situation changes.    · Plan of Care Reviewed with: patient    Subjective     Chief Complaint: Reported "My ankle has been bothering me."  Patient/Family Comments/goals: none reported    Occupational Profile:  Living Environment: Patient resides in a one story home with no steps to enter, with his wife.  Previous level of function: Patient reports that he is independent with ambulation (using RW PRN when "weak") and independent with ADLs. He declines falls. Reports that he has been working with HHPT. Does not drive.   Roles and Routines: n/a  Equipment Used at home:  walker, rolling  Assistance upon Discharge: wife    Pain/Comfort:  · Pain Rating 1: 6/10  · Location - Side 1: Left  · Location 1: ankle  · Pain Addressed 1:  (activity pacing)    Objective:     Communicated with: Nurse, Sandhya, prior to session.  Patient found supine with blood pressure cuff,pulse ox (continuous),telemetry,peripheral IV upon OT entry to room.    General Precautions: Standard,     Orthopedic Precautions:N/A   Braces: N/A  Respiratory Status: Nasal cannula, flow 2 L/min     Bed Mobility:    · Patient completed Supine to Sit with modified independence  · Patient completed Sit to Supine with modified independence    Functional Mobility/Transfers:  · Patient completed Sit <> Stand Transfer with modified " independence  with  no assistive device   · Patient completed Bed <> Chair Transfer using Step Transfer technique with modified independence with no assistive device  · Functional Mobility: Patient completed functional mobility in hallway x150ft without AD progressing from SPV for assessment to independent. Patient declined to transfer to bedside chair after, and completed step>pivot back to ER stretcher.    Activities of Daily Living:  · Grooming: independence .  · Upper Body Dressing: independence .    Cognitive/Visual Perceptual:  Cognitive/Psychosocial Skills:     -       Oriented to: Person, Place, Time and Situation   -       Follows Commands/attention:Follows multistep  commands    Physical Exam:  Balance:    -       sitting: WFL, standing: WFL  Dominant hand:    -       right  Upper Extremity Range of Motion:     -       Right Upper Extremity: WFL  -       Left Upper Extremity: WFL  Upper Extremity Strength:    -       Right Upper Extremity: WFL  -       Left Upper Extremity: WFL   Strength:    -       Right Upper Extremity: WFL  -       Left Upper Extremity: WFL    AMPAC 6 Click ADL:  AMPAC Total Score: 24    Treatment & Education:  Patient educated on role of OT in acute setting and benefits of OOB activity. Stated understanding.  Education:    Patient left supine with all lines intact and call button in reach    History:     Past Medical History:   Diagnosis Date    BMI 40.0-44.9, adult 7/21/2016    Colon cancer screening 10/3/2017    Diabetes mellitus type 2 in obese 11/2006    Hyperlipidemia     Hypertension 1985    Obesity, Class III, BMI 40-49.9 (morbid obesity) 1985    Umbilical hernia        Past Surgical History:   Procedure Laterality Date    G U surgery  1982    biopsy of testicle infertility w/u       Time Tracking:     OT Date of Treatment: 01/25/22  OT Start Time: 0855  OT Stop Time: 0920  OT Total Time (min): 25 min    Billable Minutes:Evaluation 25 1/25/2022

## 2022-01-25 NOTE — HPI
Tom Tavarez is a 66 y.o. male patient with PMHx of Colon cancer, DM, HLD, and HTN who presented to ED with complaint of SOB, TUTTLE, productive cough/congestion, fatigue and decreased po intake x 3 days. Pt denies fever. Symptoms are constant and moderate in severity. No mitigating or exacerbating factors reported. Pt was being seen in the cancer center P[facility] and was referred to the ED due to hypotension. Pt was dx with COVID-19 on 1/11/22. ED evaluation: Labs remarkable for trop 0.187, . CXR show LLL pna. Pt received cefepime and 2 liters IVF. Patient admitted to hospital medicine for further care.

## 2022-01-25 NOTE — PROGRESS NOTES
Pharmacist Renal Dose Adjustment Note    Tom Tavarez is a 66 y.o. male being treated with the medication cefepime for pneumonia.     Patient Data:    Vital Signs (Most Recent):  Temp: 98 °F (36.7 °C) (01/24/22 1458)  Pulse: 73 (01/24/22 2030)  Resp: 18 (01/24/22 2030)  BP: (!) 112/59 (01/24/22 2030)  SpO2: (!) 94 % (01/24/22 2030)   Vital Signs (72h Range):  Temp:  [97 °F (36.1 °C)-98 °F (36.7 °C)]   Pulse:  [73-88]   Resp:  [18-24]   BP: ()/(55-67)   SpO2:  [92 %-97 %]      Recent Labs   Lab 01/18/22  1208 01/24/22  1511   CREATININE 1.2 1.6*     Serum creatinine: 1.6 mg/dL (H) 01/24/22 1511  Estimated creatinine clearance: 62.7 mL/min (A)    Per protocol for a severe infection & for CrCl > 60 ml/min, dose will be increased from 1 gm IV every 12 hours to 2 gm IV every 8 hours.    Pharmacist's Name: Katherine E Mcardle  Pharmacist's Extension: [phone]

## 2022-01-25 NOTE — PLAN OF CARE
OT kat completed. Patient at functional baseline. Completing transfers and ADLs mod I to independent. Continued skilled OT intervention in acute or post acute not warranted. D/C OT.

## 2022-01-28 ENCOUNTER — TELEPHONE (OUTPATIENT)
Dept: INTERNAL MEDICINE | Facility: CLINIC | Age: 67
End: 2022-01-28
Payer: MEDICARE

## 2022-01-28 NOTE — TELEPHONE ENCOUNTER
Called and spoke with the fax number she gave me im not sure where its located. I gave her that fax number in peds by Dr. Lemus office at [phone]

## 2022-01-28 NOTE — TELEPHONE ENCOUNTER
----- Message from Vilma Ivey sent at 1/28/2022 10:42 AM CST -----  Contact: Pt Wife Pau  .Type:  Needs Medical Advice    Who Called: Pt Wife Pau   Would the patient rather a call back or a response via [facility]? Call   Best Call Back Number: .[phone]   Additional Information: Pt wife is req a call back in regards to paperwork she needs filled out for her employer showing she will be providing care to her . Also she will be having this information faxed to your office and would also like to be notified once this information is received.

## 2022-01-31 ENCOUNTER — TELEPHONE (OUTPATIENT)
Dept: INTERNAL MEDICINE | Facility: CLINIC | Age: 67
End: 2022-01-31
Payer: MEDICARE

## 2022-01-31 LAB
BACTERIA BLD CULT: NORMAL
BACTERIA BLD CULT: NORMAL

## 2022-01-31 NOTE — TELEPHONE ENCOUNTER
----- Message from Nancy Marquez sent at 1/31/2022 12:21 PM CST -----  Regarding: family leave  Contact: pt  Pt calling regarding an extended leave for his wife.  The paperwork was sent last week. His wife have until Friday to get it turned in. Pt can be reached at [phone] or Pau Tavarez [phone]

## 2022-02-01 ENCOUNTER — PATIENT MESSAGE (OUTPATIENT)
Dept: ADMINISTRATIVE | Facility: OTHER | Age: 67
End: 2022-02-01
Payer: MEDICARE

## 2022-02-03 ENCOUNTER — HOSPITAL ENCOUNTER (OUTPATIENT)
Dept: RADIOLOGY | Facility: HOSPITAL | Age: 67
Discharge: HOME | End: 2022-02-03
Attending: FAMILY MEDICINE
Payer: MEDICARE

## 2022-02-03 ENCOUNTER — OFFICE VISIT (OUTPATIENT)
Dept: INTERNAL MEDICINE | Facility: CLINIC | Age: 67
End: 2022-02-03
Payer: MEDICARE

## 2022-02-03 VITALS
HEART RATE: 69 BPM | OXYGEN SATURATION: 97 % | SYSTOLIC BLOOD PRESSURE: 130 MMHG | DIASTOLIC BLOOD PRESSURE: 80 MMHG | TEMPERATURE: 98 F

## 2022-02-03 DIAGNOSIS — R06.09 DYSPNEA ON EXERTION: ICD-10-CM

## 2022-02-03 DIAGNOSIS — R06.02 SHORTNESS OF BREATH: ICD-10-CM

## 2022-02-03 DIAGNOSIS — I10 PRIMARY HYPERTENSION: ICD-10-CM

## 2022-02-03 DIAGNOSIS — R06.02 SHORTNESS OF BREATH: Primary | ICD-10-CM

## 2022-02-03 DIAGNOSIS — R53.1 WEAKNESS: ICD-10-CM

## 2022-02-03 DIAGNOSIS — I95.9 HYPOTENSION, UNSPECIFIED HYPOTENSION TYPE: ICD-10-CM

## 2022-02-03 PROCEDURE — 1125F AMNT PAIN NOTED PAIN PRSNT: CPT | Mod: CPTII,S$GLB,, | Performed by: FAMILY MEDICINE

## 2022-02-03 PROCEDURE — 1101F PT FALLS ASSESS-DOCD LE1/YR: CPT | Mod: CPTII,S$GLB,, | Performed by: FAMILY MEDICINE

## 2022-02-03 PROCEDURE — 1125F PR PAIN SEVERITY QUANTIFIED, PAIN PRESENT: ICD-10-PCS | Mod: CPTII,S$GLB,, | Performed by: FAMILY MEDICINE

## 2022-02-03 PROCEDURE — 3079F DIAST BP 80-89 MM HG: CPT | Mod: CPTII,S$GLB,, | Performed by: FAMILY MEDICINE

## 2022-02-03 PROCEDURE — 3046F HEMOGLOBIN A1C LEVEL >9.0%: CPT | Mod: CPTII,S$GLB,, | Performed by: FAMILY MEDICINE

## 2022-02-03 PROCEDURE — 3288F PR FALLS RISK ASSESSMENT DOCUMENTED: ICD-10-PCS | Mod: CPTII,S$GLB,, | Performed by: FAMILY MEDICINE

## 2022-02-03 PROCEDURE — 3075F PR MOST RECENT SYSTOLIC BLOOD PRESS GE 130-139MM HG: ICD-10-PCS | Mod: CPTII,S$GLB,, | Performed by: FAMILY MEDICINE

## 2022-02-03 PROCEDURE — 3288F FALL RISK ASSESSMENT DOCD: CPT | Mod: CPTII,S$GLB,, | Performed by: FAMILY MEDICINE

## 2022-02-03 PROCEDURE — 99999 PR PBB SHADOW E&M-EST. PATIENT-LVL IV: CPT | Mod: PBBFAC,,, | Performed by: FAMILY MEDICINE

## 2022-02-03 PROCEDURE — 1101F PR PT FALLS ASSESS DOC 0-1 FALLS W/OUT INJ PAST YR: ICD-10-PCS | Mod: CPTII,S$GLB,, | Performed by: FAMILY MEDICINE

## 2022-02-03 PROCEDURE — 99999 PR PBB SHADOW E&M-EST. PATIENT-LVL IV: ICD-10-PCS | Mod: PBBFAC,,, | Performed by: FAMILY MEDICINE

## 2022-02-03 PROCEDURE — 99214 OFFICE O/P EST MOD 30 MIN: CPT | Mod: S$GLB,,, | Performed by: FAMILY MEDICINE

## 2022-02-03 PROCEDURE — 1159F MED LIST DOCD IN RCRD: CPT | Mod: CPTII,S$GLB,, | Performed by: FAMILY MEDICINE

## 2022-02-03 PROCEDURE — 1159F PR MEDICATION LIST DOCUMENTED IN MEDICAL RECORD: ICD-10-PCS | Mod: CPTII,S$GLB,, | Performed by: FAMILY MEDICINE

## 2022-02-03 PROCEDURE — 71046 XR CHEST PA AND LATERAL: ICD-10-PCS | Mod: 26,,, | Performed by: RADIOLOGY

## 2022-02-03 PROCEDURE — 71046 X-RAY EXAM CHEST 2 VIEWS: CPT | Mod: TC

## 2022-02-03 PROCEDURE — 3079F PR MOST RECENT DIASTOLIC BLOOD PRESSURE 80-89 MM HG: ICD-10-PCS | Mod: CPTII,S$GLB,, | Performed by: FAMILY MEDICINE

## 2022-02-03 PROCEDURE — 3075F SYST BP GE 130 - 139MM HG: CPT | Mod: CPTII,S$GLB,, | Performed by: FAMILY MEDICINE

## 2022-02-03 PROCEDURE — 71046 X-RAY EXAM CHEST 2 VIEWS: CPT | Mod: 26,,, | Performed by: RADIOLOGY

## 2022-02-03 PROCEDURE — 3046F PR MOST RECENT HEMOGLOBIN A1C LEVEL > 9.0%: ICD-10-PCS | Mod: CPTII,S$GLB,, | Performed by: FAMILY MEDICINE

## 2022-02-03 PROCEDURE — 99214 PR OFFICE/OUTPT VISIT, EST, LEVL IV, 30-39 MIN: ICD-10-PCS | Mod: S$GLB,,, | Performed by: FAMILY MEDICINE

## 2022-02-03 NOTE — PROGRESS NOTES
Subjective:       Patient ID: Tom Tavarez is a 66 y.o. male.    Chief Complaint: Follow-up ([facility])    Hospital discharge follow-up.  One day admission for shortness of breath low blood pressure with diabetes.  His blood sugars were normal during that period of time.  He reports his blood sugars currently at have been .  He is on Lantus 50 units twice a day.  Previous hypotension has improved his blood pressure now 130. His blood pressure medications were adjusted.  He has some generalized weakness but is improving.  He completed home physical therapy.  He needs [facility] papers completed for his wife's work.  He denies fever chills he has occasional cough.  Chest x-ray with pulmonary infiltrates suggestive of pneumonia.  However clinically this was not supported.    Review of Systems   Constitutional: Negative for chills and fever.   HENT: Negative for congestion.    Respiratory: Positive for cough and shortness of breath. Negative for chest tightness and wheezing.         Minimal short of breath with exertion but improved   Cardiovascular: Negative for chest pain and palpitations.   Musculoskeletal: Positive for arthralgias.        Ongoing left ankle pain.  He has a history of gout.  He has gets relief with heat and Tylenol.  He was previously described prednisone.   Neurological: Positive for weakness. Negative for dizziness, light-headedness and headaches.       Objective:      Physical Exam  Constitutional:       General: He is not in acute distress.     Appearance: He is not diaphoretic.   Cardiovascular:      Rate and Rhythm: Normal rate.      Heart sounds: No murmur heard.  No gallop.    Pulmonary:      Effort: Pulmonary effort is normal. No respiratory distress.      Breath sounds: Rales present. No wheezing or rhonchi.      Comments: Minimal rales right base  Abdominal:      General: There is no distension.   Skin:     General: Skin is warm and dry.      Coloration: Skin is not pale.      Findings: No  erythema.   Neurological:      Mental Status: He is alert.         Admission on 01/24/2022, Discharged on 01/25/2022   Component Date Value Ref Range Status    WBC 01/24/2022 6.97  3.90 - 12.70 K/uL Final    RBC 01/24/2022 4.78  4.60 - 6.20 M/uL Final    Hemoglobin 01/24/2022 14.1  14.0 - 18.0 g/dL Final    Hematocrit 01/24/2022 43.2  40.0 - 54.0 % Final    MCV 01/24/2022 90  82 - 98 fL Final    MCH 01/24/2022 29.5  27.0 - 31.0 pg Final    MCHC 01/24/2022 32.6  32.0 - 36.0 g/dL Final    RDW 01/24/2022 14.8* 11.5 - 14.5 % Final    Platelets 01/24/2022 209  150 - 450 K/uL Final    MPV 01/24/2022 9.3  9.2 - 12.9 fL Final    Immature Granulocytes 01/24/2022 0.3  0.0 - 0.5 % Final    Gran # (ANC) 01/24/2022 4.6  1.8 - 7.7 K/uL Final    Immature Grans (Abs) 01/24/2022 0.02  0.00 - 0.04 K/uL Final    Lymph # 01/24/2022 1.7  1.0 - 4.8 K/uL Final    Mono # 01/24/2022 0.5  0.3 - 1.0 K/uL Final    Eos # 01/24/2022 0.1  0.0 - 0.5 K/uL Final    Baso # 01/24/2022 0.02  0.00 - 0.20 K/uL Final    nRBC 01/24/2022 0  0 /100 WBC Final    Gran % 01/24/2022 66.5  38.0 - 73.0 % Final    Lymph % 01/24/2022 24.4  18.0 - 48.0 % Final    Mono % 01/24/2022 7.2  4.0 - 15.0 % Final    Eosinophil % 01/24/2022 1.3  0.0 - 8.0 % Final    Basophil % 01/24/2022 0.3  0.0 - 1.9 % Final    Differential Method 01/24/2022 Automated   Final    Sodium 01/24/2022 140  136 - 145 mmol/L Final    Potassium 01/24/2022 4.3  3.5 - 5.1 mmol/L Final    Chloride 01/24/2022 104  95 - 110 mmol/L Final    CO2 01/24/2022 22* 23 - 29 mmol/L Final    Glucose 01/24/2022 102  70 - 110 mg/dL Final    BUN 01/24/2022 30* 8 - 23 mg/dL Final    Creatinine 01/24/2022 1.6* 0.5 - 1.4 mg/dL Final    Calcium 01/24/2022 8.7  8.7 - 10.5 mg/dL Final    Total Protein 01/24/2022 7.6  6.0 - 8.4 g/dL Final    Albumin 01/24/2022 2.0* 3.5 - 5.2 g/dL Final    Total Bilirubin 01/24/2022 0.8  0.1 - 1.0 mg/dL Final    Alkaline Phosphatase 01/24/2022 76  55 - 135  U/L Final    AST 01/24/2022 34  10 - 40 U/L Final    ALT 01/24/2022 20  10 - 44 U/L Final    Anion Gap 01/24/2022 14  8 - 16 mmol/L Final    eGFR if  01/24/2022 51* >60 mL/min/1.73 m^2 Final    eGFR if non  01/24/2022 44* >60 mL/min/1.73 m^2 Final    CPK 01/24/2022 47  20 - 200 U/L Final    Troponin I 01/24/2022 0.187* 0.000 - 0.026 ng/mL Final    BNP 01/24/2022 165* 0 - 99 pg/mL Final    POCT Glucose 01/24/2022 107  70 - 110 mg/dL Final    Blood Culture, Routine 01/24/2022 No growth after 5 days.   Final    Blood Culture, Routine 01/24/2022 No growth after 5 days.   Final    Lactate (Lactic Acid) 01/24/2022 2.0  0.5 - 2.2 mmol/L Final    Procalcitonin 01/24/2022 0.19  <0.25 ng/mL Final    SARS-CoV-2 RNA, Amplification, Qual 01/24/2022 Negative  Negative Final    Procalcitonin 01/24/2022 0.16  <0.25 ng/mL Final    Specimen UA 01/24/2022 Urine, Clean Catch   Final    Color, UA 01/24/2022 Yellow  Yellow, Straw, Radha Final    Appearance, UA 01/24/2022 Clear  Clear Final    pH, UA 01/24/2022 6.0  5.0 - 8.0 Final    Specific Gravity, UA 01/24/2022 1.025  1.005 - 1.030 Final    Protein, UA 01/24/2022 Negative  Negative Final    Glucose, UA 01/24/2022 Negative  Negative Final    Ketones, UA 01/24/2022 Trace* Negative Final    Bilirubin (UA) 01/24/2022 Negative  Negative Final    Occult Blood UA 01/24/2022 Negative  Negative Final    Nitrite, UA 01/24/2022 Negative  Negative Final    Urobilinogen, UA 01/24/2022 Negative  <2.0 EU/dL Final    Leukocytes, UA 01/24/2022 Trace* Negative Final    Troponin I 01/24/2022 0.165* 0.000 - 0.026 ng/mL Final    Lactate (Lactic Acid) 01/24/2022 1.5  0.5 - 2.2 mmol/L Final    POCT Glucose 01/24/2022 77  70 - 110 mg/dL Final    POCT Glucose 01/24/2022 98  70 - 110 mg/dL Final    WBC, UA 01/24/2022 4  0 - 5 /hpf Final    Bacteria 01/24/2022 Rare  None-Occ /hpf Final    Squam Epithel, UA 01/24/2022 1  /hpf Final     Microscopic Comment 01/24/2022 SEE COMMENT   Final    Sodium 01/25/2022 141  136 - 145 mmol/L Final    Potassium 01/25/2022 3.4* 3.5 - 5.1 mmol/L Final    Chloride 01/25/2022 108  95 - 110 mmol/L Final    CO2 01/25/2022 20* 23 - 29 mmol/L Final    Glucose 01/25/2022 87  70 - 110 mg/dL Final    BUN 01/25/2022 27* 8 - 23 mg/dL Final    Creatinine 01/25/2022 1.4  0.5 - 1.4 mg/dL Final    Calcium 01/25/2022 8.0* 8.7 - 10.5 mg/dL Final    Total Protein 01/25/2022 6.1  6.0 - 8.4 g/dL Final    Albumin 01/25/2022 1.7* 3.5 - 5.2 g/dL Final    Total Bilirubin 01/25/2022 0.7  0.1 - 1.0 mg/dL Final    Alkaline Phosphatase 01/25/2022 61  55 - 135 U/L Final    AST 01/25/2022 17  10 - 40 U/L Final    ALT 01/25/2022 16  10 - 44 U/L Final    Anion Gap 01/25/2022 13  8 - 16 mmol/L Final    eGFR if  01/25/2022 60  >60 mL/min/1.73 m^2 Final    eGFR if non  01/25/2022 52* >60 mL/min/1.73 m^2 Final    WBC 01/25/2022 5.62  3.90 - 12.70 K/uL Final    RBC 01/25/2022 4.34* 4.60 - 6.20 M/uL Final    Hemoglobin 01/25/2022 12.8* 14.0 - 18.0 g/dL Final    Hematocrit 01/25/2022 40.3  40.0 - 54.0 % Final    MCV 01/25/2022 93  82 - 98 fL Final    MCH 01/25/2022 29.5  27.0 - 31.0 pg Final    MCHC 01/25/2022 31.8* 32.0 - 36.0 g/dL Final    RDW 01/25/2022 14.8* 11.5 - 14.5 % Final    Platelets 01/25/2022 204  150 - 450 K/uL Final    MPV 01/25/2022 9.5  9.2 - 12.9 fL Final    Immature Granulocytes 01/25/2022 0.4  0.0 - 0.5 % Final    Gran # (ANC) 01/25/2022 3.7  1.8 - 7.7 K/uL Final    Immature Grans (Abs) 01/25/2022 0.02  0.00 - 0.04 K/uL Final    Lymph # 01/25/2022 1.3  1.0 - 4.8 K/uL Final    Mono # 01/25/2022 0.4  0.3 - 1.0 K/uL Final    Eos # 01/25/2022 0.1  0.0 - 0.5 K/uL Final    Baso # 01/25/2022 0.04  0.00 - 0.20 K/uL Final    nRBC 01/25/2022 0  0 /100 WBC Final    Gran % 01/25/2022 65.3  38.0 - 73.0 % Final    Lymph % 01/25/2022 23.8  18.0 - 48.0 % Final    Mono %  01/25/2022 7.8  4.0 - 15.0 % Final    Eosinophil % 01/25/2022 2.0  0.0 - 8.0 % Final    Basophil % 01/25/2022 0.7  0.0 - 1.9 % Final    Differential Method 01/25/2022 Automated   Final    Troponin I 01/25/2022 0.107* 0.000 - 0.026 ng/mL Final    POCT Glucose 01/25/2022 102  70 - 110 mg/dL Final     Assessment:       1. Shortness of breath    2. Dyspnea on exertion    3. Primary hypertension    4. Hypotension, unspecified hypotension type    5. Weakness        Plan:     Blood sugars currently controlled.  Blood pressure is normal.  Chest x-ray being done.  Continue home physical therapy.  Form completed for FMLA.  Follow-up in weeks    Shortness of breath  -     X-Ray Chest PA And Lateral; Future; Expected date: 02/03/2022    Dyspnea on exertion    Primary hypertension    Hypotension, unspecified hypotension type    Weakness

## 2022-02-05 ENCOUNTER — PATIENT OUTREACH (OUTPATIENT)
Dept: ADMINISTRATIVE | Facility: OTHER | Age: 67
End: 2022-02-05
Payer: MEDICARE

## 2022-02-05 NOTE — PROGRESS NOTES
Health Maintenance Due   Topic Date Due    COVID-19 Vaccine (1) Never done    Colorectal Cancer Screening  Never done    Shingles Vaccine (1 of 2) Never done     Updates were requested from care everywhere.  Chart was reviewed for overdue [facility]active [facility] Encounters ([facility]) topics (CRS, Breast Cancer Screening, Eye exam)  Health Maintenance has been updated.  LINKS immunization registry triggered.  Immunizations were reconciled.

## 2022-02-08 DIAGNOSIS — M1A.9XX0 CHRONIC GOUT WITHOUT TOPHUS, UNSPECIFIED CAUSE, UNSPECIFIED SITE: Primary | ICD-10-CM

## 2022-02-08 RX ORDER — COLCHICINE 0.6 MG/1
0.6 TABLET ORAL DAILY
Qty: 30 TABLET | Refills: 11 | Status: SHIPPED | OUTPATIENT
Start: 2022-02-08 | End: 2022-10-17 | Stop reason: SDUPTHER

## 2022-02-09 ENCOUNTER — TELEPHONE (OUTPATIENT)
Dept: OPHTHALMOLOGY | Facility: CLINIC | Age: 67
End: 2022-02-09
Payer: MEDICARE

## 2022-02-09 NOTE — TELEPHONE ENCOUNTER
----- Message from Radha Lira sent at 2/9/2022 10:49 AM CST -----  Type:  Sooner Apoointment Request    Caller is requesting a sooner appointment.  Caller declined first available appointment listed below.  Caller will not accept being placed on the waitlist and is requesting a message be sent to doctor.  Name of Caller:patient  When is the first available appointment?na  Symptoms:NP,routine exam/blurry vision  Would the patient rather a call back or a response via [facility]? Call back  Best Call Back Number:[phone]  Additional Information: pt would like appt same date as other appt on 2/17 around 12

## 2022-02-17 ENCOUNTER — OFFICE VISIT (OUTPATIENT)
Dept: INTERNAL MEDICINE | Facility: CLINIC | Age: 67
End: 2022-02-17
Payer: MEDICARE

## 2022-02-17 ENCOUNTER — OFFICE VISIT (OUTPATIENT)
Dept: OPHTHALMOLOGY | Facility: CLINIC | Age: 67
End: 2022-02-17
Payer: MEDICARE

## 2022-02-17 ENCOUNTER — PATIENT MESSAGE (OUTPATIENT)
Dept: SPORTS MEDICINE | Facility: CLINIC | Age: 67
End: 2022-02-17
Payer: MEDICARE

## 2022-02-17 ENCOUNTER — LAB VISIT (OUTPATIENT)
Dept: LAB | Facility: HOSPITAL | Age: 67
End: 2022-02-17
Attending: FAMILY MEDICINE
Payer: MEDICARE

## 2022-02-17 VITALS
DIASTOLIC BLOOD PRESSURE: 76 MMHG | WEIGHT: 289.25 LBS | HEIGHT: 71 IN | BODY MASS INDEX: 40.49 KG/M2 | SYSTOLIC BLOOD PRESSURE: 130 MMHG | OXYGEN SATURATION: 96 % | HEART RATE: 71 BPM | RESPIRATION RATE: 18 BRPM | TEMPERATURE: 98 F

## 2022-02-17 DIAGNOSIS — M25.559 HIP PAIN: ICD-10-CM

## 2022-02-17 DIAGNOSIS — Z12.11 COLON CANCER SCREENING: ICD-10-CM

## 2022-02-17 DIAGNOSIS — E11.9 CONTROLLED TYPE 2 DIABETES MELLITUS WITHOUT COMPLICATION, WITH LONG-TERM CURRENT USE OF INSULIN: ICD-10-CM

## 2022-02-17 DIAGNOSIS — H52.7 REFRACTIVE ERRORS: ICD-10-CM

## 2022-02-17 DIAGNOSIS — I10 PRIMARY HYPERTENSION: ICD-10-CM

## 2022-02-17 DIAGNOSIS — M54.9 DORSALGIA, UNSPECIFIED: ICD-10-CM

## 2022-02-17 DIAGNOSIS — E11.36 DIABETIC CATARACT: ICD-10-CM

## 2022-02-17 DIAGNOSIS — Z79.4 CONTROLLED TYPE 2 DIABETES MELLITUS WITHOUT COMPLICATION, WITH LONG-TERM CURRENT USE OF INSULIN: Primary | ICD-10-CM

## 2022-02-17 DIAGNOSIS — Z79.4 CONTROLLED TYPE 2 DIABETES MELLITUS WITHOUT COMPLICATION, WITH LONG-TERM CURRENT USE OF INSULIN: ICD-10-CM

## 2022-02-17 DIAGNOSIS — E11.9 DIABETES MELLITUS TYPE 2 WITHOUT RETINOPATHY: Primary | ICD-10-CM

## 2022-02-17 DIAGNOSIS — E11.9 CONTROLLED TYPE 2 DIABETES MELLITUS WITHOUT COMPLICATION, WITH LONG-TERM CURRENT USE OF INSULIN: Primary | ICD-10-CM

## 2022-02-17 LAB
ESTIMATED AVG GLUCOSE: 177 MG/DL (ref 68–131)
HBA1C MFR BLD: 7.8 % (ref 4–5.6)

## 2022-02-17 PROCEDURE — 1101F PR PT FALLS ASSESS DOC 0-1 FALLS W/OUT INJ PAST YR: ICD-10-PCS | Mod: CPTII,S$GLB,, | Performed by: FAMILY MEDICINE

## 2022-02-17 PROCEDURE — 2023F DILAT RTA XM W/O RTNOPTHY: CPT | Mod: CPTII,S$GLB,, | Performed by: OPTOMETRIST

## 2022-02-17 PROCEDURE — 92004 COMPRE OPH EXAM NEW PT 1/>: CPT | Mod: S$GLB,,, | Performed by: OPTOMETRIST

## 2022-02-17 PROCEDURE — 3046F HEMOGLOBIN A1C LEVEL >9.0%: CPT | Mod: CPTII,S$GLB,, | Performed by: OPTOMETRIST

## 2022-02-17 PROCEDURE — 1125F AMNT PAIN NOTED PAIN PRSNT: CPT | Mod: CPTII,S$GLB,, | Performed by: FAMILY MEDICINE

## 2022-02-17 PROCEDURE — 36415 COLL VENOUS BLD VENIPUNCTURE: CPT | Performed by: FAMILY MEDICINE

## 2022-02-17 PROCEDURE — 3075F PR MOST RECENT SYSTOLIC BLOOD PRESS GE 130-139MM HG: ICD-10-PCS | Mod: CPTII,S$GLB,, | Performed by: FAMILY MEDICINE

## 2022-02-17 PROCEDURE — 3046F HEMOGLOBIN A1C LEVEL >9.0%: CPT | Mod: CPTII,S$GLB,, | Performed by: FAMILY MEDICINE

## 2022-02-17 PROCEDURE — 3008F BODY MASS INDEX DOCD: CPT | Mod: CPTII,S$GLB,, | Performed by: FAMILY MEDICINE

## 2022-02-17 PROCEDURE — 3078F PR MOST RECENT DIASTOLIC BLOOD PRESSURE < 80 MM HG: ICD-10-PCS | Mod: CPTII,S$GLB,, | Performed by: FAMILY MEDICINE

## 2022-02-17 PROCEDURE — 1159F MED LIST DOCD IN RCRD: CPT | Mod: CPTII,S$GLB,, | Performed by: FAMILY MEDICINE

## 2022-02-17 PROCEDURE — 1159F MED LIST DOCD IN RCRD: CPT | Mod: CPTII,S$GLB,, | Performed by: OPTOMETRIST

## 2022-02-17 PROCEDURE — 3046F PR MOST RECENT HEMOGLOBIN A1C LEVEL > 9.0%: ICD-10-PCS | Mod: CPTII,S$GLB,, | Performed by: FAMILY MEDICINE

## 2022-02-17 PROCEDURE — 3288F PR FALLS RISK ASSESSMENT DOCUMENTED: ICD-10-PCS | Mod: CPTII,S$GLB,, | Performed by: FAMILY MEDICINE

## 2022-02-17 PROCEDURE — 1101F PT FALLS ASSESS-DOCD LE1/YR: CPT | Mod: CPTII,S$GLB,, | Performed by: FAMILY MEDICINE

## 2022-02-17 PROCEDURE — 3288F FALL RISK ASSESSMENT DOCD: CPT | Mod: CPTII,S$GLB,, | Performed by: FAMILY MEDICINE

## 2022-02-17 PROCEDURE — 99999 PR PBB SHADOW E&M-EST. PATIENT-LVL IV: ICD-10-PCS | Mod: PBBFAC,,, | Performed by: FAMILY MEDICINE

## 2022-02-17 PROCEDURE — 99214 OFFICE O/P EST MOD 30 MIN: CPT | Mod: S$GLB,,, | Performed by: FAMILY MEDICINE

## 2022-02-17 PROCEDURE — 3046F PR MOST RECENT HEMOGLOBIN A1C LEVEL > 9.0%: ICD-10-PCS | Mod: CPTII,S$GLB,, | Performed by: OPTOMETRIST

## 2022-02-17 PROCEDURE — 2023F PR DILATED RETINAL EXAM W/O EVID OF RETINOPATHY: ICD-10-PCS | Mod: CPTII,S$GLB,, | Performed by: OPTOMETRIST

## 2022-02-17 PROCEDURE — 3075F SYST BP GE 130 - 139MM HG: CPT | Mod: CPTII,S$GLB,, | Performed by: FAMILY MEDICINE

## 2022-02-17 PROCEDURE — 92015 DETERMINE REFRACTIVE STATE: CPT | Mod: S$GLB,,, | Performed by: OPTOMETRIST

## 2022-02-17 PROCEDURE — 99214 PR OFFICE/OUTPT VISIT, EST, LEVL IV, 30-39 MIN: ICD-10-PCS | Mod: S$GLB,,, | Performed by: FAMILY MEDICINE

## 2022-02-17 PROCEDURE — 3008F PR BODY MASS INDEX (BMI) DOCUMENTED: ICD-10-PCS | Mod: CPTII,S$GLB,, | Performed by: FAMILY MEDICINE

## 2022-02-17 PROCEDURE — 99999 PR PBB SHADOW E&M-EST. PATIENT-LVL IV: CPT | Mod: PBBFAC,,, | Performed by: FAMILY MEDICINE

## 2022-02-17 PROCEDURE — 83036 HEMOGLOBIN GLYCOSYLATED A1C: CPT | Performed by: FAMILY MEDICINE

## 2022-02-17 PROCEDURE — 1125F PR PAIN SEVERITY QUANTIFIED, PAIN PRESENT: ICD-10-PCS | Mod: CPTII,S$GLB,, | Performed by: FAMILY MEDICINE

## 2022-02-17 PROCEDURE — 3078F DIAST BP <80 MM HG: CPT | Mod: CPTII,S$GLB,, | Performed by: FAMILY MEDICINE

## 2022-02-17 PROCEDURE — 1159F PR MEDICATION LIST DOCUMENTED IN MEDICAL RECORD: ICD-10-PCS | Mod: CPTII,S$GLB,, | Performed by: OPTOMETRIST

## 2022-02-17 PROCEDURE — 99999 PR PBB SHADOW E&M-EST. PATIENT-LVL II: CPT | Mod: PBBFAC,,, | Performed by: OPTOMETRIST

## 2022-02-17 PROCEDURE — 1159F PR MEDICATION LIST DOCUMENTED IN MEDICAL RECORD: ICD-10-PCS | Mod: CPTII,S$GLB,, | Performed by: FAMILY MEDICINE

## 2022-02-17 PROCEDURE — 92015 PR REFRACTION: ICD-10-PCS | Mod: S$GLB,,, | Performed by: OPTOMETRIST

## 2022-02-17 PROCEDURE — 92004 PR EYE EXAM, NEW PATIENT,COMPREHESV: ICD-10-PCS | Mod: S$GLB,,, | Performed by: OPTOMETRIST

## 2022-02-17 PROCEDURE — 99999 PR PBB SHADOW E&M-EST. PATIENT-LVL II: ICD-10-PCS | Mod: PBBFAC,,, | Performed by: OPTOMETRIST

## 2022-02-17 RX ORDER — NAPROXEN 500 MG/1
500 TABLET ORAL 2 TIMES DAILY WITH MEALS
Qty: 180 TABLET | Refills: 1 | Status: SHIPPED | OUTPATIENT
Start: 2022-02-17 | End: 2022-04-25

## 2022-02-17 RX ORDER — INSULIN GLARGINE 100 [IU]/ML
50 INJECTION, SOLUTION SUBCUTANEOUS 2 TIMES DAILY
Qty: 90 ML | Refills: 1 | Status: SHIPPED | OUTPATIENT
Start: 2022-02-17 | End: 2022-10-04

## 2022-02-17 NOTE — PROGRESS NOTES
HPI     New Patient Annual Diabetic Eye Exam   Pt was admitted to hospital 1/3/22 sugar was 438  Blurry vision  Pt saw dr at eye [place] at the end of 2021  Pt was told he was a glaucoma suspect   .Lab Results       Component                Value               Date                       HGBA1C                   13.1 (H)            01/18/2022                   Last edited by Jt Mcleod, OD on 2/17/2022  3:29 PM. (History)            Assessment /Plan     For exam results, see Encounter Report.    Diabetes mellitus type 2 without retinopathy    Diabetic cataract    Refractive errors      No Background Diabetic Retinopathy    Moderate cataracts OU, not surgical    Dispense Final Rx for glasses.  RTC 1 year  Discussed above and answered questions.

## 2022-02-17 NOTE — PROGRESS NOTES
Subjective:       Patient ID: Tom Tavarez is a 66 y.o. male.    Chief Complaint: Follow-up    Follow-up diabetes hypertension generalized weakness.  Reports blood sugars have been normal at home.  He reduce Lantus 50 units twice a day and most to time is using it once a day.  He has no hypoglycemic symptoms.  Denies chest pain palpitations or edema.  Home physical therapies been completed.  He is doing home exercises and slowly gaining strength.    Review of Systems   Constitutional: Negative for chills and fever.   HENT: Negative for congestion.    Respiratory: Positive for shortness of breath. Negative for cough, chest tightness and wheezing.         With exertion stable   Cardiovascular: Negative for chest pain, palpitations and leg swelling.   Gastrointestinal: Negative for abdominal distention and abdominal pain.   Endocrine: Negative for polydipsia and polyuria.   Neurological: Positive for weakness. Negative for dizziness, light-headedness and headaches.       Objective:      Physical Exam  Constitutional:       General: He is not in acute distress.     Appearance: He is not ill-appearing or diaphoretic.   Cardiovascular:      Rate and Rhythm: Normal rate and regular rhythm.      Heart sounds: No murmur heard.  No gallop.    Pulmonary:      Effort: Pulmonary effort is normal. No respiratory distress.      Breath sounds: No wheezing, rhonchi or rales.   Lymphadenopathy:      Cervical: No cervical adenopathy.   Skin:     General: Skin is warm and dry.      Coloration: Skin is not pale.      Findings: No erythema.   Neurological:      Mental Status: He is alert.   Psychiatric:         Mood and Affect: Mood normal.         Behavior: Behavior normal.         Thought Content: Thought content normal.         Judgment: Judgment normal.         Admission on 01/24/2022, Discharged on 01/25/2022   Component Date Value Ref Range Status    WBC 01/24/2022 6.97  3.90 - 12.70 K/uL Final    RBC 01/24/2022 4.78  4.60 -  6.20 M/uL Final    Hemoglobin 01/24/2022 14.1  14.0 - 18.0 g/dL Final    Hematocrit 01/24/2022 43.2  40.0 - 54.0 % Final    MCV 01/24/2022 90  82 - 98 fL Final    MCH 01/24/2022 29.5  27.0 - 31.0 pg Final    MCHC 01/24/2022 32.6  32.0 - 36.0 g/dL Final    RDW 01/24/2022 14.8* 11.5 - 14.5 % Final    Platelets 01/24/2022 209  150 - 450 K/uL Final    MPV 01/24/2022 9.3  9.2 - 12.9 fL Final    Immature Granulocytes 01/24/2022 0.3  0.0 - 0.5 % Final    Gran # (ANC) 01/24/2022 4.6  1.8 - 7.7 K/uL Final    Immature Grans (Abs) 01/24/2022 0.02  0.00 - 0.04 K/uL Final    Lymph # 01/24/2022 1.7  1.0 - 4.8 K/uL Final    Mono # 01/24/2022 0.5  0.3 - 1.0 K/uL Final    Eos # 01/24/2022 0.1  0.0 - 0.5 K/uL Final    Baso # 01/24/2022 0.02  0.00 - 0.20 K/uL Final    nRBC 01/24/2022 0  0 /100 WBC Final    Gran % 01/24/2022 66.5  38.0 - 73.0 % Final    Lymph % 01/24/2022 24.4  18.0 - 48.0 % Final    Mono % 01/24/2022 7.2  4.0 - 15.0 % Final    Eosinophil % 01/24/2022 1.3  0.0 - 8.0 % Final    Basophil % 01/24/2022 0.3  0.0 - 1.9 % Final    Differential Method 01/24/2022 Automated   Final    Sodium 01/24/2022 140  136 - 145 mmol/L Final    Potassium 01/24/2022 4.3  3.5 - 5.1 mmol/L Final    Chloride 01/24/2022 104  95 - 110 mmol/L Final    CO2 01/24/2022 22* 23 - 29 mmol/L Final    Glucose 01/24/2022 102  70 - 110 mg/dL Final    BUN 01/24/2022 30* 8 - 23 mg/dL Final    Creatinine 01/24/2022 1.6* 0.5 - 1.4 mg/dL Final    Calcium 01/24/2022 8.7  8.7 - 10.5 mg/dL Final    Total Protein 01/24/2022 7.6  6.0 - 8.4 g/dL Final    Albumin 01/24/2022 2.0* 3.5 - 5.2 g/dL Final    Total Bilirubin 01/24/2022 0.8  0.1 - 1.0 mg/dL Final    Alkaline Phosphatase 01/24/2022 76  55 - 135 U/L Final    AST 01/24/2022 34  10 - 40 U/L Final    ALT 01/24/2022 20  10 - 44 U/L Final    Anion Gap 01/24/2022 14  8 - 16 mmol/L Final    eGFR if  01/24/2022 51* >60 mL/min/1.73 m^2 Final    eGFR if non African  American 01/24/2022 44* >60 mL/min/1.73 m^2 Final    CPK 01/24/2022 47  20 - 200 U/L Final    Troponin I 01/24/2022 0.187* 0.000 - 0.026 ng/mL Final    BNP 01/24/2022 165* 0 - 99 pg/mL Final    POCT Glucose 01/24/2022 107  70 - 110 mg/dL Final    Blood Culture, Routine 01/24/2022 No growth after 5 days.   Final    Blood Culture, Routine 01/24/2022 No growth after 5 days.   Final    Lactate (Lactic Acid) 01/24/2022 2.0  0.5 - 2.2 mmol/L Final    Procalcitonin 01/24/2022 0.19  <0.25 ng/mL Final    SARS-CoV-2 RNA, Amplification, Qual 01/24/2022 Negative  Negative Final    Procalcitonin 01/24/2022 0.16  <0.25 ng/mL Final    Specimen UA 01/24/2022 Urine, Clean Catch   Final    Color, UA 01/24/2022 Yellow  Yellow, Straw, Radha Final    Appearance, UA 01/24/2022 Clear  Clear Final    pH, UA 01/24/2022 6.0  5.0 - 8.0 Final    Specific Gravity, UA 01/24/2022 1.025  1.005 - 1.030 Final    Protein, UA 01/24/2022 Negative  Negative Final    Glucose, UA 01/24/2022 Negative  Negative Final    Ketones, UA 01/24/2022 Trace* Negative Final    Bilirubin (UA) 01/24/2022 Negative  Negative Final    Occult Blood UA 01/24/2022 Negative  Negative Final    Nitrite, UA 01/24/2022 Negative  Negative Final    Urobilinogen, UA 01/24/2022 Negative  <2.0 EU/dL Final    Leukocytes, UA 01/24/2022 Trace* Negative Final    Troponin I 01/24/2022 0.165* 0.000 - 0.026 ng/mL Final    Lactate (Lactic Acid) 01/24/2022 1.5  0.5 - 2.2 mmol/L Final    POCT Glucose 01/24/2022 77  70 - 110 mg/dL Final    POCT Glucose 01/24/2022 98  70 - 110 mg/dL Final    WBC, UA 01/24/2022 4  0 - 5 /hpf Final    Bacteria 01/24/2022 Rare  None-Occ /hpf Final    Squam Epithel, UA 01/24/2022 1  /hpf Final    Microscopic Comment 01/24/2022 SEE COMMENT   Final    Sodium 01/25/2022 141  136 - 145 mmol/L Final    Potassium 01/25/2022 3.4* 3.5 - 5.1 mmol/L Final    Chloride 01/25/2022 108  95 - 110 mmol/L Final    CO2 01/25/2022 20* 23 - 29 mmol/L  Final    Glucose 01/25/2022 87  70 - 110 mg/dL Final    BUN 01/25/2022 27* 8 - 23 mg/dL Final    Creatinine 01/25/2022 1.4  0.5 - 1.4 mg/dL Final    Calcium 01/25/2022 8.0* 8.7 - 10.5 mg/dL Final    Total Protein 01/25/2022 6.1  6.0 - 8.4 g/dL Final    Albumin 01/25/2022 1.7* 3.5 - 5.2 g/dL Final    Total Bilirubin 01/25/2022 0.7  0.1 - 1.0 mg/dL Final    Alkaline Phosphatase 01/25/2022 61  55 - 135 U/L Final    AST 01/25/2022 17  10 - 40 U/L Final    ALT 01/25/2022 16  10 - 44 U/L Final    Anion Gap 01/25/2022 13  8 - 16 mmol/L Final    eGFR if  01/25/2022 60  >60 mL/min/1.73 m^2 Final    eGFR if non  01/25/2022 52* >60 mL/min/1.73 m^2 Final    WBC 01/25/2022 5.62  3.90 - 12.70 K/uL Final    RBC 01/25/2022 4.34* 4.60 - 6.20 M/uL Final    Hemoglobin 01/25/2022 12.8* 14.0 - 18.0 g/dL Final    Hematocrit 01/25/2022 40.3  40.0 - 54.0 % Final    MCV 01/25/2022 93  82 - 98 fL Final    MCH 01/25/2022 29.5  27.0 - 31.0 pg Final    MCHC 01/25/2022 31.8* 32.0 - 36.0 g/dL Final    RDW 01/25/2022 14.8* 11.5 - 14.5 % Final    Platelets 01/25/2022 204  150 - 450 K/uL Final    MPV 01/25/2022 9.5  9.2 - 12.9 fL Final    Immature Granulocytes 01/25/2022 0.4  0.0 - 0.5 % Final    Gran # (ANC) 01/25/2022 3.7  1.8 - 7.7 K/uL Final    Immature Grans (Abs) 01/25/2022 0.02  0.00 - 0.04 K/uL Final    Lymph # 01/25/2022 1.3  1.0 - 4.8 K/uL Final    Mono # 01/25/2022 0.4  0.3 - 1.0 K/uL Final    Eos # 01/25/2022 0.1  0.0 - 0.5 K/uL Final    Baso # 01/25/2022 0.04  0.00 - 0.20 K/uL Final    nRBC 01/25/2022 0  0 /100 WBC Final    Gran % 01/25/2022 65.3  38.0 - 73.0 % Final    Lymph % 01/25/2022 23.8  18.0 - 48.0 % Final    Mono % 01/25/2022 7.8  4.0 - 15.0 % Final    Eosinophil % 01/25/2022 2.0  0.0 - 8.0 % Final    Basophil % 01/25/2022 0.7  0.0 - 1.9 % Final    Differential Method 01/25/2022 Automated   Final    Troponin I 01/25/2022 0.107* 0.000 - 0.026 ng/mL Final     POCT Glucose 01/25/2022 102  70 - 110 mg/dL Final     Assessment:       1. Controlled type 2 diabetes mellitus without complication, with long-term current use of insulin    2. Hip pain    3. Dorsalgia, unspecified    4. Colon cancer screening    5. Primary hypertension    6. BMI 40.0-44.9, adult        Plan:     Refill Lantus and Naprosyn.  Fecal immunoassay test ordered.  When he strong enough  will do colonoscopy.  A1c ordered.  Continue home exercises.  Continue diet.  Follow-up in 1 month.  Blood pressure controlled  BMI noted.  Continue diet.    Controlled type 2 diabetes mellitus without complication, with long-term current use of insulin  -     Hemoglobin A1C; Future; Expected date: 02/17/2022    Hip pain  -     naproxen (NAPROSYN) 500 MG tablet; Take 1 tablet (500 mg total) by mouth 2 (two) times daily with meals.  Dispense: 180 tablet; Refill: 1    Dorsalgia, unspecified  -     naproxen (NAPROSYN) 500 MG tablet; Take 1 tablet (500 mg total) by mouth 2 (two) times daily with meals.  Dispense: 180 tablet; Refill: 1    Colon cancer screening  -     Fecal Immunochemical Test (iFOBT); Future; Expected date: 02/17/2022    Primary hypertension    BMI 40.0-44.9, adult    Other orders  -     LANTUS SOLOSTAR U-100 INSULIN glargine 100 units/mL (3mL) SubQ pen; Inject 50 Units into the skin 2 (two) times a day.  Dispense: 90 mL; Refill: 1

## 2022-02-24 ENCOUNTER — TELEPHONE (OUTPATIENT)
Dept: PRIMARY CARE CLINIC | Facility: CLINIC | Age: 67
End: 2022-02-24
Payer: MEDICARE

## 2022-02-24 NOTE — TELEPHONE ENCOUNTER
----- Message from Casandra Miller sent at 2/23/2022  2:08 PM CST -----  [facility]  Sylwia called regarding glucose monitor and supplies for Tom, please give her a call back at [phone]      Thanks  Kb

## 2022-02-24 NOTE — TELEPHONE ENCOUNTER
Tried reaching Sylwia with [facility] in regards to the patient BS testing supplies. Unable to reach her or leave a message. The previous message stated to press opt #2. After pressing OPT #2, it transferred me to another department, which I got their VM.

## 2022-03-04 ENCOUNTER — TELEPHONE (OUTPATIENT)
Dept: INTERNAL MEDICINE | Facility: CLINIC | Age: 67
End: 2022-03-04
Payer: MEDICARE

## 2022-03-04 NOTE — TELEPHONE ENCOUNTER
----- Message from Lizzette Kelley sent at 3/4/2022  8:24 AM CST -----  Lorena from [employer] would like a call back in regard to getting a order for [employer] supplies and meter along with clinical notes. Please call her at [phone]. Fax: [phone]

## 2022-03-04 NOTE — TELEPHONE ENCOUNTER
Please order Freestyle [employer] supplies and meter and I will fax everything to [facility]

## 2022-03-07 DIAGNOSIS — E11.65 UNCONTROLLED TYPE 2 DIABETES MELLITUS WITH HYPERGLYCEMIA: Primary | ICD-10-CM

## 2022-03-07 RX ORDER — FLASH GLUCOSE SENSOR
1 KIT MISCELLANEOUS CONTINUOUS
Qty: 6 KIT | Refills: 3 | Status: SHIPPED | OUTPATIENT
Start: 2022-03-07

## 2022-03-07 RX ORDER — FLASH GLUCOSE SCANNING READER
1 EACH MISCELLANEOUS CONTINUOUS
Qty: 6 EACH | Refills: 3 | Status: SHIPPED | OUTPATIENT
Start: 2022-03-07

## 2022-03-07 NOTE — TELEPHONE ENCOUNTER
Spoke with Lorena @ [facility], she stated they would need an order for the FreeStyle Florentino kit and MNF    Order was printed

## 2022-03-08 ENCOUNTER — PATIENT OUTREACH (OUTPATIENT)
Dept: ADMINISTRATIVE | Facility: OTHER | Age: 67
End: 2022-03-08
Payer: MEDICARE

## 2022-03-08 ENCOUNTER — OFFICE VISIT (OUTPATIENT)
Dept: PODIATRY | Facility: CLINIC | Age: 67
End: 2022-03-08
Payer: MEDICARE

## 2022-03-08 VITALS
HEIGHT: 71 IN | HEART RATE: 71 BPM | DIASTOLIC BLOOD PRESSURE: 90 MMHG | WEIGHT: 289 LBS | BODY MASS INDEX: 40.46 KG/M2 | SYSTOLIC BLOOD PRESSURE: 148 MMHG

## 2022-03-08 DIAGNOSIS — Z12.11 COLON CANCER SCREENING: Primary | ICD-10-CM

## 2022-03-08 DIAGNOSIS — R60.0 BILATERAL LOWER EXTREMITY EDEMA: ICD-10-CM

## 2022-03-08 DIAGNOSIS — E11.9 CONTROLLED TYPE 2 DIABETES MELLITUS WITHOUT COMPLICATION, WITH LONG-TERM CURRENT USE OF INSULIN: Primary | ICD-10-CM

## 2022-03-08 DIAGNOSIS — Z79.4 CONTROLLED TYPE 2 DIABETES MELLITUS WITHOUT COMPLICATION, WITH LONG-TERM CURRENT USE OF INSULIN: Primary | ICD-10-CM

## 2022-03-08 DIAGNOSIS — B35.3 TINEA PEDIS OF BOTH FEET: ICD-10-CM

## 2022-03-08 PROCEDURE — 99999 PR PBB SHADOW E&M-EST. PATIENT-LVL IV: ICD-10-PCS | Mod: PBBFAC,,, | Performed by: PODIATRIST

## 2022-03-08 PROCEDURE — 99204 PR OFFICE/OUTPT VISIT, NEW, LEVL IV, 45-59 MIN: ICD-10-PCS | Mod: 25,S$GLB,, | Performed by: PODIATRIST

## 2022-03-08 PROCEDURE — 3080F DIAST BP >= 90 MM HG: CPT | Mod: CPTII,S$GLB,, | Performed by: PODIATRIST

## 2022-03-08 PROCEDURE — 1160F PR REVIEW ALL MEDS BY PRESCRIBER/CLIN PHARMACIST DOCUMENTED: ICD-10-PCS | Mod: CPTII,S$GLB,, | Performed by: PODIATRIST

## 2022-03-08 PROCEDURE — 3008F BODY MASS INDEX DOCD: CPT | Mod: CPTII,S$GLB,, | Performed by: PODIATRIST

## 2022-03-08 PROCEDURE — 99204 OFFICE O/P NEW MOD 45 MIN: CPT | Mod: 25,S$GLB,, | Performed by: PODIATRIST

## 2022-03-08 PROCEDURE — 3077F SYST BP >= 140 MM HG: CPT | Mod: CPTII,S$GLB,, | Performed by: PODIATRIST

## 2022-03-08 PROCEDURE — 1159F PR MEDICATION LIST DOCUMENTED IN MEDICAL RECORD: ICD-10-PCS | Mod: CPTII,S$GLB,, | Performed by: PODIATRIST

## 2022-03-08 PROCEDURE — 3008F PR BODY MASS INDEX (BMI) DOCUMENTED: ICD-10-PCS | Mod: CPTII,S$GLB,, | Performed by: PODIATRIST

## 2022-03-08 PROCEDURE — 3288F FALL RISK ASSESSMENT DOCD: CPT | Mod: CPTII,S$GLB,, | Performed by: PODIATRIST

## 2022-03-08 PROCEDURE — 1126F PR PAIN SEVERITY QUANTIFIED, NO PAIN PRESENT: ICD-10-PCS | Mod: CPTII,S$GLB,, | Performed by: PODIATRIST

## 2022-03-08 PROCEDURE — 99999 PR PBB SHADOW E&M-EST. PATIENT-LVL IV: CPT | Mod: PBBFAC,,, | Performed by: PODIATRIST

## 2022-03-08 PROCEDURE — 3051F HG A1C>EQUAL 7.0%<8.0%: CPT | Mod: CPTII,S$GLB,, | Performed by: PODIATRIST

## 2022-03-08 PROCEDURE — 1160F RVW MEDS BY RX/DR IN RCRD: CPT | Mod: CPTII,S$GLB,, | Performed by: PODIATRIST

## 2022-03-08 PROCEDURE — 1126F AMNT PAIN NOTED NONE PRSNT: CPT | Mod: CPTII,S$GLB,, | Performed by: PODIATRIST

## 2022-03-08 PROCEDURE — 1101F PR PT FALLS ASSESS DOC 0-1 FALLS W/OUT INJ PAST YR: ICD-10-PCS | Mod: CPTII,S$GLB,, | Performed by: PODIATRIST

## 2022-03-08 PROCEDURE — 3080F PR MOST RECENT DIASTOLIC BLOOD PRESSURE >= 90 MM HG: ICD-10-PCS | Mod: CPTII,S$GLB,, | Performed by: PODIATRIST

## 2022-03-08 PROCEDURE — 3077F PR MOST RECENT SYSTOLIC BLOOD PRESSURE >= 140 MM HG: ICD-10-PCS | Mod: CPTII,S$GLB,, | Performed by: PODIATRIST

## 2022-03-08 PROCEDURE — 1101F PT FALLS ASSESS-DOCD LE1/YR: CPT | Mod: CPTII,S$GLB,, | Performed by: PODIATRIST

## 2022-03-08 PROCEDURE — 3051F PR MOST RECENT HEMOGLOBIN A1C LEVEL 7.0 - < 8.0%: ICD-10-PCS | Mod: CPTII,S$GLB,, | Performed by: PODIATRIST

## 2022-03-08 PROCEDURE — 3288F PR FALLS RISK ASSESSMENT DOCUMENTED: ICD-10-PCS | Mod: CPTII,S$GLB,, | Performed by: PODIATRIST

## 2022-03-08 PROCEDURE — 1159F MED LIST DOCD IN RCRD: CPT | Mod: CPTII,S$GLB,, | Performed by: PODIATRIST

## 2022-03-08 RX ORDER — KETOCONAZOLE 20 MG/G
CREAM TOPICAL DAILY
Qty: 60 G | Refills: 1 | Status: SHIPPED | OUTPATIENT
Start: 2022-03-08 | End: 2022-10-17 | Stop reason: SDUPTHER

## 2022-03-08 NOTE — PROGRESS NOTES
Subjective:     Patient ID: Tom Tavarez is a 66 y.o. male.    Chief Complaint: Foot Pain (C/o left foot and ankle swelling, Wears slippers with socks, diabetic Pt, last seen on 02/17/22 with PCP Dr. Lemus)    Tom is a 66 y.o. male who presents to the clinic for evaluation and treatment of high risk feet. Tom has a past medical history of BMI 40.0-44.9, adult (7/21/2016), Colon cancer screening (10/3/2017), Diabetes mellitus type 2 in obese (11/2006), Hyperlipidemia, Hypertension (1985), Obesity, Class III, BMI 40-49.9 (morbid obesity) (1985), and Umbilical hernia. The patient's chief complaint is swelling in ankles, and tightness in left foot. Patient admits history of gout in the left foot but no flare ups lately. This patient has documented high risk feet requiring routine maintenance secondary to diabetes mellitis and those secondary complications of diabetes, as mentioned.. Patient is accompanied by his wife today.     PCP: Adolfo Lemus MD    Date Last Seen by PCP: 02/17/2022    Current shoe gear:  Affected Foot: Tennis shoes     Unaffected Foot: Tennis shoes    Hemoglobin A1C   Date Value Ref Range Status   02/17/2022 7.8 (H) 4.0 - 5.6 % Final     Comment:     ADA Screening Guidelines:  5.7-6.4%  Consistent with prediabetes  >or=6.5%  Consistent with diabetes    High levels of fetal hemoglobin interfere with the HbA1C  assay. Heterozygous hemoglobin variants (HbS, HgC, etc)do  not significantly interfere with this assay.   However, presence of multiple variants may affect accuracy.     01/18/2022 13.1 (H) 4.0 - 5.6 % Final     Comment:     ADA Screening Guidelines:  5.7-6.4%  Consistent with prediabetes  >or=6.5%  Consistent with diabetes    High levels of fetal hemoglobin interfere with the HbA1C  assay. Heterozygous hemoglobin variants (HbS, HgC, etc)do  not significantly interfere with this assay.   However, presence of multiple variants may affect accuracy.     11/09/2021 11.0 (H) 4.0 - 5.6  % Final     Comment:     ADA Screening Guidelines:  5.7-6.4%  Consistent with prediabetes  >or=6.5%  Consistent with diabetes    High levels of fetal hemoglobin interfere with the HbA1C  assay. Heterozygous hemoglobin variants (HbS, HgC, etc)do  not significantly interfere with this assay.   However, presence of multiple variants may affect accuracy.             Patient Active Problem List   Diagnosis    Diabetes mellitus type 2 in obese    Hyperlipidemia    Primary hypertension    BMI 40.0-44.9, adult    Hypertension    Umbilical hernia    Controlled type 2 diabetes mellitus without complication, with long-term current use of insulin    Weakness    Immunization deficiency    Yeast dermatitis    Acute gout due to renal impairment involving foot    Onychomycosis of toenail    Acute cystitis with hematuria    Stage 3 chronic kidney disease    FH: thoracic aortic aneurysm    Chronic allergic rhinitis    GERD (gastroesophageal reflux disease)    Itchy skin    Testicle swelling    Uncontrolled type 2 diabetes mellitus with hyperglycemia    Primary osteoarthritis of left hip    Aortic atherosclerosis    Arteriosclerosis of abdominal aorta    Abdominal wall hernia    Nasal congestion    Abdominal aortic aneurysm (AAA) without rupture    Dorsalgia, unspecified    Hip pain    Colon cancer screening    Hospital discharge follow-up    Hypotension    Shortness of breath       Medication List with Changes/Refills   New Medications    KETOCONAZOLE (NIZORAL) 2 % CREAM    Apply topically once daily.   Current Medications    AMLODIPINE (NORVASC) 10 MG TABLET    Take 1 tablet (10 mg total) by mouth once daily.    ATORVASTATIN (LIPITOR) 40 MG TABLET    TAKE 1 TABLET BY MOUTH  DAILY    BLOOD SUGAR DIAGNOSTIC STRP    1 each by Misc.(Non-Drug; Combo Route) route once daily.    COLCHICINE (COLCRYS) 0.6 MG TABLET    Take 1 tablet (0.6 mg total) by mouth once daily.    [employer] GLUCOSE SCANNING READER (FREESTYLE  "KRISTY 14 DAY READER) MISC    1 each by Misc.(Non-Drug; Combo Route) route continuous.    FLASH GLUCOSE SENSOR (FREESTYLE KRISTY 14 DAY SENSOR) KIT    1 each by Misc.(Non-Drug; Combo Route) route continuous.    INSULIN SYRINGE-NEEDLE U-100 0.3 ML 31 GAUGE X 1/4" SYRG    1 each by Misc.(Non-Drug; Combo Route) route 2 (two) times a day.    LANTUS SOLOSTAR U-100 INSULIN GLARGINE 100 UNITS/ML (3ML) SUBQ PEN    Inject 50 Units into the skin 2 (two) times a day.    METFORMIN (GLUCOPHAGE) 500 MG TABLET    TAKE 1 TABLET BY MOUTH  TWICE DAILY WITH MEALS    NAPROXEN (NAPROSYN) 500 MG TABLET    Take 1 tablet (500 mg total) by mouth 2 (two) times daily with meals.       Review of patient's allergies indicates:  No Known Allergies    Past Surgical History:   Procedure Laterality Date    G U surgery  1982    biopsy of testicle infertility w/u       Family History   Problem Relation Age of Onset    Hypertension Mother     Heart failure Mother        Social History     Socioeconomic History    Marital status:     Number of children: 0    Highest education level: Some college, no degree   Occupational History    Occupation: Retired-Sales     Comment: [employer]   Tobacco Use    Smoking status: Former Smoker    Smokeless tobacco: Never Used    Tobacco comment: 1998 quit    Substance and Sexual Activity    Alcohol use: No     Alcohol/week: 0.0 standard drinks    Drug use: No    Sexual activity: Yes     Partners: Female       Vitals:    03/08/22 1526   BP: (!) 148/90   Pulse: 71   Weight: 131.1 kg (289 lb)   Height: 5' 11" (1.803 m)   PainSc: 0-No pain   PainLoc: Foot       Hemoglobin A1C   Date Value Ref Range Status   02/17/2022 7.8 (H) 4.0 - 5.6 % Final     Comment:     ADA Screening Guidelines:  5.7-6.4%  Consistent with prediabetes  >or=6.5%  Consistent with diabetes    High levels of fetal hemoglobin interfere with the HbA1C  assay. Heterozygous hemoglobin variants (HbS, HgC, etc)do  not " significantly interfere with this assay.   However, presence of multiple variants may affect accuracy.     01/18/2022 13.1 (H) 4.0 - 5.6 % Final     Comment:     ADA Screening Guidelines:  5.7-6.4%  Consistent with prediabetes  >or=6.5%  Consistent with diabetes    High levels of fetal hemoglobin interfere with the HbA1C  assay. Heterozygous hemoglobin variants (HbS, HgC, etc)do  not significantly interfere with this assay.   However, presence of multiple variants may affect accuracy.     11/09/2021 11.0 (H) 4.0 - 5.6 % Final     Comment:     ADA Screening Guidelines:  5.7-6.4%  Consistent with prediabetes  >or=6.5%  Consistent with diabetes    High levels of fetal hemoglobin interfere with the HbA1C  assay. Heterozygous hemoglobin variants (HbS, HgC, etc)do  not significantly interfere with this assay.   However, presence of multiple variants may affect accuracy.         Review of Systems   Constitutional: Negative for chills and fever.   Respiratory: Negative for shortness of breath.    Cardiovascular: Positive for leg swelling. Negative for chest pain, palpitations, orthopnea and claudication.   Gastrointestinal: Negative for diarrhea, nausea and vomiting.   Musculoskeletal: Negative for joint pain.   Skin: Negative for rash.   Neurological: Negative for dizziness, tingling, sensory change, focal weakness and weakness.   Psychiatric/Behavioral: Negative.              Objective:   PHYSICAL EXAM: Apperance: Alert and orient in no distress,well developed, and with good attention to grooming and body habits  Patient presents ambulating in tennis shoes.   LOWER EXTREMITY EXAM:  VASCULAR: Dorsalis pedis pulses 2/4 bilateral and Posterior Tibial pulses 2/4 bilateral. Capillary fill time <4 seconds  bilateral. Moderate edema observed bilateral. Varicosities absent bilateral. Skin temperature of the lower extremities is warm to warm, proximal to distal. Hair growth WNL bilateral.  DERMATOLOGICAL: No skin rashes,  subcutaneous nodules, lesions, or ulcers observed bilateral. Nails 1,2,3,4,5 bilateral normal length. Webspaces 1,2,3,4 bilateral clean, dry and without evidence of break in skin integrity. Dry and peeling skin noted plantarly in moccasin distribution.   NEUROLOGICAL: Light touch, sharp-dull, proprioception all present and equal bilaterally.  Vibratory sensation diminished at bilateral hallux. Protective sensation intact at all 10 sites as tested with a [place] 5.07 monofilament.   MUSCULOSKELETAL: Muscle strength is 5/5 for foot inverters, everters, plantarflexors, and dorsiflexors. Muscle tone is normal. No pain on palpation or ROM of left ankle.     Assessment:   Controlled type 2 diabetes mellitus without complication, with long-term current use of insulin    Tinea pedis of both feet  -     ketoconazole (NIZORAL) 2 % cream; Apply topically once daily.  Dispense: 60 g; Refill: 1    Bilateral lower extremity edema  -     US Lower Extremity Veins Bilateral; Future; Expected date: 03/08/2022          Plan:   Controlled type 2 diabetes mellitus without complication, with long-term current use of insulin    Tinea pedis of both feet  -     ketoconazole (NIZORAL) 2 % cream; Apply topically once daily.  Dispense: 60 g; Refill: 1    Bilateral lower extremity edema  -     US Lower Extremity Veins Bilateral; Future; Expected date: 03/08/2022      I counseled the patient on his conditions, regarding findings of my examination, my impressions, and usual treatment plan.   Greater than 50% of this visit spent on counseling and coordination of care.  Greater than 15 minutes of a 20 minute appointment spent on education about the diabetic foot, neuropathy, and prevention of limb loss.  Shoe inspection. Diabetic Foot Education. Patient reminded of the importance of good nutrition and blood sugar control to help prevent podiatric complications of diabetes. Patient instructed on proper foot hygeine. We discussed wearing  proper shoe gear, daily foot inspections, never walking without protective shoe gear, never putting sharp instruments to feet.    Patient counseled on ways to improve swelling in lower extremities including decreasing/eliminating salt intake, elevating lower extremities, compression stocking, or oral medications. Patient states she understands.   Ordered bilateral lower extremities venous ultrasound.   Dispensed size F tubi  to be worn daily, and not to sleep in.   Prescription written for topical Ketoconazole 2% cream to be applied to feet once daily for 2 weeks.   Patient  will continue to monitor the areas daily, inspect feet, wear protective shoe gear when ambulatory, moisturizer to maintain skin integrity. Patient reminded of the importance of good nutrition and blood sugar control to help prevent podiatric complications of diabetes.  Patient to return 2 months or sooner if needed.             Rosas Macias DPM  [facility]

## 2022-03-11 ENCOUNTER — HOSPITAL ENCOUNTER (OUTPATIENT)
Dept: RADIOLOGY | Facility: HOSPITAL | Age: 67
Discharge: HOME | End: 2022-03-11
Attending: PODIATRIST
Payer: MEDICARE

## 2022-03-11 DIAGNOSIS — R60.0 BILATERAL LOWER EXTREMITY EDEMA: ICD-10-CM

## 2022-03-11 PROCEDURE — 93970 EXTREMITY STUDY: CPT | Mod: 26,,, | Performed by: RADIOLOGY

## 2022-03-11 PROCEDURE — 93970 EXTREMITY STUDY: CPT | Mod: TC

## 2022-03-11 PROCEDURE — 93970 US LOWER EXTREMITY VEINS BILATERAL: ICD-10-PCS | Mod: 26,,, | Performed by: RADIOLOGY

## 2022-03-24 ENCOUNTER — TELEPHONE (OUTPATIENT)
Dept: INTERNAL MEDICINE | Facility: CLINIC | Age: 67
End: 2022-03-24
Payer: MEDICARE

## 2022-03-24 NOTE — TELEPHONE ENCOUNTER
----- Message from Yoly Thornton sent at 3/24/2022 12:13 PM CDT -----  Pt called requesting a call back in regards to an appt with in the next week or two , please give a call back at .[phone]  Thanks

## 2022-03-24 NOTE — TELEPHONE ENCOUNTER
----- Message from Nu Cameron sent at 3/24/2022  2:40 PM CDT -----  .Type:  Patient Returning Call    Who Called:self  Who Left Message for Patient:  Does the patient know what this is regarding?:yes  Would the patient rather a call back or a response via [facility]? call  Best Call Back Number:[phone] (home)

## 2022-03-31 ENCOUNTER — PES CALL (OUTPATIENT)
Dept: ADMINISTRATIVE | Facility: CLINIC | Age: 67
End: 2022-03-31
Payer: MEDICARE

## 2022-04-04 DIAGNOSIS — N18.30 STAGE 3 CHRONIC KIDNEY DISEASE, UNSPECIFIED WHETHER STAGE 3A OR 3B CKD: Primary | ICD-10-CM

## 2022-04-05 ENCOUNTER — TELEPHONE (OUTPATIENT)
Dept: ADMINISTRATIVE | Facility: HOSPITAL | Age: 67
End: 2022-04-05
Payer: MEDICARE

## 2022-04-05 DIAGNOSIS — E66.9 DIABETES MELLITUS TYPE 2 IN OBESE: ICD-10-CM

## 2022-04-05 DIAGNOSIS — Z12.11 COLON CANCER SCREENING: Primary | ICD-10-CM

## 2022-04-05 DIAGNOSIS — E11.69 DIABETES MELLITUS TYPE 2 IN OBESE: ICD-10-CM

## 2022-04-05 RX ORDER — METFORMIN HYDROCHLORIDE 500 MG/1
500 TABLET ORAL 2 TIMES DAILY WITH MEALS
Qty: 180 TABLET | Refills: 3 | Status: SHIPPED | OUTPATIENT
Start: 2022-04-05 | End: 2022-10-17 | Stop reason: SDUPTHER

## 2022-04-05 NOTE — TELEPHONE ENCOUNTER
Care Due:                  Date            Visit Type   Department     Provider  --------------------------------------------------------------------------------                                EP -                              PRIMARY      [facility] INTERNAL  Last Visit: 02-      CARE ([facility])   LLOYD Lemus                              EP -                              PRIMARY      [facility] INTERNAL  Next Visit: 04-      CARE ([facility])   LLOYD Lemus                                                            Last  Test          Frequency    Reason                     Performed    Due Date  --------------------------------------------------------------------------------    Uric Acid...  12 months..  colchicine...............  Not Found    Overdue    Powered by [employer]. Reference number: [id number].   4/05/2022 10:05:19 AM CDT

## 2022-04-22 ENCOUNTER — TELEPHONE (OUTPATIENT)
Dept: INTERNAL MEDICINE | Facility: CLINIC | Age: 67
End: 2022-04-22
Payer: MEDICARE

## 2022-04-25 ENCOUNTER — LAB VISIT (OUTPATIENT)
Dept: LAB | Facility: HOSPITAL | Age: 67
End: 2022-04-25
Attending: FAMILY MEDICINE
Payer: MEDICARE

## 2022-04-25 ENCOUNTER — OFFICE VISIT (OUTPATIENT)
Dept: INTERNAL MEDICINE | Facility: CLINIC | Age: 67
End: 2022-04-25
Payer: MEDICARE

## 2022-04-25 VITALS
DIASTOLIC BLOOD PRESSURE: 80 MMHG | SYSTOLIC BLOOD PRESSURE: 136 MMHG | OXYGEN SATURATION: 98 % | RESPIRATION RATE: 18 BRPM | HEART RATE: 64 BPM | HEIGHT: 71 IN | WEIGHT: 290.56 LBS | BODY MASS INDEX: 40.68 KG/M2 | TEMPERATURE: 98 F

## 2022-04-25 DIAGNOSIS — I10 PRIMARY HYPERTENSION: ICD-10-CM

## 2022-04-25 DIAGNOSIS — Z79.4 CONTROLLED TYPE 2 DIABETES MELLITUS WITHOUT COMPLICATION, WITH LONG-TERM CURRENT USE OF INSULIN: ICD-10-CM

## 2022-04-25 DIAGNOSIS — E11.42 DIABETIC POLYNEUROPATHY ASSOCIATED WITH TYPE 2 DIABETES MELLITUS: ICD-10-CM

## 2022-04-25 DIAGNOSIS — Z79.4 CONTROLLED TYPE 2 DIABETES MELLITUS WITHOUT COMPLICATION, WITH LONG-TERM CURRENT USE OF INSULIN: Primary | ICD-10-CM

## 2022-04-25 DIAGNOSIS — E11.9 CONTROLLED TYPE 2 DIABETES MELLITUS WITHOUT COMPLICATION, WITH LONG-TERM CURRENT USE OF INSULIN: ICD-10-CM

## 2022-04-25 DIAGNOSIS — E11.9 CONTROLLED TYPE 2 DIABETES MELLITUS WITHOUT COMPLICATION, WITH LONG-TERM CURRENT USE OF INSULIN: Primary | ICD-10-CM

## 2022-04-25 PROBLEM — Z09 HOSPITAL DISCHARGE FOLLOW-UP: Status: RESOLVED | Noted: 2022-01-18 | Resolved: 2022-04-25

## 2022-04-25 PROCEDURE — 99999 PR PBB SHADOW E&M-EST. PATIENT-LVL III: CPT | Mod: PBBFAC,,, | Performed by: FAMILY MEDICINE

## 2022-04-25 PROCEDURE — 99214 OFFICE O/P EST MOD 30 MIN: CPT | Mod: S$GLB,,, | Performed by: FAMILY MEDICINE

## 2022-04-25 PROCEDURE — 3288F PR FALLS RISK ASSESSMENT DOCUMENTED: ICD-10-PCS | Mod: CPTII,S$GLB,, | Performed by: FAMILY MEDICINE

## 2022-04-25 PROCEDURE — 3008F BODY MASS INDEX DOCD: CPT | Mod: CPTII,S$GLB,, | Performed by: FAMILY MEDICINE

## 2022-04-25 PROCEDURE — 1126F AMNT PAIN NOTED NONE PRSNT: CPT | Mod: CPTII,S$GLB,, | Performed by: FAMILY MEDICINE

## 2022-04-25 PROCEDURE — 1101F PT FALLS ASSESS-DOCD LE1/YR: CPT | Mod: CPTII,S$GLB,, | Performed by: FAMILY MEDICINE

## 2022-04-25 PROCEDURE — 3051F HG A1C>EQUAL 7.0%<8.0%: CPT | Mod: CPTII,S$GLB,, | Performed by: FAMILY MEDICINE

## 2022-04-25 PROCEDURE — 99999 PR PBB SHADOW E&M-EST. PATIENT-LVL III: ICD-10-PCS | Mod: PBBFAC,,, | Performed by: FAMILY MEDICINE

## 2022-04-25 PROCEDURE — 1126F PR PAIN SEVERITY QUANTIFIED, NO PAIN PRESENT: ICD-10-PCS | Mod: CPTII,S$GLB,, | Performed by: FAMILY MEDICINE

## 2022-04-25 PROCEDURE — 3075F SYST BP GE 130 - 139MM HG: CPT | Mod: CPTII,S$GLB,, | Performed by: FAMILY MEDICINE

## 2022-04-25 PROCEDURE — 36415 COLL VENOUS BLD VENIPUNCTURE: CPT | Performed by: FAMILY MEDICINE

## 2022-04-25 PROCEDURE — 1159F MED LIST DOCD IN RCRD: CPT | Mod: CPTII,S$GLB,, | Performed by: FAMILY MEDICINE

## 2022-04-25 PROCEDURE — 3079F PR MOST RECENT DIASTOLIC BLOOD PRESSURE 80-89 MM HG: ICD-10-PCS | Mod: CPTII,S$GLB,, | Performed by: FAMILY MEDICINE

## 2022-04-25 PROCEDURE — 3051F PR MOST RECENT HEMOGLOBIN A1C LEVEL 7.0 - < 8.0%: ICD-10-PCS | Mod: CPTII,S$GLB,, | Performed by: FAMILY MEDICINE

## 2022-04-25 PROCEDURE — 99214 PR OFFICE/OUTPT VISIT, EST, LEVL IV, 30-39 MIN: ICD-10-PCS | Mod: S$GLB,,, | Performed by: FAMILY MEDICINE

## 2022-04-25 PROCEDURE — 83036 HEMOGLOBIN GLYCOSYLATED A1C: CPT | Performed by: FAMILY MEDICINE

## 2022-04-25 PROCEDURE — 1159F PR MEDICATION LIST DOCUMENTED IN MEDICAL RECORD: ICD-10-PCS | Mod: CPTII,S$GLB,, | Performed by: FAMILY MEDICINE

## 2022-04-25 PROCEDURE — 3288F FALL RISK ASSESSMENT DOCD: CPT | Mod: CPTII,S$GLB,, | Performed by: FAMILY MEDICINE

## 2022-04-25 PROCEDURE — 3075F PR MOST RECENT SYSTOLIC BLOOD PRESS GE 130-139MM HG: ICD-10-PCS | Mod: CPTII,S$GLB,, | Performed by: FAMILY MEDICINE

## 2022-04-25 PROCEDURE — 3008F PR BODY MASS INDEX (BMI) DOCUMENTED: ICD-10-PCS | Mod: CPTII,S$GLB,, | Performed by: FAMILY MEDICINE

## 2022-04-25 PROCEDURE — 3079F DIAST BP 80-89 MM HG: CPT | Mod: CPTII,S$GLB,, | Performed by: FAMILY MEDICINE

## 2022-04-25 PROCEDURE — 1101F PR PT FALLS ASSESS DOC 0-1 FALLS W/OUT INJ PAST YR: ICD-10-PCS | Mod: CPTII,S$GLB,, | Performed by: FAMILY MEDICINE

## 2022-04-25 RX ORDER — GABAPENTIN 300 MG/1
300 CAPSULE ORAL 2 TIMES DAILY
Qty: 60 CAPSULE | Refills: 2 | Status: SHIPPED | OUTPATIENT
Start: 2022-04-25 | End: 2022-06-29

## 2022-04-25 NOTE — PROGRESS NOTES
Subjective:       Patient ID: Tom Tavarez is a 67 y.o. male.    Chief Complaint: Follow-up    Follow-up diabetes weakness shortness of breath pain and burning of his feet.  His recent hospital discharge was reviewed.  He reports his breathing is better he feels last week.  Overall he feels improved.  His blood sugars morning evening have been in the 70 80 range with no hypoglycemic symptoms.  He currently is on Lantus 50 units at night and metformin as prescribed.  He has had 1 month duration of pain and burning of his feet.  He denies chest pain palpitations shortness of breath or edema.    Review of Systems   Constitutional: Negative for chills and fever.   HENT: Negative for congestion.    Respiratory: Positive for shortness of breath. Negative for cough, chest tightness and wheezing.    Cardiovascular: Negative for chest pain, palpitations and leg swelling.   Gastrointestinal: Negative for abdominal distention and abdominal pain.   Endocrine: Negative for polydipsia and polyuria.   Neurological: Negative for dizziness, weakness, light-headedness and headaches.        Pain and burning of his feet       Objective:      Physical Exam  Constitutional:       General: He is not in acute distress.     Appearance: He is not ill-appearing or diaphoretic.   Cardiovascular:      Rate and Rhythm: Normal rate and regular rhythm.      Heart sounds: No murmur heard.    No gallop.   Pulmonary:      Effort: Pulmonary effort is normal. No respiratory distress.      Breath sounds: Wheezing present. No rhonchi or rales.      Comments: Minimal expiratory wheeze  Abdominal:      General: There is no distension.   Lymphadenopathy:      Cervical: No cervical adenopathy.   Skin:     General: Skin is warm and dry.      Coloration: Skin is not pale.      Findings: No erythema.   Neurological:      Mental Status: He is alert and oriented to person, place, and time.         Lab Visit on 02/17/2022   Component Date Value Ref Range  Status    Hemoglobin A1C 02/17/2022 7.8 (A) 4.0 - 5.6 % Final    Estimated Avg Glucose 02/17/2022 177 (A) 68 - 131 mg/dL Final     Assessment:       1. Controlled type 2 diabetes mellitus without complication, with long-term current use of insulin    2. Primary hypertension    3. Diabetic polyneuropathy associated with type 2 diabetes mellitus        Plan:     A1c 7.8 will repeat.  Start gabapentin 300 mg twice a day for neuropathy.  Follow-up 3 months.  He is also scheduled see nephrology.  He has had intermittent decrease in renal function he is no longer using naproxen blood pressure controlled    Controlled type 2 diabetes mellitus without complication, with long-term current use of insulin  -     Hemoglobin A1C; Future; Expected date: 04/25/2022    Primary hypertension    Diabetic polyneuropathy associated with type 2 diabetes mellitus    Other orders  -     gabapentin (NEURONTIN) 300 MG capsule; Take 1 capsule (300 mg total) by mouth 2 (two) times daily.  Dispense: 60 capsule; Refill: 2

## 2022-04-26 LAB
ESTIMATED AVG GLUCOSE: 111 MG/DL (ref 68–131)
HBA1C MFR BLD: 5.5 % (ref 4–5.6)

## 2022-04-28 RX ORDER — AMLODIPINE BESYLATE 10 MG/1
10 TABLET ORAL DAILY
Qty: 90 TABLET | Refills: 0 | Status: CANCELLED | OUTPATIENT
Start: 2022-04-28 | End: 2023-04-28

## 2022-04-28 NOTE — TELEPHONE ENCOUNTER
Refill Routing Note   Medication(s) are not appropriate for processing by [facility] for the following reason(s):      - Medication is a new start (<3 months)    [facility] action(s):  Defer          Medication reconciliation completed: No     Appointments  past 12m or future 3m with PCP    Date Provider   Last Visit   4/25/2022 Adolfo Lemus MD   Next Visit   7/25/2022 Adolfo Lemus MD   ED visits in past 90 days: 0        Note composed:2:57 PM 04/28/2022

## 2022-04-28 NOTE — TELEPHONE ENCOUNTER
No new care gaps identified.  Powered by [employer]. Reference number: [id number].   4/28/2022 10:46:38 AM ELVIST

## 2022-04-29 ENCOUNTER — PATIENT MESSAGE (OUTPATIENT)
Dept: INTERNAL MEDICINE | Facility: CLINIC | Age: 67
End: 2022-04-29
Payer: MEDICARE

## 2022-04-29 NOTE — TELEPHONE ENCOUNTER
No new care gaps identified.  Powered by [employer]. Reference number: [id number].   4/29/2022 8:58:15 AM CDT

## 2022-05-01 RX ORDER — AMLODIPINE BESYLATE 10 MG/1
10 TABLET ORAL DAILY
Qty: 90 TABLET | Refills: 0 | Status: SHIPPED | OUTPATIENT
Start: 2022-05-01 | End: 2022-08-14 | Stop reason: SDUPTHER

## 2022-05-04 ENCOUNTER — PATIENT OUTREACH (OUTPATIENT)
Dept: ADMINISTRATIVE | Facility: HOSPITAL | Age: 67
End: 2022-05-04
Payer: MEDICARE

## 2022-05-04 DIAGNOSIS — E78.5 HYPERLIPIDEMIA, UNSPECIFIED HYPERLIPIDEMIA TYPE: ICD-10-CM

## 2022-05-04 RX ORDER — ATORVASTATIN CALCIUM 40 MG/1
TABLET, FILM COATED ORAL
Qty: 90 TABLET | Refills: 0 | Status: SHIPPED | OUTPATIENT
Start: 2022-05-04 | End: 2022-07-11

## 2022-05-04 NOTE — TELEPHONE ENCOUNTER
No new care gaps identified.  [facility] Embedded Care Gaps. Reference number: [id number]. 5/04/2022   1:06:05 PM CDT

## 2022-05-10 ENCOUNTER — PATIENT MESSAGE (OUTPATIENT)
Dept: PHARMACY | Facility: CLINIC | Age: 67
End: 2022-05-10
Payer: MEDICARE

## 2022-05-10 ENCOUNTER — PATIENT OUTREACH (OUTPATIENT)
Dept: ADMINISTRATIVE | Facility: OTHER | Age: 67
End: 2022-05-10
Payer: MEDICARE

## 2022-05-11 ENCOUNTER — OFFICE VISIT (OUTPATIENT)
Dept: PODIATRY | Facility: CLINIC | Age: 67
End: 2022-05-11
Payer: MEDICARE

## 2022-05-11 VITALS
SYSTOLIC BLOOD PRESSURE: 175 MMHG | DIASTOLIC BLOOD PRESSURE: 89 MMHG | HEIGHT: 71 IN | BODY MASS INDEX: 40.68 KG/M2 | HEART RATE: 53 BPM | WEIGHT: 290.56 LBS

## 2022-05-11 DIAGNOSIS — R60.0 BILATERAL LOWER EXTREMITY EDEMA: Primary | ICD-10-CM

## 2022-05-11 DIAGNOSIS — E11.9 CONTROLLED TYPE 2 DIABETES MELLITUS WITHOUT COMPLICATION, WITH LONG-TERM CURRENT USE OF INSULIN: ICD-10-CM

## 2022-05-11 DIAGNOSIS — Z79.4 CONTROLLED TYPE 2 DIABETES MELLITUS WITHOUT COMPLICATION, WITH LONG-TERM CURRENT USE OF INSULIN: ICD-10-CM

## 2022-05-11 DIAGNOSIS — B35.3 TINEA PEDIS OF BOTH FEET: ICD-10-CM

## 2022-05-11 PROCEDURE — 3288F PR FALLS RISK ASSESSMENT DOCUMENTED: ICD-10-PCS | Mod: CPTII,S$GLB,, | Performed by: PODIATRIST

## 2022-05-11 PROCEDURE — 1160F PR REVIEW ALL MEDS BY PRESCRIBER/CLIN PHARMACIST DOCUMENTED: ICD-10-PCS | Mod: CPTII,S$GLB,, | Performed by: PODIATRIST

## 2022-05-11 PROCEDURE — 1160F RVW MEDS BY RX/DR IN RCRD: CPT | Mod: CPTII,S$GLB,, | Performed by: PODIATRIST

## 2022-05-11 PROCEDURE — 1101F PR PT FALLS ASSESS DOC 0-1 FALLS W/OUT INJ PAST YR: ICD-10-PCS | Mod: CPTII,S$GLB,, | Performed by: PODIATRIST

## 2022-05-11 PROCEDURE — 99499 UNLISTED E&M SERVICE: CPT | Mod: S$GLB,,, | Performed by: PODIATRIST

## 2022-05-11 PROCEDURE — 1159F PR MEDICATION LIST DOCUMENTED IN MEDICAL RECORD: ICD-10-PCS | Mod: CPTII,S$GLB,, | Performed by: PODIATRIST

## 2022-05-11 PROCEDURE — 1125F AMNT PAIN NOTED PAIN PRSNT: CPT | Mod: CPTII,S$GLB,, | Performed by: PODIATRIST

## 2022-05-11 PROCEDURE — 99499 RISK ADDL DX/OHS AUDIT: ICD-10-PCS | Mod: S$GLB,,, | Performed by: PODIATRIST

## 2022-05-11 PROCEDURE — 99999 PR PBB SHADOW E&M-EST. PATIENT-LVL IV: CPT | Mod: PBBFAC,,, | Performed by: PODIATRIST

## 2022-05-11 PROCEDURE — 99213 OFFICE O/P EST LOW 20 MIN: CPT | Mod: S$GLB,,, | Performed by: PODIATRIST

## 2022-05-11 PROCEDURE — 1159F MED LIST DOCD IN RCRD: CPT | Mod: CPTII,S$GLB,, | Performed by: PODIATRIST

## 2022-05-11 PROCEDURE — 3079F PR MOST RECENT DIASTOLIC BLOOD PRESSURE 80-89 MM HG: ICD-10-PCS | Mod: CPTII,S$GLB,, | Performed by: PODIATRIST

## 2022-05-11 PROCEDURE — 3077F PR MOST RECENT SYSTOLIC BLOOD PRESSURE >= 140 MM HG: ICD-10-PCS | Mod: CPTII,S$GLB,, | Performed by: PODIATRIST

## 2022-05-11 PROCEDURE — 3077F SYST BP >= 140 MM HG: CPT | Mod: CPTII,S$GLB,, | Performed by: PODIATRIST

## 2022-05-11 PROCEDURE — 3288F FALL RISK ASSESSMENT DOCD: CPT | Mod: CPTII,S$GLB,, | Performed by: PODIATRIST

## 2022-05-11 PROCEDURE — 3044F PR MOST RECENT HEMOGLOBIN A1C LEVEL <7.0%: ICD-10-PCS | Mod: CPTII,S$GLB,, | Performed by: PODIATRIST

## 2022-05-11 PROCEDURE — 99999 PR PBB SHADOW E&M-EST. PATIENT-LVL IV: ICD-10-PCS | Mod: PBBFAC,,, | Performed by: PODIATRIST

## 2022-05-11 PROCEDURE — 99213 PR OFFICE/OUTPT VISIT, EST, LEVL III, 20-29 MIN: ICD-10-PCS | Mod: S$GLB,,, | Performed by: PODIATRIST

## 2022-05-11 PROCEDURE — 3044F HG A1C LEVEL LT 7.0%: CPT | Mod: CPTII,S$GLB,, | Performed by: PODIATRIST

## 2022-05-11 PROCEDURE — 1125F PR PAIN SEVERITY QUANTIFIED, PAIN PRESENT: ICD-10-PCS | Mod: CPTII,S$GLB,, | Performed by: PODIATRIST

## 2022-05-11 PROCEDURE — 3079F DIAST BP 80-89 MM HG: CPT | Mod: CPTII,S$GLB,, | Performed by: PODIATRIST

## 2022-05-11 PROCEDURE — 3008F BODY MASS INDEX DOCD: CPT | Mod: CPTII,S$GLB,, | Performed by: PODIATRIST

## 2022-05-11 PROCEDURE — 3008F PR BODY MASS INDEX (BMI) DOCUMENTED: ICD-10-PCS | Mod: CPTII,S$GLB,, | Performed by: PODIATRIST

## 2022-05-11 PROCEDURE — 1101F PT FALLS ASSESS-DOCD LE1/YR: CPT | Mod: CPTII,S$GLB,, | Performed by: PODIATRIST

## 2022-05-19 ENCOUNTER — PATIENT OUTREACH (OUTPATIENT)
Dept: ADMINISTRATIVE | Facility: HOSPITAL | Age: 67
End: 2022-05-19
Payer: MEDICARE

## 2022-05-19 NOTE — PROGRESS NOTES
Working Colon Cancer Screening    Spoke with patient about screenings. Patient agreed to fit kit. Will message [facility] to mail fit kit out to patient to complete and return.

## 2022-05-23 NOTE — PROGRESS NOTES
Subjective:     Patient ID: Tom Tavarez is a 67 y.o. male.    Chief Complaint: Foot Pain (Pt c/o left foot neuropathy/ pain 4/10, diabetic pt wears tennis shoes w/ socks, PCP Dr. Lemus last seen 4-25-22)    Tom is a 67 y.o. male who presents to the clinic for evaluation and treatment of high risk feet. Tom has a past medical history of BMI 40.0-44.9, adult (7/21/2016), Colon cancer screening (10/3/2017), Diabetes mellitus type 2 in obese (11/2006), Hyperlipidemia, Hypertension (1985), Obesity, Class III, BMI 40-49.9 (morbid obesity) (1985), and Umbilical hernia. The patient's chief complaint is follow up of left foot swelling and neuropathy pain. Patient states the pain is better in the feet, rate pain 4/10. Patient states he has venous ultrasound completed. Patient states he has been wearing the tubi  sometimes. This patient has documented high risk feet requiring routine maintenance secondary to diabetes mellitis and those secondary complications of diabetes, as mentioned..    PCP: Adolfo Lemus MD    Date Last Seen by PCP: 04/25/2022    Current shoe gear:  Affected Foot: Tennis shoes     Unaffected Foot: Tennis shoes    Hemoglobin A1C   Date Value Ref Range Status   04/25/2022 5.5 4.0 - 5.6 % Final     Comment:     ADA Screening Guidelines:  5.7-6.4%  Consistent with prediabetes  >or=6.5%  Consistent with diabetes    High levels of fetal hemoglobin interfere with the HbA1C  assay. Heterozygous hemoglobin variants (HbS, HgC, etc)do  not significantly interfere with this assay.   However, presence of multiple variants may affect accuracy.     02/17/2022 7.8 (H) 4.0 - 5.6 % Final     Comment:     ADA Screening Guidelines:  5.7-6.4%  Consistent with prediabetes  >or=6.5%  Consistent with diabetes    High levels of fetal hemoglobin interfere with the HbA1C  assay. Heterozygous hemoglobin variants (HbS, HgC, etc)do  not significantly interfere with this assay.   However, presence of multiple  variants may affect accuracy.     01/18/2022 13.1 (H) 4.0 - 5.6 % Final     Comment:     ADA Screening Guidelines:  5.7-6.4%  Consistent with prediabetes  >or=6.5%  Consistent with diabetes    High levels of fetal hemoglobin interfere with the HbA1C  assay. Heterozygous hemoglobin variants (HbS, HgC, etc)do  not significantly interfere with this assay.   However, presence of multiple variants may affect accuracy.         Patient Active Problem List   Diagnosis    Diabetes mellitus type 2 in obese    Hyperlipidemia    Primary hypertension    BMI 40.0-44.9, adult    Hypertension    Umbilical hernia    Controlled type 2 diabetes mellitus without complication, with long-term current use of insulin    Weakness    Immunization deficiency    Yeast dermatitis    Acute gout due to renal impairment involving foot    Onychomycosis of toenail    Acute cystitis with hematuria    Stage 3 chronic kidney disease    FH: thoracic aortic aneurysm    Chronic allergic rhinitis    GERD (gastroesophageal reflux disease)    Itchy skin    Testicle swelling    Uncontrolled type 2 diabetes mellitus with hyperglycemia    Primary osteoarthritis of left hip    Aortic atherosclerosis    Arteriosclerosis of abdominal aorta    Abdominal wall hernia    Nasal congestion    Abdominal aortic aneurysm (AAA) without rupture    Dorsalgia, unspecified    Hip pain    Colon cancer screening    Hypotension    Shortness of breath    Diabetic polyneuropathy associated with type 2 diabetes mellitus       Medication List with Changes/Refills   Current Medications    AMLODIPINE (NORVASC) 10 MG TABLET    Take 1 tablet (10 mg total) by mouth once daily.    ATORVASTATIN (LIPITOR) 40 MG TABLET    TAKE 1 TABLET BY MOUTH  DAILY    BLOOD SUGAR DIAGNOSTIC STRP    1 each by Misc.(Non-Drug; Combo Route) route once daily.    COLCHICINE (COLCRYS) 0.6 MG TABLET    Take 1 tablet (0.6 mg total) by mouth once daily.    FLASH GLUCOSE SCANNING READER  "(FREESTYLE KRISTY 14 DAY READER) MISC    1 each by Misc.(Non-Drug; Combo Route) route continuous.    FLASH GLUCOSE SENSOR (FREESTYLE KRISTY 14 DAY SENSOR) KIT    1 each by Misc.(Non-Drug; Combo Route) route continuous.    GABAPENTIN (NEURONTIN) 300 MG CAPSULE    Take 1 capsule (300 mg total) by mouth 2 (two) times daily.    INSULIN SYRINGE-NEEDLE U-100 0.3 ML 31 GAUGE X 1/4" SYRG    1 each by Misc.(Non-Drug; Combo Route) route 2 (two) times a day.    KETOCONAZOLE (NIZORAL) 2 % CREAM    Apply topically once daily.    LANTUS SOLOSTAR U-100 INSULIN GLARGINE 100 UNITS/ML (3ML) SUBQ PEN    Inject 50 Units into the skin 2 (two) times a day.    METFORMIN (GLUCOPHAGE) 500 MG TABLET    Take 1 tablet (500 mg total) by mouth 2 (two) times daily with meals.       Review of patient's allergies indicates:  No Known Allergies    Past Surgical History:   Procedure Laterality Date    G U surgery  1982    biopsy of testicle infertility w/u       Family History   Problem Relation Age of Onset    Hypertension Mother     Heart failure Mother        Social History     Socioeconomic History    Marital status:     Number of children: 0    Highest education level: Some college, no degree   Occupational History    Occupation: Retired-Sales     Comment: [employer]   Tobacco Use    Smoking status: Former Smoker    Smokeless tobacco: Never Used    Tobacco comment: 1998 quit    Substance and Sexual Activity    Alcohol use: No     Alcohol/week: 0.0 standard drinks    Drug use: No    Sexual activity: Yes     Partners: Female       Vitals:    05/11/22 1330   BP: (!) 175/89   Pulse: (!) 53   Weight: 131.8 kg (290 lb 9.1 oz)   Height: 5' 11" (1.803 m)   PainSc:   4       Hemoglobin A1C   Date Value Ref Range Status   04/25/2022 5.5 4.0 - 5.6 % Final     Comment:     ADA Screening Guidelines:  5.7-6.4%  Consistent with prediabetes  >or=6.5%  Consistent with diabetes    High levels of fetal hemoglobin interfere with the " HbA1C  assay. Heterozygous hemoglobin variants (HbS, HgC, etc)do  not significantly interfere with this assay.   However, presence of multiple variants may affect accuracy.     02/17/2022 7.8 (H) 4.0 - 5.6 % Final     Comment:     ADA Screening Guidelines:  5.7-6.4%  Consistent with prediabetes  >or=6.5%  Consistent with diabetes    High levels of fetal hemoglobin interfere with the HbA1C  assay. Heterozygous hemoglobin variants (HbS, HgC, etc)do  not significantly interfere with this assay.   However, presence of multiple variants may affect accuracy.     01/18/2022 13.1 (H) 4.0 - 5.6 % Final     Comment:     ADA Screening Guidelines:  5.7-6.4%  Consistent with prediabetes  >or=6.5%  Consistent with diabetes    High levels of fetal hemoglobin interfere with the HbA1C  assay. Heterozygous hemoglobin variants (HbS, HgC, etc)do  not significantly interfere with this assay.   However, presence of multiple variants may affect accuracy.         Review of Systems   Constitutional: Negative for chills and fever.   Respiratory: Negative for shortness of breath.    Cardiovascular: Positive for leg swelling. Negative for chest pain, palpitations, orthopnea and claudication.   Gastrointestinal: Negative for diarrhea, nausea and vomiting.   Musculoskeletal: Negative for joint pain.   Skin: Negative for rash.   Neurological: Positive for tingling and sensory change.   Psychiatric/Behavioral: Negative.          Objective:      PHYSICAL EXAM: Apperance: Alert and orient in no distress,well developed, and with good attention to grooming and body habits  Patient presents ambulating in tennis shoes.   LOWER EXTREMITY EXAM:  VASCULAR: Dorsalis pedis pulses 2/4 bilateral and Posterior Tibial pulses 2/4 bilateral. Capillary fill time <4 seconds  bilateral. Moderate edema observed bilateral. Varicosities absent bilateral. Skin temperature of the lower extremities is warm to warm, proximal to distal. Hair growth WNL  bilateral.  DERMATOLOGICAL: No skin rashes, subcutaneous nodules, lesions, or ulcers observed bilateral. Nails 1,2,3,4,5 bilateral normal length. Webspaces 1,2,3,4 bilateral clean, dry and without evidence of break in skin integrity. Decreased dry and peeling skin noted plantarly in moccasin distribution.   NEUROLOGICAL: Light touch, sharp-dull, proprioception all present and equal bilaterally.  Vibratory sensation diminished at bilateral hallux. Protective sensation intact at all 10 sites as tested with a [place] 5.07 monofilament.   MUSCULOSKELETAL: Muscle strength is 5/5 for foot inverters, everters, plantarflexors, and dorsiflexors. Muscle tone is normal. No pain on palpation or ROM of left ankle.           Assessment:       ICD-10-CM ICD-9-CM   1. Bilateral lower extremity edema  R60.0 782.3   2. Tinea pedis of both feet  B35.3 110.4   3. Controlled type 2 diabetes mellitus without complication, with long-term current use of insulin  E11.9 250.00    Z79.4 V58.67       Plan:   Bilateral lower extremity edema    Tinea pedis of both feet    Controlled type 2 diabetes mellitus without complication, with long-term current use of insulin      I counseled the patient on his conditions, regarding findings of my examination, my impressions, and usual treatment plan.   Reviewed venous ultrasound results with patient.   Patient counseled on ways to improve swelling in lower extremities including decreasing/eliminating salt intake, elevating lower extremities, compression stocking, or oral medications. Patient states she understands.   Patient instructed to continue use of tubi .   Dispensed size F tubi  to be worn daily, and not to sleep in.   Patient to continue topical Ketoconazole 2% cream to be applied to feet once daily for 2 weeks.   Patient  will continue to monitor the areas daily, inspect feet, wear protective shoe gear when ambulatory, moisturizer to maintain skin integrity. Patient reminded of  the importance of good nutrition and blood sugar control to help prevent podiatric complications of diabetes.  Patient to return 3 months or sooner if needed.        Rosas Macias DPM  [facility]

## 2022-06-03 ENCOUNTER — LAB VISIT (OUTPATIENT)
Dept: LAB | Facility: HOSPITAL | Age: 67
End: 2022-06-03
Attending: INTERNAL MEDICINE
Payer: MEDICARE

## 2022-06-03 DIAGNOSIS — N18.30 STAGE 3 CHRONIC KIDNEY DISEASE, UNSPECIFIED WHETHER STAGE 3A OR 3B CKD: ICD-10-CM

## 2022-06-03 LAB
ALBUMIN SERPL BCP-MCNC: 3.5 G/DL (ref 3.5–5.2)
ANION GAP SERPL CALC-SCNC: 10 MMOL/L (ref 8–16)
BASOPHILS # BLD AUTO: 0.06 K/UL (ref 0–0.2)
BASOPHILS NFR BLD: 0.8 % (ref 0–1.9)
BUN SERPL-MCNC: 20 MG/DL (ref 8–23)
CALCIUM SERPL-MCNC: 9.2 MG/DL (ref 8.7–10.5)
CHLORIDE SERPL-SCNC: 105 MMOL/L (ref 95–110)
CO2 SERPL-SCNC: 26 MMOL/L (ref 23–29)
CREAT SERPL-MCNC: 1.2 MG/DL (ref 0.5–1.4)
DIFFERENTIAL METHOD: NORMAL
EOSINOPHIL # BLD AUTO: 0.3 K/UL (ref 0–0.5)
EOSINOPHIL NFR BLD: 4.4 % (ref 0–8)
ERYTHROCYTE [DISTWIDTH] IN BLOOD BY AUTOMATED COUNT: 14.5 % (ref 11.5–14.5)
EST. GFR  (AFRICAN AMERICAN): >60 ML/MIN/1.73 M^2
EST. GFR  (NON AFRICAN AMERICAN): >60 ML/MIN/1.73 M^2
GLUCOSE SERPL-MCNC: 79 MG/DL (ref 70–110)
HCT VFR BLD AUTO: 47.8 % (ref 40–54)
HGB BLD-MCNC: 15.8 G/DL (ref 14–18)
IMM GRANULOCYTES # BLD AUTO: 0.01 K/UL (ref 0–0.04)
IMM GRANULOCYTES NFR BLD AUTO: 0.1 % (ref 0–0.5)
LYMPHOCYTES # BLD AUTO: 3.1 K/UL (ref 1–4.8)
LYMPHOCYTES NFR BLD: 40.3 % (ref 18–48)
MCH RBC QN AUTO: 28.7 PG (ref 27–31)
MCHC RBC AUTO-ENTMCNC: 33.1 G/DL (ref 32–36)
MCV RBC AUTO: 87 FL (ref 82–98)
MONOCYTES # BLD AUTO: 0.6 K/UL (ref 0.3–1)
MONOCYTES NFR BLD: 8 % (ref 4–15)
NEUTROPHILS # BLD AUTO: 3.6 K/UL (ref 1.8–7.7)
NEUTROPHILS NFR BLD: 46.4 % (ref 38–73)
NRBC BLD-RTO: 0 /100 WBC
PHOSPHATE SERPL-MCNC: 2.9 MG/DL (ref 2.7–4.5)
PLATELET # BLD AUTO: 231 K/UL (ref 150–450)
PMV BLD AUTO: 11.2 FL (ref 9.2–12.9)
POTASSIUM SERPL-SCNC: 4.7 MMOL/L (ref 3.5–5.1)
RBC # BLD AUTO: 5.51 M/UL (ref 4.6–6.2)
SODIUM SERPL-SCNC: 141 MMOL/L (ref 136–145)
WBC # BLD AUTO: 7.75 K/UL (ref 3.9–12.7)

## 2022-06-03 PROCEDURE — 80069 RENAL FUNCTION PANEL: CPT | Performed by: INTERNAL MEDICINE

## 2022-06-03 PROCEDURE — 36415 COLL VENOUS BLD VENIPUNCTURE: CPT | Mod: PO | Performed by: INTERNAL MEDICINE

## 2022-06-03 PROCEDURE — 85025 COMPLETE CBC W/AUTO DIFF WBC: CPT | Performed by: INTERNAL MEDICINE

## 2022-06-07 ENCOUNTER — OFFICE VISIT (OUTPATIENT)
Dept: NEPHROLOGY | Facility: CLINIC | Age: 67
End: 2022-06-07
Payer: MEDICARE

## 2022-06-07 VITALS
HEART RATE: 52 BPM | HEIGHT: 71 IN | DIASTOLIC BLOOD PRESSURE: 90 MMHG | WEIGHT: 296.31 LBS | SYSTOLIC BLOOD PRESSURE: 196 MMHG | BODY MASS INDEX: 41.48 KG/M2 | RESPIRATION RATE: 20 BRPM

## 2022-06-07 DIAGNOSIS — R00.1 BRADYCARDIA: Primary | ICD-10-CM

## 2022-06-07 DIAGNOSIS — N17.9 AKI (ACUTE KIDNEY INJURY): ICD-10-CM

## 2022-06-07 PROCEDURE — 3077F PR MOST RECENT SYSTOLIC BLOOD PRESSURE >= 140 MM HG: ICD-10-PCS | Mod: CPTII,S$GLB,, | Performed by: INTERNAL MEDICINE

## 2022-06-07 PROCEDURE — 99499 UNLISTED E&M SERVICE: CPT | Mod: S$GLB,,, | Performed by: INTERNAL MEDICINE

## 2022-06-07 PROCEDURE — 1159F MED LIST DOCD IN RCRD: CPT | Mod: CPTII,S$GLB,, | Performed by: INTERNAL MEDICINE

## 2022-06-07 PROCEDURE — 3080F PR MOST RECENT DIASTOLIC BLOOD PRESSURE >= 90 MM HG: ICD-10-PCS | Mod: CPTII,S$GLB,, | Performed by: INTERNAL MEDICINE

## 2022-06-07 PROCEDURE — 3044F PR MOST RECENT HEMOGLOBIN A1C LEVEL <7.0%: ICD-10-PCS | Mod: CPTII,S$GLB,, | Performed by: INTERNAL MEDICINE

## 2022-06-07 PROCEDURE — 99999 PR PBB SHADOW E&M-EST. PATIENT-LVL IV: CPT | Mod: PBBFAC,,, | Performed by: INTERNAL MEDICINE

## 2022-06-07 PROCEDURE — 99499 RISK ADDL DX/OHS AUDIT: ICD-10-PCS | Mod: S$GLB,,, | Performed by: INTERNAL MEDICINE

## 2022-06-07 PROCEDURE — 3066F PR DOCUMENTATION OF TREATMENT FOR NEPHROPATHY: ICD-10-PCS | Mod: CPTII,S$GLB,, | Performed by: INTERNAL MEDICINE

## 2022-06-07 PROCEDURE — 1159F PR MEDICATION LIST DOCUMENTED IN MEDICAL RECORD: ICD-10-PCS | Mod: CPTII,S$GLB,, | Performed by: INTERNAL MEDICINE

## 2022-06-07 PROCEDURE — 99999 PR PBB SHADOW E&M-EST. PATIENT-LVL IV: ICD-10-PCS | Mod: PBBFAC,,, | Performed by: INTERNAL MEDICINE

## 2022-06-07 PROCEDURE — 3008F BODY MASS INDEX DOCD: CPT | Mod: CPTII,S$GLB,, | Performed by: INTERNAL MEDICINE

## 2022-06-07 PROCEDURE — 99205 PR OFFICE/OUTPT VISIT, NEW, LEVL V, 60-74 MIN: ICD-10-PCS | Mod: S$GLB,,, | Performed by: INTERNAL MEDICINE

## 2022-06-07 PROCEDURE — 3008F PR BODY MASS INDEX (BMI) DOCUMENTED: ICD-10-PCS | Mod: CPTII,S$GLB,, | Performed by: INTERNAL MEDICINE

## 2022-06-07 PROCEDURE — 3288F PR FALLS RISK ASSESSMENT DOCUMENTED: ICD-10-PCS | Mod: CPTII,S$GLB,, | Performed by: INTERNAL MEDICINE

## 2022-06-07 PROCEDURE — 3288F FALL RISK ASSESSMENT DOCD: CPT | Mod: CPTII,S$GLB,, | Performed by: INTERNAL MEDICINE

## 2022-06-07 PROCEDURE — 3080F DIAST BP >= 90 MM HG: CPT | Mod: CPTII,S$GLB,, | Performed by: INTERNAL MEDICINE

## 2022-06-07 PROCEDURE — 1101F PT FALLS ASSESS-DOCD LE1/YR: CPT | Mod: CPTII,S$GLB,, | Performed by: INTERNAL MEDICINE

## 2022-06-07 PROCEDURE — 3077F SYST BP >= 140 MM HG: CPT | Mod: CPTII,S$GLB,, | Performed by: INTERNAL MEDICINE

## 2022-06-07 PROCEDURE — 99205 OFFICE O/P NEW HI 60 MIN: CPT | Mod: S$GLB,,, | Performed by: INTERNAL MEDICINE

## 2022-06-07 PROCEDURE — 1126F AMNT PAIN NOTED NONE PRSNT: CPT | Mod: CPTII,S$GLB,, | Performed by: INTERNAL MEDICINE

## 2022-06-07 PROCEDURE — 3044F HG A1C LEVEL LT 7.0%: CPT | Mod: CPTII,S$GLB,, | Performed by: INTERNAL MEDICINE

## 2022-06-07 PROCEDURE — 1126F PR PAIN SEVERITY QUANTIFIED, NO PAIN PRESENT: ICD-10-PCS | Mod: CPTII,S$GLB,, | Performed by: INTERNAL MEDICINE

## 2022-06-07 PROCEDURE — 1101F PR PT FALLS ASSESS DOC 0-1 FALLS W/OUT INJ PAST YR: ICD-10-PCS | Mod: CPTII,S$GLB,, | Performed by: INTERNAL MEDICINE

## 2022-06-07 PROCEDURE — 4010F ACE/ARB THERAPY RXD/TAKEN: CPT | Mod: CPTII,S$GLB,, | Performed by: INTERNAL MEDICINE

## 2022-06-07 PROCEDURE — 3066F NEPHROPATHY DOC TX: CPT | Mod: CPTII,S$GLB,, | Performed by: INTERNAL MEDICINE

## 2022-06-07 PROCEDURE — 4010F PR ACE/ARB THEARPY RXD/TAKEN: ICD-10-PCS | Mod: CPTII,S$GLB,, | Performed by: INTERNAL MEDICINE

## 2022-06-07 RX ORDER — LOSARTAN POTASSIUM 25 MG/1
25 TABLET ORAL DAILY
Qty: 90 TABLET | Refills: 3 | Status: SHIPPED | OUTPATIENT
Start: 2022-06-07 | End: 2022-10-17 | Stop reason: SDUPTHER

## 2022-06-07 NOTE — PROGRESS NOTES
"Tom Tavarez is a 67 y.o. male for whom nephrology consult has been requested to evaluate and give opinion.   Renal clinic consult note:  Date of consult: 6/7/22  Reason for consult: s Cr fluctuations, GUALBERTO  Referring physician: Dr. Lemus    HPI: Thank you for referring the pt to us. H/o and chart were reviewed. Pt was seen and examined. Pt is a 66 y/o male who was referred to renal clinic for multiple fluctuations in s Cr between 1.2 and 1.6 in the past 2 years. Pt currently feels well, denies taking NSAIds, reports occasional diarrhea. Noted his HR is in 50's (used to play sports when younger), not on any beta blockers. Noted BP is uncontrolled. Prior notes reviewed show he used to be on olmesartan and HCTZ, both stopped because :BP was too low", per pt. Hew has gout, twice a year attack.    PAST MEDICAL HISTORY:  DM-2, HTN, obesity, gout, Hyperlipidemia, Umbilical hernia.    PAST SURGICAL HISTORY:  He  has a past surgical history that includes G U surgery (1982).    SOCIAL HISTORY:  He  reports that he has quit smoking. He has never used smokeless tobacco. He reports that he does not drink alcohol and does not use drugs.    FAMILY MEDICAL HISTORY:  His family history includes Heart failure in his mother; Hypertension in his mother.    Review of patient's allergies indicates:  No Known Allergies        Prior to Admission medications    Medication Sig Start Date End Date Taking? Authorizing Provider   amLODIPine (NORVASC) 10 MG tablet Take 1 tablet (10 mg total) by mouth once daily. 5/1/22 5/1/23 Yes Adolfo Lemus MD   atorvastatin (LIPITOR) 40 MG tablet TAKE 1 TABLET BY MOUTH  DAILY 5/4/22  Yes Adolfo Lemus MD   blood sugar diagnostic Strp 1 each by Misc.(Non-Drug; Combo Route) route once daily. 4/12/21  Yes Adolfo Lemus MD   colchicine (COLCRYS) 0.6 mg tablet Take 1 tablet (0.6 mg total) by mouth once daily. 2/8/22 2/8/23 Yes Adolfo Lemus MD   flash glucose scanning reader (FREESTYLE KRISTY 14 " "DAY READER) Misc 1 each by Misc.(Non-Drug; Combo Route) route continuous. 3/7/22  Yes Adolfo Lemus MD   flash glucose sensor (FREESTYLE KRISTY 14 DAY SENSOR) Kit 1 each by Misc.(Non-Drug; Combo Route) route continuous. 3/7/22  Yes Adolfo Lemus MD   gabapentin (NEURONTIN) 300 MG capsule Take 1 capsule (300 mg total) by mouth 2 (two) times daily. 4/25/22 4/25/23 Yes Adolfo Lemus MD   insulin syringe-needle U-100 0.3 mL 31 gauge x 1/4" Syrg 1 each by Misc.(Non-Drug; Combo Route) route 2 (two) times a day. 12/15/21  Yes Adolfo Lemus MD   ketoconazole (NIZORAL) 2 % cream Apply topically once daily. 3/8/22  Yes Rosas Macias DPM   LANTUS SOLOSTAR U-100 INSULIN glargine 100 units/mL (3mL) SubQ pen Inject 50 Units into the skin 2 (two) times a day. 2/17/22  Yes Adolfo Lemus MD   metFORMIN (GLUCOPHAGE) 500 MG tablet Take 1 tablet (500 mg total) by mouth 2 (two) times daily with meals. 4/5/22  Yes Dann Claros MD   losartan (COZAAR) 25 MG tablet Take 1 tablet (25 mg total) by mouth once daily. 6/7/22 6/7/23  Janna Lizama MD        REVIEW OF SYSTEMS:  Patient has no fever, fatigue, visual changes, chest pain, edema, cough, dyspnea, nausea, vomiting, constipation, diarrhea, arthralgias, pruritis, dizziness, weakness, depression, confusion.    PHYSICAL EXAM:   height is 5' 11" (1.803 m) and weight is 134.4 kg (296 lb 4.8 oz). His blood pressure is 196/90 (abnormal) and his pulse is 52 (abnormal). His respiration is 20.   Gen: WDWN male in no apparent distress  Psych: Normal mood and affect  Skin: No rashes or ulcers  Neck: No JVD  Chest: Clear with no rales, rhonchi, wheezing with normal effort  CV: Bradycardia. Regular with no murmurs, gallops or rubs  Abd: Soft, nontender, no distension  Ext: No edema    Labs reviewed  BMP  Lab Results   Component Value Date     06/03/2022    K 4.7 06/03/2022     06/03/2022    CO2 26 06/03/2022    BUN 20 06/03/2022    CREATININE 1.2 06/03/2022    CALCIUM " 9.2 06/03/2022    ANIONGAP 10 06/03/2022    ESTGFRAFRICA >60.0 06/03/2022    EGFRNONAA >60.0 06/03/2022     Lab Results   Component Value Date    WBC 7.75 06/03/2022    HGB 15.8 06/03/2022    HCT 47.8 06/03/2022    MCV 87 06/03/2022     06/03/2022     Urine p/Cr 80 mg  HgA1C 5.5%, has been as high as 13      IMPRESSION AND RECOMMENDATIONS: 66 y/o male presented with s Cr fluctuations:    1. Renal: these fluctuations are likely related to conditions that cause prerenal azotemia (decreased renal blood flow-renal perfusion)  These conditions are: low HR and occasional diarrhea  In addition, he has gout, s Cr changes may be due to acute uric acid nephropathy, as well.  DDX: accelerated HTN    Noted prior mild hypokalemia, due to HCTZ. Has resolved    2. HTN: BP is uncontrolled. On monotherapy with amlodipine (did not take it this am)  Advise pt to avoid salty foods  Advised pt not to skip meds  Re-start an ARB, losartan 25 mg po qd    3. Bradycardia: appears to have normal rhythm.  Reason not clear  No on any beta blockers  (previsouly played sports when young), low HR may be conditional  Will refer to cardiology    4. H/o of DM: noted microalbuminuria  Likely early stage diabetic nephropathy  Another reason an ARB is indicated    5. Gout: was given printed list of risk factor foods  Will add uric acid level    6. Life style, for healthier living reviewed with pt  Wt control is advised.      Plans and recommendations:  As discussed above  Total time spent 60 minutes including time needed to review the records, the   patient evaluation, documentation, face-to-face discussion with the patient,   more than 50% of the time was spent on coordination of care and counseling.    Level V visit.  RTC 3-4 months    Janna Lizama MD

## 2022-06-09 ENCOUNTER — OFFICE VISIT (OUTPATIENT)
Dept: CARDIOLOGY | Facility: CLINIC | Age: 67
End: 2022-06-09
Payer: MEDICARE

## 2022-06-09 VITALS
HEART RATE: 58 BPM | BODY MASS INDEX: 41.6 KG/M2 | DIASTOLIC BLOOD PRESSURE: 90 MMHG | SYSTOLIC BLOOD PRESSURE: 166 MMHG | WEIGHT: 298.31 LBS | OXYGEN SATURATION: 98 %

## 2022-06-09 DIAGNOSIS — E66.9 DIABETES MELLITUS TYPE 2 IN OBESE: ICD-10-CM

## 2022-06-09 DIAGNOSIS — E78.5 HYPERLIPIDEMIA, UNSPECIFIED HYPERLIPIDEMIA TYPE: Chronic | ICD-10-CM

## 2022-06-09 DIAGNOSIS — E11.69 DIABETES MELLITUS TYPE 2 IN OBESE: ICD-10-CM

## 2022-06-09 DIAGNOSIS — R00.1 BRADYCARDIA: Primary | ICD-10-CM

## 2022-06-09 DIAGNOSIS — I70.0 AORTIC ATHEROSCLEROSIS: ICD-10-CM

## 2022-06-09 DIAGNOSIS — I10 PRIMARY HYPERTENSION: ICD-10-CM

## 2022-06-09 PROCEDURE — 4010F PR ACE/ARB THEARPY RXD/TAKEN: ICD-10-PCS | Mod: CPTII,S$GLB,, | Performed by: INTERNAL MEDICINE

## 2022-06-09 PROCEDURE — 3066F PR DOCUMENTATION OF TREATMENT FOR NEPHROPATHY: ICD-10-PCS | Mod: CPTII,S$GLB,, | Performed by: INTERNAL MEDICINE

## 2022-06-09 PROCEDURE — 3008F BODY MASS INDEX DOCD: CPT | Mod: CPTII,S$GLB,, | Performed by: INTERNAL MEDICINE

## 2022-06-09 PROCEDURE — 3008F PR BODY MASS INDEX (BMI) DOCUMENTED: ICD-10-PCS | Mod: CPTII,S$GLB,, | Performed by: INTERNAL MEDICINE

## 2022-06-09 PROCEDURE — 99999 PR PBB SHADOW E&M-EST. PATIENT-LVL IV: ICD-10-PCS | Mod: PBBFAC,,, | Performed by: INTERNAL MEDICINE

## 2022-06-09 PROCEDURE — 3077F PR MOST RECENT SYSTOLIC BLOOD PRESSURE >= 140 MM HG: ICD-10-PCS | Mod: CPTII,S$GLB,, | Performed by: INTERNAL MEDICINE

## 2022-06-09 PROCEDURE — 3044F PR MOST RECENT HEMOGLOBIN A1C LEVEL <7.0%: ICD-10-PCS | Mod: CPTII,S$GLB,, | Performed by: INTERNAL MEDICINE

## 2022-06-09 PROCEDURE — 99204 PR OFFICE/OUTPT VISIT, NEW, LEVL IV, 45-59 MIN: ICD-10-PCS | Mod: S$GLB,,, | Performed by: INTERNAL MEDICINE

## 2022-06-09 PROCEDURE — 3080F PR MOST RECENT DIASTOLIC BLOOD PRESSURE >= 90 MM HG: ICD-10-PCS | Mod: CPTII,S$GLB,, | Performed by: INTERNAL MEDICINE

## 2022-06-09 PROCEDURE — 99499 RISK ADDL DX/OHS AUDIT: ICD-10-PCS | Mod: S$GLB,,, | Performed by: INTERNAL MEDICINE

## 2022-06-09 PROCEDURE — 3077F SYST BP >= 140 MM HG: CPT | Mod: CPTII,S$GLB,, | Performed by: INTERNAL MEDICINE

## 2022-06-09 PROCEDURE — 3080F DIAST BP >= 90 MM HG: CPT | Mod: CPTII,S$GLB,, | Performed by: INTERNAL MEDICINE

## 2022-06-09 PROCEDURE — 4010F ACE/ARB THERAPY RXD/TAKEN: CPT | Mod: CPTII,S$GLB,, | Performed by: INTERNAL MEDICINE

## 2022-06-09 PROCEDURE — 99499 UNLISTED E&M SERVICE: CPT | Mod: S$GLB,,, | Performed by: INTERNAL MEDICINE

## 2022-06-09 PROCEDURE — 3044F HG A1C LEVEL LT 7.0%: CPT | Mod: CPTII,S$GLB,, | Performed by: INTERNAL MEDICINE

## 2022-06-09 PROCEDURE — 1159F PR MEDICATION LIST DOCUMENTED IN MEDICAL RECORD: ICD-10-PCS | Mod: CPTII,S$GLB,, | Performed by: INTERNAL MEDICINE

## 2022-06-09 PROCEDURE — 1160F PR REVIEW ALL MEDS BY PRESCRIBER/CLIN PHARMACIST DOCUMENTED: ICD-10-PCS | Mod: CPTII,S$GLB,, | Performed by: INTERNAL MEDICINE

## 2022-06-09 PROCEDURE — 3066F NEPHROPATHY DOC TX: CPT | Mod: CPTII,S$GLB,, | Performed by: INTERNAL MEDICINE

## 2022-06-09 PROCEDURE — 99999 PR PBB SHADOW E&M-EST. PATIENT-LVL IV: CPT | Mod: PBBFAC,,, | Performed by: INTERNAL MEDICINE

## 2022-06-09 PROCEDURE — 1160F RVW MEDS BY RX/DR IN RCRD: CPT | Mod: CPTII,S$GLB,, | Performed by: INTERNAL MEDICINE

## 2022-06-09 PROCEDURE — 1159F MED LIST DOCD IN RCRD: CPT | Mod: CPTII,S$GLB,, | Performed by: INTERNAL MEDICINE

## 2022-06-09 PROCEDURE — 99204 OFFICE O/P NEW MOD 45 MIN: CPT | Mod: S$GLB,,, | Performed by: INTERNAL MEDICINE

## 2022-06-09 NOTE — PROGRESS NOTES
Subjective:   Patient ID:  Tom Tavarez is a 67 y.o. male who presents for evaluation of No chief complaint on file.        68 yo male, referred for bradycardia.  PMH HTN HKD DM since 2006, obesity    ekg NSR and PAC  Recent medicine visit showed HR at 50's  No dizziness faint chest pain and dyspnea  Occasional fatigue after h/o COVID 19 in .   Will start [employer] work  No smoking/drinking        Past Medical History:   Diagnosis Date    BMI 40.0-44.9, adult 7/21/2016    Colon cancer screening 10/3/2017    Diabetes mellitus type 2 in obese 11/2006    Hyperlipidemia     Hypertension 1985    Obesity, Class III, BMI 40-49.9 (morbid obesity) 1985    Umbilical hernia        Past Surgical History:   Procedure Laterality Date    G U surgery  1982    biopsy of testicle infertility w/u       Social History     Tobacco Use    Smoking status: Former Smoker    Smokeless tobacco: Never Used    Tobacco comment: 1998 quit    Substance Use Topics    Alcohol use: No     Alcohol/week: 0.0 standard drinks    Drug use: No       Family History   Problem Relation Age of Onset    Hypertension Mother     Heart failure Mother        Review of Systems   Constitutional: Positive for malaise/fatigue. Negative for decreased appetite, diaphoresis, fever and night sweats.   HENT: Negative for nosebleeds.    Eyes: Negative for blurred vision and double vision.   Cardiovascular: Negative for chest pain, claudication, dyspnea on exertion, irregular heartbeat, leg swelling, near-syncope, orthopnea, palpitations, paroxysmal nocturnal dyspnea and syncope.   Respiratory: Negative for cough, shortness of breath, sleep disturbances due to breathing, snoring, sputum production and wheezing.    Endocrine: Negative for cold intolerance and polyuria.   Hematologic/Lymphatic: Does not bruise/bleed easily.   Skin: Negative for rash.   Musculoskeletal: Negative for back pain, falls, joint pain, joint swelling and neck pain.    Gastrointestinal: Negative for abdominal pain, heartburn, nausea and vomiting.   Genitourinary: Negative for dysuria, frequency and hematuria.   Neurological: Negative for difficulty with concentration, dizziness, focal weakness, headaches, light-headedness, numbness, seizures and weakness.   Psychiatric/Behavioral: Negative for depression, memory loss and substance abuse. The patient does not have insomnia.    Allergic/Immunologic: Negative for HIV exposure and hives.       Objective:   Physical Exam  HENT:      Head: Normocephalic.   Eyes:      Pupils: Pupils are equal, round, and reactive to light.   Neck:      Thyroid: No thyromegaly.      Vascular: Normal carotid pulses. No carotid bruit or JVD.   Cardiovascular:      Rate and Rhythm: Normal rate and regular rhythm.  No extrasystoles are present.     Chest Wall: PMI is not displaced.      Pulses: Normal pulses.      Heart sounds: Normal heart sounds. No murmur heard.    No gallop. No S3 sounds.   Pulmonary:      Effort: No respiratory distress.      Breath sounds: Normal breath sounds. No stridor.   Abdominal:      General: Bowel sounds are normal.      Palpations: Abdomen is soft.      Tenderness: There is no abdominal tenderness. There is no rebound.   Musculoskeletal:         General: Normal range of motion.   Skin:     Findings: No rash.   Neurological:      Mental Status: He is alert and oriented to person, place, and time.   Psychiatric:         Behavior: Behavior normal.         Lab Results   Component Value Date    CHOL 115 (L) 01/18/2022    CHOL 172 10/08/2020    CHOL 144 10/23/2018     Lab Results   Component Value Date    HDL 38 (L) 01/18/2022    HDL 33 (L) 10/08/2020    HDL 31 (L) 10/23/2018     Lab Results   Component Value Date    LDLCALC 56.2 (L) 01/18/2022    LDLCALC 116.4 10/08/2020    LDLCALC 88.8 10/23/2018     Lab Results   Component Value Date    TRIG 104 01/18/2022    TRIG 113 10/08/2020    TRIG 121 10/23/2018     Lab Results   Component  Value Date    CHOLHDL 33.0 01/18/2022    CHOLHDL 19.2 (L) 10/08/2020    CHOLHDL 21.5 10/23/2018       Chemistry        Component Value Date/Time     06/03/2022 1335    K 4.7 06/03/2022 1335     06/03/2022 1335    CO2 26 06/03/2022 1335    BUN 20 06/03/2022 1335    CREATININE 1.2 06/03/2022 1335    GLU 79 06/03/2022 1335        Component Value Date/Time    CALCIUM 9.2 06/03/2022 1335    ALKPHOS 61 01/25/2022 0611    AST 17 01/25/2022 0611    ALT 16 01/25/2022 0611    BILITOT 0.7 01/25/2022 0611    ESTGFRAFRICA >60.0 06/03/2022 1335    EGFRNONAA >60.0 06/03/2022 1335          Lab Results   Component Value Date    HGBA1C 5.5 04/25/2022     Lab Results   Component Value Date    TSH 2.012 10/08/2020     No results found for: INR, PROTIME  Lab Results   Component Value Date    WBC 7.75 06/03/2022    HGB 15.8 06/03/2022    HCT 47.8 06/03/2022    MCV 87 06/03/2022     06/03/2022     BNP  @LABRCNTIP(BNP,BNPTRIAGEBLO)@  Estimated Creatinine Clearance: 82.8 mL/min (based on SCr of 1.2 mg/dL).  No results found in the last 24 hours.  No results found in the last 24 hours.  No results found in the last 24 hours.    Assessment:      1. Primary hypertension    2. Bradycardia    3. Hyperlipidemia, unspecified hyperlipidemia type    4. Aortic atherosclerosis    5. BMI 40.0-44.9, adult    6. Diabetes mellitus type 2 in obese      Stared Losartan 2 days ago  Plan:   Continue Amlodipine and loastan for HTN  Echo and holter for bradycarid  Continue Lipitor    Counseled DASH  Check Lipid profile in 6 months  Recommend heart-healthy diet, weight control and regular exercise.  Zhou. Risk modification.   I have reviewed all pertinent labs and cardiac studies independently. Plans and recommendations have been formulated under my direct supervision. All questions answered and patient voiced understanding.   If symptoms persist go to the ED  RTC in 12 months

## 2022-06-22 ENCOUNTER — TELEPHONE (OUTPATIENT)
Dept: CARDIOLOGY | Facility: HOSPITAL | Age: 67
End: 2022-06-22
Payer: MEDICARE

## 2022-07-10 DIAGNOSIS — E78.5 HYPERLIPIDEMIA, UNSPECIFIED HYPERLIPIDEMIA TYPE: ICD-10-CM

## 2022-07-10 NOTE — TELEPHONE ENCOUNTER
No new care gaps identified.  [facility] Embedded Care Gaps. Reference number: [id number]. 7/10/2022   1:31:19 PM CDT

## 2022-07-11 RX ORDER — ATORVASTATIN CALCIUM 40 MG/1
TABLET, FILM COATED ORAL
Qty: 90 TABLET | Refills: 1 | Status: SHIPPED | OUTPATIENT
Start: 2022-07-11 | End: 2023-03-21 | Stop reason: SDUPTHER

## 2022-07-11 NOTE — TELEPHONE ENCOUNTER
Refill Decision Note   Tom Tavarez  is requesting a refill authorization.  Brief Assessment and Rationale for Refill:  Approve     Medication Therapy Plan:       Medication Reconciliation Completed: No   Comments:     No Care Gaps recommended.     Note composed:5:41 PM 07/11/2022

## 2022-07-22 ENCOUNTER — TELEPHONE (OUTPATIENT)
Dept: INTERNAL MEDICINE | Facility: CLINIC | Age: 67
End: 2022-07-22
Payer: MEDICARE

## 2022-08-14 NOTE — TELEPHONE ENCOUNTER
Care Due:                  Date            Visit Type   Department     Provider  --------------------------------------------------------------------------------                                EP -                              PRIMARY      [facility] INTERNAL  Last Visit: 04-      CARE ([facility])   LLOYD Lemus                              EP -                              PRIMARY      [facility] INTERNAL  Next Visit: 09-      CARE ([facility])   LLOYD Lemus                                                            Last  Test          Frequency    Reason                     Performed    Due Date  --------------------------------------------------------------------------------    HBA1C.......  6 months...  LANTUS, metFORMIN........  04-   10-    [facility] Embedded Care Gaps. Reference number: [id number]. 8/14/2022   9:28:47 AM CDT

## 2022-08-15 RX ORDER — AMLODIPINE BESYLATE 10 MG/1
10 TABLET ORAL DAILY
Qty: 90 TABLET | Refills: 0 | Status: SHIPPED | OUTPATIENT
Start: 2022-08-15 | End: 2022-11-08

## 2022-08-15 NOTE — TELEPHONE ENCOUNTER
Refill Routing Note   Medication(s) are not appropriate for processing by [facility] for the following reason(s):      - Required vitals are abnormal    [facility] action(s):  Defer Medication-related problems identified: Requires labs        Medication reconciliation completed: No     Appointments  past 12m or future 3m with PCP    Date Provider   Last Visit   4/25/2022 Adolfo Lemus MD   Next Visit   9/13/2022 Adolfo Lemus MD   ED visits in past 90 days: 0        Note composed:1:07 PM 08/15/2022

## 2022-08-16 RX ORDER — INSULIN GLARGINE 100 [IU]/ML
50 INJECTION, SOLUTION SUBCUTANEOUS 2 TIMES DAILY
Qty: 90 ML | Refills: 1 | Status: CANCELLED | OUTPATIENT
Start: 2022-08-16

## 2022-08-16 NOTE — TELEPHONE ENCOUNTER
No new care gaps identified.  [facility] Embedded Care Gaps. Reference number: [id number]. 8/16/2022   8:37:05 AM ELVIST   [de-identified] : RIGHT KNEE CORTISONE INJECTION\par Discussed at length with the patient the planned steroid and lidocaine injection. The risks, benefits, convalescence and alternatives were reviewed. The possible side effects discussed included but were not limited to: pain, swelling, heat and redness. There symptoms are generally mild but if they are extensive then contact the office. Giving pain relievers by mouth such as NSAID’s or Tylenol can generally treat the reactions to  steroid and lidocaine. Rare cases of infection have been noted. Rash, hives and itching may occur post injection. If you have muscle pain or cramps, flushing and or swelling of the face, rapid heart beat, nausea, dizziness, fever, chills, headache, difficulty breathing, swelling in the arms or legs, or have a prickly feeling of your skin, contact a health care provider immediately.\par  \par Following this discussion, the knee was prepped with betadine and under sterile conditions 9 cc of 1% lidocaine and 1 cc (40 mg) of Depo-Medrol were injected with a 21 gauge needle. The needle was introduced into the joint, aspiration was performed to ensure intra-articular placement and the medication was injected. Upon withdrawal of the needle the site was cleaned with alcohol and a bandaid applied. The patient tolerated the injection well and there were no adverse effects. Post injection instructions included no strenuous activity for 24 hours, cryotherapy and if there are any adverse effects to contact the office.

## 2022-08-16 NOTE — TELEPHONE ENCOUNTER
----- Message from Tahmina Henson sent at 8/15/2022  3:41 PM CDT -----  Contact: [employer]  Good Afternoon,  Please fax to [employer] at [phone] order for patient true metrix test strips and true metrix lancets    Thank you

## 2022-08-16 NOTE — TELEPHONE ENCOUNTER
----- Message from Kerry Booth sent at 8/16/2022 12:00 PM CDT -----  Regarding: orders  [facility]: [facility]  .Please fax over orders for Lantus to [facility] at Fax# [phone]  (LANTUS SOLOSTAR U-100 INSULIN glargine 100 units/mL (3mL) SubQ pen)

## 2022-08-17 ENCOUNTER — TELEPHONE (OUTPATIENT)
Dept: INTERNAL MEDICINE | Facility: CLINIC | Age: 67
End: 2022-08-17
Payer: MEDICARE

## 2022-08-17 NOTE — TELEPHONE ENCOUNTER
Spoke with home health regarding a lancets order to be placed. They see the strips but no lancets and pt needs both, please advise.

## 2022-08-17 NOTE — TELEPHONE ENCOUNTER
----- Message from Radha Lira sent at 8/17/2022  1:07 PM CDT -----  Please call [facility] @ [phone] regarding pt order, will discuss advise.

## 2022-08-18 RX ORDER — LANCETS
EACH MISCELLANEOUS
Qty: 100 EACH | Refills: 3 | Status: SHIPPED | OUTPATIENT
Start: 2022-08-18 | End: 2022-10-03

## 2022-09-13 ENCOUNTER — OFFICE VISIT (OUTPATIENT)
Dept: INTERNAL MEDICINE | Facility: CLINIC | Age: 67
End: 2022-09-13
Payer: MEDICARE

## 2022-09-13 ENCOUNTER — LAB VISIT (OUTPATIENT)
Dept: LAB | Facility: HOSPITAL | Age: 67
End: 2022-09-13
Attending: SURGERY
Payer: MEDICARE

## 2022-09-13 VITALS
WEIGHT: 306 LBS | OXYGEN SATURATION: 96 % | SYSTOLIC BLOOD PRESSURE: 128 MMHG | HEIGHT: 71 IN | HEART RATE: 49 BPM | BODY MASS INDEX: 42.84 KG/M2 | DIASTOLIC BLOOD PRESSURE: 84 MMHG

## 2022-09-13 VITALS
RESPIRATION RATE: 16 BRPM | OXYGEN SATURATION: 96 % | HEART RATE: 49 BPM | BODY MASS INDEX: 42.68 KG/M2 | WEIGHT: 306 LBS | TEMPERATURE: 99 F | SYSTOLIC BLOOD PRESSURE: 128 MMHG | DIASTOLIC BLOOD PRESSURE: 84 MMHG

## 2022-09-13 DIAGNOSIS — R00.1 BRADYCARDIA: ICD-10-CM

## 2022-09-13 DIAGNOSIS — E11.69 HYPERLIPIDEMIA ASSOCIATED WITH TYPE 2 DIABETES MELLITUS: ICD-10-CM

## 2022-09-13 DIAGNOSIS — N18.30 STAGE 3 CHRONIC KIDNEY DISEASE, UNSPECIFIED WHETHER STAGE 3A OR 3B CKD: ICD-10-CM

## 2022-09-13 DIAGNOSIS — Z79.4 CONTROLLED TYPE 2 DIABETES MELLITUS WITHOUT COMPLICATION, WITH LONG-TERM CURRENT USE OF INSULIN: ICD-10-CM

## 2022-09-13 DIAGNOSIS — R20.2 TINGLING OF BOTH FEET: ICD-10-CM

## 2022-09-13 DIAGNOSIS — I10 PRIMARY HYPERTENSION: ICD-10-CM

## 2022-09-13 DIAGNOSIS — M1A.9XX0 CHRONIC GOUT WITHOUT TOPHUS, UNSPECIFIED CAUSE, UNSPECIFIED SITE: ICD-10-CM

## 2022-09-13 DIAGNOSIS — I10 HYPERTENSION, UNSPECIFIED TYPE: ICD-10-CM

## 2022-09-13 DIAGNOSIS — I70.0 ATHEROSCLEROSIS OF AORTA: ICD-10-CM

## 2022-09-13 DIAGNOSIS — E11.9 CONTROLLED TYPE 2 DIABETES MELLITUS WITHOUT COMPLICATION, WITH LONG-TERM CURRENT USE OF INSULIN: Primary | ICD-10-CM

## 2022-09-13 DIAGNOSIS — Z79.4 CONTROLLED TYPE 2 DIABETES MELLITUS WITHOUT COMPLICATION, WITH LONG-TERM CURRENT USE OF INSULIN: Primary | ICD-10-CM

## 2022-09-13 DIAGNOSIS — Z00.00 ENCOUNTER FOR PREVENTIVE HEALTH EXAMINATION: Primary | ICD-10-CM

## 2022-09-13 DIAGNOSIS — E11.9 CONTROLLED TYPE 2 DIABETES MELLITUS WITHOUT COMPLICATION, WITH LONG-TERM CURRENT USE OF INSULIN: ICD-10-CM

## 2022-09-13 DIAGNOSIS — E66.01 OBESITY, CLASS III, BMI 40-49.9 (MORBID OBESITY): ICD-10-CM

## 2022-09-13 DIAGNOSIS — E78.5 HYPERLIPIDEMIA ASSOCIATED WITH TYPE 2 DIABETES MELLITUS: ICD-10-CM

## 2022-09-13 DIAGNOSIS — Z12.11 COLON CANCER SCREENING: ICD-10-CM

## 2022-09-13 LAB
ALBUMIN SERPL BCP-MCNC: 3.7 G/DL (ref 3.5–5.2)
ALP SERPL-CCNC: 71 U/L (ref 55–135)
ALT SERPL W/O P-5'-P-CCNC: 12 U/L (ref 10–44)
ANION GAP SERPL CALC-SCNC: 8 MMOL/L (ref 8–16)
AST SERPL-CCNC: 16 U/L (ref 10–40)
BASOPHILS # BLD AUTO: 0.07 K/UL (ref 0–0.2)
BASOPHILS NFR BLD: 0.9 % (ref 0–1.9)
BILIRUB SERPL-MCNC: 0.6 MG/DL (ref 0.1–1)
BUN SERPL-MCNC: 22 MG/DL (ref 8–23)
CALCIUM SERPL-MCNC: 9.4 MG/DL (ref 8.7–10.5)
CHLORIDE SERPL-SCNC: 106 MMOL/L (ref 95–110)
CO2 SERPL-SCNC: 26 MMOL/L (ref 23–29)
CREAT SERPL-MCNC: 1.3 MG/DL (ref 0.5–1.4)
DIFFERENTIAL METHOD: ABNORMAL
EOSINOPHIL # BLD AUTO: 0.4 K/UL (ref 0–0.5)
EOSINOPHIL NFR BLD: 5.1 % (ref 0–8)
ERYTHROCYTE [DISTWIDTH] IN BLOOD BY AUTOMATED COUNT: 15 % (ref 11.5–14.5)
EST. GFR  (NO RACE VARIABLE): 59.8 ML/MIN/1.73 M^2
ESTIMATED AVG GLUCOSE: 120 MG/DL (ref 68–131)
GLUCOSE SERPL-MCNC: 87 MG/DL (ref 70–110)
HBA1C MFR BLD: 5.8 % (ref 4–5.6)
HCT VFR BLD AUTO: 47.6 % (ref 40–54)
HGB BLD-MCNC: 15.6 G/DL (ref 14–18)
IMM GRANULOCYTES # BLD AUTO: 0.01 K/UL (ref 0–0.04)
IMM GRANULOCYTES NFR BLD AUTO: 0.1 % (ref 0–0.5)
LYMPHOCYTES # BLD AUTO: 2.8 K/UL (ref 1–4.8)
LYMPHOCYTES NFR BLD: 38.1 % (ref 18–48)
MCH RBC QN AUTO: 29.8 PG (ref 27–31)
MCHC RBC AUTO-ENTMCNC: 32.8 G/DL (ref 32–36)
MCV RBC AUTO: 91 FL (ref 82–98)
MONOCYTES # BLD AUTO: 0.6 K/UL (ref 0.3–1)
MONOCYTES NFR BLD: 7.4 % (ref 4–15)
NEUTROPHILS # BLD AUTO: 3.6 K/UL (ref 1.8–7.7)
NEUTROPHILS NFR BLD: 48.4 % (ref 38–73)
NRBC BLD-RTO: 0 /100 WBC
PLATELET # BLD AUTO: 218 K/UL (ref 150–450)
PMV BLD AUTO: 11.2 FL (ref 9.2–12.9)
POTASSIUM SERPL-SCNC: 4.1 MMOL/L (ref 3.5–5.1)
PROT SERPL-MCNC: 7.5 G/DL (ref 6–8.4)
RBC # BLD AUTO: 5.24 M/UL (ref 4.6–6.2)
SODIUM SERPL-SCNC: 140 MMOL/L (ref 136–145)
URATE SERPL-MCNC: 6.3 MG/DL (ref 3.4–7)
WBC # BLD AUTO: 7.4 K/UL (ref 3.9–12.7)

## 2022-09-13 PROCEDURE — 3288F PR FALLS RISK ASSESSMENT DOCUMENTED: ICD-10-PCS | Mod: CPTII,S$GLB,, | Performed by: FAMILY MEDICINE

## 2022-09-13 PROCEDURE — G0439 PR MEDICARE ANNUAL WELLNESS SUBSEQUENT VISIT: ICD-10-PCS | Mod: S$GLB,,, | Performed by: NURSE PRACTITIONER

## 2022-09-13 PROCEDURE — 99999 PR PBB SHADOW E&M-EST. PATIENT-LVL IV: ICD-10-PCS | Mod: PBBFAC,,, | Performed by: NURSE PRACTITIONER

## 2022-09-13 PROCEDURE — 3074F PR MOST RECENT SYSTOLIC BLOOD PRESSURE < 130 MM HG: ICD-10-PCS | Mod: CPTII,S$GLB,, | Performed by: NURSE PRACTITIONER

## 2022-09-13 PROCEDURE — G0439 PPPS, SUBSEQ VISIT: HCPCS | Mod: S$GLB,,, | Performed by: NURSE PRACTITIONER

## 2022-09-13 PROCEDURE — 36415 COLL VENOUS BLD VENIPUNCTURE: CPT | Performed by: FAMILY MEDICINE

## 2022-09-13 PROCEDURE — 3288F PR FALLS RISK ASSESSMENT DOCUMENTED: ICD-10-PCS | Mod: CPTII,S$GLB,, | Performed by: NURSE PRACTITIONER

## 2022-09-13 PROCEDURE — 83036 HEMOGLOBIN GLYCOSYLATED A1C: CPT | Performed by: FAMILY MEDICINE

## 2022-09-13 PROCEDURE — 3044F PR MOST RECENT HEMOGLOBIN A1C LEVEL <7.0%: ICD-10-PCS | Mod: CPTII,S$GLB,, | Performed by: NURSE PRACTITIONER

## 2022-09-13 PROCEDURE — G0008 FLU VACCINE - QUADRIVALENT - ADJUVANTED: ICD-10-PCS | Mod: S$GLB,,, | Performed by: FAMILY MEDICINE

## 2022-09-13 PROCEDURE — 3074F SYST BP LT 130 MM HG: CPT | Mod: CPTII,S$GLB,, | Performed by: NURSE PRACTITIONER

## 2022-09-13 PROCEDURE — 3079F DIAST BP 80-89 MM HG: CPT | Mod: CPTII,S$GLB,, | Performed by: NURSE PRACTITIONER

## 2022-09-13 PROCEDURE — 3079F PR MOST RECENT DIASTOLIC BLOOD PRESSURE 80-89 MM HG: ICD-10-PCS | Mod: CPTII,S$GLB,, | Performed by: NURSE PRACTITIONER

## 2022-09-13 PROCEDURE — 1159F PR MEDICATION LIST DOCUMENTED IN MEDICAL RECORD: ICD-10-PCS | Mod: CPTII,S$GLB,, | Performed by: FAMILY MEDICINE

## 2022-09-13 PROCEDURE — 3288F FALL RISK ASSESSMENT DOCD: CPT | Mod: CPTII,S$GLB,, | Performed by: FAMILY MEDICINE

## 2022-09-13 PROCEDURE — 1126F AMNT PAIN NOTED NONE PRSNT: CPT | Mod: CPTII,S$GLB,, | Performed by: FAMILY MEDICINE

## 2022-09-13 PROCEDURE — 4010F PR ACE/ARB THEARPY RXD/TAKEN: ICD-10-PCS | Mod: CPTII,S$GLB,, | Performed by: FAMILY MEDICINE

## 2022-09-13 PROCEDURE — 4010F ACE/ARB THERAPY RXD/TAKEN: CPT | Mod: CPTII,S$GLB,, | Performed by: NURSE PRACTITIONER

## 2022-09-13 PROCEDURE — G0008 ADMIN INFLUENZA VIRUS VAC: HCPCS | Mod: S$GLB,,, | Performed by: FAMILY MEDICINE

## 2022-09-13 PROCEDURE — 82570 ASSAY OF URINE CREATININE: CPT | Performed by: FAMILY MEDICINE

## 2022-09-13 PROCEDURE — 99999 PR PBB SHADOW E&M-EST. PATIENT-LVL IV: ICD-10-PCS | Mod: PBBFAC,,, | Performed by: FAMILY MEDICINE

## 2022-09-13 PROCEDURE — 3044F HG A1C LEVEL LT 7.0%: CPT | Mod: CPTII,S$GLB,, | Performed by: NURSE PRACTITIONER

## 2022-09-13 PROCEDURE — 99499 UNLISTED E&M SERVICE: CPT | Mod: S$GLB,,, | Performed by: NURSE PRACTITIONER

## 2022-09-13 PROCEDURE — 1159F MED LIST DOCD IN RCRD: CPT | Mod: CPTII,S$GLB,, | Performed by: FAMILY MEDICINE

## 2022-09-13 PROCEDURE — 99999 PR PBB SHADOW E&M-EST. PATIENT-LVL IV: CPT | Mod: PBBFAC,,, | Performed by: FAMILY MEDICINE

## 2022-09-13 PROCEDURE — 1125F AMNT PAIN NOTED PAIN PRSNT: CPT | Mod: CPTII,S$GLB,, | Performed by: NURSE PRACTITIONER

## 2022-09-13 PROCEDURE — 3044F PR MOST RECENT HEMOGLOBIN A1C LEVEL <7.0%: ICD-10-PCS | Mod: CPTII,S$GLB,, | Performed by: FAMILY MEDICINE

## 2022-09-13 PROCEDURE — 3008F PR BODY MASS INDEX (BMI) DOCUMENTED: ICD-10-PCS | Mod: CPTII,S$GLB,, | Performed by: FAMILY MEDICINE

## 2022-09-13 PROCEDURE — 3008F BODY MASS INDEX DOCD: CPT | Mod: CPTII,S$GLB,, | Performed by: FAMILY MEDICINE

## 2022-09-13 PROCEDURE — 1125F PR PAIN SEVERITY QUANTIFIED, PAIN PRESENT: ICD-10-PCS | Mod: CPTII,S$GLB,, | Performed by: NURSE PRACTITIONER

## 2022-09-13 PROCEDURE — 1101F PR PT FALLS ASSESS DOC 0-1 FALLS W/OUT INJ PAST YR: ICD-10-PCS | Mod: CPTII,S$GLB,, | Performed by: NURSE PRACTITIONER

## 2022-09-13 PROCEDURE — 1170F FXNL STATUS ASSESSED: CPT | Mod: CPTII,S$GLB,, | Performed by: NURSE PRACTITIONER

## 2022-09-13 PROCEDURE — 3079F DIAST BP 80-89 MM HG: CPT | Mod: CPTII,S$GLB,, | Performed by: FAMILY MEDICINE

## 2022-09-13 PROCEDURE — 3288F FALL RISK ASSESSMENT DOCD: CPT | Mod: CPTII,S$GLB,, | Performed by: NURSE PRACTITIONER

## 2022-09-13 PROCEDURE — 3066F PR DOCUMENTATION OF TREATMENT FOR NEPHROPATHY: ICD-10-PCS | Mod: CPTII,S$GLB,, | Performed by: NURSE PRACTITIONER

## 2022-09-13 PROCEDURE — 3079F PR MOST RECENT DIASTOLIC BLOOD PRESSURE 80-89 MM HG: ICD-10-PCS | Mod: CPTII,S$GLB,, | Performed by: FAMILY MEDICINE

## 2022-09-13 PROCEDURE — 1101F PT FALLS ASSESS-DOCD LE1/YR: CPT | Mod: CPTII,S$GLB,, | Performed by: NURSE PRACTITIONER

## 2022-09-13 PROCEDURE — 3008F BODY MASS INDEX DOCD: CPT | Mod: CPTII,S$GLB,, | Performed by: NURSE PRACTITIONER

## 2022-09-13 PROCEDURE — 3044F HG A1C LEVEL LT 7.0%: CPT | Mod: CPTII,S$GLB,, | Performed by: FAMILY MEDICINE

## 2022-09-13 PROCEDURE — 4010F ACE/ARB THERAPY RXD/TAKEN: CPT | Mod: CPTII,S$GLB,, | Performed by: FAMILY MEDICINE

## 2022-09-13 PROCEDURE — 80053 COMPREHEN METABOLIC PANEL: CPT | Performed by: FAMILY MEDICINE

## 2022-09-13 PROCEDURE — 4010F PR ACE/ARB THEARPY RXD/TAKEN: ICD-10-PCS | Mod: CPTII,S$GLB,, | Performed by: NURSE PRACTITIONER

## 2022-09-13 PROCEDURE — 99214 OFFICE O/P EST MOD 30 MIN: CPT | Mod: 25,S$GLB,, | Performed by: FAMILY MEDICINE

## 2022-09-13 PROCEDURE — 99214 PR OFFICE/OUTPT VISIT, EST, LEVL IV, 30-39 MIN: ICD-10-PCS | Mod: 25,S$GLB,, | Performed by: FAMILY MEDICINE

## 2022-09-13 PROCEDURE — 3066F NEPHROPATHY DOC TX: CPT | Mod: CPTII,S$GLB,, | Performed by: FAMILY MEDICINE

## 2022-09-13 PROCEDURE — 3008F PR BODY MASS INDEX (BMI) DOCUMENTED: ICD-10-PCS | Mod: CPTII,S$GLB,, | Performed by: NURSE PRACTITIONER

## 2022-09-13 PROCEDURE — 3066F PR DOCUMENTATION OF TREATMENT FOR NEPHROPATHY: ICD-10-PCS | Mod: CPTII,S$GLB,, | Performed by: FAMILY MEDICINE

## 2022-09-13 PROCEDURE — 1126F PR PAIN SEVERITY QUANTIFIED, NO PAIN PRESENT: ICD-10-PCS | Mod: CPTII,S$GLB,, | Performed by: FAMILY MEDICINE

## 2022-09-13 PROCEDURE — 99999 PR PBB SHADOW E&M-EST. PATIENT-LVL IV: CPT | Mod: PBBFAC,,, | Performed by: NURSE PRACTITIONER

## 2022-09-13 PROCEDURE — 3074F SYST BP LT 130 MM HG: CPT | Mod: CPTII,S$GLB,, | Performed by: FAMILY MEDICINE

## 2022-09-13 PROCEDURE — 1101F PT FALLS ASSESS-DOCD LE1/YR: CPT | Mod: CPTII,S$GLB,, | Performed by: FAMILY MEDICINE

## 2022-09-13 PROCEDURE — 1170F PR FUNCTIONAL STATUS ASSESSED: ICD-10-PCS | Mod: CPTII,S$GLB,, | Performed by: NURSE PRACTITIONER

## 2022-09-13 PROCEDURE — 85025 COMPLETE CBC W/AUTO DIFF WBC: CPT | Performed by: FAMILY MEDICINE

## 2022-09-13 PROCEDURE — 90694 FLU VACCINE - QUADRIVALENT - ADJUVANTED: ICD-10-PCS | Mod: S$GLB,,, | Performed by: FAMILY MEDICINE

## 2022-09-13 PROCEDURE — 3074F PR MOST RECENT SYSTOLIC BLOOD PRESSURE < 130 MM HG: ICD-10-PCS | Mod: CPTII,S$GLB,, | Performed by: FAMILY MEDICINE

## 2022-09-13 PROCEDURE — 3066F NEPHROPATHY DOC TX: CPT | Mod: CPTII,S$GLB,, | Performed by: NURSE PRACTITIONER

## 2022-09-13 PROCEDURE — 90694 VACC AIIV4 NO PRSRV 0.5ML IM: CPT | Mod: S$GLB,,, | Performed by: FAMILY MEDICINE

## 2022-09-13 PROCEDURE — 99499 RISK ADDL DX/OHS AUDIT: ICD-10-PCS | Mod: S$GLB,,, | Performed by: NURSE PRACTITIONER

## 2022-09-13 PROCEDURE — 1101F PR PT FALLS ASSESS DOC 0-1 FALLS W/OUT INJ PAST YR: ICD-10-PCS | Mod: CPTII,S$GLB,, | Performed by: FAMILY MEDICINE

## 2022-09-13 PROCEDURE — 84550 ASSAY OF BLOOD/URIC ACID: CPT | Performed by: FAMILY MEDICINE

## 2022-09-13 NOTE — PROGRESS NOTES
Follow-up diabetes hypertension hyperlipidemia chronic gout.  Chronic renal disease.  He is scheduled to see Nephrology.  He currently is on Lantus 40 units once a day and metformin 500 mg twice a day A1c 3 months ago was 5.5.  Overall his blood sugar has improved.  He denies chest pain palpitations shortness breath or edema.  BMI is 42.68.  Previous weight of 290 weight today 306.  Subjective:       Patient ID: Tom Tavarez is a 67 y.o. male.    Chief Complaint: Follow-up    Follow-up hypertension diabetes hyperlipidemia elevated BMI chronic kidney disease chronic gout.  He denies headache chest pain palpitations shortness of breath or edema.  Currently is on Lantus 40 units a day metformin 500 mg twice a day he reports improvement in his diet overall.  However his weight has increased from 290-306.    Review of Systems   Constitutional:  Negative for activity change, appetite change, chills and fever.   HENT:  Negative for congestion.    Respiratory:  Negative for cough, chest tightness, shortness of breath and wheezing.    Cardiovascular:  Negative for chest pain, palpitations and leg swelling.   Gastrointestinal:  Negative for abdominal distention, abdominal pain, diarrhea and nausea.   Endocrine: Negative for polydipsia and polyuria.   Neurological:  Negative for dizziness, weakness, light-headedness and headaches.     Objective:      Physical Exam  Constitutional:       General: He is not in acute distress.     Appearance: He is not ill-appearing or diaphoretic.   Cardiovascular:      Rate and Rhythm: Normal rate and regular rhythm.      Heart sounds: No murmur heard.    No gallop.   Pulmonary:      Effort: Pulmonary effort is normal. No respiratory distress.      Breath sounds: No wheezing, rhonchi or rales.   Abdominal:      General: There is no distension.      Palpations: Abdomen is soft.   Lymphadenopathy:      Cervical: No cervical adenopathy.   Skin:     General: Skin is warm and dry.       Coloration: Skin is not pale.      Findings: No erythema.   Neurological:      Mental Status: He is alert.       Lab Visit on 06/03/2022   Component Date Value Ref Range Status    WBC 06/03/2022 7.75  3.90 - 12.70 K/uL Final    RBC 06/03/2022 5.51  4.60 - 6.20 M/uL Final    Hemoglobin 06/03/2022 15.8  14.0 - 18.0 g/dL Final    Hematocrit 06/03/2022 47.8  40.0 - 54.0 % Final    MCV 06/03/2022 87  82 - 98 fL Final    MCH 06/03/2022 28.7  27.0 - 31.0 pg Final    MCHC 06/03/2022 33.1  32.0 - 36.0 g/dL Final    RDW 06/03/2022 14.5  11.5 - 14.5 % Final    Platelets 06/03/2022 231  150 - 450 K/uL Final    MPV 06/03/2022 11.2  9.2 - 12.9 fL Final    Immature Granulocytes 06/03/2022 0.1  0.0 - 0.5 % Final    Gran # (ANC) 06/03/2022 3.6  1.8 - 7.7 K/uL Final    Immature Grans (Abs) 06/03/2022 0.01  0.00 - 0.04 K/uL Final    Lymph # 06/03/2022 3.1  1.0 - 4.8 K/uL Final    Mono # 06/03/2022 0.6  0.3 - 1.0 K/uL Final    Eos # 06/03/2022 0.3  0.0 - 0.5 K/uL Final    Baso # 06/03/2022 0.06  0.00 - 0.20 K/uL Final    nRBC 06/03/2022 0  0 /100 WBC Final    Gran % 06/03/2022 46.4  38.0 - 73.0 % Final    Lymph % 06/03/2022 40.3  18.0 - 48.0 % Final    Mono % 06/03/2022 8.0  4.0 - 15.0 % Final    Eosinophil % 06/03/2022 4.4  0.0 - 8.0 % Final    Basophil % 06/03/2022 0.8  0.0 - 1.9 % Final    Differential Method 06/03/2022 Automated   Final    Glucose 06/03/2022 79  70 - 110 mg/dL Final    Sodium 06/03/2022 141  136 - 145 mmol/L Final    Potassium 06/03/2022 4.7  3.5 - 5.1 mmol/L Final    Chloride 06/03/2022 105  95 - 110 mmol/L Final    CO2 06/03/2022 26  23 - 29 mmol/L Final    BUN 06/03/2022 20  8 - 23 mg/dL Final    Calcium 06/03/2022 9.2  8.7 - 10.5 mg/dL Final    Creatinine 06/03/2022 1.2  0.5 - 1.4 mg/dL Final    Albumin 06/03/2022 3.5  3.5 - 5.2 g/dL Final    Phosphorus 06/03/2022 2.9  2.7 - 4.5 mg/dL Final    eGFR if African American 06/03/2022 >60.0  >60 mL/min/1.73 m^2 Final    eGFR if non African American 06/03/2022 >60.0   >60 mL/min/1.73 m^2 Final    Anion Gap 06/03/2022 10  8 - 16 mmol/L Final     Assessment:       1. Controlled type 2 diabetes mellitus without complication, with long-term current use of insulin    2. Primary hypertension    3. Chronic gout without tophus, unspecified cause, unspecified site    4. Colon cancer screening    5. BMI 40.0-44.9, adult          Plan:     Blood pressure controlled lab was ordered to include CBC CMP A1c microalbumin creatinine ratio uric acid.  Cologuard was ordered.  Health maintenance reviewed.  Flu VAX given today.  Discussed weight reduction.  Follow-up in 4 months.    Controlled type 2 diabetes mellitus without complication, with long-term current use of insulin  -     Comprehensive Metabolic Panel; Future; Expected date: 09/13/2022  -     Hemoglobin A1C; Future; Expected date: 09/13/2022  -     Microalbumin/Creatinine Ratio, Urine; Future; Expected date: 09/13/2022    Primary hypertension  -     CBC Auto Differential; Future; Expected date: 09/13/2022    Chronic gout without tophus, unspecified cause, unspecified site  -     Uric Acid; Future; Expected date: 09/13/2022    Colon cancer screening  -     Cologuard Screening (Multitarget Stool DNA); Future; Expected date: 09/13/2022    BMI 40.0-44.9, adult

## 2022-09-13 NOTE — PATIENT INSTRUCTIONS
Counseling and Referral of Other Preventative  (Italic type indicates deductible and co-insurance are waived)    Patient Name: Tom Tavarez  Today's Date: 9/13/2022    Health Maintenance       Date Due Completion Date    Colorectal Cancer Screening Never done ---    Shingles Vaccine (1 of 2) Never done ---    Influenza Vaccine (1) 09/01/2022 11/9/2021    Diabetes Urine Screening 11/09/2022 11/9/2021    COVID-19 Vaccine (1) 09/13/2023 (Originally 1955) ---    Hemoglobin A1c 10/25/2022 4/25/2022    Lipid Panel 01/18/2023 1/18/2022    Eye Exam 02/17/2023 2/17/2022    Foot Exam 03/08/2023 3/8/2022 (Done)    Override on 3/8/2022: Done    Override on 11/9/2021: Done    High Dose Statin 09/13/2023 9/13/2022    TETANUS VACCINE 11/17/2031 11/17/2021        No orders of the defined types were placed in this encounter.      The following information is provided to all patients.  This information is to help you find resources for any of the problems found today that may be affecting your health:                Living healthy guide: www.[facility]placed [facility].[place].gov      Understanding Diabetes: www.diabetes.org      Eating healthy: www.cdc.gov/healthyweight      CDC home safety checklist: www.cdc.gov/steadi/patient.html      Agency on Aging: www.goea.[place].[facility]      Alcoholics anonymous (AA): www.aa.org      Physical Activity: www.anne.nih.gov/qs9ytku      Tobacco use: www.quitwithusla.org

## 2022-09-13 NOTE — PROGRESS NOTES
"  Tom Tavarez presented for a  Medicare AWV and comprehensive Health Risk Assessment today. The following components were reviewed and updated:    Medical history  Family History  Social history  Allergies and Current Medications  Health Risk Assessment  Health Maintenance  Care Team         ** See Completed Assessments for Annual Wellness Visit within the encounter summary.**         The following assessments were completed:  Living Situation  CAGE  Depression Screening  Timed Get Up and Go  Whisper Test  Cognitive Function Screening  Nutrition Screening  ADL Screening  PAQ Screening        Vitals:    09/13/22 1006   BP: 128/84   BP Location: Left arm   Patient Position: Sitting   Pulse: (!) 49   SpO2: 96%   Weight: (!) 138.8 kg (306 lb)   Height: 5' 11" (1.803 m)     Body mass index is 42.68 kg/m².  Physical Exam  Vitals and nursing note reviewed.   Constitutional:       Appearance: He is well-developed.   HENT:      Head: Normocephalic.   Cardiovascular:      Rate and Rhythm: Regular rhythm. Bradycardia present.      Heart sounds: Normal heart sounds.   Pulmonary:      Effort: Pulmonary effort is normal. No respiratory distress.      Breath sounds: Normal breath sounds.   Abdominal:      Palpations: Abdomen is soft. There is no mass.      Tenderness: There is no abdominal tenderness.   Musculoskeletal:         General: Normal range of motion.   Skin:     General: Skin is warm and dry.   Neurological:      Mental Status: He is alert and oriented to person, place, and time.      Motor: No abnormal muscle tone.   Psychiatric:         Speech: Speech normal.         Behavior: Behavior normal.             Diagnoses and health risks identified today and associated recommendations/orders:    1. Encounter for preventive health examination  MA to send message to Dr. Sue's staff to please contact to discuss scheduling Holter per Dr. Sue's recommendations.     Discussed locations to receive flu vaccine  Declines " covid vaccine.   Discussed receiving Shingrix at pharmacy.      Denies family history of colon cancer. Declines colonoscopy at this time. Reports has FIT KIT at home,encouraged to complete.     2. Hypertension, unspecified type  Continue current treatment plan as previously prescribed with your pcp and cardiologist.     3. Hyperlipidemia associated with type 2 diabetes mellitus  A1c 5.5  Lipid-s/c  Continue current treatment plan as previously prescribed with your  pcp     4. Atherosclerosis of aorta  Xray 11/20  Continue current treatment plan as previously prescribed with your  pcp     5. Stage 3 chronic kidney disease, unspecified whether stage 3a or 3b CKD  Stable and controlled. Continue current treatment plan as previously prescribed with your PCP.      6. Obesity, Class III, BMI 40-49.9 (morbid obesity)  Encouraged healthy diet and exercise as tolerated to help bring BMI into normal range.    Continue current treatment plan as previously prescribed with your  pcp     7. Bradycardia  Continue current treatment plan as previously prescribed with your  cardiologist.     8. Tingling of both feet  Ongoing for 3-6 months  Encouraged daily foot inspections.   Advised to follow up with PCP for further evaluation and recommendations. Patient expressed understanding.        Provided [place] with a 5-10 year written screening schedule and personal prevention plan. Recommendations were developed using the USPSTF age appropriate recommendations. Education, counseling, and referrals were provided as needed. After Visit Summary printed and given to patient which includes a list of additional screenings\tests needed.    Follow up in about 1 year (around 9/13/2023) for awv.    Radha Shankar NP  I offered to discuss advanced care planning, including how to pick a person who would make decisions for you if you were unable to make them for yourself, called a health care power of , and what kind of decisions you might  make such as use of life sustaining treatments such as ventilators and tube feeding when faced with a life limiting illness recorded on a living will that they will need to know. (How you want to be cared for as you near the end of your natural life)     X Patient is interested in learning more about how to make advanced directives.  I provided them paperwork and offered to discuss this with them.

## 2022-09-14 ENCOUNTER — PATIENT OUTREACH (OUTPATIENT)
Dept: ADMINISTRATIVE | Facility: HOSPITAL | Age: 67
End: 2022-09-14
Payer: MEDICARE

## 2022-09-14 ENCOUNTER — PATIENT MESSAGE (OUTPATIENT)
Dept: INTERNAL MEDICINE | Facility: CLINIC | Age: 67
End: 2022-09-14
Payer: MEDICARE

## 2022-09-14 LAB
ALBUMIN/CREAT UR: 9 UG/MG (ref 0–30)
CREAT UR-MCNC: 145 MG/DL (ref 23–375)
MICROALBUMIN UR DL<=1MG/L-MCNC: 13 UG/ML

## 2022-09-19 ENCOUNTER — OFFICE VISIT (OUTPATIENT)
Dept: PODIATRY | Facility: CLINIC | Age: 67
End: 2022-09-19
Payer: MEDICARE

## 2022-09-19 VITALS — HEIGHT: 71 IN | WEIGHT: 306 LBS | BODY MASS INDEX: 42.84 KG/M2

## 2022-09-19 DIAGNOSIS — Z79.4 CONTROLLED TYPE 2 DIABETES MELLITUS WITHOUT COMPLICATION, WITH LONG-TERM CURRENT USE OF INSULIN: ICD-10-CM

## 2022-09-19 DIAGNOSIS — E11.9 CONTROLLED TYPE 2 DIABETES MELLITUS WITHOUT COMPLICATION, WITH LONG-TERM CURRENT USE OF INSULIN: ICD-10-CM

## 2022-09-19 DIAGNOSIS — R60.0 BILATERAL LOWER EXTREMITY EDEMA: Primary | ICD-10-CM

## 2022-09-19 PROCEDURE — 1159F PR MEDICATION LIST DOCUMENTED IN MEDICAL RECORD: ICD-10-PCS | Mod: CPTII,S$GLB,, | Performed by: PODIATRIST

## 2022-09-19 PROCEDURE — 1101F PT FALLS ASSESS-DOCD LE1/YR: CPT | Mod: CPTII,S$GLB,, | Performed by: PODIATRIST

## 2022-09-19 PROCEDURE — 3061F PR NEG MICROALBUMINURIA RESULT DOCUMENTED/REVIEW: ICD-10-PCS | Mod: CPTII,S$GLB,, | Performed by: PODIATRIST

## 2022-09-19 PROCEDURE — 3066F NEPHROPATHY DOC TX: CPT | Mod: CPTII,S$GLB,, | Performed by: PODIATRIST

## 2022-09-19 PROCEDURE — 3044F HG A1C LEVEL LT 7.0%: CPT | Mod: CPTII,S$GLB,, | Performed by: PODIATRIST

## 2022-09-19 PROCEDURE — 99213 PR OFFICE/OUTPT VISIT, EST, LEVL III, 20-29 MIN: ICD-10-PCS | Mod: S$GLB,,, | Performed by: PODIATRIST

## 2022-09-19 PROCEDURE — 4010F PR ACE/ARB THEARPY RXD/TAKEN: ICD-10-PCS | Mod: CPTII,S$GLB,, | Performed by: PODIATRIST

## 2022-09-19 PROCEDURE — 3061F NEG MICROALBUMINURIA REV: CPT | Mod: CPTII,S$GLB,, | Performed by: PODIATRIST

## 2022-09-19 PROCEDURE — 3008F PR BODY MASS INDEX (BMI) DOCUMENTED: ICD-10-PCS | Mod: CPTII,S$GLB,, | Performed by: PODIATRIST

## 2022-09-19 PROCEDURE — 4010F ACE/ARB THERAPY RXD/TAKEN: CPT | Mod: CPTII,S$GLB,, | Performed by: PODIATRIST

## 2022-09-19 PROCEDURE — 1159F MED LIST DOCD IN RCRD: CPT | Mod: CPTII,S$GLB,, | Performed by: PODIATRIST

## 2022-09-19 PROCEDURE — 1101F PR PT FALLS ASSESS DOC 0-1 FALLS W/OUT INJ PAST YR: ICD-10-PCS | Mod: CPTII,S$GLB,, | Performed by: PODIATRIST

## 2022-09-19 PROCEDURE — 3288F PR FALLS RISK ASSESSMENT DOCUMENTED: ICD-10-PCS | Mod: CPTII,S$GLB,, | Performed by: PODIATRIST

## 2022-09-19 PROCEDURE — 3288F FALL RISK ASSESSMENT DOCD: CPT | Mod: CPTII,S$GLB,, | Performed by: PODIATRIST

## 2022-09-19 PROCEDURE — 99213 OFFICE O/P EST LOW 20 MIN: CPT | Mod: S$GLB,,, | Performed by: PODIATRIST

## 2022-09-19 PROCEDURE — 99999 PR PBB SHADOW E&M-EST. PATIENT-LVL III: ICD-10-PCS | Mod: PBBFAC,,, | Performed by: PODIATRIST

## 2022-09-19 PROCEDURE — 1160F PR REVIEW ALL MEDS BY PRESCRIBER/CLIN PHARMACIST DOCUMENTED: ICD-10-PCS | Mod: CPTII,S$GLB,, | Performed by: PODIATRIST

## 2022-09-19 PROCEDURE — 1160F RVW MEDS BY RX/DR IN RCRD: CPT | Mod: CPTII,S$GLB,, | Performed by: PODIATRIST

## 2022-09-19 PROCEDURE — 99999 PR PBB SHADOW E&M-EST. PATIENT-LVL III: CPT | Mod: PBBFAC,,, | Performed by: PODIATRIST

## 2022-09-19 PROCEDURE — 3044F PR MOST RECENT HEMOGLOBIN A1C LEVEL <7.0%: ICD-10-PCS | Mod: CPTII,S$GLB,, | Performed by: PODIATRIST

## 2022-09-19 PROCEDURE — 3066F PR DOCUMENTATION OF TREATMENT FOR NEPHROPATHY: ICD-10-PCS | Mod: CPTII,S$GLB,, | Performed by: PODIATRIST

## 2022-09-19 PROCEDURE — 3008F BODY MASS INDEX DOCD: CPT | Mod: CPTII,S$GLB,, | Performed by: PODIATRIST

## 2022-09-19 NOTE — PROGRESS NOTES
Subjective:     Patient ID: Tom Tavarez is a 67 y.o. male.    Chief Complaint: Diabetic Foot Exam (Pt c/o BL foot swelling, Diabetic pt wears tennis shoes, PCP Dr. Lemus last seen 9-13-22)    Tom is a 67 y.o. male who presents to the clinic for evaluation and treatment of high risk feet. Tom has a past medical history of BMI 40.0-44.9, adult (7/21/2016), Colon cancer screening (10/3/2017), Diabetes mellitus type 2 in obese (11/2006), Hyperlipidemia, Hypertension (1985), Obesity, Class III, BMI 40-49.9 (morbid obesity) (1985), and Umbilical hernia. The patient's chief complaint is follow up of left foot swelling and neuropathy pain. Patient states the pain is better in the feet. Patient states he has venous ultrasound completed. Patient states he has not been wearing the tubi . This patient has documented high risk feet requiring routine maintenance secondary to diabetes mellitis and those secondary complications of diabetes, as mentioned..    PCP: Adolfo Lemus MD    Date Last Seen by PCP: 09/13/2022    Current shoe gear:  Affected Foot: Tennis shoes     Unaffected Foot: Tennis shoes    Hemoglobin A1C   Date Value Ref Range Status   09/13/2022 5.8 (H) 4.0 - 5.6 % Final     Comment:     ADA Screening Guidelines:  5.7-6.4%  Consistent with prediabetes  >or=6.5%  Consistent with diabetes    High levels of fetal hemoglobin interfere with the HbA1C  assay. Heterozygous hemoglobin variants (HbS, HgC, etc)do  not significantly interfere with this assay.   However, presence of multiple variants may affect accuracy.     04/25/2022 5.5 4.0 - 5.6 % Final     Comment:     ADA Screening Guidelines:  5.7-6.4%  Consistent with prediabetes  >or=6.5%  Consistent with diabetes    High levels of fetal hemoglobin interfere with the HbA1C  assay. Heterozygous hemoglobin variants (HbS, HgC, etc)do  not significantly interfere with this assay.   However, presence of multiple variants may affect accuracy.     02/17/2022  7.8 (H) 4.0 - 5.6 % Final     Comment:     ADA Screening Guidelines:  5.7-6.4%  Consistent with prediabetes  >or=6.5%  Consistent with diabetes    High levels of fetal hemoglobin interfere with the HbA1C  assay. Heterozygous hemoglobin variants (HbS, HgC, etc)do  not significantly interfere with this assay.   However, presence of multiple variants may affect accuracy.         Patient Active Problem List   Diagnosis    Diabetes mellitus type 2 in obese    Hyperlipidemia    Primary hypertension    BMI 40.0-44.9, adult    Hypertension    Umbilical hernia    Controlled type 2 diabetes mellitus without complication, with long-term current use of insulin    Weakness    Immunization deficiency    Yeast dermatitis    Acute gout due to renal impairment involving foot    Onychomycosis of toenail    Acute cystitis with hematuria    Stage 3 chronic kidney disease    FH: thoracic aortic aneurysm    Chronic allergic rhinitis    GERD (gastroesophageal reflux disease)    Itchy skin    Testicle swelling    Uncontrolled type 2 diabetes mellitus with hyperglycemia    Primary osteoarthritis of left hip    Aortic atherosclerosis    Arteriosclerosis of abdominal aorta    Abdominal wall hernia    Nasal congestion    Abdominal aortic aneurysm (AAA) without rupture    Dorsalgia, unspecified    Hip pain    Hypotension    Shortness of breath    Diabetic polyneuropathy associated with type 2 diabetes mellitus    Colon cancer screening    Chronic gout without tophus       Medication List with Changes/Refills   Current Medications    AMLODIPINE (NORVASC) 10 MG TABLET    Take 1 tablet (10 mg total) by mouth once daily.    ATORVASTATIN (LIPITOR) 40 MG TABLET    TAKE 1 TABLET BY MOUTH  DAILY    BLOOD SUGAR DIAGNOSTIC STRP    1 each by Misc.(Non-Drug; Combo Route) route once daily.    COLCHICINE (COLCRYS) 0.6 MG TABLET    Take 1 tablet (0.6 mg total) by mouth once daily.    FLASH GLUCOSE SCANNING READER (FilmijobSTYLE KRISTY 14 DAY READER) MISC    1 each  "by Misc.(Non-Drug; Combo Route) route continuous.    FLASH GLUCOSE SENSOR (FREESTYLE KRISTY 14 DAY SENSOR) KIT    1 each by Misc.(Non-Drug; Combo Route) route continuous.    GABAPENTIN (NEURONTIN) 300 MG CAPSULE    TAKE 1 CAPSULE(300 MG) BY MOUTH TWICE DAILY    INSULIN SYRINGE-NEEDLE U-100 0.3 ML 31 GAUGE X 1/4" SYRG    1 each by Misc.(Non-Drug; Combo Route) route 2 (two) times a day.    KETOCONAZOLE (NIZORAL) 2 % CREAM    Apply topically once daily.    LANCETS MISC    To check BG 1 times daily, to use with insurance preferred meter    LANTUS SOLOSTAR U-100 INSULIN GLARGINE 100 UNITS/ML (3ML) SUBQ PEN    Inject 50 Units into the skin 2 (two) times a day.    LOSARTAN (COZAAR) 25 MG TABLET    Take 1 tablet (25 mg total) by mouth once daily.    METFORMIN (GLUCOPHAGE) 500 MG TABLET    Take 1 tablet (500 mg total) by mouth 2 (two) times daily with meals.       Review of patient's allergies indicates:  No Known Allergies    Past Surgical History:   Procedure Laterality Date    G U surgery  1982    biopsy of testicle infertility w/u       Family History   Problem Relation Age of Onset    Hypertension Mother     Heart failure Mother        Social History     Socioeconomic History    Marital status:     Number of children: 0    Highest education level: Some college, no degree   Occupational History    Occupation: Retired-Sales     Comment: [employer]   Tobacco Use    Smoking status: Former    Smokeless tobacco: Never    Tobacco comments:     1998 quit    Substance and Sexual Activity    Alcohol use: No     Alcohol/week: 0.0 standard drinks    Drug use: No    Sexual activity: Yes     Partners: Female     Social Determinants of Health     Financial Resource Strain: Low Risk     Difficulty of Paying Living Expenses: Not hard at all   Food Insecurity: No Food Insecurity    Worried About Running Out of Food in the Last Year: Never true    Ran Out of Food in the Last Year: Never true   Transportation Needs: No " "Transportation Needs    Lack of Transportation (Medical): No    Lack of Transportation (Non-Medical): No   Physical Activity: Inactive    Days of Exercise per Week: 0 days    Minutes of Exercise per Session: 0 min   Stress: No Stress Concern Present    Feeling of Stress : Not at all   Social Connections: Moderately Integrated    Frequency of Communication with Friends and Family: More than three times a week    Frequency of Social Gatherings with Friends and Family: Three times a week    Attends [employer] Services: More than 4 times per year    Active Member of Clubs or Organizations: No    Attends Club or Organization Meetings: Never    Marital Status:    Housing Stability: Low Risk     Unable to Pay for Housing in the Last Year: No    Number of Places Lived in the Last Year: 1    Unstable Housing in the Last Year: No       Vitals:    09/19/22 1154   Weight: (!) 138.8 kg (306 lb)   Height: 5' 11" (1.803 m)       Hemoglobin A1C   Date Value Ref Range Status   09/13/2022 5.8 (H) 4.0 - 5.6 % Final     Comment:     ADA Screening Guidelines:  5.7-6.4%  Consistent with prediabetes  >or=6.5%  Consistent with diabetes    High levels of fetal hemoglobin interfere with the HbA1C  assay. Heterozygous hemoglobin variants (HbS, HgC, etc)do  not significantly interfere with this assay.   However, presence of multiple variants may affect accuracy.     04/25/2022 5.5 4.0 - 5.6 % Final     Comment:     ADA Screening Guidelines:  5.7-6.4%  Consistent with prediabetes  >or=6.5%  Consistent with diabetes    High levels of fetal hemoglobin interfere with the HbA1C  assay. Heterozygous hemoglobin variants (HbS, HgC, etc)do  not significantly interfere with this assay.   However, presence of multiple variants may affect accuracy.     02/17/2022 7.8 (H) 4.0 - 5.6 % Final     Comment:     ADA Screening Guidelines:  5.7-6.4%  Consistent with prediabetes  >or=6.5%  Consistent with diabetes    High levels of fetal hemoglobin interfere " with the HbA1C  assay. Heterozygous hemoglobin variants (HbS, HgC, etc)do  not significantly interfere with this assay.   However, presence of multiple variants may affect accuracy.         Review of Systems   Constitutional:  Negative for chills and fever.   Respiratory:  Negative for shortness of breath.    Cardiovascular:  Positive for leg swelling. Negative for chest pain, palpitations, orthopnea and claudication.   Gastrointestinal:  Negative for diarrhea, nausea and vomiting.   Musculoskeletal:  Negative for joint pain.   Skin:  Negative for rash.   Neurological:  Positive for tingling and sensory change.   Psychiatric/Behavioral: Negative.         Objective:      PHYSICAL EXAM: Apperance: Alert and orient in no distress,well developed, and with good attention to grooming and body habits  Patient presents ambulating in tennis shoes.   LOWER EXTREMITY EXAM:  VASCULAR: Dorsalis pedis pulses 2/4 bilateral and Posterior Tibial pulses 2/4 bilateral. Capillary fill time <4 seconds  bilateral. Minimal edema observed bilateral. Varicosities absent bilateral. Skin temperature of the lower extremities is warm to warm, proximal to distal. Hair growth WNL bilateral.  DERMATOLOGICAL: No skin rashes, subcutaneous nodules, lesions, or ulcers observed bilateral. Nails 1,2,3,4,5 bilateral normal length. Webspaces 1,2,3,4 bilateral clean, dry and without evidence of break in skin integrity. Minimal  dry and peeling skin noted plantarly in moccasin distribution.   NEUROLOGICAL: Light touch, sharp-dull, proprioception all present and equal bilaterally.  Vibratory sensation diminished at bilateral hallux. Protective sensation intact at all 10 sites as tested with a B[place]ank-Lindsay 5.07 monofilament.   MUSCULOSKELETAL: Muscle strength is 5/5 for foot inverters, everters, plantarflexors, and dorsiflexors. Muscle tone is normal. No pain on palpation or ROM of left ankle.           Assessment:       ICD-10-CM ICD-9-CM   1. Bilateral  lower extremity edema  R60.0 782.3   2. Controlled type 2 diabetes mellitus without complication, with long-term current use of insulin  E11.9 250.00    Z79.4 V58.67         Plan:   Bilateral lower extremity edema    Controlled type 2 diabetes mellitus without complication, with long-term current use of insulin      I counseled the patient on his conditions, regarding findings of my examination, my impressions, and usual treatment plan.   Reviewed venous ultrasound results with patient.   Patient counseled on ways to improve swelling in lower extremities including decreasing/eliminating salt intake, elevating lower extremities, compression stocking, or oral medications. Patient states she understands.   Patient instructed to resume use of tubi .   Dispensed size F tubi  to be worn daily, and not to sleep in.   Patient  will continue to monitor the areas daily, inspect feet, wear protective shoe gear when ambulatory, moisturizer to maintain skin integrity. Patient reminded of the importance of good nutrition and blood sugar control to help prevent podiatric complications of diabetes.  Patient to return 3 months or sooner if needed.        Rosas Macias DPM  [facility]

## 2022-09-20 ENCOUNTER — TELEPHONE (OUTPATIENT)
Dept: NEPHROLOGY | Facility: CLINIC | Age: 67
End: 2022-09-20
Payer: MEDICARE

## 2022-09-20 NOTE — TELEPHONE ENCOUNTER
----- Message from Hayescb Naveen sent at 9/20/2022  4:49 PM CDT -----  Contact: Self/[phone]  Pt is calling in regards to lab work, he states he just did blood work on 9/13/22 and was wondering if it was necessary for him to get blood work done again on 10/19/22. Please give him a call for clarification at [phone]. Thank you s/g

## 2022-09-29 ENCOUNTER — HOSPITAL ENCOUNTER (OUTPATIENT)
Dept: CARDIOLOGY | Facility: HOSPITAL | Age: 67
Discharge: HOME | End: 2022-09-29
Attending: INTERNAL MEDICINE
Payer: MEDICARE

## 2022-09-29 VITALS
DIASTOLIC BLOOD PRESSURE: 84 MMHG | WEIGHT: 306 LBS | HEIGHT: 71 IN | SYSTOLIC BLOOD PRESSURE: 124 MMHG | BODY MASS INDEX: 42.84 KG/M2

## 2022-09-29 DIAGNOSIS — R00.1 BRADYCARDIA: ICD-10-CM

## 2022-09-29 LAB
AORTIC ROOT ANNULUS: 3.44 CM
ASCENDING AORTA: 3.36 CM
AV INDEX (PROSTH): 0.81
AV MEAN GRADIENT: 5 MMHG
AV PEAK GRADIENT: 10 MMHG
AV VALVE AREA: 3.03 CM2
AV VELOCITY RATIO: 0.8
BSA FOR ECHO PROCEDURE: 2.64 M2
CV ECHO LV RWT: 0.43 CM
DOP CALC AO PEAK VEL: 1.58 M/S
DOP CALC AO VTI: 34.9 CM
DOP CALC LVOT AREA: 3.8 CM2
DOP CALC LVOT DIAMETER: 2.19 CM
DOP CALC LVOT PEAK VEL: 1.26 M/S
DOP CALC LVOT STROKE VOLUME: 105.79 CM3
DOP CALC RVOT PEAK VEL: 0.93 M/S
DOP CALC RVOT VTI: 19.7 CM
DOP CALCLVOT PEAK VEL VTI: 28.1 CM
E WAVE DECELERATION TIME: 247.9 MSEC
E/A RATIO: 0.96
E/E' RATIO: 7.33 M/S
ECHO LV POSTERIOR WALL: 1.08 CM (ref 0.6–1.1)
EJECTION FRACTION: 55 %
FRACTIONAL SHORTENING: 40 % (ref 28–44)
INTERVENTRICULAR SEPTUM: 1.18 CM (ref 0.6–1.1)
IVC DIAMETER: 1.51 CM
IVRT: 79.92 MSEC
LA MAJOR: 5.23 CM
LA MINOR: 4.93 CM
LA WIDTH: 4.3 CM
LEFT ATRIUM SIZE: 4.04 CM
LEFT ATRIUM VOLUME INDEX MOD: 24.9 ML/M2
LEFT ATRIUM VOLUME INDEX: 29.6 ML/M2
LEFT ATRIUM VOLUME MOD: 62.98 CM3
LEFT ATRIUM VOLUME: 74.95 CM3
LEFT INTERNAL DIMENSION IN SYSTOLE: 3.03 CM (ref 2.1–4)
LEFT VENTRICLE DIASTOLIC VOLUME INDEX: 48.6 ML/M2
LEFT VENTRICLE DIASTOLIC VOLUME: 122.95 ML
LEFT VENTRICLE MASS INDEX: 87 G/M2
LEFT VENTRICLE SYSTOLIC VOLUME INDEX: 14.1 ML/M2
LEFT VENTRICLE SYSTOLIC VOLUME: 35.79 ML
LEFT VENTRICULAR INTERNAL DIMENSION IN DIASTOLE: 5.08 CM (ref 3.5–6)
LEFT VENTRICULAR MASS: 220.54 G
LV LATERAL E/E' RATIO: 6.42 M/S
LV SEPTAL E/E' RATIO: 8.56 M/S
LVOT MG: 2.94 MMHG
LVOT MV: 0.78 CM/S
MV PEAK A VEL: 0.8 M/S
MV PEAK E VEL: 0.77 M/S
MV STENOSIS PRESSURE HALF TIME: 71.89 MS
MV VALVE AREA P 1/2 METHOD: 3.06 CM2
PISA TR MAX VEL: 2.42 M/S
PULM VEIN S/D RATIO: 1.44
PV MEAN GRADIENT: 1.51 MMHG
PV PEAK D VEL: 0.45 M/S
PV PEAK S VEL: 0.65 M/S
PV PEAK VELOCITY: 1.23 CM/S
RA MAJOR: 4.53 CM
RA PRESSURE: 3 MMHG
RA WIDTH: 3.6 CM
RIGHT VENTRICULAR END-DIASTOLIC DIMENSION: 3.43 CM
SINUS: 3.31 CM
STJ: 2.69 CM
TDI LATERAL: 0.12 M/S
TDI SEPTAL: 0.09 M/S
TDI: 0.11 M/S
TR MAX PG: 23 MMHG
TRICUSPID ANNULAR PLANE SYSTOLIC EXCURSION: 2.74 CM
TV REST PULMONARY ARTERY PRESSURE: 26 MMHG

## 2022-09-29 PROCEDURE — 93226 XTRNL ECG REC<48 HR SCAN A/R: CPT

## 2022-09-29 PROCEDURE — 93306 TTE W/DOPPLER COMPLETE: CPT | Mod: 26,,, | Performed by: INTERNAL MEDICINE

## 2022-09-29 PROCEDURE — 93227 XTRNL ECG REC<48 HR R&I: CPT | Mod: ,,, | Performed by: INTERNAL MEDICINE

## 2022-09-29 PROCEDURE — 93306 ECHO (CUPID ONLY): ICD-10-PCS | Mod: 26,,, | Performed by: INTERNAL MEDICINE

## 2022-09-29 PROCEDURE — 93306 TTE W/DOPPLER COMPLETE: CPT

## 2022-09-29 PROCEDURE — 93227 HOLTER MONITOR - 48 HOUR (CUPID ONLY): ICD-10-PCS | Mod: ,,, | Performed by: INTERNAL MEDICINE

## 2022-10-03 ENCOUNTER — TELEPHONE (OUTPATIENT)
Dept: CARDIOLOGY | Facility: CLINIC | Age: 67
End: 2022-10-03
Payer: MEDICARE

## 2022-10-03 ENCOUNTER — TELEPHONE (OUTPATIENT)
Dept: INTERNAL MEDICINE | Facility: CLINIC | Age: 67
End: 2022-10-03
Payer: MEDICARE

## 2022-10-03 DIAGNOSIS — Z79.4 CONTROLLED TYPE 2 DIABETES MELLITUS WITHOUT COMPLICATION, WITH LONG-TERM CURRENT USE OF INSULIN: Primary | ICD-10-CM

## 2022-10-03 DIAGNOSIS — E11.9 CONTROLLED TYPE 2 DIABETES MELLITUS WITHOUT COMPLICATION, WITH LONG-TERM CURRENT USE OF INSULIN: Primary | ICD-10-CM

## 2022-10-03 DIAGNOSIS — E11.42 DIABETIC POLYNEUROPATHY ASSOCIATED WITH TYPE 2 DIABETES MELLITUS: ICD-10-CM

## 2022-10-03 RX ORDER — LANCETS
EACH MISCELLANEOUS
Qty: 200 EACH | Refills: 3 | Status: SHIPPED | OUTPATIENT
Start: 2022-10-03 | End: 2022-10-03

## 2022-10-03 RX ORDER — INSULIN PUMP SYRINGE, 3 ML
EACH MISCELLANEOUS
Qty: 1 EACH | Refills: 0 | Status: SHIPPED | OUTPATIENT
Start: 2022-10-03 | End: 2022-10-03

## 2022-10-03 NOTE — TELEPHONE ENCOUNTER
No new care gaps identified.  [facility] Embedded Care Gaps. Reference number: [id number]. 10/03/2022   3:35:29 PM CDT

## 2022-10-03 NOTE — TELEPHONE ENCOUNTER
----- Message from Adolfo Lemus MD sent at 10/3/2022  1:25 PM CDT -----  Regarding: FW: Diabetic supplies  Contact: [facility] member services  Printed need to fax to [employer] [facility]  ----- Message -----  From: Mia Self LPN  Sent: 10/3/2022  11:50 AM CDT  To: Adolfo Lemus MD  Subject: Diabetic supplies                                Need paper refills for this to be faxed over to [email or web address].      ----- Message -----  From: Mannie Haynes  Sent: 10/3/2022   9:41 AM CDT  To: Mariah Ferrell    [employer] is calling in regards to getting new orders for pt diabetic supplies. Please fax over to [phone]. The call back number is  [phone]. Thanks KB

## 2022-10-03 NOTE — TELEPHONE ENCOUNTER
Left message with patient to update him that the script was sent over with his previous instructions on them. Dr Lemus is gone for the day but we will fax first thing in the morning when he can sign a new paper script. Also called and updated [employer].

## 2022-10-03 NOTE — TELEPHONE ENCOUNTER
----- Message from Loretta Carrillo sent at 10/3/2022  3:04 PM CDT -----  Contact: [facility]  Type:  Patient Returning Call    Who Called: pt  Who Left Message for Patient: unknown   Does the patient know what this is regarding? no  Would the patient rather a call back or a response via [facility]? Call back  Best Call Back Number: [phone]  Additional Information: n/a

## 2022-10-03 NOTE — TELEPHONE ENCOUNTER
Pt contacted about results, pt verbalized understanding        ----- Message from Bhupinder Sue MD sent at 10/2/2022  4:10 PM CDT -----  Echo normal function

## 2022-10-04 ENCOUNTER — TELEPHONE (OUTPATIENT)
Dept: INTERNAL MEDICINE | Facility: CLINIC | Age: 67
End: 2022-10-04
Payer: MEDICARE

## 2022-10-04 LAB
OHS CV EVENT MONITOR DAY: 0
OHS CV HOLTER LENGTH DECIMAL HOURS: 48
OHS CV HOLTER LENGTH HOURS: 48
OHS CV HOLTER LENGTH MINUTES: 0
OHS CV HOLTER SINUS AVERAGE HR: 51
OHS CV HOLTER SINUS MAX HR: 94
OHS CV HOLTER SINUS MIN HR: 38

## 2022-10-04 RX ORDER — LANCETS
EACH MISCELLANEOUS
Qty: 100 EACH | Refills: 3 | Status: SHIPPED | OUTPATIENT
Start: 2022-10-04

## 2022-10-04 RX ORDER — LANCETS
EACH MISCELLANEOUS
Qty: 200 EACH | Refills: 3 | Status: SHIPPED | OUTPATIENT
Start: 2022-10-04 | End: 2023-07-19

## 2022-10-04 RX ORDER — INSULIN PUMP SYRINGE, 3 ML
EACH MISCELLANEOUS
Qty: 1 EACH | Refills: 0 | Status: SHIPPED | OUTPATIENT
Start: 2022-10-04 | End: 2023-10-03

## 2022-10-04 NOTE — TELEPHONE ENCOUNTER
----- Message from Adolfo Lemus MD sent at 10/4/2022  7:21 AM CDT -----  Contact: [facility] [employer]  [facility]  ----- Message -----  From: Mia Self LPN  Sent: 10/3/2022   3:33 PM CDT  To: Adolfo Lemus MD    Please reprint the diabetic testing supplies and resign. Patient advised that he was told last visit to increase testing to TID. I updated the orders to show TID. Just need your sig on them to fax over to [employer].   Thanks  Mia RIOS  ----- Message -----  From: Loretta Carrillo  Sent: 10/3/2022   3:06 PM CDT  To: Mariah NAYLOR Staff    Type:  Patient Returning Call    Who Called: pt  Who Left Message for Patient: unknown   Does the patient know what this is regarding? no  Would the patient rather a call back or a response via [facility]? Call back  Best Call Back Number: [phone]  Additional Information: n/a

## 2022-10-04 NOTE — TELEPHONE ENCOUNTER
Refaxed updated and signed orders to [employer] for patient's diabetic testing supplies.     TG LPN

## 2022-10-06 ENCOUNTER — TELEPHONE (OUTPATIENT)
Dept: CARDIOLOGY | Facility: CLINIC | Age: 67
End: 2022-10-06
Payer: MEDICARE

## 2022-10-06 NOTE — TELEPHONE ENCOUNTER
The patient has been notified of this information and all questions answered.        ----- Message from Bhupinder Sue MD sent at 10/5/2022  8:40 PM CDT -----  Holter showed rare arrhythmia.   Continue current Rx

## 2022-10-19 ENCOUNTER — LAB VISIT (OUTPATIENT)
Dept: LAB | Facility: HOSPITAL | Age: 67
End: 2022-10-19
Attending: INTERNAL MEDICINE
Payer: MEDICARE

## 2022-10-19 DIAGNOSIS — N17.9 AKI (ACUTE KIDNEY INJURY): ICD-10-CM

## 2022-10-19 LAB
ALBUMIN SERPL BCP-MCNC: 3.4 G/DL (ref 3.5–5.2)
ANION GAP SERPL CALC-SCNC: 8 MMOL/L (ref 8–16)
BUN SERPL-MCNC: 24 MG/DL (ref 8–23)
CALCIUM SERPL-MCNC: 9 MG/DL (ref 8.7–10.5)
CHLORIDE SERPL-SCNC: 104 MMOL/L (ref 95–110)
CO2 SERPL-SCNC: 27 MMOL/L (ref 23–29)
CREAT SERPL-MCNC: 1.5 MG/DL (ref 0.5–1.4)
EST. GFR  (NO RACE VARIABLE): 50.4 ML/MIN/1.73 M^2
GLUCOSE SERPL-MCNC: 128 MG/DL (ref 70–110)
PHOSPHATE SERPL-MCNC: 2.9 MG/DL (ref 2.7–4.5)
POTASSIUM SERPL-SCNC: 4.3 MMOL/L (ref 3.5–5.1)
SODIUM SERPL-SCNC: 139 MMOL/L (ref 136–145)
URATE SERPL-MCNC: 7 MG/DL (ref 3.4–7)

## 2022-10-19 PROCEDURE — 80069 RENAL FUNCTION PANEL: CPT | Performed by: INTERNAL MEDICINE

## 2022-10-19 PROCEDURE — 36415 COLL VENOUS BLD VENIPUNCTURE: CPT | Mod: PO | Performed by: INTERNAL MEDICINE

## 2022-10-19 PROCEDURE — 84550 ASSAY OF BLOOD/URIC ACID: CPT | Performed by: INTERNAL MEDICINE

## 2022-10-27 ENCOUNTER — PATIENT MESSAGE (OUTPATIENT)
Dept: ADMINISTRATIVE | Facility: OTHER | Age: 67
End: 2022-10-27
Payer: MEDICARE

## 2022-10-27 ENCOUNTER — PATIENT MESSAGE (OUTPATIENT)
Dept: PODIATRY | Facility: CLINIC | Age: 67
End: 2022-10-27
Payer: MEDICARE

## 2022-10-28 DIAGNOSIS — E11.42 DIABETIC POLYNEUROPATHY ASSOCIATED WITH TYPE 2 DIABETES MELLITUS: Primary | ICD-10-CM

## 2022-10-28 RX ORDER — PREGABALIN 75 MG/1
75 CAPSULE ORAL NIGHTLY
Qty: 30 CAPSULE | Refills: 1 | Status: SHIPPED | OUTPATIENT
Start: 2022-10-28 | End: 2023-07-19

## 2022-11-04 ENCOUNTER — TELEPHONE (OUTPATIENT)
Dept: INTERNAL MEDICINE | Facility: CLINIC | Age: 67
End: 2022-11-04
Payer: MEDICARE

## 2022-11-04 ENCOUNTER — PATIENT MESSAGE (OUTPATIENT)
Dept: INTERNAL MEDICINE | Facility: CLINIC | Age: 67
End: 2022-11-04
Payer: MEDICARE

## 2022-11-04 NOTE — TELEPHONE ENCOUNTER
Returned call to patient appointment scheduled at patients request. Patient voiced understanding of appointment date time and location.

## 2022-11-08 ENCOUNTER — OFFICE VISIT (OUTPATIENT)
Dept: INTERNAL MEDICINE | Facility: CLINIC | Age: 67
End: 2022-11-08
Payer: MEDICARE

## 2022-11-08 VITALS
OXYGEN SATURATION: 98 % | TEMPERATURE: 99 F | WEIGHT: 311.5 LBS | HEIGHT: 71 IN | BODY MASS INDEX: 43.61 KG/M2 | DIASTOLIC BLOOD PRESSURE: 82 MMHG | SYSTOLIC BLOOD PRESSURE: 138 MMHG | HEART RATE: 55 BPM

## 2022-11-08 DIAGNOSIS — E11.9 CONTROLLED TYPE 2 DIABETES MELLITUS WITHOUT COMPLICATION, WITH LONG-TERM CURRENT USE OF INSULIN: ICD-10-CM

## 2022-11-08 DIAGNOSIS — Z79.4 CONTROLLED TYPE 2 DIABETES MELLITUS WITHOUT COMPLICATION, WITH LONG-TERM CURRENT USE OF INSULIN: ICD-10-CM

## 2022-11-08 DIAGNOSIS — R60.9 EDEMA, UNSPECIFIED TYPE: Primary | ICD-10-CM

## 2022-11-08 DIAGNOSIS — E11.42 DIABETIC POLYNEUROPATHY ASSOCIATED WITH TYPE 2 DIABETES MELLITUS: ICD-10-CM

## 2022-11-08 DIAGNOSIS — I10 PRIMARY HYPERTENSION: ICD-10-CM

## 2022-11-08 DIAGNOSIS — N18.30 STAGE 3 CHRONIC KIDNEY DISEASE, UNSPECIFIED WHETHER STAGE 3A OR 3B CKD: ICD-10-CM

## 2022-11-08 PROCEDURE — 3061F PR NEG MICROALBUMINURIA RESULT DOCUMENTED/REVIEW: ICD-10-PCS | Mod: CPTII,S$GLB,, | Performed by: FAMILY MEDICINE

## 2022-11-08 PROCEDURE — 3044F HG A1C LEVEL LT 7.0%: CPT | Mod: CPTII,S$GLB,, | Performed by: FAMILY MEDICINE

## 2022-11-08 PROCEDURE — 1101F PT FALLS ASSESS-DOCD LE1/YR: CPT | Mod: CPTII,S$GLB,, | Performed by: FAMILY MEDICINE

## 2022-11-08 PROCEDURE — 3044F PR MOST RECENT HEMOGLOBIN A1C LEVEL <7.0%: ICD-10-PCS | Mod: CPTII,S$GLB,, | Performed by: FAMILY MEDICINE

## 2022-11-08 PROCEDURE — 3066F NEPHROPATHY DOC TX: CPT | Mod: CPTII,S$GLB,, | Performed by: FAMILY MEDICINE

## 2022-11-08 PROCEDURE — 3061F NEG MICROALBUMINURIA REV: CPT | Mod: CPTII,S$GLB,, | Performed by: FAMILY MEDICINE

## 2022-11-08 PROCEDURE — 3008F BODY MASS INDEX DOCD: CPT | Mod: CPTII,S$GLB,, | Performed by: FAMILY MEDICINE

## 2022-11-08 PROCEDURE — 3008F PR BODY MASS INDEX (BMI) DOCUMENTED: ICD-10-PCS | Mod: CPTII,S$GLB,, | Performed by: FAMILY MEDICINE

## 2022-11-08 PROCEDURE — 4010F ACE/ARB THERAPY RXD/TAKEN: CPT | Mod: CPTII,S$GLB,, | Performed by: FAMILY MEDICINE

## 2022-11-08 PROCEDURE — 3066F PR DOCUMENTATION OF TREATMENT FOR NEPHROPATHY: ICD-10-PCS | Mod: CPTII,S$GLB,, | Performed by: FAMILY MEDICINE

## 2022-11-08 PROCEDURE — 1125F PR PAIN SEVERITY QUANTIFIED, PAIN PRESENT: ICD-10-PCS | Mod: CPTII,S$GLB,, | Performed by: FAMILY MEDICINE

## 2022-11-08 PROCEDURE — 3288F FALL RISK ASSESSMENT DOCD: CPT | Mod: CPTII,S$GLB,, | Performed by: FAMILY MEDICINE

## 2022-11-08 PROCEDURE — 3075F PR MOST RECENT SYSTOLIC BLOOD PRESS GE 130-139MM HG: ICD-10-PCS | Mod: CPTII,S$GLB,, | Performed by: FAMILY MEDICINE

## 2022-11-08 PROCEDURE — 99214 PR OFFICE/OUTPT VISIT, EST, LEVL IV, 30-39 MIN: ICD-10-PCS | Mod: S$GLB,,, | Performed by: FAMILY MEDICINE

## 2022-11-08 PROCEDURE — 99499 RISK ADDL DX/OHS AUDIT: ICD-10-PCS | Mod: S$GLB,,, | Performed by: FAMILY MEDICINE

## 2022-11-08 PROCEDURE — 99499 UNLISTED E&M SERVICE: CPT | Mod: S$GLB,,, | Performed by: FAMILY MEDICINE

## 2022-11-08 PROCEDURE — 3079F DIAST BP 80-89 MM HG: CPT | Mod: CPTII,S$GLB,, | Performed by: FAMILY MEDICINE

## 2022-11-08 PROCEDURE — 99999 PR PBB SHADOW E&M-EST. PATIENT-LVL III: CPT | Mod: PBBFAC,,, | Performed by: FAMILY MEDICINE

## 2022-11-08 PROCEDURE — 1125F AMNT PAIN NOTED PAIN PRSNT: CPT | Mod: CPTII,S$GLB,, | Performed by: FAMILY MEDICINE

## 2022-11-08 PROCEDURE — 1101F PR PT FALLS ASSESS DOC 0-1 FALLS W/OUT INJ PAST YR: ICD-10-PCS | Mod: CPTII,S$GLB,, | Performed by: FAMILY MEDICINE

## 2022-11-08 PROCEDURE — 3075F SYST BP GE 130 - 139MM HG: CPT | Mod: CPTII,S$GLB,, | Performed by: FAMILY MEDICINE

## 2022-11-08 PROCEDURE — 99214 OFFICE O/P EST MOD 30 MIN: CPT | Mod: S$GLB,,, | Performed by: FAMILY MEDICINE

## 2022-11-08 PROCEDURE — 3079F PR MOST RECENT DIASTOLIC BLOOD PRESSURE 80-89 MM HG: ICD-10-PCS | Mod: CPTII,S$GLB,, | Performed by: FAMILY MEDICINE

## 2022-11-08 PROCEDURE — 4010F PR ACE/ARB THEARPY RXD/TAKEN: ICD-10-PCS | Mod: CPTII,S$GLB,, | Performed by: FAMILY MEDICINE

## 2022-11-08 PROCEDURE — 3288F PR FALLS RISK ASSESSMENT DOCUMENTED: ICD-10-PCS | Mod: CPTII,S$GLB,, | Performed by: FAMILY MEDICINE

## 2022-11-08 PROCEDURE — 99999 PR PBB SHADOW E&M-EST. PATIENT-LVL III: ICD-10-PCS | Mod: PBBFAC,,, | Performed by: FAMILY MEDICINE

## 2022-11-08 RX ORDER — AMLODIPINE BESYLATE 5 MG/1
5 TABLET ORAL DAILY
Qty: 30 TABLET | Refills: 0 | Status: SHIPPED | OUTPATIENT
Start: 2022-11-08 | End: 2022-11-08

## 2022-11-08 RX ORDER — CHLORTHALIDONE 25 MG/1
25 TABLET ORAL DAILY
Qty: 30 TABLET | Refills: 0 | Status: SHIPPED | OUTPATIENT
Start: 2022-11-08 | End: 2022-11-08 | Stop reason: SDUPTHER

## 2022-11-08 RX ORDER — CHLORTHALIDONE 25 MG/1
TABLET ORAL
Qty: 12 TABLET | Refills: 2 | Status: SHIPPED | OUTPATIENT
Start: 2022-11-08 | End: 2022-11-08

## 2022-11-08 NOTE — PROGRESS NOTES
Subjective:       Patient ID: Tom Tavarez is a 67 y.o. male.    Chief Complaint: Other (BLE edema)    He is concerned about ongoing swelling of lower extremities and feet.  Reports swelling resolves when lying down but returns when he stands up.  Tried compression stockings that he reports did not help.  Medical history includes diabetes hypertension hyperlipidemia.  He has diabetic polyneuropathy.  Had hypotension in the past with hydrochlorothiazide.  He is followed by Nephrology with decreased GFR.  A1c is up-to-date in good.  He denies chest pain palpitations shortness of breath.    Review of Systems   Constitutional:  Negative for appetite change, chills, fever and unexpected weight change.   Respiratory:  Negative for cough, chest tightness, shortness of breath and wheezing.    Cardiovascular:  Positive for leg swelling. Negative for chest pain and palpitations.   Gastrointestinal:  Negative for abdominal distention and abdominal pain.   Genitourinary:  Negative for difficulty urinating, dysuria, frequency, hematuria and urgency.   Neurological:  Negative for dizziness, weakness, light-headedness and headaches.     Objective:      Physical Exam  Constitutional:       General: He is not in acute distress.     Appearance: He is not ill-appearing or diaphoretic.   Cardiovascular:      Rate and Rhythm: Normal rate and regular rhythm.      Heart sounds: No murmur heard.    No gallop.      Comments: 1-2 + edema lower legs and feet  Pulmonary:      Effort: Pulmonary effort is normal. No respiratory distress.      Breath sounds: No wheezing, rhonchi or rales.   Abdominal:      General: There is no distension.      Palpations: Abdomen is soft.   Lymphadenopathy:      Cervical: No cervical adenopathy.   Skin:     General: Skin is warm and dry.      Coloration: Skin is not pale.      Findings: No erythema.   Neurological:      Mental Status: He is alert.       Lab Visit on 10/19/2022   Component Date Value Ref  Range Status    Glucose 10/19/2022 128 (H)  70 - 110 mg/dL Final    Sodium 10/19/2022 139  136 - 145 mmol/L Final    Potassium 10/19/2022 4.3  3.5 - 5.1 mmol/L Final    Chloride 10/19/2022 104  95 - 110 mmol/L Final    CO2 10/19/2022 27  23 - 29 mmol/L Final    BUN 10/19/2022 24 (H)  8 - 23 mg/dL Final    Calcium 10/19/2022 9.0  8.7 - 10.5 mg/dL Final    Creatinine 10/19/2022 1.5 (H)  0.5 - 1.4 mg/dL Final    Albumin 10/19/2022 3.4 (L)  3.5 - 5.2 g/dL Final    Phosphorus 10/19/2022 2.9  2.7 - 4.5 mg/dL Final    eGFR 10/19/2022 50.4 (A)  >60 mL/min/1.73 m^2 Final    Anion Gap 10/19/2022 8  8 - 16 mmol/L Final    Uric Acid 10/19/2022 7.0  3.4 - 7.0 mg/dL Final     Assessment:       1. Edema, unspecified type    2. Diabetic polyneuropathy associated with type 2 diabetes mellitus    3. Controlled type 2 diabetes mellitus without complication, with long-term current use of insulin    4. Stage 3 chronic kidney disease, unspecified whether stage 3a or 3b CKD    5. Primary hypertension          Plan:   Pressure 138/82  Pulse is 55 and regular.  Will decrease amlodipine and start 5 mg a day.  Start Hygroton 25 mg take on Monday Wednesday and Friday only.  Follow-up in 2 weeks.  Renal panel ordered prior to next visit.  Monitor blood pressure daily and hold Hygroton if systolic lasting 100      Edema, unspecified type  -     Renal Function Panel; Future; Expected date: 11/15/2022    Diabetic polyneuropathy associated with type 2 diabetes mellitus    Controlled type 2 diabetes mellitus without complication, with long-term current use of insulin    Stage 3 chronic kidney disease, unspecified whether stage 3a or 3b CKD    Primary hypertension    Other orders  -     Discontinue: chlorthalidone (HYGROTEN) 25 MG Tab; Take 1 tablet (25 mg total) by mouth once daily.  Dispense: 30 tablet; Refill: 0  -     amLODIPine (NORVASC) 5 MG tablet; Take 1 tablet (5 mg total) by mouth once daily.  Dispense: 30 tablet; Refill: 0  -      chlorthalidone (HYGROTEN) 25 MG Tab; Take one on mon wed and fri of each week and not daily  Dispense: 12 tablet; Refill: 2

## 2022-12-19 ENCOUNTER — OFFICE VISIT (OUTPATIENT)
Dept: PODIATRY | Facility: CLINIC | Age: 67
End: 2022-12-19
Payer: MEDICARE

## 2022-12-19 VITALS — HEIGHT: 71 IN | WEIGHT: 311 LBS | BODY MASS INDEX: 43.54 KG/M2

## 2022-12-19 DIAGNOSIS — E11.42 DIABETIC POLYNEUROPATHY ASSOCIATED WITH TYPE 2 DIABETES MELLITUS: Primary | ICD-10-CM

## 2022-12-19 DIAGNOSIS — R60.0 BILATERAL LOWER EXTREMITY EDEMA: ICD-10-CM

## 2022-12-19 PROCEDURE — 99213 PR OFFICE/OUTPT VISIT, EST, LEVL III, 20-29 MIN: ICD-10-PCS | Mod: S$GLB,,, | Performed by: PODIATRIST

## 2022-12-19 PROCEDURE — 99999 PR PBB SHADOW E&M-EST. PATIENT-LVL IV: CPT | Mod: PBBFAC,,, | Performed by: PODIATRIST

## 2022-12-19 PROCEDURE — 3072F PR LOW RISK FOR RETINOPATHY: ICD-10-PCS | Mod: CPTII,S$GLB,, | Performed by: PODIATRIST

## 2022-12-19 PROCEDURE — 3008F PR BODY MASS INDEX (BMI) DOCUMENTED: ICD-10-PCS | Mod: CPTII,S$GLB,, | Performed by: PODIATRIST

## 2022-12-19 PROCEDURE — 1159F PR MEDICATION LIST DOCUMENTED IN MEDICAL RECORD: ICD-10-PCS | Mod: CPTII,S$GLB,, | Performed by: PODIATRIST

## 2022-12-19 PROCEDURE — 1160F RVW MEDS BY RX/DR IN RCRD: CPT | Mod: CPTII,S$GLB,, | Performed by: PODIATRIST

## 2022-12-19 PROCEDURE — 1101F PR PT FALLS ASSESS DOC 0-1 FALLS W/OUT INJ PAST YR: ICD-10-PCS | Mod: CPTII,S$GLB,, | Performed by: PODIATRIST

## 2022-12-19 PROCEDURE — 1160F PR REVIEW ALL MEDS BY PRESCRIBER/CLIN PHARMACIST DOCUMENTED: ICD-10-PCS | Mod: CPTII,S$GLB,, | Performed by: PODIATRIST

## 2022-12-19 PROCEDURE — 99213 OFFICE O/P EST LOW 20 MIN: CPT | Mod: S$GLB,,, | Performed by: PODIATRIST

## 2022-12-19 PROCEDURE — 1125F AMNT PAIN NOTED PAIN PRSNT: CPT | Mod: CPTII,S$GLB,, | Performed by: PODIATRIST

## 2022-12-19 PROCEDURE — 99999 PR PBB SHADOW E&M-EST. PATIENT-LVL IV: ICD-10-PCS | Mod: PBBFAC,,, | Performed by: PODIATRIST

## 2022-12-19 PROCEDURE — 3008F BODY MASS INDEX DOCD: CPT | Mod: CPTII,S$GLB,, | Performed by: PODIATRIST

## 2022-12-19 PROCEDURE — 3072F LOW RISK FOR RETINOPATHY: CPT | Mod: CPTII,S$GLB,, | Performed by: PODIATRIST

## 2022-12-19 PROCEDURE — 3288F PR FALLS RISK ASSESSMENT DOCUMENTED: ICD-10-PCS | Mod: CPTII,S$GLB,, | Performed by: PODIATRIST

## 2022-12-19 PROCEDURE — 1101F PT FALLS ASSESS-DOCD LE1/YR: CPT | Mod: CPTII,S$GLB,, | Performed by: PODIATRIST

## 2022-12-19 PROCEDURE — 3288F FALL RISK ASSESSMENT DOCD: CPT | Mod: CPTII,S$GLB,, | Performed by: PODIATRIST

## 2022-12-19 PROCEDURE — 1159F MED LIST DOCD IN RCRD: CPT | Mod: CPTII,S$GLB,, | Performed by: PODIATRIST

## 2022-12-19 PROCEDURE — 1125F PR PAIN SEVERITY QUANTIFIED, PAIN PRESENT: ICD-10-PCS | Mod: CPTII,S$GLB,, | Performed by: PODIATRIST

## 2023-01-08 NOTE — PROGRESS NOTES
Subjective:     Patient ID: Tom Tavarez is a 67 y.o. male.    Chief Complaint: Follow-up ([place] f/u, c/o pain to pain to toes, rates pain 5/10, wear casual shoes with socks, diabetic Pt, last seen on 11/08/22 with PCP Dr. Lemus )      Tom is a 67 y.o. male who presents to the clinic for evaluation and treatment of high risk feet. Tom has a past medical history of BMI 40.0-44.9, adult (7/21/2016), Colon cancer screening (10/3/2017), Diabetes mellitus type 2 in obese (11/2006), Hyperlipidemia, Hypertension (1985), Obesity, Class III, BMI 40-49.9 (morbid obesity) (1985), and Umbilical hernia. The patient's chief complaint is follow up of left foot swelling and neuropathy pain. Patient states the pain is better in the feet. Patient states he has venous ultrasound completed. Patient states he has not been wearing the tubi . This patient has documented high risk feet requiring routine maintenance secondary to diabetes mellitis and those secondary complications of diabetes, as mentioned..    PCP: Adolfo Lemus MD    Date Last Seen by PCP: 11/08/2022    Current shoe gear:  Affected Foot: Tennis shoes     Unaffected Foot: Tennis shoes    Hemoglobin A1C   Date Value Ref Range Status   09/13/2022 5.8 (H) 4.0 - 5.6 % Final     Comment:     ADA Screening Guidelines:  5.7-6.4%  Consistent with prediabetes  >or=6.5%  Consistent with diabetes    High levels of fetal hemoglobin interfere with the HbA1C  assay. Heterozygous hemoglobin variants (HbS, HgC, etc)do  not significantly interfere with this assay.   However, presence of multiple variants may affect accuracy.     04/25/2022 5.5 4.0 - 5.6 % Final     Comment:     ADA Screening Guidelines:  5.7-6.4%  Consistent with prediabetes  >or=6.5%  Consistent with diabetes    High levels of fetal hemoglobin interfere with the HbA1C  assay. Heterozygous hemoglobin variants (HbS, HgC, etc)do  not significantly interfere with this assay.   However, presence of  multiple variants may affect accuracy.     02/17/2022 7.8 (H) 4.0 - 5.6 % Final     Comment:     ADA Screening Guidelines:  5.7-6.4%  Consistent with prediabetes  >or=6.5%  Consistent with diabetes    High levels of fetal hemoglobin interfere with the HbA1C  assay. Heterozygous hemoglobin variants (HbS, HgC, etc)do  not significantly interfere with this assay.   However, presence of multiple variants may affect accuracy.         Patient Active Problem List   Diagnosis    Diabetes mellitus type 2 in obese    Hyperlipidemia    Primary hypertension    BMI 40.0-44.9, adult    Hypertension    Umbilical hernia    Controlled type 2 diabetes mellitus without complication, with long-term current use of insulin    Weakness    Immunization deficiency    Yeast dermatitis    Acute gout due to renal impairment involving foot    Onychomycosis of toenail    Acute cystitis with hematuria    Stage 3 chronic kidney disease    FH: thoracic aortic aneurysm    Chronic allergic rhinitis    GERD (gastroesophageal reflux disease)    Itchy skin    Testicle swelling    Uncontrolled type 2 diabetes mellitus with hyperglycemia    Primary osteoarthritis of left hip    Aortic atherosclerosis    Arteriosclerosis of abdominal aorta    Abdominal wall hernia    Nasal congestion    Abdominal aortic aneurysm (AAA) without rupture    Dorsalgia, unspecified    Hip pain    Hypotension    Shortness of breath    Diabetic polyneuropathy associated with type 2 diabetes mellitus    Colon cancer screening    Chronic gout without tophus    Edema       Medication List with Changes/Refills   Current Medications    AMLODIPINE (NORVASC) 5 MG TABLET    TAKE 1 TABLET(5 MG) BY MOUTH EVERY DAY    ATORVASTATIN (LIPITOR) 40 MG TABLET    TAKE 1 TABLET BY MOUTH  DAILY    BLOOD SUGAR DIAGNOSTIC STRP    To check BG 3 times daily, to use with insurance preferred meter    BLOOD SUGAR DIAGNOSTIC STRP    1 each by Misc.(Non-Drug; Combo Route) route once daily.    BLOOD-GLUCOSE  "METER KIT    To check BG 3 times daily, to use with insurance preferred meter    CHLORTHALIDONE (HYGROTEN) 25 MG TAB    TAKE 1 TABLET(25 MG) BY mouth on mon wed and fri of each week    COLCHICINE (COLCRYS) 0.6 MG TABLET    Take 1 tablet (0.6 mg total) by mouth once daily.    FLASH GLUCOSE SCANNING READER (FREESTYLE KRISTY 14 DAY READER) MISC    1 each by Misc.(Non-Drug; Combo Route) route continuous.    FLASH GLUCOSE SENSOR (FREESTYLE KRISTY 14 DAY SENSOR) KIT    1 each by Misc.(Non-Drug; Combo Route) route continuous.    INSULIN SYRINGE-NEEDLE U-100 0.3 ML 31 GAUGE X 1/4" SYRG    1 each by Misc.(Non-Drug; Combo Route) route 2 (two) times a day.    KETOCONAZOLE (NIZORAL) 2 % CREAM    Apply topically once daily.    LANCETS MISC    To check BG 3 times daily, to use with insurance preferred meter    LANCETS MISC    To check BG 3 times daily, to use with insurance preferred meter    LANTUS SOLOSTAR U-100 INSULIN GLARGINE 100 UNITS/ML SUBQ PEN    INJECT 50 UNITS  SUBCUTANEOUSLY TWICE DAILY    LOSARTAN (COZAAR) 25 MG TABLET    Take 1 tablet (25 mg total) by mouth once daily.    METFORMIN (GLUCOPHAGE) 500 MG TABLET    Take 1 tablet (500 mg total) by mouth 2 (two) times daily with meals.    PREGABALIN (LYRICA) 75 MG CAPSULE    Take 1 capsule (75 mg total) by mouth every evening.       Review of patient's allergies indicates:  No Known Allergies    Past Surgical History:   Procedure Laterality Date    G U surgery  1982    biopsy of testicle infertility w/u       Family History   Problem Relation Age of Onset    Hypertension Mother     Heart failure Mother        Social History     Socioeconomic History    Marital status:     Number of children: 0    Highest education level: Some college, no degree   Occupational History    Occupation: Retired-Sales     Comment: [employer]   Tobacco Use    Smoking status: Former    Smokeless tobacco: Never    Tobacco comments:     1998 quit    Substance and Sexual Activity " "   Alcohol use: No     Alcohol/week: 0.0 standard drinks    Drug use: No    Sexual activity: Yes     Partners: Female     Social Determinants of Health     Financial Resource Strain: Low Risk     Difficulty of Paying Living Expenses: Not hard at all   Food Insecurity: No Food Insecurity    Worried About Running Out of Food in the Last Year: Never true    Ran Out of Food in the Last Year: Never true   Transportation Needs: No Transportation Needs    Lack of Transportation (Medical): No    Lack of Transportation (Non-Medical): No   Physical Activity: Inactive    Days of Exercise per Week: 0 days    Minutes of Exercise per Session: 0 min   Stress: No Stress Concern Present    Feeling of Stress : Not at all   Social Connections: Moderately Integrated    Frequency of Communication with Friends and Family: More than three times a week    Frequency of Social Gatherings with Friends and Family: Three times a week    Attends Denominational Services: More than 4 times per year    Active Member of Clubs or Organizations: No    Attends Club or Organization Meetings: Never    Marital Status:    Housing Stability: Low Risk     Unable to Pay for Housing in the Last Year: No    Number of Places Lived in the Last Year: 1    Unstable Housing in the Last Year: No       Vitals:    12/19/22 1307   Weight: (!) 141.1 kg (311 lb)   Height: 5' 11" (1.803 m)   PainSc:   5   PainLoc: Foot       Hemoglobin A1C   Date Value Ref Range Status   09/13/2022 5.8 (H) 4.0 - 5.6 % Final     Comment:     ADA Screening Guidelines:  5.7-6.4%  Consistent with prediabetes  >or=6.5%  Consistent with diabetes    High levels of fetal hemoglobin interfere with the HbA1C  assay. Heterozygous hemoglobin variants (HbS, HgC, etc)do  not significantly interfere with this assay.   However, presence of multiple variants may affect accuracy.     04/25/2022 5.5 4.0 - 5.6 % Final     Comment:     ADA Screening Guidelines:  5.7-6.4%  Consistent with " prediabetes  >or=6.5%  Consistent with diabetes    High levels of fetal hemoglobin interfere with the HbA1C  assay. Heterozygous hemoglobin variants (HbS, HgC, etc)do  not significantly interfere with this assay.   However, presence of multiple variants may affect accuracy.     02/17/2022 7.8 (H) 4.0 - 5.6 % Final     Comment:     ADA Screening Guidelines:  5.7-6.4%  Consistent with prediabetes  >or=6.5%  Consistent with diabetes    High levels of fetal hemoglobin interfere with the HbA1C  assay. Heterozygous hemoglobin variants (HbS, HgC, etc)do  not significantly interfere with this assay.   However, presence of multiple variants may affect accuracy.         Review of Systems   Constitutional:  Negative for chills and fever.   Respiratory:  Negative for shortness of breath.    Cardiovascular:  Positive for leg swelling. Negative for chest pain, palpitations, orthopnea and claudication.   Gastrointestinal:  Negative for diarrhea, nausea and vomiting.   Musculoskeletal:  Negative for joint pain.   Skin:  Negative for rash.   Neurological:  Positive for tingling and sensory change.   Psychiatric/Behavioral: Negative.         Objective:      PHYSICAL EXAM: Apperance: Alert and orient in no distress,well developed, and with good attention to grooming and body habits  Patient presents ambulating in tennis shoes.   LOWER EXTREMITY EXAM:  VASCULAR: Dorsalis pedis pulses 2/4 bilateral and Posterior Tibial pulses 2/4 bilateral. Capillary fill time <4 seconds  bilateral. Minimal edema observed bilateral. Varicosities absent bilateral. Skin temperature of the lower extremities is warm to warm, proximal to distal. Hair growth WNL bilateral.  DERMATOLOGICAL: No skin rashes, subcutaneous nodules, lesions, or ulcers observed bilateral. Nails 1,2,3,4,5 bilateral normal length. Webspaces 1,2,3,4 bilateral clean, dry and without evidence of break in skin integrity. Minimal  dry and peeling skin noted plantarly in moccasin  distribution.   NEUROLOGICAL: Light touch, sharp-dull, proprioception all present and equal bilaterally.  Vibratory sensation diminished at bilateral hallux. Protective sensation intact at all 10 sites as tested with a [place] 5.07 monofilament.   MUSCULOSKELETAL: Muscle strength is 5/5 for foot inverters, everters, plantarflexors, and dorsiflexors. Muscle tone is normal. No pain on palpation or ROM of left ankle.           Assessment:       ICD-10-CM ICD-9-CM   1. Diabetic polyneuropathy associated with type 2 diabetes mellitus  E11.42 250.60     357.2   2. Bilateral lower extremity edema  R60.0 782.3         Plan:   Diabetic polyneuropathy associated with type 2 diabetes mellitus    Bilateral lower extremity edema      I counseled the patient on his conditions, regarding findings of my examination, my impressions, and usual treatment plan.   Reviewed venous ultrasound results with patient.   Patient counseled on ways to improve swelling in lower extremities including decreasing/eliminating salt intake, elevating lower extremities, compression stocking, or oral medications. Patient states she understands.   Patient instructed to resume use of tubi .   Dispensed size F tubi  to be worn daily, and not to sleep in.   Patient  will continue to monitor the areas daily, inspect feet, wear protective shoe gear when ambulatory, moisturizer to maintain skin integrity. Patient reminded of the importance of good nutrition and blood sugar control to help prevent podiatric complications of diabetes.  Patient to return 6 months or sooner if needed.        Rosas Macias DPM  [facility]

## 2023-01-17 ENCOUNTER — PATIENT OUTREACH (OUTPATIENT)
Dept: ADMINISTRATIVE | Facility: HOSPITAL | Age: 68
End: 2023-01-17
Payer: MEDICARE

## 2023-01-19 DIAGNOSIS — I10 PRIMARY HYPERTENSION: Primary | ICD-10-CM

## 2023-01-25 DIAGNOSIS — E11.9 TYPE 2 DIABETES MELLITUS WITHOUT COMPLICATION: ICD-10-CM

## 2023-03-01 ENCOUNTER — PES CALL (OUTPATIENT)
Dept: ADMINISTRATIVE | Facility: CLINIC | Age: 68
End: 2023-03-01
Payer: MEDICARE

## 2023-03-12 ENCOUNTER — PATIENT MESSAGE (OUTPATIENT)
Dept: INTERNAL MEDICINE | Facility: CLINIC | Age: 68
End: 2023-03-12
Payer: MEDICARE

## 2023-03-20 ENCOUNTER — PATIENT OUTREACH (OUTPATIENT)
Dept: ADMINISTRATIVE | Facility: HOSPITAL | Age: 68
End: 2023-03-20
Payer: MEDICARE

## 2023-03-20 DIAGNOSIS — E11.9 CONTROLLED TYPE 2 DIABETES MELLITUS WITHOUT COMPLICATION, WITH LONG-TERM CURRENT USE OF INSULIN: Primary | ICD-10-CM

## 2023-03-20 DIAGNOSIS — Z79.4 CONTROLLED TYPE 2 DIABETES MELLITUS WITHOUT COMPLICATION, WITH LONG-TERM CURRENT USE OF INSULIN: Primary | ICD-10-CM

## 2023-03-20 NOTE — PROGRESS NOTES
DM lab report: Per chart review, patient has a lab appointment scheduled 3/24/23, diabetic labs linked. Patient will be fasting for labs.

## 2023-03-24 ENCOUNTER — LAB VISIT (OUTPATIENT)
Dept: LAB | Facility: HOSPITAL | Age: 68
End: 2023-03-24
Attending: INTERNAL MEDICINE
Payer: MEDICARE

## 2023-03-24 DIAGNOSIS — I10 PRIMARY HYPERTENSION: ICD-10-CM

## 2023-03-24 DIAGNOSIS — E11.9 CONTROLLED TYPE 2 DIABETES MELLITUS WITHOUT COMPLICATION, WITH LONG-TERM CURRENT USE OF INSULIN: ICD-10-CM

## 2023-03-24 DIAGNOSIS — E11.9 TYPE 2 DIABETES MELLITUS WITHOUT COMPLICATION: ICD-10-CM

## 2023-03-24 DIAGNOSIS — Z79.4 CONTROLLED TYPE 2 DIABETES MELLITUS WITHOUT COMPLICATION, WITH LONG-TERM CURRENT USE OF INSULIN: ICD-10-CM

## 2023-03-24 LAB
ALBUMIN SERPL BCP-MCNC: 3.2 G/DL (ref 3.5–5.2)
ANION GAP SERPL CALC-SCNC: 6 MMOL/L (ref 8–16)
BASOPHILS # BLD AUTO: 0.05 K/UL (ref 0–0.2)
BASOPHILS NFR BLD: 0.7 % (ref 0–1.9)
BUN SERPL-MCNC: 16 MG/DL (ref 8–23)
CALCIUM SERPL-MCNC: 9.5 MG/DL (ref 8.7–10.5)
CHLORIDE SERPL-SCNC: 105 MMOL/L (ref 95–110)
CHOLEST SERPL-MCNC: 212 MG/DL (ref 120–199)
CHOLEST/HDLC SERPL: 6.6 {RATIO} (ref 2–5)
CO2 SERPL-SCNC: 28 MMOL/L (ref 23–29)
CREAT SERPL-MCNC: 1.3 MG/DL (ref 0.5–1.4)
DIFFERENTIAL METHOD: NORMAL
EOSINOPHIL # BLD AUTO: 0.3 K/UL (ref 0–0.5)
EOSINOPHIL NFR BLD: 3.5 % (ref 0–8)
ERYTHROCYTE [DISTWIDTH] IN BLOOD BY AUTOMATED COUNT: 14.3 % (ref 11.5–14.5)
EST. GFR  (NO RACE VARIABLE): 59.5 ML/MIN/1.73 M^2
ESTIMATED AVG GLUCOSE: 143 MG/DL (ref 68–131)
GLUCOSE SERPL-MCNC: 82 MG/DL (ref 70–110)
HBA1C MFR BLD: 6.6 % (ref 4–5.6)
HCT VFR BLD AUTO: 48.2 % (ref 40–54)
HDLC SERPL-MCNC: 32 MG/DL (ref 40–75)
HDLC SERPL: 15.1 % (ref 20–50)
HGB BLD-MCNC: 15.8 G/DL (ref 14–18)
IMM GRANULOCYTES # BLD AUTO: 0.01 K/UL (ref 0–0.04)
IMM GRANULOCYTES NFR BLD AUTO: 0.1 % (ref 0–0.5)
LDLC SERPL CALC-MCNC: 147.4 MG/DL (ref 63–159)
LYMPHOCYTES # BLD AUTO: 2.9 K/UL (ref 1–4.8)
LYMPHOCYTES NFR BLD: 38.1 % (ref 18–48)
MCH RBC QN AUTO: 29.4 PG (ref 27–31)
MCHC RBC AUTO-ENTMCNC: 32.8 G/DL (ref 32–36)
MCV RBC AUTO: 90 FL (ref 82–98)
MONOCYTES # BLD AUTO: 0.5 K/UL (ref 0.3–1)
MONOCYTES NFR BLD: 6.8 % (ref 4–15)
NEUTROPHILS # BLD AUTO: 3.9 K/UL (ref 1.8–7.7)
NEUTROPHILS NFR BLD: 50.8 % (ref 38–73)
NONHDLC SERPL-MCNC: 180 MG/DL
NRBC BLD-RTO: 0 /100 WBC
PHOSPHATE SERPL-MCNC: 2.5 MG/DL (ref 2.7–4.5)
PLATELET # BLD AUTO: 269 K/UL (ref 150–450)
PMV BLD AUTO: 10.2 FL (ref 9.2–12.9)
POTASSIUM SERPL-SCNC: 4.6 MMOL/L (ref 3.5–5.1)
RBC # BLD AUTO: 5.37 M/UL (ref 4.6–6.2)
SODIUM SERPL-SCNC: 139 MMOL/L (ref 136–145)
TRIGL SERPL-MCNC: 163 MG/DL (ref 30–150)
WBC # BLD AUTO: 7.63 K/UL (ref 3.9–12.7)

## 2023-03-24 PROCEDURE — 85025 COMPLETE CBC W/AUTO DIFF WBC: CPT | Performed by: INTERNAL MEDICINE

## 2023-03-24 PROCEDURE — 80069 RENAL FUNCTION PANEL: CPT | Performed by: INTERNAL MEDICINE

## 2023-03-24 PROCEDURE — 80061 LIPID PANEL: CPT | Performed by: FAMILY MEDICINE

## 2023-03-24 PROCEDURE — 36415 COLL VENOUS BLD VENIPUNCTURE: CPT | Performed by: INTERNAL MEDICINE

## 2023-03-24 PROCEDURE — 83036 HEMOGLOBIN GLYCOSYLATED A1C: CPT | Performed by: FAMILY MEDICINE

## 2023-03-30 NOTE — TELEPHONE ENCOUNTER
Refill Routing Note   Medication(s) are not appropriate for processing by [facility] for the following reason(s):      Drug-disease interaction    [facility] action(s):  Defer     Medication Therapy Plan: amLODIPine and Hypotension    Pharmacist review requested: Yes     Appointments  past 12m or future 3m with PCP    Date Provider   Last Visit   11/8/2022 Adolfo Lemus MD   Next Visit   5/23/2023 Adolfo Lemus MD   ED visits in past 90 days: 0        Note composed:12:25 PM 03/30/2023

## 2023-03-30 NOTE — TELEPHONE ENCOUNTER
No new care gaps identified.  [facility] Embedded Care Gaps. Reference number: [id number]. 3/30/2023   10:51:58 AM ELVIST

## 2023-03-31 RX ORDER — AMLODIPINE BESYLATE 10 MG/1
10 TABLET ORAL DAILY
Qty: 90 TABLET | Refills: 2 | Status: SHIPPED | OUTPATIENT
Start: 2023-03-31 | End: 2023-08-02 | Stop reason: DRUGHIGH

## 2023-04-11 DIAGNOSIS — N18.30 STAGE 3 CHRONIC KIDNEY DISEASE, UNSPECIFIED WHETHER STAGE 3A OR 3B CKD: Primary | ICD-10-CM

## 2023-04-19 ENCOUNTER — PATIENT MESSAGE (OUTPATIENT)
Dept: ADMINISTRATIVE | Facility: HOSPITAL | Age: 68
End: 2023-04-19
Payer: MEDICARE

## 2023-04-21 ENCOUNTER — TELEPHONE (OUTPATIENT)
Dept: ADMINISTRATIVE | Facility: HOSPITAL | Age: 68
End: 2023-04-21
Payer: MEDICARE

## 2023-05-09 ENCOUNTER — OFFICE VISIT (OUTPATIENT)
Dept: OPHTHALMOLOGY | Facility: CLINIC | Age: 68
End: 2023-05-09
Payer: MEDICARE

## 2023-05-09 DIAGNOSIS — H52.7 REFRACTIVE ERRORS: ICD-10-CM

## 2023-05-09 DIAGNOSIS — E11.36 DIABETIC CATARACT: ICD-10-CM

## 2023-05-09 DIAGNOSIS — E11.9 DIABETES MELLITUS TYPE 2 WITHOUT RETINOPATHY: Primary | ICD-10-CM

## 2023-05-09 PROCEDURE — 99999 PR PBB SHADOW E&M-EST. PATIENT-LVL III: CPT | Mod: PBBFAC,,, | Performed by: OPTOMETRIST

## 2023-05-09 PROCEDURE — 92014 PR EYE EXAM, EST PATIENT,COMPREHESV: ICD-10-PCS | Mod: S$GLB,,, | Performed by: OPTOMETRIST

## 2023-05-09 PROCEDURE — 92015 PR REFRACTION: ICD-10-PCS | Mod: S$GLB,,, | Performed by: OPTOMETRIST

## 2023-05-09 PROCEDURE — 1159F PR MEDICATION LIST DOCUMENTED IN MEDICAL RECORD: ICD-10-PCS | Mod: CPTII,S$GLB,, | Performed by: OPTOMETRIST

## 2023-05-09 PROCEDURE — 99999 PR PBB SHADOW E&M-EST. PATIENT-LVL III: ICD-10-PCS | Mod: PBBFAC,,, | Performed by: OPTOMETRIST

## 2023-05-09 PROCEDURE — 4010F ACE/ARB THERAPY RXD/TAKEN: CPT | Mod: CPTII,S$GLB,, | Performed by: OPTOMETRIST

## 2023-05-09 PROCEDURE — 2023F DILAT RTA XM W/O RTNOPTHY: CPT | Mod: CPTII,S$GLB,, | Performed by: OPTOMETRIST

## 2023-05-09 PROCEDURE — 1159F MED LIST DOCD IN RCRD: CPT | Mod: CPTII,S$GLB,, | Performed by: OPTOMETRIST

## 2023-05-09 PROCEDURE — 3044F PR MOST RECENT HEMOGLOBIN A1C LEVEL <7.0%: ICD-10-PCS | Mod: CPTII,S$GLB,, | Performed by: OPTOMETRIST

## 2023-05-09 PROCEDURE — 4010F PR ACE/ARB THEARPY RXD/TAKEN: ICD-10-PCS | Mod: CPTII,S$GLB,, | Performed by: OPTOMETRIST

## 2023-05-09 PROCEDURE — 92015 DETERMINE REFRACTIVE STATE: CPT | Mod: S$GLB,,, | Performed by: OPTOMETRIST

## 2023-05-09 PROCEDURE — 2023F PR DILATED RETINAL EXAM W/O EVID OF RETINOPATHY: ICD-10-PCS | Mod: CPTII,S$GLB,, | Performed by: OPTOMETRIST

## 2023-05-09 PROCEDURE — 92014 COMPRE OPH EXAM EST PT 1/>: CPT | Mod: S$GLB,,, | Performed by: OPTOMETRIST

## 2023-05-09 PROCEDURE — 3044F HG A1C LEVEL LT 7.0%: CPT | Mod: CPTII,S$GLB,, | Performed by: OPTOMETRIST

## 2023-05-09 NOTE — PROGRESS NOTES
HPI    NIDDM exam  Decrease near and distance visual acuity with or without glasses.  Last eye exam 02/17/2022 [facility].  Update glasses RX.  Lab Results       Component                Value               Date                       HGBA1C                   6.6 (H)             03/24/2023              Last edited by Shamika Carrillo MA on 5/9/2023 11:25 AM.            Assessment /Plan     For exam results, see Encounter Report.    Diabetes mellitus type 2 without retinopathy    Diabetic cataract    Refractive errors      No Background Diabetic Retinopathy  Strict BG control, f/u w/ PCP, and annual DFE  Stressed importance of DM control to preserve vision    Significant cataracts OU, discussed cataract surgery including Specialty IOL's  Failed glare test OU, refer to Dr Barksdale  Consult Ophthalmology for cataract evaluation at next available appointment.

## 2023-05-15 ENCOUNTER — OFFICE VISIT (OUTPATIENT)
Dept: PODIATRY | Facility: CLINIC | Age: 68
End: 2023-05-15
Payer: MEDICARE

## 2023-05-15 VITALS — WEIGHT: 311.06 LBS | BODY MASS INDEX: 43.55 KG/M2 | HEIGHT: 71 IN

## 2023-05-15 DIAGNOSIS — E11.42 DIABETIC POLYNEUROPATHY ASSOCIATED WITH TYPE 2 DIABETES MELLITUS: Primary | ICD-10-CM

## 2023-05-15 DIAGNOSIS — R60.0 BILATERAL LOWER EXTREMITY EDEMA: ICD-10-CM

## 2023-05-15 PROCEDURE — 1126F PR PAIN SEVERITY QUANTIFIED, NO PAIN PRESENT: ICD-10-PCS | Mod: CPTII,,, | Performed by: PODIATRIST

## 2023-05-15 PROCEDURE — 3072F LOW RISK FOR RETINOPATHY: CPT | Mod: CPTII,,, | Performed by: PODIATRIST

## 2023-05-15 PROCEDURE — 1160F RVW MEDS BY RX/DR IN RCRD: CPT | Mod: CPTII,,, | Performed by: PODIATRIST

## 2023-05-15 PROCEDURE — 1159F MED LIST DOCD IN RCRD: CPT | Mod: CPTII,,, | Performed by: PODIATRIST

## 2023-05-15 PROCEDURE — 99999 PR PBB SHADOW E&M-EST. PATIENT-LVL III: CPT | Mod: PBBFAC,,, | Performed by: PODIATRIST

## 2023-05-15 PROCEDURE — 1101F PT FALLS ASSESS-DOCD LE1/YR: CPT | Mod: CPTII,,, | Performed by: PODIATRIST

## 2023-05-15 PROCEDURE — 1101F PR PT FALLS ASSESS DOC 0-1 FALLS W/OUT INJ PAST YR: ICD-10-PCS | Mod: CPTII,,, | Performed by: PODIATRIST

## 2023-05-15 PROCEDURE — 3044F PR MOST RECENT HEMOGLOBIN A1C LEVEL <7.0%: ICD-10-PCS | Mod: CPTII,,, | Performed by: PODIATRIST

## 2023-05-15 PROCEDURE — 3288F PR FALLS RISK ASSESSMENT DOCUMENTED: ICD-10-PCS | Mod: CPTII,,, | Performed by: PODIATRIST

## 2023-05-15 PROCEDURE — 3008F PR BODY MASS INDEX (BMI) DOCUMENTED: ICD-10-PCS | Mod: CPTII,,, | Performed by: PODIATRIST

## 2023-05-15 PROCEDURE — 99999 PR PBB SHADOW E&M-EST. PATIENT-LVL III: ICD-10-PCS | Mod: PBBFAC,,, | Performed by: PODIATRIST

## 2023-05-15 PROCEDURE — 99213 OFFICE O/P EST LOW 20 MIN: CPT | Mod: ,,, | Performed by: PODIATRIST

## 2023-05-15 PROCEDURE — 3044F HG A1C LEVEL LT 7.0%: CPT | Mod: CPTII,,, | Performed by: PODIATRIST

## 2023-05-15 PROCEDURE — 1159F PR MEDICATION LIST DOCUMENTED IN MEDICAL RECORD: ICD-10-PCS | Mod: CPTII,,, | Performed by: PODIATRIST

## 2023-05-15 PROCEDURE — 4010F ACE/ARB THERAPY RXD/TAKEN: CPT | Mod: CPTII,,, | Performed by: PODIATRIST

## 2023-05-15 PROCEDURE — 1160F PR REVIEW ALL MEDS BY PRESCRIBER/CLIN PHARMACIST DOCUMENTED: ICD-10-PCS | Mod: CPTII,,, | Performed by: PODIATRIST

## 2023-05-15 PROCEDURE — 1126F AMNT PAIN NOTED NONE PRSNT: CPT | Mod: CPTII,,, | Performed by: PODIATRIST

## 2023-05-15 PROCEDURE — 99213 PR OFFICE/OUTPT VISIT, EST, LEVL III, 20-29 MIN: ICD-10-PCS | Mod: ,,, | Performed by: PODIATRIST

## 2023-05-15 PROCEDURE — 3072F PR LOW RISK FOR RETINOPATHY: ICD-10-PCS | Mod: CPTII,,, | Performed by: PODIATRIST

## 2023-05-15 PROCEDURE — 3288F FALL RISK ASSESSMENT DOCD: CPT | Mod: CPTII,,, | Performed by: PODIATRIST

## 2023-05-15 PROCEDURE — 3008F BODY MASS INDEX DOCD: CPT | Mod: CPTII,,, | Performed by: PODIATRIST

## 2023-05-15 PROCEDURE — 4010F PR ACE/ARB THEARPY RXD/TAKEN: ICD-10-PCS | Mod: CPTII,,, | Performed by: PODIATRIST

## 2023-05-15 PROCEDURE — 99213 OFFICE O/P EST LOW 20 MIN: CPT | Performed by: PODIATRIST

## 2023-05-15 RX ORDER — MUPIROCIN 20 MG/G
OINTMENT TOPICAL DAILY
Qty: 30 G | Refills: 1 | Status: SHIPPED | OUTPATIENT
Start: 2023-05-15

## 2023-05-15 NOTE — PROGRESS NOTES
Subjective:     Patient ID: Tom Tavarez is a 68 y.o. male.    Chief Complaint: Foot Problem (Pt c/o BL swollen feet, pt c/o 0/10 pain, diabetic pt wears tennis shoes, PCP DR. Lemus last seen 11-8-22)    Tom is a 68 y.o. male who presents to the clinic for evaluation and treatment of high risk feet. Tom has a past medical history of BMI 40.0-44.9, adult (07/21/2016), Colon cancer screening (10/03/2017), Diabetes mellitus type 2 in obese (11/2006), Hyperlipidemia, Hypertension (1985), Obesity, Class III, BMI 40-49.9 (morbid obesity) (1985), and Umbilical hernia. The patient's chief complaint is follow up of left foot swelling and neuropathy pain. Patient states the pain is better in the feet. Patient states he has not been wearing the tubi . This patient has documented high risk feet requiring routine maintenance secondary to diabetes mellitis and those secondary complications of diabetes, as mentioned..    PCP: Adolfo Lemus MD    Date Last Seen by PCP: 11/08/2022    Current shoe gear:  Affected Foot: Tennis shoes     Unaffected Foot: Tennis shoes    Hemoglobin A1C   Date Value Ref Range Status   03/24/2023 6.6 (H) 4.0 - 5.6 % Final     Comment:     ADA Screening Guidelines:  5.7-6.4%  Consistent with prediabetes  >or=6.5%  Consistent with diabetes    High levels of fetal hemoglobin interfere with the HbA1C  assay. Heterozygous hemoglobin variants (HbS, HgC, etc)do  not significantly interfere with this assay.   However, presence of multiple variants may affect accuracy.     09/13/2022 5.8 (H) 4.0 - 5.6 % Final     Comment:     ADA Screening Guidelines:  5.7-6.4%  Consistent with prediabetes  >or=6.5%  Consistent with diabetes    High levels of fetal hemoglobin interfere with the HbA1C  assay. Heterozygous hemoglobin variants (HbS, HgC, etc)do  not significantly interfere with this assay.   However, presence of multiple variants may affect accuracy.     04/25/2022 5.5 4.0 - 5.6 % Final      Comment:     ADA Screening Guidelines:  5.7-6.4%  Consistent with prediabetes  >or=6.5%  Consistent with diabetes    High levels of fetal hemoglobin interfere with the HbA1C  assay. Heterozygous hemoglobin variants (HbS, HgC, etc)do  not significantly interfere with this assay.   However, presence of multiple variants may affect accuracy.         Patient Active Problem List   Diagnosis    Diabetes mellitus type 2 in obese    Hyperlipidemia    Primary hypertension    BMI 40.0-44.9, adult    Hypertension    Umbilical hernia    Controlled type 2 diabetes mellitus without complication, with long-term current use of insulin    Weakness    Immunization deficiency    Yeast dermatitis    Acute gout due to renal impairment involving foot    Onychomycosis of toenail    Acute cystitis with hematuria    Stage 3 chronic kidney disease    FH: thoracic aortic aneurysm    Chronic allergic rhinitis    GERD (gastroesophageal reflux disease)    Itchy skin    Testicle swelling    Uncontrolled type 2 diabetes mellitus with hyperglycemia    Primary osteoarthritis of left hip    Aortic atherosclerosis    Arteriosclerosis of abdominal aorta    Abdominal wall hernia    Nasal congestion    Abdominal aortic aneurysm (AAA) without rupture    Dorsalgia, unspecified    Hip pain    Hypotension    Shortness of breath    Diabetic polyneuropathy associated with type 2 diabetes mellitus    Colon cancer screening    Chronic gout without tophus    Edema       Medication List with Changes/Refills   Current Medications    AMLODIPINE (NORVASC) 10 MG TABLET    Take 1 tablet (10 mg total) by mouth once daily.    ATORVASTATIN (LIPITOR) 40 MG TABLET    Take 1 tablet (40 mg total) by mouth once daily.    BLOOD SUGAR DIAGNOSTIC STRP    To check BG 3 times daily, to use with insurance preferred meter    BLOOD SUGAR DIAGNOSTIC STRP    1 each by Misc.(Non-Drug; Combo Route) route once daily.    BLOOD-GLUCOSE METER KIT    To check BG 3 times daily, to use with  "insurance preferred meter    CHLORTHALIDONE (HYGROTEN) 25 MG TAB    TAKE 1 TABLET(25 MG) BY mouth on mon wed and fri of each week    COLCHICINE (COLCRYS) 0.6 MG TABLET    Take 1 tablet (0.6 mg total) by mouth once daily.    FLASH GLUCOSE SCANNING READER (FREESTYLE KRISTY 14 DAY READER) MISC    1 each by Misc.(Non-Drug; Combo Route) route continuous.    FLASH GLUCOSE SENSOR (FREESTYLE KRISTY 14 DAY SENSOR) KIT    1 each by Misc.(Non-Drug; Combo Route) route continuous.    INSULIN SYRINGE-NEEDLE U-100 0.3 ML 31 GAUGE X 1/4" SYRG    1 each by Misc.(Non-Drug; Combo Route) route 2 (two) times a day.    KETOCONAZOLE (NIZORAL) 2 % CREAM    Apply topically once daily.    LANCETS MISC    To check BG 3 times daily, to use with insurance preferred meter    LANCETS MISC    To check BG 3 times daily, to use with insurance preferred meter    LANTUS SOLOSTAR U-100 INSULIN GLARGINE 100 UNITS/ML SUBQ PEN    Inject 50 Units into the skin 2 (two) times daily.    LOSARTAN (COZAAR) 25 MG TABLET    Take 1 tablet (25 mg total) by mouth once daily.    METFORMIN (GLUCOPHAGE) 500 MG TABLET    Take 1 tablet (500 mg total) by mouth 2 (two) times daily with meals.    PREGABALIN (LYRICA) 75 MG CAPSULE    Take 1 capsule (75 mg total) by mouth every evening.       Review of patient's allergies indicates:  No Known Allergies    Past Surgical History:   Procedure Laterality Date    G U surgery  1982    biopsy of testicle infertility w/u       Family History   Problem Relation Age of Onset    Hypertension Mother     Heart failure Mother     Diabetes Brother        Social History     Socioeconomic History    Marital status:     Number of children: 0    Highest education level: Some college, no degree   Occupational History    Occupation: Retired-Sales     Comment: [employer]   Tobacco Use    Smoking status: Former    Smokeless tobacco: Never    Tobacco comments:     1998 quit    Substance and Sexual Activity    Alcohol use: No     " "Alcohol/week: 0.0 standard drinks    Drug use: No    Sexual activity: Yes     Partners: Female     Social Determinants of Health     Financial Resource Strain: Low Risk     Difficulty of Paying Living Expenses: Not hard at all   Food Insecurity: No Food Insecurity    Worried About Running Out of Food in the Last Year: Never true    Ran Out of Food in the Last Year: Never true   Transportation Needs: No Transportation Needs    Lack of Transportation (Medical): No    Lack of Transportation (Non-Medical): No   Physical Activity: Inactive    Days of Exercise per Week: 0 days    Minutes of Exercise per Session: 0 min   Stress: No Stress Concern Present    Feeling of Stress : Not at all   Social Connections: Moderately Integrated    Frequency of Communication with Friends and Family: More than three times a week    Frequency of Social Gatherings with Friends and Family: Three times a week    Attends Mandaen Services: More than 4 times per year    Active Member of Clubs or Organizations: No    Attends Club or Organization Meetings: Never    Marital Status:    Housing Stability: Low Risk     Unable to Pay for Housing in the Last Year: No    Number of Places Lived in the Last Year: 1    Unstable Housing in the Last Year: No       Vitals:    05/15/23 1334   Weight: (!) 141.1 kg (311 lb 1.1 oz)   Height: 5' 11" (1.803 m)   PainSc: 0-No pain       Hemoglobin A1C   Date Value Ref Range Status   03/24/2023 6.6 (H) 4.0 - 5.6 % Final     Comment:     ADA Screening Guidelines:  5.7-6.4%  Consistent with prediabetes  >or=6.5%  Consistent with diabetes    High levels of fetal hemoglobin interfere with the HbA1C  assay. Heterozygous hemoglobin variants (HbS, HgC, etc)do  not significantly interfere with this assay.   However, presence of multiple variants may affect accuracy.     09/13/2022 5.8 (H) 4.0 - 5.6 % Final     Comment:     ADA Screening Guidelines:  5.7-6.4%  Consistent with prediabetes  >or=6.5%  Consistent with " diabetes    High levels of fetal hemoglobin interfere with the HbA1C  assay. Heterozygous hemoglobin variants (HbS, HgC, etc)do  not significantly interfere with this assay.   However, presence of multiple variants may affect accuracy.     04/25/2022 5.5 4.0 - 5.6 % Final     Comment:     ADA Screening Guidelines:  5.7-6.4%  Consistent with prediabetes  >or=6.5%  Consistent with diabetes    High levels of fetal hemoglobin interfere with the HbA1C  assay. Heterozygous hemoglobin variants (HbS, HgC, etc)do  not significantly interfere with this assay.   However, presence of multiple variants may affect accuracy.         Review of Systems   Constitutional:  Negative for chills and fever.   Respiratory:  Negative for shortness of breath.    Cardiovascular:  Positive for leg swelling. Negative for chest pain, palpitations, orthopnea and claudication.   Gastrointestinal:  Negative for diarrhea, nausea and vomiting.   Musculoskeletal:  Negative for joint pain.   Skin:  Negative for rash.   Neurological:  Positive for tingling and sensory change.   Psychiatric/Behavioral: Negative.         Objective:      PHYSICAL EXAM: Apperance: Alert and orient in no distress,well developed, and with good attention to grooming and body habits  Patient presents ambulating in tennis shoes.   LOWER EXTREMITY EXAM:  VASCULAR: Dorsalis pedis pulses 2/4 bilateral and Posterior Tibial pulses 2/4 bilateral. Capillary fill time <4 seconds  bilateral. Minimal edema observed bilateral. Varicosities absent bilateral. Skin temperature of the lower extremities is warm to warm, proximal to distal. Hair growth WNL bilateral.  DERMATOLOGICAL: No skin rashes, subcutaneous nodules, lesions, or ulcers observed bilateral. Nails 1,2,3,4,5 bilateral normal length. Webspaces 1,2,3,4 bilateral clean, dry and without evidence of break in skin integrity. Minimal  dry and peeling skin noted plantarly in moccasin distribution.   NEUROLOGICAL: Light touch,  sharp-dull, proprioception all present and equal bilaterally.  Vibratory sensation diminished at bilateral hallux. Protective sensation intact at all 10 sites as tested with a Kingsley-Lindsay 5.07 monofilament.   MUSCULOSKELETAL: Muscle strength is 5/5 for foot inverters, everters, plantarflexors, and dorsiflexors. Muscle tone is normal. No pain on palpation or ROM of left ankle.           Assessment:       ICD-10-CM ICD-9-CM   1. Diabetic polyneuropathy associated with type 2 diabetes mellitus  E11.42 250.60     357.2   2. Bilateral lower extremity edema  R60.0 782.3         Plan:   Diabetic polyneuropathy associated with type 2 diabetes mellitus    Bilateral lower extremity edema    I counseled the patient on his conditions, regarding findings of my examination, my impressions, and usual treatment plan.   Reviewed venous ultrasound results with patient.   Patient counseled on ways to improve swelling in lower extremities including decreasing/eliminating salt intake, elevating lower extremities, compression stocking, or oral medications. Patient states she understands.   Patient instructed to resume use of tubi .   Dispensed size F tubi  to be worn daily, and not to sleep in.   Patient  will continue to monitor the areas daily, inspect feet, wear protective shoe gear when ambulatory, moisturizer to maintain skin integrity. Patient reminded of the importance of good nutrition and blood sugar control to help prevent podiatric complications of diabetes.  Patient to return 6 months or sooner if needed.        Rosas Macias DPM  [facility]

## 2023-05-18 ENCOUNTER — TELEPHONE (OUTPATIENT)
Dept: OPHTHALMOLOGY | Facility: CLINIC | Age: 68
End: 2023-05-18
Payer: MEDICARE

## 2023-05-18 ENCOUNTER — TELEPHONE (OUTPATIENT)
Dept: INTERNAL MEDICINE | Facility: CLINIC | Age: 68
End: 2023-05-18
Payer: MEDICARE

## 2023-05-18 NOTE — TELEPHONE ENCOUNTER
Pt would like to consult with a nurse in regards to mosquitos bites he has. He stated that they are beginning to get swollen and red up his arm. Pt would like a prescription to relieve the itching and swollen.     Please advise per above message.  Pt has appt scheduled to see you next Tuesday for swollen testicle.

## 2023-05-18 NOTE — TELEPHONE ENCOUNTER
----- Message from Gonzaleslogancharley Haynes sent at 5/18/2023  8:51 AM CDT -----  Contact: [phone]  Pt would like to consult with a nurse in regards to mosquitos bites he has. He stated that they are beginning to get swollen and red up his arm. Pt would like a prescription to relieve the itching and swollen.         [id number] - [place], [place] - [address] AT [facility] [address]  [address]  [place] [address]  Phone: [phone] Fax: [phone]        Pt contact number is  [phone]. Thanks KB

## 2023-05-18 NOTE — TELEPHONE ENCOUNTER
----- Message from Nohemi Vu sent at 5/9/2023  1:17 PM CDT -----    ----- Message -----  From: Jt Mcleod OD  Sent: 5/9/2023  11:54 AM CDT  To: Shamir perry

## 2023-05-19 ENCOUNTER — PATIENT MESSAGE (OUTPATIENT)
Dept: INTERNAL MEDICINE | Facility: CLINIC | Age: 68
End: 2023-05-19
Payer: MEDICARE

## 2023-05-23 ENCOUNTER — OFFICE VISIT (OUTPATIENT)
Dept: INTERNAL MEDICINE | Facility: CLINIC | Age: 68
End: 2023-05-23
Payer: MEDICARE

## 2023-05-23 VITALS
SYSTOLIC BLOOD PRESSURE: 136 MMHG | OXYGEN SATURATION: 98 % | TEMPERATURE: 99 F | HEART RATE: 78 BPM | DIASTOLIC BLOOD PRESSURE: 84 MMHG | BODY MASS INDEX: 43.39 KG/M2 | HEIGHT: 71 IN

## 2023-05-23 DIAGNOSIS — L98.9 LEG SKIN LESION, RIGHT: ICD-10-CM
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
## 2023-05-23 DIAGNOSIS — Z79.4 CONTROLLED TYPE 2 DIABETES MELLITUS WITHOUT COMPLICATION, WITH LONG-TERM CURRENT USE OF INSULIN: Primary | ICD-10-CM

## 2023-05-23 DIAGNOSIS — N50.89 SCROTAL SWELLING: ICD-10-CM

## 2023-05-23 DIAGNOSIS — I70.0 ATHEROSCLEROSIS OF AORTA: ICD-10-CM

## 2023-05-23 DIAGNOSIS — E11.9 CONTROLLED TYPE 2 DIABETES MELLITUS WITHOUT COMPLICATION, WITH LONG-TERM CURRENT USE OF INSULIN: Primary | ICD-10-CM

## 2023-05-23 DIAGNOSIS — Z12.11 COLON CANCER SCREENING: ICD-10-CM

## 2023-05-23 DIAGNOSIS — I10 PRIMARY HYPERTENSION: ICD-10-CM

## 2023-05-23 DIAGNOSIS — E66.01 OBESITY, CLASS III, BMI 40-49.9 (MORBID OBESITY): ICD-10-CM

## 2023-05-23 DIAGNOSIS — N18.30 STAGE 3 CHRONIC KIDNEY DISEASE, UNSPECIFIED WHETHER STAGE 3A OR 3B CKD: ICD-10-CM

## 2023-05-23 DIAGNOSIS — S40.869A INSECT BITE OF UPPER ARM, UNSPECIFIED LATERALITY, INITIAL ENCOUNTER: ICD-10-CM

## 2023-05-23 PROBLEM — E66.813 OBESITY, CLASS III, BMI 40-49.9 (MORBID OBESITY): Status: ACTIVE | Noted: 2023-05-23

## 2023-05-23 PROCEDURE — 3008F BODY MASS INDEX DOCD: CPT | Mod: CPTII,S$GLB,, | Performed by: FAMILY MEDICINE

## 2023-05-23 PROCEDURE — 1101F PR PT FALLS ASSESS DOC 0-1 FALLS W/OUT INJ PAST YR: ICD-10-PCS | Mod: CPTII,S$GLB,, | Performed by: FAMILY MEDICINE

## 2023-05-23 PROCEDURE — 3079F DIAST BP 80-89 MM HG: CPT | Mod: CPTII,S$GLB,, | Performed by: FAMILY MEDICINE

## 2023-05-23 PROCEDURE — 3008F PR BODY MASS INDEX (BMI) DOCUMENTED: ICD-10-PCS | Mod: CPTII,S$GLB,, | Performed by: FAMILY MEDICINE

## 2023-05-23 PROCEDURE — 3072F PR LOW RISK FOR RETINOPATHY: ICD-10-PCS | Mod: CPTII,S$GLB,, | Performed by: FAMILY MEDICINE

## 2023-05-23 PROCEDURE — 3075F PR MOST RECENT SYSTOLIC BLOOD PRESS GE 130-139MM HG: ICD-10-PCS | Mod: CPTII,S$GLB,, | Performed by: FAMILY MEDICINE

## 2023-05-23 PROCEDURE — 1125F PR PAIN SEVERITY QUANTIFIED, PAIN PRESENT: ICD-10-PCS | Mod: CPTII,S$GLB,, | Performed by: FAMILY MEDICINE

## 2023-05-23 PROCEDURE — 99214 PR OFFICE/OUTPT VISIT, EST, LEVL IV, 30-39 MIN: ICD-10-PCS | Mod: S$GLB,,, | Performed by: FAMILY MEDICINE

## 2023-05-23 PROCEDURE — 3288F PR FALLS RISK ASSESSMENT DOCUMENTED: ICD-10-PCS | Mod: CPTII,S$GLB,, | Performed by: FAMILY MEDICINE

## 2023-05-23 PROCEDURE — 3044F PR MOST RECENT HEMOGLOBIN A1C LEVEL <7.0%: ICD-10-PCS | Mod: CPTII,S$GLB,, | Performed by: FAMILY MEDICINE

## 2023-05-23 PROCEDURE — 3079F PR MOST RECENT DIASTOLIC BLOOD PRESSURE 80-89 MM HG: ICD-10-PCS | Mod: CPTII,S$GLB,, | Performed by: FAMILY MEDICINE

## 2023-05-23 PROCEDURE — 3072F LOW RISK FOR RETINOPATHY: CPT | Mod: CPTII,S$GLB,, | Performed by: FAMILY MEDICINE

## 2023-05-23 PROCEDURE — 1101F PT FALLS ASSESS-DOCD LE1/YR: CPT | Mod: CPTII,S$GLB,, | Performed by: FAMILY MEDICINE

## 2023-05-23 PROCEDURE — 99213 OFFICE O/P EST LOW 20 MIN: CPT | Performed by: FAMILY MEDICINE

## 2023-05-23 PROCEDURE — 99999 PR PBB SHADOW E&M-EST. PATIENT-LVL III: ICD-10-PCS | Mod: PBBFAC,,, | Performed by: FAMILY MEDICINE

## 2023-05-23 PROCEDURE — 4010F PR ACE/ARB THEARPY RXD/TAKEN: ICD-10-PCS | Mod: CPTII,S$GLB,, | Performed by: FAMILY MEDICINE

## 2023-05-23 PROCEDURE — 99999 PR PBB SHADOW E&M-EST. PATIENT-LVL III: CPT | Mod: PBBFAC,,, | Performed by: FAMILY MEDICINE

## 2023-05-23 PROCEDURE — 3044F HG A1C LEVEL LT 7.0%: CPT | Mod: CPTII,S$GLB,, | Performed by: FAMILY MEDICINE

## 2023-05-23 PROCEDURE — 1125F AMNT PAIN NOTED PAIN PRSNT: CPT | Mod: CPTII,S$GLB,, | Performed by: FAMILY MEDICINE

## 2023-05-23 PROCEDURE — 3075F SYST BP GE 130 - 139MM HG: CPT | Mod: CPTII,S$GLB,, | Performed by: FAMILY MEDICINE

## 2023-05-23 PROCEDURE — 3288F FALL RISK ASSESSMENT DOCD: CPT | Mod: CPTII,S$GLB,, | Performed by: FAMILY MEDICINE

## 2023-05-23 PROCEDURE — 99214 OFFICE O/P EST MOD 30 MIN: CPT | Mod: S$GLB,,, | Performed by: FAMILY MEDICINE

## 2023-05-23 PROCEDURE — 4010F ACE/ARB THERAPY RXD/TAKEN: CPT | Mod: CPTII,S$GLB,, | Performed by: FAMILY MEDICINE

## 2023-05-23 RX ORDER — TRIAMCINOLONE ACETONIDE 1 MG/G
CREAM TOPICAL 2 TIMES DAILY
Qty: 28.4 G | Refills: 0 | Status: SHIPPED | OUTPATIENT
Start: 2023-05-23

## 2023-05-23 RX ORDER — SULFAMETHOXAZOLE AND TRIMETHOPRIM 800; 160 MG/1; MG/1
1 TABLET ORAL 2 TIMES DAILY
Qty: 20 TABLET | Refills: 0 | Status: SHIPPED | OUTPATIENT
Start: 2023-05-23 | End: 2023-06-06

## 2023-05-23 NOTE — PROGRESS NOTES
Subjective:       Patient ID: Tom Tavarez is a 68 y.o. male.    Chief Complaint: Other    He has itchiness of his arms and legs from insect bites, lesion of the right ankle, some chest wall discomfort and swelling of his testicle after lifting  his wife.  Medical history also includes hypertension diabetes.  A1c is up-to-date in good.  He denies shortness of breath or edema.  He reports chest discomfort has resolved and swelling of his testicle has resolved.    Review of Systems   Constitutional:  Negative for chills and fever.   Respiratory:  Negative for cough, chest tightness, shortness of breath and wheezing.    Cardiovascular:  Negative for chest pain, palpitations and leg swelling.   Gastrointestinal:  Negative for abdominal distention and abdominal pain.   Skin:         Lesion of the right ankle   Neurological:  Negative for dizziness, weakness, light-headedness and headaches.     Objective:      Physical Exam  Constitutional:       General: He is not in acute distress.     Appearance: He is not ill-appearing or diaphoretic.   Cardiovascular:      Rate and Rhythm: Normal rate and regular rhythm.      Heart sounds: No murmur heard.    No gallop.   Pulmonary:      Effort: Pulmonary effort is normal. No respiratory distress.      Breath sounds: No wheezing, rhonchi or rales.   Abdominal:      General: There is no distension.   Musculoskeletal:      Right foot: No deformity.      Left foot: No deformity.   Feet:      Right foot:      Protective Sensation: 10 sites tested.  10 sites sensed.      Skin integrity: Skin integrity normal.      Toenail Condition: Right toenails are abnormally thick.      Left foot:      Protective Sensation: 10 sites tested.  10 sites sensed.      Skin integrity: Skin integrity normal.      Toenail Condition: Left toenails are abnormally thick.   Lymphadenopathy:      Cervical: No cervical adenopathy.   Skin:     Comments: He has about a 2 cm crusty lesion of the right anterior  ankle.  There is some slight pus on pressure.  He also has some minimal urticaria reaction of his forearms from his insect bites.   Neurological:      Mental Status: He is alert.       Lab Visit on 03/24/2023   Component Date Value Ref Range Status    WBC 03/24/2023 7.63  3.90 - 12.70 K/uL Final    RBC 03/24/2023 5.37  4.60 - 6.20 M/uL Final    Hemoglobin 03/24/2023 15.8  14.0 - 18.0 g/dL Final    Hematocrit 03/24/2023 48.2  40.0 - 54.0 % Final    MCV 03/24/2023 90  82 - 98 fL Final    MCH 03/24/2023 29.4  27.0 - 31.0 pg Final    MCHC 03/24/2023 32.8  32.0 - 36.0 g/dL Final    RDW 03/24/2023 14.3  11.5 - 14.5 % Final    Platelets 03/24/2023 269  150 - 450 K/uL Final    MPV 03/24/2023 10.2  9.2 - 12.9 fL Final    Immature Granulocytes 03/24/2023 0.1  0.0 - 0.5 % Final    Gran # (ANC) 03/24/2023 3.9  1.8 - 7.7 K/uL Final    Immature Grans (Abs) 03/24/2023 0.01  0.00 - 0.04 K/uL Final    Lymph # 03/24/2023 2.9  1.0 - 4.8 K/uL Final    Mono # 03/24/2023 0.5  0.3 - 1.0 K/uL Final    Eos # 03/24/2023 0.3  0.0 - 0.5 K/uL Final    Baso # 03/24/2023 0.05  0.00 - 0.20 K/uL Final    nRBC 03/24/2023 0  0 /100 WBC Final    Gran % 03/24/2023 50.8  38.0 - 73.0 % Final    Lymph % 03/24/2023 38.1  18.0 - 48.0 % Final    Mono % 03/24/2023 6.8  4.0 - 15.0 % Final    Eosinophil % 03/24/2023 3.5  0.0 - 8.0 % Final    Basophil % 03/24/2023 0.7  0.0 - 1.9 % Final    Differential Method 03/24/2023 Automated   Final    Glucose 03/24/2023 82  70 - 110 mg/dL Final    Sodium 03/24/2023 139  136 - 145 mmol/L Final    Potassium 03/24/2023 4.6  3.5 - 5.1 mmol/L Final    Chloride 03/24/2023 105  95 - 110 mmol/L Final    CO2 03/24/2023 28  23 - 29 mmol/L Final    BUN 03/24/2023 16  8 - 23 mg/dL Final    Calcium 03/24/2023 9.5  8.7 - 10.5 mg/dL Final    Creatinine 03/24/2023 1.3  0.5 - 1.4 mg/dL Final    Albumin 03/24/2023 3.2 (L)  3.5 - 5.2 g/dL Final    Phosphorus 03/24/2023 2.5 (L)  2.7 - 4.5 mg/dL Final    eGFR 03/24/2023 59.5 (A)  >60  mL/min/1.73 m^2 Final    Anion Gap 03/24/2023 6 (L)  8 - 16 mmol/L Final    Cholesterol 03/24/2023 212 (H)  120 - 199 mg/dL Final    Triglycerides 03/24/2023 163 (H)  30 - 150 mg/dL Final    HDL 03/24/2023 32 (L)  40 - 75 mg/dL Final    LDL Cholesterol 03/24/2023 147.4  63.0 - 159.0 mg/dL Final    HDL/Cholesterol Ratio 03/24/2023 15.1 (L)  20.0 - 50.0 % Final    Total Cholesterol/HDL Ratio 03/24/2023 6.6 (H)  2.0 - 5.0 Final    Non-HDL Cholesterol 03/24/2023 180  mg/dL Final    Hemoglobin A1C 03/24/2023 6.6 (H)  4.0 - 5.6 % Final    Estimated Avg Glucose 03/24/2023 143 (H)  68 - 131 mg/dL Final     Assessment:       1. Controlled type 2 diabetes mellitus without complication, with long-term current use of insulin    2. Colon cancer screening    3. Obesity, Class III, BMI 40-49.9 (morbid obesity)    4. Atherosclerosis of aorta    5. Stage 3 chronic kidney disease, unspecified whether stage 3a or 3b CKD    6. Primary hypertension    7. Leg skin lesion, right    8. Insect bite of upper arm, unspecified laterality, initial encounter    9. Scrotal swelling        Plan:     Triamcinolone cream twice a day for insect bites.  Soap water twice a day for ankle lesion and start Bactrim DS twice a day.  Follow-up in 2 weeks.  He is not interested in scrotal exam today.  He feels like the problem has resolved.  I have concerned about a possible hernia.  BMI elevated.  Diet discussed.  Chronic renal disease stable also followed by Nephrology.  Medically treating athero sclerosis aorta  not up-to-date on colon screening.  He agrees to Cologuard blood pressure controlled    Controlled type 2 diabetes mellitus without complication, with long-term current use of insulin    Colon cancer screening  -     Cologuard Screening (Multitarget Stool DNA); Future; Expected date: 05/23/2023    Obesity, Class III, BMI 40-49.9 (morbid obesity)    Atherosclerosis of aorta    Stage 3 chronic kidney disease, unspecified whether stage 3a or 3b  CKD    Primary hypertension    Leg skin lesion, right    Insect bite of upper arm, unspecified laterality, initial encounter    Scrotal swelling    Other orders  -     triamcinolone acetonide 0.1% (KENALOG) 0.1 % cream; Apply topically 2 (two) times daily.  Dispense: 28.4 g; Refill: 0  -     sulfamethoxazole-trimethoprim 800-160mg (BACTRIM DS) 800-160 mg Tab; Take 1 tablet by mouth 2 (two) times daily.  Dispense: 20 tablet; Refill: 0

## 2023-05-24 RX ORDER — AMLODIPINE BESYLATE 5 MG/1
TABLET ORAL
Qty: 90 TABLET | Refills: 0 | OUTPATIENT
Start: 2023-05-24

## 2023-05-24 NOTE — TELEPHONE ENCOUNTER
No care due was identified.  [facility] Embedded Care Due Messages. Reference number: [id number].   5/24/2023 2:19:18 PM CDT

## 2023-05-24 NOTE — TELEPHONE ENCOUNTER
Refill Decision Note   [facility]  is requesting a refill authorization.  Brief Assessment and Rationale for Refill:  Quick Discontinue     Medication Therapy Plan:         Comments:     Note composed:6:23 PM 05/24/2023

## 2023-06-05 ENCOUNTER — TELEPHONE (OUTPATIENT)
Dept: INTERNAL MEDICINE | Facility: CLINIC | Age: 68
End: 2023-06-05
Payer: MEDICARE

## 2023-06-06 ENCOUNTER — OFFICE VISIT (OUTPATIENT)
Dept: INTERNAL MEDICINE | Facility: CLINIC | Age: 68
End: 2023-06-06
Payer: MEDICARE

## 2023-06-06 VITALS
HEIGHT: 71 IN | HEART RATE: 60 BPM | DIASTOLIC BLOOD PRESSURE: 80 MMHG | OXYGEN SATURATION: 98 % | TEMPERATURE: 98 F | SYSTOLIC BLOOD PRESSURE: 120 MMHG | BODY MASS INDEX: 43.39 KG/M2

## 2023-06-06 DIAGNOSIS — R07.9 CHEST PAIN, UNSPECIFIED TYPE: Primary | ICD-10-CM

## 2023-06-06 DIAGNOSIS — Z79.4 CONTROLLED TYPE 2 DIABETES MELLITUS WITHOUT COMPLICATION, WITH LONG-TERM CURRENT USE OF INSULIN: ICD-10-CM

## 2023-06-06 DIAGNOSIS — R22.0 LIP SWELLING: ICD-10-CM

## 2023-06-06 DIAGNOSIS — I10 PRIMARY HYPERTENSION: ICD-10-CM

## 2023-06-06 DIAGNOSIS — E11.9 CONTROLLED TYPE 2 DIABETES MELLITUS WITHOUT COMPLICATION, WITH LONG-TERM CURRENT USE OF INSULIN: ICD-10-CM

## 2023-06-06 DIAGNOSIS — L98.9 LEG SKIN LESION, RIGHT: ICD-10-CM

## 2023-06-06 PROCEDURE — 3044F PR MOST RECENT HEMOGLOBIN A1C LEVEL <7.0%: ICD-10-PCS | Mod: CPTII,S$GLB,, | Performed by: FAMILY MEDICINE

## 2023-06-06 PROCEDURE — 3008F PR BODY MASS INDEX (BMI) DOCUMENTED: ICD-10-PCS | Mod: CPTII,S$GLB,, | Performed by: FAMILY MEDICINE

## 2023-06-06 PROCEDURE — 3074F SYST BP LT 130 MM HG: CPT | Mod: CPTII,S$GLB,, | Performed by: FAMILY MEDICINE

## 2023-06-06 PROCEDURE — 3074F PR MOST RECENT SYSTOLIC BLOOD PRESSURE < 130 MM HG: ICD-10-PCS | Mod: CPTII,S$GLB,, | Performed by: FAMILY MEDICINE

## 2023-06-06 PROCEDURE — 1126F PR PAIN SEVERITY QUANTIFIED, NO PAIN PRESENT: ICD-10-PCS | Mod: CPTII,S$GLB,, | Performed by: FAMILY MEDICINE

## 2023-06-06 PROCEDURE — 1159F MED LIST DOCD IN RCRD: CPT | Mod: CPTII,S$GLB,, | Performed by: FAMILY MEDICINE

## 2023-06-06 PROCEDURE — 3288F PR FALLS RISK ASSESSMENT DOCUMENTED: ICD-10-PCS | Mod: CPTII,S$GLB,, | Performed by: FAMILY MEDICINE

## 2023-06-06 PROCEDURE — 99999 PR PBB SHADOW E&M-EST. PATIENT-LVL V: CPT | Mod: PBBFAC,,, | Performed by: FAMILY MEDICINE

## 2023-06-06 PROCEDURE — 4010F PR ACE/ARB THEARPY RXD/TAKEN: ICD-10-PCS | Mod: CPTII,S$GLB,, | Performed by: FAMILY MEDICINE

## 2023-06-06 PROCEDURE — 99214 OFFICE O/P EST MOD 30 MIN: CPT | Mod: S$GLB,,, | Performed by: FAMILY MEDICINE

## 2023-06-06 PROCEDURE — 99999 PR PBB SHADOW E&M-EST. PATIENT-LVL V: ICD-10-PCS | Mod: PBBFAC,,, | Performed by: FAMILY MEDICINE

## 2023-06-06 PROCEDURE — 1126F AMNT PAIN NOTED NONE PRSNT: CPT | Mod: CPTII,S$GLB,, | Performed by: FAMILY MEDICINE

## 2023-06-06 PROCEDURE — 4010F ACE/ARB THERAPY RXD/TAKEN: CPT | Mod: CPTII,S$GLB,, | Performed by: FAMILY MEDICINE

## 2023-06-06 PROCEDURE — 1101F PT FALLS ASSESS-DOCD LE1/YR: CPT | Mod: CPTII,S$GLB,, | Performed by: FAMILY MEDICINE

## 2023-06-06 PROCEDURE — 3008F BODY MASS INDEX DOCD: CPT | Mod: CPTII,S$GLB,, | Performed by: FAMILY MEDICINE

## 2023-06-06 PROCEDURE — 3288F FALL RISK ASSESSMENT DOCD: CPT | Mod: CPTII,S$GLB,, | Performed by: FAMILY MEDICINE

## 2023-06-06 PROCEDURE — 99214 PR OFFICE/OUTPT VISIT, EST, LEVL IV, 30-39 MIN: ICD-10-PCS | Mod: S$GLB,,, | Performed by: FAMILY MEDICINE

## 2023-06-06 PROCEDURE — 3079F PR MOST RECENT DIASTOLIC BLOOD PRESSURE 80-89 MM HG: ICD-10-PCS | Mod: CPTII,S$GLB,, | Performed by: FAMILY MEDICINE

## 2023-06-06 PROCEDURE — 3079F DIAST BP 80-89 MM HG: CPT | Mod: CPTII,S$GLB,, | Performed by: FAMILY MEDICINE

## 2023-06-06 PROCEDURE — 3072F PR LOW RISK FOR RETINOPATHY: ICD-10-PCS | Mod: CPTII,S$GLB,, | Performed by: FAMILY MEDICINE

## 2023-06-06 PROCEDURE — 3072F LOW RISK FOR RETINOPATHY: CPT | Mod: CPTII,S$GLB,, | Performed by: FAMILY MEDICINE

## 2023-06-06 PROCEDURE — 1101F PR PT FALLS ASSESS DOC 0-1 FALLS W/OUT INJ PAST YR: ICD-10-PCS | Mod: CPTII,S$GLB,, | Performed by: FAMILY MEDICINE

## 2023-06-06 PROCEDURE — 1159F PR MEDICATION LIST DOCUMENTED IN MEDICAL RECORD: ICD-10-PCS | Mod: CPTII,S$GLB,, | Performed by: FAMILY MEDICINE

## 2023-06-06 PROCEDURE — 3044F HG A1C LEVEL LT 7.0%: CPT | Mod: CPTII,S$GLB,, | Performed by: FAMILY MEDICINE

## 2023-06-06 NOTE — PROGRESS NOTES
Subjective:       Patient ID: Tom Tavarez is a 68 y.o. male.    Chief Complaint: Follow-up (2 week f/u)    He was started on Bactrim DS for an infected looking lesion of his right anterior ankle.  The lesion has improved.  However he developed swelling of his tongue and was emergency room.  Bactrim DS was discontinued.  He is taking Benadryl.  Yesterday developed some swelling of his lips.  He also mentioned that for some time now he gets chest pain radiated into his jaws.  He is not sure if his activity related.  He is start taking Ecotrin 81 mg 2 a several days ago.  Medical history includes hypertension hyperlipidemia and diabetes.    Follow-up  Associated symptoms include chest pain. Pertinent negatives include no abdominal pain, coughing, headaches or weakness.   Review of Systems   Constitutional:  Negative for activity change and appetite change.   Respiratory:  Negative for cough, chest tightness, shortness of breath and wheezing.    Cardiovascular:  Positive for chest pain. Negative for palpitations and leg swelling.   Gastrointestinal:  Negative for abdominal distention and abdominal pain.   Allergic/Immunologic:        Tongue swelling with Bactrim DS yesterday with lip swelling no respiratory distress no hoarseness   Neurological:  Negative for dizziness, weakness, light-headedness and headaches.     Objective:      Physical Exam  Constitutional:       General: He is not in acute distress.     Appearance: He is not ill-appearing or diaphoretic.   Cardiovascular:      Rate and Rhythm: Normal rate and regular rhythm.      Heart sounds: No murmur heard.    No gallop.   Pulmonary:      Effort: Pulmonary effort is normal. No respiratory distress.      Breath sounds: No wheezing, rhonchi or rales.   Abdominal:      General: There is no distension.   Lymphadenopathy:      Cervical: No cervical adenopathy.   Skin:     Comments: Previous ankle lesion has improved   Neurological:      Mental Status: He is  alert.       Lab Visit on 03/24/2023   Component Date Value Ref Range Status    WBC 03/24/2023 7.63  3.90 - 12.70 K/uL Final    RBC 03/24/2023 5.37  4.60 - 6.20 M/uL Final    Hemoglobin 03/24/2023 15.8  14.0 - 18.0 g/dL Final    Hematocrit 03/24/2023 48.2  40.0 - 54.0 % Final    MCV 03/24/2023 90  82 - 98 fL Final    MCH 03/24/2023 29.4  27.0 - 31.0 pg Final    MCHC 03/24/2023 32.8  32.0 - 36.0 g/dL Final    RDW 03/24/2023 14.3  11.5 - 14.5 % Final    Platelets 03/24/2023 269  150 - 450 K/uL Final    MPV 03/24/2023 10.2  9.2 - 12.9 fL Final    Immature Granulocytes 03/24/2023 0.1  0.0 - 0.5 % Final    Gran # (ANC) 03/24/2023 3.9  1.8 - 7.7 K/uL Final    Immature Grans (Abs) 03/24/2023 0.01  0.00 - 0.04 K/uL Final    Lymph # 03/24/2023 2.9  1.0 - 4.8 K/uL Final    Mono # 03/24/2023 0.5  0.3 - 1.0 K/uL Final    Eos # 03/24/2023 0.3  0.0 - 0.5 K/uL Final    Baso # 03/24/2023 0.05  0.00 - 0.20 K/uL Final    nRBC 03/24/2023 0  0 /100 WBC Final    Gran % 03/24/2023 50.8  38.0 - 73.0 % Final    Lymph % 03/24/2023 38.1  18.0 - 48.0 % Final    Mono % 03/24/2023 6.8  4.0 - 15.0 % Final    Eosinophil % 03/24/2023 3.5  0.0 - 8.0 % Final    Basophil % 03/24/2023 0.7  0.0 - 1.9 % Final    Differential Method 03/24/2023 Automated   Final    Glucose 03/24/2023 82  70 - 110 mg/dL Final    Sodium 03/24/2023 139  136 - 145 mmol/L Final    Potassium 03/24/2023 4.6  3.5 - 5.1 mmol/L Final    Chloride 03/24/2023 105  95 - 110 mmol/L Final    CO2 03/24/2023 28  23 - 29 mmol/L Final    BUN 03/24/2023 16  8 - 23 mg/dL Final    Calcium 03/24/2023 9.5  8.7 - 10.5 mg/dL Final    Creatinine 03/24/2023 1.3  0.5 - 1.4 mg/dL Final    Albumin 03/24/2023 3.2 (L)  3.5 - 5.2 g/dL Final    Phosphorus 03/24/2023 2.5 (L)  2.7 - 4.5 mg/dL Final    eGFR 03/24/2023 59.5 (A)  >60 mL/min/1.73 m^2 Final    Anion Gap 03/24/2023 6 (L)  8 - 16 mmol/L Final    Cholesterol 03/24/2023 212 (H)  120 - 199 mg/dL Final    Triglycerides 03/24/2023 163 (H)  30 - 150 mg/dL  Final    HDL 03/24/2023 32 (L)  40 - 75 mg/dL Final    LDL Cholesterol 03/24/2023 147.4  63.0 - 159.0 mg/dL Final    HDL/Cholesterol Ratio 03/24/2023 15.1 (L)  20.0 - 50.0 % Final    Total Cholesterol/HDL Ratio 03/24/2023 6.6 (H)  2.0 - 5.0 Final    Non-HDL Cholesterol 03/24/2023 180  mg/dL Final    Hemoglobin A1C 03/24/2023 6.6 (H)  4.0 - 5.6 % Final    Estimated Avg Glucose 03/24/2023 143 (H)  68 - 131 mg/dL Final     Assessment:       1. Chest pain, unspecified type    2. Controlled type 2 diabetes mellitus without complication, with long-term current use of insulin    3. Primary hypertension    4. Leg skin lesion, right    5. Lip swelling        Plan:     Blood pressure controlled.  Concerned about underlying coronary artery disease.  Cardiology referral was made.  In the meantime recommend continuing Ecotrin 81 mg 2 a day and limited physical activity.  Benadryl 50 mg twice a day for 10 days for ongoing swelling of his lip.  Follow-up 1 month.  Follow-up earlier if needed.    Chest pain, unspecified type  -     Ambulatory referral/consult to Cardiology; Future; Expected date: 06/13/2023    Controlled type 2 diabetes mellitus without complication, with long-term current use of insulin    Primary hypertension    Leg skin lesion, right    Lip swelling

## 2023-06-13 ENCOUNTER — TELEPHONE (OUTPATIENT)
Dept: INTERNAL MEDICINE | Facility: CLINIC | Age: 68
End: 2023-06-13
Payer: MEDICARE

## 2023-06-13 NOTE — TELEPHONE ENCOUNTER
----- Message from Vipin Beckett sent at 6/12/2023  4:43 PM CDT -----  Contact: Tom  Patient is calling regarding his tongue swelling in his mouth again, pt stated he thinks he took the wrong medication and needs something to go to pharmacy to take away the swelling before it gets to swollen and have to go to ER. Reports taking Triamcinolone- Acetoride 0.1%. Please call patient back at soonest convenience at .[phone]         Thanks

## 2023-06-13 NOTE — TELEPHONE ENCOUNTER
Patient stated tongue swollen but has gone down some no trouble talking, no other symptoms. He thinks he took some medication that caused him a problem before. He stated he does not want to go to the ER for evaluation.

## 2023-06-13 NOTE — TELEPHONE ENCOUNTER
----- Message from Vipin Beckett sent at 6/12/2023  4:43 PM CDT -----  Contact: Tom  Patient is calling regarding his tongue swelling in his mouth again, pt stated he thinks he took the wrong medication and needs something to go to pharmacy to take away the swelling before it gets to swollen and have to go to ER. Reports taking Triamcinolone- Acetoride 0.1%. Please call patient back at soonest convenience at .[phone]         Thanks

## 2023-06-13 NOTE — TELEPHONE ENCOUNTER
Returned call to patient informed per Dr Lemus to take Take Benadryl 50 mg twice a day for 10 days and also start over-the-counter Pepcid 20 mg twice a day for 10 days.  and call with an update. Patient voiced understanding..

## 2023-06-16 ENCOUNTER — OFFICE VISIT (OUTPATIENT)
Dept: OPHTHALMOLOGY | Facility: CLINIC | Age: 68
End: 2023-06-16
Payer: MEDICARE

## 2023-06-16 ENCOUNTER — DOCUMENTATION ONLY (OUTPATIENT)
Dept: OPHTHALMOLOGY | Facility: CLINIC | Age: 68
End: 2023-06-16

## 2023-06-16 DIAGNOSIS — M1A.9XX0 CHRONIC GOUT WITHOUT TOPHUS, UNSPECIFIED CAUSE, UNSPECIFIED SITE: ICD-10-CM

## 2023-06-16 DIAGNOSIS — H25.12 NUCLEAR SCLEROSIS OF LEFT EYE: ICD-10-CM

## 2023-06-16 DIAGNOSIS — E11.9 DIABETES MELLITUS TYPE 2 WITHOUT RETINOPATHY: ICD-10-CM

## 2023-06-16 DIAGNOSIS — H25.13 AGE-RELATED NUCLEAR CATARACT OF BOTH EYES: ICD-10-CM

## 2023-06-16 DIAGNOSIS — H25.11 NUCLEAR SCLEROSIS OF RIGHT EYE: Primary | ICD-10-CM

## 2023-06-16 DIAGNOSIS — I70.0 ATHEROSCLEROSIS OF AORTA: ICD-10-CM

## 2023-06-16 DIAGNOSIS — I10 HYPERTENSION, UNSPECIFIED TYPE: Primary | ICD-10-CM

## 2023-06-16 PROCEDURE — 92136 OPHTHALMIC BIOMETRY: CPT | Mod: RT,S$GLB,, | Performed by: STUDENT IN AN ORGANIZED HEALTH CARE EDUCATION/TRAINING PROGRAM

## 2023-06-16 PROCEDURE — 2023F DILAT RTA XM W/O RTNOPTHY: CPT | Mod: CPTII,S$GLB,, | Performed by: STUDENT IN AN ORGANIZED HEALTH CARE EDUCATION/TRAINING PROGRAM

## 2023-06-16 PROCEDURE — 99999 PR PBB SHADOW E&M-EST. PATIENT-LVL III: ICD-10-PCS | Mod: PBBFAC,,, | Performed by: STUDENT IN AN ORGANIZED HEALTH CARE EDUCATION/TRAINING PROGRAM

## 2023-06-16 PROCEDURE — 1160F PR REVIEW ALL MEDS BY PRESCRIBER/CLIN PHARMACIST DOCUMENTED: ICD-10-PCS | Mod: CPTII,S$GLB,, | Performed by: STUDENT IN AN ORGANIZED HEALTH CARE EDUCATION/TRAINING PROGRAM

## 2023-06-16 PROCEDURE — 2023F PR DILATED RETINAL EXAM W/O EVID OF RETINOPATHY: ICD-10-PCS | Mod: CPTII,S$GLB,, | Performed by: STUDENT IN AN ORGANIZED HEALTH CARE EDUCATION/TRAINING PROGRAM

## 2023-06-16 PROCEDURE — 1159F MED LIST DOCD IN RCRD: CPT | Mod: CPTII,S$GLB,, | Performed by: STUDENT IN AN ORGANIZED HEALTH CARE EDUCATION/TRAINING PROGRAM

## 2023-06-16 PROCEDURE — 3044F PR MOST RECENT HEMOGLOBIN A1C LEVEL <7.0%: ICD-10-PCS | Mod: CPTII,S$GLB,, | Performed by: STUDENT IN AN ORGANIZED HEALTH CARE EDUCATION/TRAINING PROGRAM

## 2023-06-16 PROCEDURE — 4010F ACE/ARB THERAPY RXD/TAKEN: CPT | Mod: CPTII,S$GLB,, | Performed by: STUDENT IN AN ORGANIZED HEALTH CARE EDUCATION/TRAINING PROGRAM

## 2023-06-16 PROCEDURE — 4010F PR ACE/ARB THEARPY RXD/TAKEN: ICD-10-PCS | Mod: CPTII,S$GLB,, | Performed by: STUDENT IN AN ORGANIZED HEALTH CARE EDUCATION/TRAINING PROGRAM

## 2023-06-16 PROCEDURE — 99204 OFFICE O/P NEW MOD 45 MIN: CPT | Mod: S$GLB,,, | Performed by: STUDENT IN AN ORGANIZED HEALTH CARE EDUCATION/TRAINING PROGRAM

## 2023-06-16 PROCEDURE — 1160F RVW MEDS BY RX/DR IN RCRD: CPT | Mod: CPTII,S$GLB,, | Performed by: STUDENT IN AN ORGANIZED HEALTH CARE EDUCATION/TRAINING PROGRAM

## 2023-06-16 PROCEDURE — 99999 PR PBB SHADOW E&M-EST. PATIENT-LVL III: CPT | Mod: PBBFAC,,, | Performed by: STUDENT IN AN ORGANIZED HEALTH CARE EDUCATION/TRAINING PROGRAM

## 2023-06-16 PROCEDURE — 1159F PR MEDICATION LIST DOCUMENTED IN MEDICAL RECORD: ICD-10-PCS | Mod: CPTII,S$GLB,, | Performed by: STUDENT IN AN ORGANIZED HEALTH CARE EDUCATION/TRAINING PROGRAM

## 2023-06-16 PROCEDURE — 99204 PR OFFICE/OUTPT VISIT, NEW, LEVL IV, 45-59 MIN: ICD-10-PCS | Mod: S$GLB,,, | Performed by: STUDENT IN AN ORGANIZED HEALTH CARE EDUCATION/TRAINING PROGRAM

## 2023-06-16 PROCEDURE — 92136 IOL MASTER - OD - RIGHT EYE: ICD-10-PCS | Mod: RT,S$GLB,, | Performed by: STUDENT IN AN ORGANIZED HEALTH CARE EDUCATION/TRAINING PROGRAM

## 2023-06-16 PROCEDURE — 3044F HG A1C LEVEL LT 7.0%: CPT | Mod: CPTII,S$GLB,, | Performed by: STUDENT IN AN ORGANIZED HEALTH CARE EDUCATION/TRAINING PROGRAM

## 2023-06-16 RX ORDER — COLCHICINE 0.6 MG/1
0.6 TABLET ORAL DAILY
Qty: 30 TABLET | Refills: 11 | Status: SHIPPED | OUTPATIENT
Start: 2023-06-16 | End: 2024-03-28

## 2023-06-16 RX ORDER — PREDNISOLONE ACETATE 10 MG/ML
1 SUSPENSION/ DROPS OPHTHALMIC 4 TIMES DAILY
Qty: 5 ML | Refills: 1 | Status: SHIPPED | OUTPATIENT
Start: 2023-06-16

## 2023-06-16 NOTE — PROGRESS NOTES
HPI     Cataract     Additional comments: Pt in today for a cataract eval. Pt states his   vision is blurry. No pain or discomfort. Pt states he's having a hard time   seeing [employer].            Comments    C/o blurred vision , difficulty reading, and seeing the television over   the past several months    Which eye is most affected? OU    Do you have difficulty, even with glasses, with the following activities?    Reading small print such as labels on medicine bottles, a telephone book,   or food labels.   Yes  Reading a newspaper or book?  Yes  Seeing steps, stairs, or curbs  No  Reading traffic signs, street signs, or store signs  No  Doing fine handwork like sewing, knitting, crocheting or carpentry?  No  Writing checks or filling out forms  No  Playing games such as bingo, dominos, card games or mahjong?  No  Watching television?  Yes  Driving at night?  No             Last edited by Lizzette Edwards on 6/16/2023  1:52 PM.            Assessment /Plan     For exam results, see Encounter Report.    Nuclear sclerosis of right eye- Visually Significant Cataract OU  Patient reports decreased vision consistent with the clinical amount of lenticular opacity, which reaches the level of visual significance and affects activities of daily living including reading and glare. Risks, benefits, and alternatives to cataract surgery were discussed.  Discussion of risks included possibility of infection as well as permanent vision loss.The pt expressed a desire to proceed with surgery with the potential for some reasonable degree of visual improvement. Recommended regular use of artificial tears and good lid hygiene to optimize surgical outcome.     Discussed IOL options and refractive outcomes for this patient.    Phaco right eye,   Topical, + Iring  Will aim for Monofocal - Distance IOL (*Considering multifocal)    Intraop Kenalog 40: No    *Poor dilation OU plan for Iring    Post op gtts:   Durezol or PF      Discussed that  vision may be limited by:  none    Dilation: good  Alpha Blockers: none    SS record completed, IOL master reviewed, external referral completed.   Referral to [facility] for Ophthalmic [facility]  Prescriptions sent for preoperative medications  Explained that patient may need glasses after surgery.    RTC for POD#1      Nuclear sclerosis of left eye- Follow  *plan for Iring    Diabetes mellitus type 2 without retinopathy- last A1c 6.6   Strict BG control, f/u w/ PCP, and annual DFE  Stressed importance of DM control to preserve vision

## 2023-06-16 NOTE — PROGRESS NOTES
Short Stay Record    Diagnosis: Nuclear Sclerotic Cataract right    CC: Blurry Vision     HPI:  Tom Tavarez is a 68 y.o. male who presents for evaluation prior to ophthalmic surgery. No current complaints.     Past Medical History:   Diagnosis Date    BMI 40.0-44.9, adult 07/21/2016    Cataract     Colon cancer screening 10/03/2017    Diabetes mellitus type 2 in obese 11/2006     am 05/09/2023 Insulin x 10 Years    Hyperlipidemia     Hypertension 1985    Obesity, Class III, BMI 40-49.9 (morbid obesity) 1985    Umbilical hernia      Past Surgical History:   Procedure Laterality Date    G U surgery  1982    biopsy of testicle infertility w/u     Social History     Tobacco Use    Smoking status: Former    Smokeless tobacco: Never    Tobacco comments:     1998 quit    Substance Use Topics    Alcohol use: No     Alcohol/week: 0.0 standard drinks     Family History   Problem Relation Age of Onset    Hypertension Mother     Heart failure Mother     Diabetes Brother      Review of patient's allergies indicates:   Allergen Reactions    Bactrim [sulfamethoxazole-trimethoprim]      Tongue swelling         Current Outpatient Medications:     amLODIPine (NORVASC) 10 MG tablet, Take 1 tablet (10 mg total) by mouth once daily., Disp: 90 tablet, Rfl: 2    atorvastatin (LIPITOR) 40 MG tablet, Take 1 tablet (40 mg total) by mouth once daily., Disp: 90 tablet, Rfl: 1    blood sugar diagnostic Strp, To check BG 3 times daily, to use with insurance preferred meter, Disp: 200 each, Rfl: 3    blood sugar diagnostic Strp, 1 each by Misc.(Non-Drug; Combo Route) route once daily., Disp: 100 each, Rfl: 3    blood-glucose meter kit, To check BG 3 times daily, to use with insurance preferred meter, Disp: 1 each, Rfl: 0    chlorthalidone (HYGROTEN) 25 MG Tab, TAKE 1 TABLET(25 MG) BY mouth on mon wed and fri of each week, Disp: 36 tablet, Rfl: 0    colchicine (COLCRYS) 0.6 mg tablet, Take 1 tablet (0.6 mg total) by mouth once  "daily., Disp: 30 tablet, Rfl: 11    flash glucose scanning reader (FREESTYLE KRISTY 14 DAY READER) Misc, 1 each by Misc.(Non-Drug; Combo Route) route continuous., Disp: 6 each, Rfl: 3    flash glucose sensor (FREESTYLE KRISTY 14 DAY SENSOR) Kit, 1 each by Misc.(Non-Drug; Combo Route) route continuous., Disp: 6 kit, Rfl: 3    insulin syringe-needle U-100 0.3 mL 31 gauge x 1/4" Syrg, 1 each by Misc.(Non-Drug; Combo Route) route 2 (two) times a day., Disp: 100 each, Rfl: 5    ketoconazole (NIZORAL) 2 % cream, Apply topically once daily., Disp: 60 g, Rfl: 1    lancets Misc, To check BG 3 times daily, to use with insurance preferred meter, Disp: 200 each, Rfl: 3    lancets Misc, To check BG 3 times daily, to use with insurance preferred meter, Disp: 100 each, Rfl: 3    LANTUS SOLOSTAR U-100 INSULIN glargine 100 units/mL SubQ pen, Inject 50 Units into the skin 2 (two) times daily., Disp: 90 mL, Rfl: 1    losartan (COZAAR) 25 MG tablet, Take 1 tablet (25 mg total) by mouth once daily., Disp: 90 tablet, Rfl: 3    metFORMIN (GLUCOPHAGE) 500 MG tablet, Take 1 tablet (500 mg total) by mouth 2 (two) times daily with meals., Disp: 180 tablet, Rfl: 3    mupirocin (BACTROBAN) 2 % ointment, Apply topically once daily., Disp: 30 g, Rfl: 1    prednisoLONE acetate (PRED FORTE) 1 % DrpS, Place 1 drop into the right eye 4 (four) times daily., Disp: 5 mL, Rfl: 1    pregabalin (LYRICA) 75 MG capsule, Take 1 capsule (75 mg total) by mouth every evening., Disp: 30 capsule, Rfl: 1    triamcinolone acetonide 0.1% (KENALOG) 0.1 % cream, Apply topically 2 (two) times daily., Disp: 28.4 g, Rfl: 0    Review of Systems:  10 Pt ROS negative except as stated in HPI    Physical Exam:  General Appearance:    A&Ox3, no distress, appears stated age   Head:    Normocephalic, without obvious abnormality, atraumatic   Eyes:    PERRL, EOM's intact   Back:     Symmetric, no curvature   Lungs:     respirations unlabored   Chest Wall:    No tenderness or " deformity    Heart:  Abdomen:  Extremities:  Skin:    S1 and S2 present    Soft, non-tender    Extremities normal, atraumatic    Skin color, texture, turgor normal     Patient is stable for ophthalmic surgery under local and MAC.       _

## 2023-06-16 NOTE — TELEPHONE ENCOUNTER
Care Due:                  Date            Visit Type   Department     Provider  --------------------------------------------------------------------------------                                EP -                              PRIMARY      [facility] INTERNAL  Last Visit: 06-      CARE ([facility])   LLOYD Lemus                              EP -                              PRIMARY      [facility] INTERNAL  Next Visit: 07-      CARE ([facility])   LLOYD Lemus                                                            Last  Test          Frequency    Reason                     Performed    Due Date  --------------------------------------------------------------------------------    CMP.........  12 months..  atorvastatin.............  09- 09-    [facility] Embedded Care Due Messages. Reference number: [id number].   6/16/2023 8:08:18 AM CDT

## 2023-06-22 ENCOUNTER — LAB VISIT (OUTPATIENT)
Dept: LAB | Facility: HOSPITAL | Age: 68
End: 2023-06-22
Attending: INTERNAL MEDICINE
Payer: MEDICARE

## 2023-06-22 DIAGNOSIS — N18.30 STAGE 3 CHRONIC KIDNEY DISEASE, UNSPECIFIED WHETHER STAGE 3A OR 3B CKD: ICD-10-CM

## 2023-06-22 LAB
ALBUMIN SERPL BCP-MCNC: 3.4 G/DL (ref 3.5–5.2)
ANION GAP SERPL CALC-SCNC: 12 MMOL/L (ref 8–16)
BASOPHILS # BLD AUTO: 0.05 K/UL (ref 0–0.2)
BASOPHILS NFR BLD: 0.7 % (ref 0–1.9)
BUN SERPL-MCNC: 21 MG/DL (ref 8–23)
CALCIUM SERPL-MCNC: 9.1 MG/DL (ref 8.7–10.5)
CHLORIDE SERPL-SCNC: 108 MMOL/L (ref 95–110)
CO2 SERPL-SCNC: 21 MMOL/L (ref 23–29)
CREAT SERPL-MCNC: 1.4 MG/DL (ref 0.5–1.4)
DIFFERENTIAL METHOD: ABNORMAL
EOSINOPHIL # BLD AUTO: 0.5 K/UL (ref 0–0.5)
EOSINOPHIL NFR BLD: 7.1 % (ref 0–8)
ERYTHROCYTE [DISTWIDTH] IN BLOOD BY AUTOMATED COUNT: 15 % (ref 11.5–14.5)
EST. GFR  (NO RACE VARIABLE): 54.4 ML/MIN/1.73 M^2
GLUCOSE SERPL-MCNC: 104 MG/DL (ref 70–110)
HCT VFR BLD AUTO: 48.7 % (ref 40–54)
HGB BLD-MCNC: 15.6 G/DL (ref 14–18)
IMM GRANULOCYTES # BLD AUTO: 0.02 K/UL (ref 0–0.04)
IMM GRANULOCYTES NFR BLD AUTO: 0.3 % (ref 0–0.5)
LYMPHOCYTES # BLD AUTO: 2.9 K/UL (ref 1–4.8)
LYMPHOCYTES NFR BLD: 38.9 % (ref 18–48)
MCH RBC QN AUTO: 29.4 PG (ref 27–31)
MCHC RBC AUTO-ENTMCNC: 32 G/DL (ref 32–36)
MCV RBC AUTO: 92 FL (ref 82–98)
MONOCYTES # BLD AUTO: 0.6 K/UL (ref 0.3–1)
MONOCYTES NFR BLD: 8.2 % (ref 4–15)
NEUTROPHILS # BLD AUTO: 3.4 K/UL (ref 1.8–7.7)
NEUTROPHILS NFR BLD: 44.8 % (ref 38–73)
NRBC BLD-RTO: 0 /100 WBC
PHOSPHATE SERPL-MCNC: 3.1 MG/DL (ref 2.7–4.5)
PLATELET # BLD AUTO: 216 K/UL (ref 150–450)
PMV BLD AUTO: 11.2 FL (ref 9.2–12.9)
POTASSIUM SERPL-SCNC: 4.9 MMOL/L (ref 3.5–5.1)
RBC # BLD AUTO: 5.3 M/UL (ref 4.6–6.2)
SODIUM SERPL-SCNC: 141 MMOL/L (ref 136–145)
WBC # BLD AUTO: 7.56 K/UL (ref 3.9–12.7)

## 2023-06-22 PROCEDURE — 36415 COLL VENOUS BLD VENIPUNCTURE: CPT | Mod: PO | Performed by: INTERNAL MEDICINE

## 2023-06-22 PROCEDURE — 85025 COMPLETE CBC W/AUTO DIFF WBC: CPT | Performed by: INTERNAL MEDICINE

## 2023-06-22 PROCEDURE — 80069 RENAL FUNCTION PANEL: CPT | Performed by: INTERNAL MEDICINE

## 2023-06-26 ENCOUNTER — OFFICE VISIT (OUTPATIENT)
Dept: NEPHROLOGY | Facility: CLINIC | Age: 68
End: 2023-06-26
Payer: MEDICARE

## 2023-06-26 VITALS
SYSTOLIC BLOOD PRESSURE: 132 MMHG | DIASTOLIC BLOOD PRESSURE: 70 MMHG | BODY MASS INDEX: 44.1 KG/M2 | RESPIRATION RATE: 18 BRPM | HEIGHT: 71 IN | HEART RATE: 74 BPM | WEIGHT: 315 LBS

## 2023-06-26 DIAGNOSIS — E11.21 DIABETIC NEPHROPATHY ASSOCIATED WITH TYPE 2 DIABETES MELLITUS: Primary | ICD-10-CM

## 2023-06-26 PROCEDURE — 1159F MED LIST DOCD IN RCRD: CPT | Mod: CPTII,S$GLB,, | Performed by: INTERNAL MEDICINE

## 2023-06-26 PROCEDURE — 1159F PR MEDICATION LIST DOCUMENTED IN MEDICAL RECORD: ICD-10-PCS | Mod: CPTII,S$GLB,, | Performed by: INTERNAL MEDICINE

## 2023-06-26 PROCEDURE — 3066F NEPHROPATHY DOC TX: CPT | Mod: CPTII,S$GLB,, | Performed by: INTERNAL MEDICINE

## 2023-06-26 PROCEDURE — 3075F PR MOST RECENT SYSTOLIC BLOOD PRESS GE 130-139MM HG: ICD-10-PCS | Mod: CPTII,S$GLB,, | Performed by: INTERNAL MEDICINE

## 2023-06-26 PROCEDURE — 4010F ACE/ARB THERAPY RXD/TAKEN: CPT | Mod: CPTII,S$GLB,, | Performed by: INTERNAL MEDICINE

## 2023-06-26 PROCEDURE — 3044F HG A1C LEVEL LT 7.0%: CPT | Mod: CPTII,S$GLB,, | Performed by: INTERNAL MEDICINE

## 2023-06-26 PROCEDURE — 3288F PR FALLS RISK ASSESSMENT DOCUMENTED: ICD-10-PCS | Mod: CPTII,S$GLB,, | Performed by: INTERNAL MEDICINE

## 2023-06-26 PROCEDURE — 1101F PT FALLS ASSESS-DOCD LE1/YR: CPT | Mod: CPTII,S$GLB,, | Performed by: INTERNAL MEDICINE

## 2023-06-26 PROCEDURE — 3075F SYST BP GE 130 - 139MM HG: CPT | Mod: CPTII,S$GLB,, | Performed by: INTERNAL MEDICINE

## 2023-06-26 PROCEDURE — 99215 OFFICE O/P EST HI 40 MIN: CPT | Mod: S$GLB,,, | Performed by: INTERNAL MEDICINE

## 2023-06-26 PROCEDURE — 99999 PR PBB SHADOW E&M-EST. PATIENT-LVL IV: CPT | Mod: PBBFAC,,, | Performed by: INTERNAL MEDICINE

## 2023-06-26 PROCEDURE — 3044F PR MOST RECENT HEMOGLOBIN A1C LEVEL <7.0%: ICD-10-PCS | Mod: CPTII,S$GLB,, | Performed by: INTERNAL MEDICINE

## 2023-06-26 PROCEDURE — 99215 PR OFFICE/OUTPT VISIT, EST, LEVL V, 40-54 MIN: ICD-10-PCS | Mod: S$GLB,,, | Performed by: INTERNAL MEDICINE

## 2023-06-26 PROCEDURE — 1126F AMNT PAIN NOTED NONE PRSNT: CPT | Mod: CPTII,S$GLB,, | Performed by: INTERNAL MEDICINE

## 2023-06-26 PROCEDURE — 3008F BODY MASS INDEX DOCD: CPT | Mod: CPTII,S$GLB,, | Performed by: INTERNAL MEDICINE

## 2023-06-26 PROCEDURE — 99999 PR PBB SHADOW E&M-EST. PATIENT-LVL IV: ICD-10-PCS | Mod: PBBFAC,,, | Performed by: INTERNAL MEDICINE

## 2023-06-26 PROCEDURE — 3078F PR MOST RECENT DIASTOLIC BLOOD PRESSURE < 80 MM HG: ICD-10-PCS | Mod: CPTII,S$GLB,, | Performed by: INTERNAL MEDICINE

## 2023-06-26 PROCEDURE — 4010F PR ACE/ARB THEARPY RXD/TAKEN: ICD-10-PCS | Mod: CPTII,S$GLB,, | Performed by: INTERNAL MEDICINE

## 2023-06-26 PROCEDURE — 3078F DIAST BP <80 MM HG: CPT | Mod: CPTII,S$GLB,, | Performed by: INTERNAL MEDICINE

## 2023-06-26 PROCEDURE — 3072F LOW RISK FOR RETINOPATHY: CPT | Mod: CPTII,S$GLB,, | Performed by: INTERNAL MEDICINE

## 2023-06-26 PROCEDURE — 3008F PR BODY MASS INDEX (BMI) DOCUMENTED: ICD-10-PCS | Mod: CPTII,S$GLB,, | Performed by: INTERNAL MEDICINE

## 2023-06-26 PROCEDURE — 3066F PR DOCUMENTATION OF TREATMENT FOR NEPHROPATHY: ICD-10-PCS | Mod: CPTII,S$GLB,, | Performed by: INTERNAL MEDICINE

## 2023-06-26 PROCEDURE — 3072F PR LOW RISK FOR RETINOPATHY: ICD-10-PCS | Mod: CPTII,S$GLB,, | Performed by: INTERNAL MEDICINE

## 2023-06-26 PROCEDURE — 3288F FALL RISK ASSESSMENT DOCD: CPT | Mod: CPTII,S$GLB,, | Performed by: INTERNAL MEDICINE

## 2023-06-26 PROCEDURE — 1101F PR PT FALLS ASSESS DOC 0-1 FALLS W/OUT INJ PAST YR: ICD-10-PCS | Mod: CPTII,S$GLB,, | Performed by: INTERNAL MEDICINE

## 2023-06-26 PROCEDURE — 1126F PR PAIN SEVERITY QUANTIFIED, NO PAIN PRESENT: ICD-10-PCS | Mod: CPTII,S$GLB,, | Performed by: INTERNAL MEDICINE

## 2023-06-26 NOTE — PROGRESS NOTES
Renal clinic consult note:  Date of consult: 6/26/23  Reason for consult: s Cr fluctuations, CKD  Referring physician: Dr. Lemus     HPI: Pt is a 67 y/o male who presents for f/u of mild renal issues. He was initially referred for s Cr changes between 1.2 and 1.6 in the past 2 years. Pt has a pertinent h/o of DM, HTN, obesity, and metabolic syndrome. He was last seen in renal clinic 1 year ago. His BP was high then, and losartan which he had been on before was re-started.     On this visit today, pt currently feels well, no acute or new c/o's, no SOB. Diet, life style, physical activity reviewed with pt. No new gout attack reported.       PAST MEDICAL HISTORY:  CKD stage 2-3, DM-2, HTN, obesity, gout, Hyperlipidemia, Umbilical hernia.     PAST SURGICAL HISTORY:  He  has a past surgical history that includes G U surgery (1982).     SOCIAL HISTORY:  He  reports that he has quit smoking. He has never used smokeless tobacco. He reports that he does not drink alcohol and does not use drugs.     FAMILY MEDICAL HISTORY:  His family history includes Heart failure in his mother; Hypertension in his mother.     Review of patient's allergies indicates:  No Known Allergies     Meds reviewed    Current Outpatient Medications:     amLODIPine (NORVASC) 10 MG tablet, Take 1 tablet (10 mg total) by mouth once daily., Disp: 90 tablet, Rfl: 2    atorvastatin (LIPITOR) 40 MG tablet, Take 1 tablet (40 mg total) by mouth once daily., Disp: 90 tablet, Rfl: 1    blood sugar diagnostic Strp, To check BG 3 times daily, to use with insurance preferred meter, Disp: 200 each, Rfl: 3    blood sugar diagnostic Strp, 1 each by Misc.(Non-Drug; Combo Route) route once daily., Disp: 100 each, Rfl: 3    blood-glucose meter kit, To check BG 3 times daily, to use with insurance preferred meter, Disp: 1 each, Rfl: 0    colchicine (COLCRYS) 0.6 mg tablet, Take 1 tablet (0.6 mg total) by mouth once daily., Disp: 30 tablet, Rfl: 11    flash glucose  "scanning reader (FREESTYLE KRISTY 14 DAY READER) Misc, 1 each by Misc.(Non-Drug; Combo Route) route continuous., Disp: 6 each, Rfl: 3    flash glucose sensor (FREESTYLE KRISTY 14 DAY SENSOR) Kit, 1 each by Misc.(Non-Drug; Combo Route) route continuous., Disp: 6 kit, Rfl: 3    insulin syringe-needle U-100 0.3 mL 31 gauge x 1/4" Syrg, 1 each by Misc.(Non-Drug; Combo Route) route 2 (two) times a day., Disp: 100 each, Rfl: 5    ketoconazole (NIZORAL) 2 % cream, Apply topically once daily., Disp: 60 g, Rfl: 1    lancets Misc, To check BG 3 times daily, to use with insurance preferred meter, Disp: 200 each, Rfl: 3    lancets Misc, To check BG 3 times daily, to use with insurance preferred meter, Disp: 100 each, Rfl: 3    LANTUS SOLOSTAR U-100 INSULIN glargine 100 units/mL SubQ pen, Inject 50 Units into the skin 2 (two) times daily., Disp: 90 mL, Rfl: 1    losartan (COZAAR) 25 MG tablet, Take 1 tablet (25 mg total) by mouth once daily., Disp: 90 tablet, Rfl: 3    metFORMIN (GLUCOPHAGE) 500 MG tablet, Take 1 tablet (500 mg total) by mouth 2 (two) times daily with meals., Disp: 180 tablet, Rfl: 3    mupirocin (BACTROBAN) 2 % ointment, Apply topically once daily., Disp: 30 g, Rfl: 1    prednisoLONE acetate (PRED FORTE) 1 % DrpS, Place 1 drop into the right eye 4 (four) times daily., Disp: 5 mL, Rfl: 1    pregabalin (LYRICA) 75 MG capsule, Take 1 capsule (75 mg total) by mouth every evening., Disp: 30 capsule, Rfl: 1    triamcinolone acetonide 0.1% (KENALOG) 0.1 % cream, Apply topically 2 (two) times daily., Disp: 28.4 g, Rfl: 0    empagliflozin (JARDIANCE) 10 mg tablet, Take 1 tablet (10 mg total) by mouth once daily., Disp: 30 tablet, Rfl: 11          REVIEW OF SYSTEMS:  Patient has no fever, fatigue, visual changes, chest pain, edema, cough, dyspnea, nausea, vomiting, constipation, diarrhea, arthralgias, pruritis, dizziness, weakness, depression, confusion.     PHYSICAL EXAM:  Blood pressure 132/70, pulse 74, resp. rate 18, " "height 5' 11" (1.803 m), weight (!) 145.4 kg (320 lb 8.8 oz). Wt 1 year ago was 296 lbs  Gen:    WDWN male in no apparent distress  Psych: Normal mood and affect  Skin:    No rashes or ulcers  Neck:   No JVD  Chest:  Clear with no rales, rhonchi, wheezing with normal effort  CV:      Bradycardia. Regular with no murmurs, gallops or rubs  Abd:     Soft, nontender, no distension  Ext:      No edema     Labs reviewed  BMP  Lab Results   Component Value Date     06/22/2023    K 4.9 06/22/2023     06/22/2023    CO2 21 (L) 06/22/2023    BUN 21 06/22/2023    CREATININE 1.4 06/22/2023    CALCIUM 9.1 06/22/2023    ANIONGAP 12 06/22/2023    EGFRNORACEVR 54.4 (A) 06/22/2023     Lab Results   Component Value Date    WBC 7.56 06/22/2023    HGB 15.6 06/22/2023    HCT 48.7 06/22/2023    MCV 92 06/22/2023     06/22/2023     HgA1C 6.6%m, was as high as 13 in the past  Urine p/Cr 60 mg         IMPRESSION AND RECOMMENDATIONS: 67 y/o male presented for f/u of renal issues related to DM and metabolic syndrome:     1. Renal: s Cr stable, tends to have fluctuations.  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 HTN: BP was uncontrolled on last visit (SBP was 190's).   BP now controlled after adding losartan.  Continue losartan  Advise pt to avoid salty foods  Advised pt not to skip meds    3. DM: metabolic syndrome  Chart reviewed, meds reviewed  Life style, healthy living discussed with pt  Recommend add a SGLT-2 inhibitor, cardiac and renal protection and side effects discussed  Pt agreeable, will add jardiane 10 mg po qd  Continue ARB for diabetic nephropathy     4. Bradycardia: tends to have a lower HR, not today  Normal rhythm.  Reason not clear  No on any beta " blockers  (previsouly played sports when young), low HR may be conditional      5. Gout: was given printed list of risk factor foods  Diet discussed     6. Life style, for healthier living reviewed with pt  Wt control is advised.        Plans and recommendations:  As discussed above  Total time spent 40 minutes including time needed to review the records, the   patient evaluation, documentation, face-to-face discussion with the patient,   more than 50% of the time was spent on coordination of care and counseling.    Level V visit.  RTC 3-4 months     Janna Lizama MD

## 2023-06-27 ENCOUNTER — TELEPHONE (OUTPATIENT)
Dept: INTERNAL MEDICINE | Facility: CLINIC | Age: 68
End: 2023-06-27
Payer: MEDICARE

## 2023-06-27 NOTE — TELEPHONE ENCOUNTER
Returned call to patient regarding tongue swelling. No other symptoms noted. Advised to seek emergency medical treatment at local ER patient voiced understanding.

## 2023-06-28 RX ORDER — LOSARTAN POTASSIUM 25 MG/1
TABLET ORAL
Qty: 90 TABLET | Refills: 3 | Status: SHIPPED | OUTPATIENT
Start: 2023-06-28 | End: 2023-06-30 | Stop reason: SDUPTHER

## 2023-06-30 ENCOUNTER — OFFICE VISIT (OUTPATIENT)
Dept: CARDIOLOGY | Facility: CLINIC | Age: 68
End: 2023-06-30
Payer: MEDICARE

## 2023-06-30 ENCOUNTER — HOSPITAL ENCOUNTER (OUTPATIENT)
Dept: CARDIOLOGY | Facility: HOSPITAL | Age: 68
Discharge: HOME | End: 2023-06-30
Attending: INTERNAL MEDICINE
Payer: MEDICARE

## 2023-06-30 VITALS
SYSTOLIC BLOOD PRESSURE: 178 MMHG | OXYGEN SATURATION: 98 % | BODY MASS INDEX: 44.1 KG/M2 | DIASTOLIC BLOOD PRESSURE: 90 MMHG | HEART RATE: 57 BPM | HEIGHT: 71 IN | WEIGHT: 315 LBS

## 2023-06-30 DIAGNOSIS — R94.31 EKG ABNORMALITIES: ICD-10-CM

## 2023-06-30 DIAGNOSIS — E78.2 MIXED HYPERLIPIDEMIA: Chronic | ICD-10-CM

## 2023-06-30 DIAGNOSIS — E11.69 DIABETES MELLITUS TYPE 2 IN OBESE: ICD-10-CM

## 2023-06-30 DIAGNOSIS — I10 HYPERTENSION, UNSPECIFIED TYPE: ICD-10-CM

## 2023-06-30 DIAGNOSIS — I10 PRIMARY HYPERTENSION: ICD-10-CM

## 2023-06-30 DIAGNOSIS — I71.40 ABDOMINAL AORTIC ANEURYSM (AAA) WITHOUT RUPTURE, UNSPECIFIED PART: ICD-10-CM

## 2023-06-30 DIAGNOSIS — I70.0 ATHEROSCLEROSIS OF AORTA: ICD-10-CM

## 2023-06-30 DIAGNOSIS — E66.9 DIABETES MELLITUS TYPE 2 IN OBESE: ICD-10-CM

## 2023-06-30 DIAGNOSIS — R07.9 CHEST PAIN, UNSPECIFIED TYPE: Primary | ICD-10-CM

## 2023-06-30 PROCEDURE — 1160F PR REVIEW ALL MEDS BY PRESCRIBER/CLIN PHARMACIST DOCUMENTED: ICD-10-PCS | Mod: CPTII,S$GLB,, | Performed by: INTERNAL MEDICINE

## 2023-06-30 PROCEDURE — 3288F FALL RISK ASSESSMENT DOCD: CPT | Mod: CPTII,S$GLB,, | Performed by: INTERNAL MEDICINE

## 2023-06-30 PROCEDURE — 3044F PR MOST RECENT HEMOGLOBIN A1C LEVEL <7.0%: ICD-10-PCS | Mod: CPTII,S$GLB,, | Performed by: INTERNAL MEDICINE

## 2023-06-30 PROCEDURE — 93010 EKG 12-LEAD: ICD-10-PCS | Mod: ,,, | Performed by: INTERNAL MEDICINE

## 2023-06-30 PROCEDURE — 3072F LOW RISK FOR RETINOPATHY: CPT | Mod: CPTII,S$GLB,, | Performed by: INTERNAL MEDICINE

## 2023-06-30 PROCEDURE — 1126F PR PAIN SEVERITY QUANTIFIED, NO PAIN PRESENT: ICD-10-PCS | Mod: CPTII,S$GLB,, | Performed by: INTERNAL MEDICINE

## 2023-06-30 PROCEDURE — 93005 ELECTROCARDIOGRAM TRACING: CPT

## 2023-06-30 PROCEDURE — 3008F BODY MASS INDEX DOCD: CPT | Mod: CPTII,S$GLB,, | Performed by: INTERNAL MEDICINE

## 2023-06-30 PROCEDURE — 4010F ACE/ARB THERAPY RXD/TAKEN: CPT | Mod: CPTII,S$GLB,, | Performed by: INTERNAL MEDICINE

## 2023-06-30 PROCEDURE — 4010F PR ACE/ARB THEARPY RXD/TAKEN: ICD-10-PCS | Mod: CPTII,S$GLB,, | Performed by: INTERNAL MEDICINE

## 2023-06-30 PROCEDURE — 93010 ELECTROCARDIOGRAM REPORT: CPT | Mod: ,,, | Performed by: INTERNAL MEDICINE

## 2023-06-30 PROCEDURE — 3080F DIAST BP >= 90 MM HG: CPT | Mod: CPTII,S$GLB,, | Performed by: INTERNAL MEDICINE

## 2023-06-30 PROCEDURE — 3044F HG A1C LEVEL LT 7.0%: CPT | Mod: CPTII,S$GLB,, | Performed by: INTERNAL MEDICINE

## 2023-06-30 PROCEDURE — 3077F SYST BP >= 140 MM HG: CPT | Mod: CPTII,S$GLB,, | Performed by: INTERNAL MEDICINE

## 2023-06-30 PROCEDURE — 1101F PT FALLS ASSESS-DOCD LE1/YR: CPT | Mod: CPTII,S$GLB,, | Performed by: INTERNAL MEDICINE

## 2023-06-30 PROCEDURE — 1101F PR PT FALLS ASSESS DOC 0-1 FALLS W/OUT INJ PAST YR: ICD-10-PCS | Mod: CPTII,S$GLB,, | Performed by: INTERNAL MEDICINE

## 2023-06-30 PROCEDURE — 1159F PR MEDICATION LIST DOCUMENTED IN MEDICAL RECORD: ICD-10-PCS | Mod: CPTII,S$GLB,, | Performed by: INTERNAL MEDICINE

## 2023-06-30 PROCEDURE — 99215 OFFICE O/P EST HI 40 MIN: CPT | Mod: 25,S$GLB,, | Performed by: INTERNAL MEDICINE

## 2023-06-30 PROCEDURE — 99999 PR PBB SHADOW E&M-EST. PATIENT-LVL IV: ICD-10-PCS | Mod: PBBFAC,,, | Performed by: INTERNAL MEDICINE

## 2023-06-30 PROCEDURE — 3008F PR BODY MASS INDEX (BMI) DOCUMENTED: ICD-10-PCS | Mod: CPTII,S$GLB,, | Performed by: INTERNAL MEDICINE

## 2023-06-30 PROCEDURE — 3080F PR MOST RECENT DIASTOLIC BLOOD PRESSURE >= 90 MM HG: ICD-10-PCS | Mod: CPTII,S$GLB,, | Performed by: INTERNAL MEDICINE

## 2023-06-30 PROCEDURE — 99999 PR PBB SHADOW E&M-EST. PATIENT-LVL IV: CPT | Mod: PBBFAC,,, | Performed by: INTERNAL MEDICINE

## 2023-06-30 PROCEDURE — 3288F PR FALLS RISK ASSESSMENT DOCUMENTED: ICD-10-PCS | Mod: CPTII,S$GLB,, | Performed by: INTERNAL MEDICINE

## 2023-06-30 PROCEDURE — 99215 PR OFFICE/OUTPT VISIT, EST, LEVL V, 40-54 MIN: ICD-10-PCS | Mod: 25,S$GLB,, | Performed by: INTERNAL MEDICINE

## 2023-06-30 PROCEDURE — 3066F PR DOCUMENTATION OF TREATMENT FOR NEPHROPATHY: ICD-10-PCS | Mod: CPTII,S$GLB,, | Performed by: INTERNAL MEDICINE

## 2023-06-30 PROCEDURE — 1160F RVW MEDS BY RX/DR IN RCRD: CPT | Mod: CPTII,S$GLB,, | Performed by: INTERNAL MEDICINE

## 2023-06-30 PROCEDURE — 3066F NEPHROPATHY DOC TX: CPT | Mod: CPTII,S$GLB,, | Performed by: INTERNAL MEDICINE

## 2023-06-30 PROCEDURE — 3077F PR MOST RECENT SYSTOLIC BLOOD PRESSURE >= 140 MM HG: ICD-10-PCS | Mod: CPTII,S$GLB,, | Performed by: INTERNAL MEDICINE

## 2023-06-30 PROCEDURE — 1126F AMNT PAIN NOTED NONE PRSNT: CPT | Mod: CPTII,S$GLB,, | Performed by: INTERNAL MEDICINE

## 2023-06-30 PROCEDURE — 1159F MED LIST DOCD IN RCRD: CPT | Mod: CPTII,S$GLB,, | Performed by: INTERNAL MEDICINE

## 2023-06-30 PROCEDURE — 3072F PR LOW RISK FOR RETINOPATHY: ICD-10-PCS | Mod: CPTII,S$GLB,, | Performed by: INTERNAL MEDICINE

## 2023-06-30 RX ORDER — LOSARTAN POTASSIUM 50 MG/1
50 TABLET ORAL DAILY
Qty: 90 TABLET | Refills: 3 | Status: SHIPPED | OUTPATIENT
Start: 2023-06-30 | End: 2023-12-04

## 2023-06-30 NOTE — PROGRESS NOTES
Subjective:   Patient ID:  Tom Tavarez is a 68 y.o. male who presents for follow up of [facility]        67 yo male, 1 yr f/u  PMH HTN HKD DM since 2006, obesity    ekg NSR and PAC  Recent medicine visit showed HR at 50's  No dizziness faint chest pain and dyspnea  Occasional fatigue after h/o COVID 19 in .   Will start [employer] work  No smoking/drinking    Interval history  C/o chest pain twice a month, dull pain, minutes, caused by emotional stress.   BP high and fluctuating. BP controlled at home  med compliance\  Ekg today NSR  poor R progression on precordial leads.         Past Medical History:   Diagnosis Date    BMI 40.0-44.9, adult 07/21/2016    Cataract     Colon cancer screening 10/03/2017    Diabetes mellitus type 2 in obese 11/2006     am 05/09/2023 Insulin x 10 Years    Hyperlipidemia     Hypertension 1985    Obesity, Class III, BMI 40-49.9 (morbid obesity) 1985    Umbilical hernia        Past Surgical History:   Procedure Laterality Date    G U surgery  1982    biopsy of testicle infertility w/u       Social History     Tobacco Use    Smoking status: Former    Smokeless tobacco: Never    Tobacco comments:     1998 quit    Substance Use Topics    Alcohol use: No     Alcohol/week: 0.0 standard drinks    Drug use: No       Family History   Problem Relation Age of Onset    Hypertension Mother     Heart failure Mother     Diabetes Brother          ROS    Objective:   Physical Exam  HENT:      Head: Normocephalic.   Eyes:      Pupils: Pupils are equal, round, and reactive to light.   Neck:      Thyroid: No thyromegaly.      Vascular: Normal carotid pulses. No carotid bruit or JVD.   Cardiovascular:      Rate and Rhythm: Normal rate and regular rhythm. No extrasystoles are present.     Chest Wall: PMI is not displaced.      Pulses: Normal pulses.      Heart sounds: Normal heart sounds. No murmur heard.    No gallop. No S3 sounds.   Pulmonary:      Effort: No respiratory  distress.      Breath sounds: Normal breath sounds. No stridor.   Abdominal:      General: Bowel sounds are normal.      Palpations: Abdomen is soft.      Tenderness: There is no abdominal tenderness. There is no rebound.   Musculoskeletal:         General: Normal range of motion.   Skin:     Findings: No rash.   Neurological:      Mental Status: He is alert and oriented to person, place, and time.   Psychiatric:         Behavior: Behavior normal.       Lab Results   Component Value Date    CHOL 212 (H) 03/24/2023    CHOL 115 (L) 01/18/2022    CHOL 172 10/08/2020     Lab Results   Component Value Date    HDL 32 (L) 03/24/2023    HDL 38 (L) 01/18/2022    HDL 33 (L) 10/08/2020     Lab Results   Component Value Date    LDLCALC 147.4 03/24/2023    LDLCALC 56.2 (L) 01/18/2022    LDLCALC 116.4 10/08/2020     Lab Results   Component Value Date    TRIG 163 (H) 03/24/2023    TRIG 104 01/18/2022    TRIG 113 10/08/2020     Lab Results   Component Value Date    CHOLHDL 15.1 (L) 03/24/2023    CHOLHDL 33.0 01/18/2022    CHOLHDL 19.2 (L) 10/08/2020       Chemistry        Component Value Date/Time     06/22/2023 1008    K 4.9 06/22/2023 1008     06/22/2023 1008    CO2 21 (L) 06/22/2023 1008    BUN 21 06/22/2023 1008    CREATININE 1.4 06/22/2023 1008     06/22/2023 1008        Component Value Date/Time    CALCIUM 9.1 06/22/2023 1008    ALKPHOS 71 09/13/2022 1158    AST 16 09/13/2022 1158    ALT 12 09/13/2022 1158    BILITOT 0.6 09/13/2022 1158    ESTGFRAFRICA >60.0 06/03/2022 1335    EGFRNONAA >60.0 06/03/2022 1335          Lab Results   Component Value Date    HGBA1C 6.6 (H) 03/24/2023     Lab Results   Component Value Date    TSH 2.012 10/08/2020     No results found for: INR, PROTIME  Lab Results   Component Value Date    WBC 7.56 06/22/2023    HGB 15.6 06/22/2023    HCT 48.7 06/22/2023    MCV 92 06/22/2023     06/22/2023     BMP  Sodium   Date Value Ref Range Status   06/22/2023 141 136 - 145 mmol/L  Final     Potassium   Date Value Ref Range Status   06/22/2023 4.9 3.5 - 5.1 mmol/L Final     Chloride   Date Value Ref Range Status   06/22/2023 108 95 - 110 mmol/L Final     CO2   Date Value Ref Range Status   06/22/2023 21 (L) 23 - 29 mmol/L Final     BUN   Date Value Ref Range Status   06/22/2023 21 8 - 23 mg/dL Final     Creatinine   Date Value Ref Range Status   06/22/2023 1.4 0.5 - 1.4 mg/dL Final     Calcium   Date Value Ref Range Status   06/22/2023 9.1 8.7 - 10.5 mg/dL Final     Anion Gap   Date Value Ref Range Status   06/22/2023 12 8 - 16 mmol/L Final     eGFR if    Date Value Ref Range Status   06/03/2022 >60.0 >60 mL/min/1.73 m^2 Final     eGFR if non    Date Value Ref Range Status   06/03/2022 >60.0 >60 mL/min/1.73 m^2 Final     Comment:     Calculation used to obtain the estimated glomerular filtration  rate (eGFR) is the CKD-EPI equation.        BNP  @LABRCNTIP(BNP,BNPTRIAGEBLO)@  @LABRCNTIP(troponini)@  CrCl cannot be calculated (Patient's most recent lab result is older than the maximum 7 days allowed.).  No results found in the last 24 hours.  No results found in the last 24 hours.  No results found in the last 24 hours.    Assessment:      1. Chest pain, unspecified type    2. Primary hypertension    3. Mixed hyperlipidemia    4. Atherosclerosis of aorta    5. Abdominal aortic aneurysm (AAA) without rupture, unspecified part    6. Diabetes mellitus type 2 in obese    7. EKG abnormalities        Plan:   Increase Losartan to 50 mg daily  Continue statin and coreg  Phar MPI for cp t/ r/o ischemia    DM Rx per PCP  Counseled DASH  Check Lipid profile with PCP in 6 months  Recommend heart-healthy diet, weight control and regular exercise.  Zhou. Risk modification.   I have reviewed all pertinent labs and cardiac studies independently. Plans and recommendations have been formulated under my direct supervision. All questions answered and patient voiced understanding.    If symptoms persist go to the ED  RTC in 12 months

## 2023-07-02 DIAGNOSIS — E78.5 HYPERLIPIDEMIA, UNSPECIFIED HYPERLIPIDEMIA TYPE: ICD-10-CM

## 2023-07-02 RX ORDER — ATORVASTATIN CALCIUM 40 MG/1
TABLET, FILM COATED ORAL
Qty: 90 TABLET | Refills: 0 | Status: SHIPPED | OUTPATIENT
Start: 2023-07-02 | End: 2023-10-17

## 2023-07-02 NOTE — TELEPHONE ENCOUNTER
Provider Staff:  Action required for this patient     Please see care gap opportunities below in Care Due Message.    Thanks!  [facility]     Appointments      Date Provider   Last Visit   6/6/2023 Adolfo Lemus MD   Next Visit   7/19/2023 Adolfo Lemus MD     Refill Decision Note   Tom Tavarez  is requesting a refill authorization.  Brief Assessment and Rationale for Refill:  Approve     Medication Therapy Plan:         Comments:     Note composed:3:21 PM 07/02/2023             Appointments     Last Visit   6/6/2023 Adolfo Lemus MD   Next Visit   7/19/2023 Adolfo Lemus MD

## 2023-07-02 NOTE — TELEPHONE ENCOUNTER
Care Due:                  Date            Visit Type   Department     Provider  --------------------------------------------------------------------------------                                EP -                              PRIMARY      [facility] INTERNAL  Last Visit: 06-      CARE ([facility])   LLOYD Lemus                              EP -                              PRIMARY      [facility] INTERNAL  Next Visit: 07-      CARE ([facility])   LLOYD Lemus                                                            Last  Test          Frequency    Reason                     Performed    Due Date  --------------------------------------------------------------------------------    HBA1C.......  6 months...  LANTUS, metFORMIN........  03- 09-    [facility] Embedded Care Due Messages. Reference number: [id number].   7/02/2023 8:08:04 AM CDT

## 2023-07-19 ENCOUNTER — OFFICE VISIT (OUTPATIENT)
Dept: INTERNAL MEDICINE | Facility: CLINIC | Age: 68
End: 2023-07-19
Payer: MEDICARE

## 2023-07-19 VITALS
SYSTOLIC BLOOD PRESSURE: 132 MMHG | DIASTOLIC BLOOD PRESSURE: 78 MMHG | OXYGEN SATURATION: 98 % | BODY MASS INDEX: 44.1 KG/M2 | WEIGHT: 315 LBS | RESPIRATION RATE: 18 BRPM | HEIGHT: 71 IN | HEART RATE: 61 BPM

## 2023-07-19 DIAGNOSIS — E11.9 CONTROLLED TYPE 2 DIABETES MELLITUS WITHOUT COMPLICATION, WITH LONG-TERM CURRENT USE OF INSULIN: ICD-10-CM

## 2023-07-19 DIAGNOSIS — I10 PRIMARY HYPERTENSION: Primary | ICD-10-CM

## 2023-07-19 DIAGNOSIS — R07.9 CHEST PAIN, UNSPECIFIED TYPE: ICD-10-CM

## 2023-07-19 DIAGNOSIS — Z79.4 CONTROLLED TYPE 2 DIABETES MELLITUS WITHOUT COMPLICATION, WITH LONG-TERM CURRENT USE OF INSULIN: ICD-10-CM

## 2023-07-19 DIAGNOSIS — L98.9 LEG SKIN LESION, RIGHT: ICD-10-CM

## 2023-07-19 DIAGNOSIS — R22.0 LIP SWELLING: ICD-10-CM

## 2023-07-19 PROCEDURE — 3008F PR BODY MASS INDEX (BMI) DOCUMENTED: ICD-10-PCS | Mod: CPTII,S$GLB,, | Performed by: FAMILY MEDICINE

## 2023-07-19 PROCEDURE — 3072F LOW RISK FOR RETINOPATHY: CPT | Mod: CPTII,S$GLB,, | Performed by: FAMILY MEDICINE

## 2023-07-19 PROCEDURE — 99214 PR OFFICE/OUTPT VISIT, EST, LEVL IV, 30-39 MIN: ICD-10-PCS | Mod: S$GLB,,, | Performed by: FAMILY MEDICINE

## 2023-07-19 PROCEDURE — 1126F PR PAIN SEVERITY QUANTIFIED, NO PAIN PRESENT: ICD-10-PCS | Mod: CPTII,S$GLB,, | Performed by: FAMILY MEDICINE

## 2023-07-19 PROCEDURE — 1159F MED LIST DOCD IN RCRD: CPT | Mod: CPTII,S$GLB,, | Performed by: FAMILY MEDICINE

## 2023-07-19 PROCEDURE — 3078F DIAST BP <80 MM HG: CPT | Mod: CPTII,S$GLB,, | Performed by: FAMILY MEDICINE

## 2023-07-19 PROCEDURE — 1101F PT FALLS ASSESS-DOCD LE1/YR: CPT | Mod: CPTII,S$GLB,, | Performed by: FAMILY MEDICINE

## 2023-07-19 PROCEDURE — 1159F PR MEDICATION LIST DOCUMENTED IN MEDICAL RECORD: ICD-10-PCS | Mod: CPTII,S$GLB,, | Performed by: FAMILY MEDICINE

## 2023-07-19 PROCEDURE — 3008F BODY MASS INDEX DOCD: CPT | Mod: CPTII,S$GLB,, | Performed by: FAMILY MEDICINE

## 2023-07-19 PROCEDURE — 3044F HG A1C LEVEL LT 7.0%: CPT | Mod: CPTII,S$GLB,, | Performed by: FAMILY MEDICINE

## 2023-07-19 PROCEDURE — 1101F PR PT FALLS ASSESS DOC 0-1 FALLS W/OUT INJ PAST YR: ICD-10-PCS | Mod: CPTII,S$GLB,, | Performed by: FAMILY MEDICINE

## 2023-07-19 PROCEDURE — 3044F PR MOST RECENT HEMOGLOBIN A1C LEVEL <7.0%: ICD-10-PCS | Mod: CPTII,S$GLB,, | Performed by: FAMILY MEDICINE

## 2023-07-19 PROCEDURE — 3288F FALL RISK ASSESSMENT DOCD: CPT | Mod: CPTII,S$GLB,, | Performed by: FAMILY MEDICINE

## 2023-07-19 PROCEDURE — 3075F PR MOST RECENT SYSTOLIC BLOOD PRESS GE 130-139MM HG: ICD-10-PCS | Mod: CPTII,S$GLB,, | Performed by: FAMILY MEDICINE

## 2023-07-19 PROCEDURE — 4010F ACE/ARB THERAPY RXD/TAKEN: CPT | Mod: CPTII,S$GLB,, | Performed by: FAMILY MEDICINE

## 2023-07-19 PROCEDURE — 3072F PR LOW RISK FOR RETINOPATHY: ICD-10-PCS | Mod: CPTII,S$GLB,, | Performed by: FAMILY MEDICINE

## 2023-07-19 PROCEDURE — 3075F SYST BP GE 130 - 139MM HG: CPT | Mod: CPTII,S$GLB,, | Performed by: FAMILY MEDICINE

## 2023-07-19 PROCEDURE — 3066F NEPHROPATHY DOC TX: CPT | Mod: CPTII,S$GLB,, | Performed by: FAMILY MEDICINE

## 2023-07-19 PROCEDURE — 1126F AMNT PAIN NOTED NONE PRSNT: CPT | Mod: CPTII,S$GLB,, | Performed by: FAMILY MEDICINE

## 2023-07-19 PROCEDURE — 87070 CULTURE OTHR SPECIMN AEROBIC: CPT | Performed by: FAMILY MEDICINE

## 2023-07-19 PROCEDURE — 3066F PR DOCUMENTATION OF TREATMENT FOR NEPHROPATHY: ICD-10-PCS | Mod: CPTII,S$GLB,, | Performed by: FAMILY MEDICINE

## 2023-07-19 PROCEDURE — 3288F PR FALLS RISK ASSESSMENT DOCUMENTED: ICD-10-PCS | Mod: CPTII,S$GLB,, | Performed by: FAMILY MEDICINE

## 2023-07-19 PROCEDURE — 99999 PR PBB SHADOW E&M-EST. PATIENT-LVL IV: CPT | Mod: PBBFAC,,, | Performed by: FAMILY MEDICINE

## 2023-07-19 PROCEDURE — 99214 OFFICE O/P EST MOD 30 MIN: CPT | Mod: S$GLB,,, | Performed by: FAMILY MEDICINE

## 2023-07-19 PROCEDURE — 4010F PR ACE/ARB THEARPY RXD/TAKEN: ICD-10-PCS | Mod: CPTII,S$GLB,, | Performed by: FAMILY MEDICINE

## 2023-07-19 PROCEDURE — 3078F PR MOST RECENT DIASTOLIC BLOOD PRESSURE < 80 MM HG: ICD-10-PCS | Mod: CPTII,S$GLB,, | Performed by: FAMILY MEDICINE

## 2023-07-19 PROCEDURE — 99999 PR PBB SHADOW E&M-EST. PATIENT-LVL IV: ICD-10-PCS | Mod: PBBFAC,,, | Performed by: FAMILY MEDICINE

## 2023-07-19 RX ORDER — DOXYCYCLINE HYCLATE 100 MG
100 TABLET ORAL 2 TIMES DAILY
Qty: 20 TABLET | Refills: 0 | Status: SHIPPED | OUTPATIENT
Start: 2023-07-19

## 2023-07-19 RX ORDER — GABAPENTIN 300 MG/1
CAPSULE ORAL
Qty: 270 CAPSULE | Refills: 1 | Status: SHIPPED | OUTPATIENT
Start: 2023-07-19 | End: 2023-10-23

## 2023-07-19 RX ORDER — GABAPENTIN 300 MG/1
300 CAPSULE ORAL 2 TIMES DAILY
COMMUNITY
Start: 2023-06-01 | End: 2023-07-19 | Stop reason: SDUPTHER

## 2023-07-19 NOTE — PROGRESS NOTES
Subjective:       Patient ID: Tom Tavarez is a 68 y.o. male.    Chief Complaint: Follow-up    Follow-up hypertension diabetes chest skin infection swelling of his tongue.  Cardiology evaluation was done.  Nuclear stress test is ordered.  He occasional chest discomfort radiating to his jaws.  A1c is up-to-date good.  Blood pressure is now controlled at 1332/78.  He reports tongue swelling started before he took Bactrim DS.    Follow-up  Associated symptoms include chest pain. Pertinent negatives include no abdominal pain, chills, coughing, fever, headaches or weakness.   Review of Systems   Constitutional:  Negative for chills and fever.   Respiratory:  Negative for cough, chest tightness, shortness of breath and wheezing.    Cardiovascular:  Positive for chest pain. Negative for palpitations and leg swelling.   Gastrointestinal:  Negative for abdominal distention and abdominal pain.   Endocrine: Negative for polydipsia and polyuria.   Skin:         Several weeks duration of infected the right anterior ankle as well as left anterior ankle   Neurological:  Negative for dizziness, weakness, light-headedness and headaches.     Objective:      Physical Exam  Constitutional:       General: He is not in acute distress.     Appearance: He is not ill-appearing or diaphoretic.   Cardiovascular:      Rate and Rhythm: Normal rate and regular rhythm.      Heart sounds: No murmur heard.    No gallop.   Pulmonary:      Effort: Pulmonary effort is normal. No respiratory distress.      Breath sounds: No wheezing, rhonchi or rales.   Abdominal:      General: There is no distension.   Lymphadenopathy:      Cervical: No cervical adenopathy.   Skin:     Comments: Has about a 2 3 inch round crusted area with slight discharge in the ankle anteriorly.  He has a smaller crusted area of the left anterior ankle   Neurological:      Mental Status: He is alert.       Lab Visit on 06/22/2023   Component Date Value Ref Range Status    WBC  06/22/2023 7.56  3.90 - 12.70 K/uL Final    RBC 06/22/2023 5.30  4.60 - 6.20 M/uL Final    Hemoglobin 06/22/2023 15.6  14.0 - 18.0 g/dL Final    Hematocrit 06/22/2023 48.7  40.0 - 54.0 % Final    MCV 06/22/2023 92  82 - 98 fL Final    MCH 06/22/2023 29.4  27.0 - 31.0 pg Final    MCHC 06/22/2023 32.0  32.0 - 36.0 g/dL Final    RDW 06/22/2023 15.0 (H)  11.5 - 14.5 % Final    Platelets 06/22/2023 216  150 - 450 K/uL Final    MPV 06/22/2023 11.2  9.2 - 12.9 fL Final    Immature Granulocytes 06/22/2023 0.3  0.0 - 0.5 % Final    Gran # (ANC) 06/22/2023 3.4  1.8 - 7.7 K/uL Final    Immature Grans (Abs) 06/22/2023 0.02  0.00 - 0.04 K/uL Final    Lymph # 06/22/2023 2.9  1.0 - 4.8 K/uL Final    Mono # 06/22/2023 0.6  0.3 - 1.0 K/uL Final    Eos # 06/22/2023 0.5  0.0 - 0.5 K/uL Final    Baso # 06/22/2023 0.05  0.00 - 0.20 K/uL Final    nRBC 06/22/2023 0  0 /100 WBC Final    Gran % 06/22/2023 44.8  38.0 - 73.0 % Final    Lymph % 06/22/2023 38.9  18.0 - 48.0 % Final    Mono % 06/22/2023 8.2  4.0 - 15.0 % Final    Eosinophil % 06/22/2023 7.1  0.0 - 8.0 % Final    Basophil % 06/22/2023 0.7  0.0 - 1.9 % Final    Differential Method 06/22/2023 Automated   Final    Glucose 06/22/2023 104  70 - 110 mg/dL Final    Sodium 06/22/2023 141  136 - 145 mmol/L Final    Potassium 06/22/2023 4.9  3.5 - 5.1 mmol/L Final    Chloride 06/22/2023 108  95 - 110 mmol/L Final    CO2 06/22/2023 21 (L)  23 - 29 mmol/L Final    BUN 06/22/2023 21  8 - 23 mg/dL Final    Calcium 06/22/2023 9.1  8.7 - 10.5 mg/dL Final    Creatinine 06/22/2023 1.4  0.5 - 1.4 mg/dL Final    Albumin 06/22/2023 3.4 (L)  3.5 - 5.2 g/dL Final    Phosphorus 06/22/2023 3.1  2.7 - 4.5 mg/dL Final    eGFR 06/22/2023 54.4 (A)  >60 mL/min/1.73 m^2 Final    Anion Gap 06/22/2023 12  8 - 16 mmol/L Final     Assessment:       1. Primary hypertension    2. Controlled type 2 diabetes mellitus without complication, with long-term current use of insulin    3. Leg skin lesion, right    4. Lip  swelling    5. Chest pain, unspecified type        Plan:     Blood sugar glucose controlled.  Culture of the ankle skin infection was done.  Start doxycycline twice a day.  Soap water twice a day for skin lesions.  Follow-up in 2 weeks.  Avoid exertional physical activity until nuclear stress test  completed    Primary hypertension    Controlled type 2 diabetes mellitus without complication, with long-term current use of insulin    Leg skin lesion, right    Lip swelling    Chest pain, unspecified type    Other orders  -     doxycycline (VIBRA-TABS) 100 MG tablet; Take 1 tablet (100 mg total) by mouth 2 (two) times daily.  Dispense: 20 tablet; Refill: 0  -     gabapentin (NEURONTIN) 300 MG capsule; Take 1 in the morning and 2 at bedtime  Dispense: 270 capsule; Refill: 1

## 2023-07-22 LAB — BACTERIA SPEC AEROBE CULT: NORMAL

## 2023-08-01 ENCOUNTER — OFFICE VISIT (OUTPATIENT)
Dept: INTERNAL MEDICINE | Facility: CLINIC | Age: 68
End: 2023-08-01
Payer: MEDICARE

## 2023-08-01 ENCOUNTER — OFFICE VISIT (OUTPATIENT)
Dept: DERMATOLOGY | Facility: CLINIC | Age: 68
End: 2023-08-01
Payer: MEDICARE

## 2023-08-01 VITALS
BODY MASS INDEX: 44.1 KG/M2 | DIASTOLIC BLOOD PRESSURE: 70 MMHG | WEIGHT: 315 LBS | HEART RATE: 60 BPM | SYSTOLIC BLOOD PRESSURE: 138 MMHG | HEIGHT: 71 IN | OXYGEN SATURATION: 97 % | TEMPERATURE: 98 F

## 2023-08-01 DIAGNOSIS — L98.9 LEG SKIN LESION, RIGHT: ICD-10-CM

## 2023-08-01 DIAGNOSIS — E66.01 OBESITY, CLASS III, BMI 40-49.9 (MORBID OBESITY): ICD-10-CM

## 2023-08-01 DIAGNOSIS — I10 PRIMARY HYPERTENSION: Primary | ICD-10-CM

## 2023-08-01 DIAGNOSIS — Z79.4 CONTROLLED TYPE 2 DIABETES MELLITUS WITHOUT COMPLICATION, WITH LONG-TERM CURRENT USE OF INSULIN: ICD-10-CM

## 2023-08-01 DIAGNOSIS — R26.9 GAIT ABNORMALITY: ICD-10-CM

## 2023-08-01 DIAGNOSIS — R22.0 LIP SWELLING: ICD-10-CM

## 2023-08-01 DIAGNOSIS — E11.9 CONTROLLED TYPE 2 DIABETES MELLITUS WITHOUT COMPLICATION, WITH LONG-TERM CURRENT USE OF INSULIN: ICD-10-CM

## 2023-08-01 DIAGNOSIS — L98.9 DISEASE OF SKIN AND SUBCUTANEOUS TISSUE: Primary | ICD-10-CM

## 2023-08-01 DIAGNOSIS — N18.30 STAGE 3 CHRONIC KIDNEY DISEASE, UNSPECIFIED WHETHER STAGE 3A OR 3B CKD: ICD-10-CM

## 2023-08-01 PROCEDURE — 1126F AMNT PAIN NOTED NONE PRSNT: CPT | Mod: CPTII,S$GLB,,

## 2023-08-01 PROCEDURE — 87070 CULTURE OTHR SPECIMN AEROBIC: CPT | Performed by: DERMATOLOGY

## 2023-08-01 PROCEDURE — 99999 PR PBB SHADOW E&M-EST. PATIENT-LVL III: ICD-10-PCS | Mod: PBBFAC,,, | Performed by: DERMATOLOGY

## 2023-08-01 PROCEDURE — 1159F PR MEDICATION LIST DOCUMENTED IN MEDICAL RECORD: ICD-10-PCS | Mod: CPTII,S$GLB,, | Performed by: DERMATOLOGY

## 2023-08-01 PROCEDURE — 3072F PR LOW RISK FOR RETINOPATHY: ICD-10-PCS | Mod: CPTII,S$GLB,, | Performed by: DERMATOLOGY

## 2023-08-01 PROCEDURE — 3072F LOW RISK FOR RETINOPATHY: CPT | Mod: CPTII,S$GLB,, | Performed by: DERMATOLOGY

## 2023-08-01 PROCEDURE — 3078F PR MOST RECENT DIASTOLIC BLOOD PRESSURE < 80 MM HG: ICD-10-PCS | Mod: CPTII,S$GLB,,

## 2023-08-01 PROCEDURE — 1101F PR PT FALLS ASSESS DOC 0-1 FALLS W/OUT INJ PAST YR: ICD-10-PCS | Mod: CPTII,S$GLB,, | Performed by: DERMATOLOGY

## 2023-08-01 PROCEDURE — 3044F HG A1C LEVEL LT 7.0%: CPT | Mod: CPTII,S$GLB,, | Performed by: DERMATOLOGY

## 2023-08-01 PROCEDURE — 3288F FALL RISK ASSESSMENT DOCD: CPT | Mod: CPTII,S$GLB,,

## 2023-08-01 PROCEDURE — 3066F NEPHROPATHY DOC TX: CPT | Mod: CPTII,S$GLB,,

## 2023-08-01 PROCEDURE — 3008F PR BODY MASS INDEX (BMI) DOCUMENTED: ICD-10-PCS | Mod: CPTII,S$GLB,,

## 2023-08-01 PROCEDURE — 87220 TISSUE EXAM FOR FUNGI: CPT | Mod: S$GLB,,, | Performed by: DERMATOLOGY

## 2023-08-01 PROCEDURE — 3044F PR MOST RECENT HEMOGLOBIN A1C LEVEL <7.0%: ICD-10-PCS | Mod: CPTII,S$GLB,,

## 2023-08-01 PROCEDURE — 1159F MED LIST DOCD IN RCRD: CPT | Mod: CPTII,S$GLB,, | Performed by: DERMATOLOGY

## 2023-08-01 PROCEDURE — 3288F PR FALLS RISK ASSESSMENT DOCUMENTED: ICD-10-PCS | Mod: CPTII,S$GLB,, | Performed by: DERMATOLOGY

## 2023-08-01 PROCEDURE — 1159F PR MEDICATION LIST DOCUMENTED IN MEDICAL RECORD: ICD-10-PCS | Mod: CPTII,S$GLB,,

## 2023-08-01 PROCEDURE — 1160F PR REVIEW ALL MEDS BY PRESCRIBER/CLIN PHARMACIST DOCUMENTED: ICD-10-PCS | Mod: CPTII,S$GLB,, | Performed by: DERMATOLOGY

## 2023-08-01 PROCEDURE — 3066F PR DOCUMENTATION OF TREATMENT FOR NEPHROPATHY: ICD-10-PCS | Mod: CPTII,S$GLB,, | Performed by: DERMATOLOGY

## 2023-08-01 PROCEDURE — 1101F PR PT FALLS ASSESS DOC 0-1 FALLS W/OUT INJ PAST YR: ICD-10-PCS | Mod: CPTII,S$GLB,,

## 2023-08-01 PROCEDURE — 3288F PR FALLS RISK ASSESSMENT DOCUMENTED: ICD-10-PCS | Mod: CPTII,S$GLB,,

## 2023-08-01 PROCEDURE — 3075F SYST BP GE 130 - 139MM HG: CPT | Mod: CPTII,S$GLB,,

## 2023-08-01 PROCEDURE — 99214 PR OFFICE/OUTPT VISIT, EST, LEVL IV, 30-39 MIN: ICD-10-PCS | Mod: S$GLB,,,

## 2023-08-01 PROCEDURE — 3008F BODY MASS INDEX DOCD: CPT | Mod: CPTII,S$GLB,,

## 2023-08-01 PROCEDURE — 99204 PR OFFICE/OUTPT VISIT, NEW, LEVL IV, 45-59 MIN: ICD-10-PCS | Mod: S$GLB,,, | Performed by: DERMATOLOGY

## 2023-08-01 PROCEDURE — 87077 CULTURE AEROBIC IDENTIFY: CPT | Performed by: DERMATOLOGY

## 2023-08-01 PROCEDURE — 3066F PR DOCUMENTATION OF TREATMENT FOR NEPHROPATHY: ICD-10-PCS | Mod: CPTII,S$GLB,,

## 2023-08-01 PROCEDURE — 87186 SC STD MICRODIL/AGAR DIL: CPT | Performed by: DERMATOLOGY

## 2023-08-01 PROCEDURE — 99999 PR PBB SHADOW E&M-EST. PATIENT-LVL V: CPT | Mod: PBBFAC,,,

## 2023-08-01 PROCEDURE — 3066F NEPHROPATHY DOC TX: CPT | Mod: CPTII,S$GLB,, | Performed by: DERMATOLOGY

## 2023-08-01 PROCEDURE — 1160F RVW MEDS BY RX/DR IN RCRD: CPT | Mod: CPTII,S$GLB,, | Performed by: DERMATOLOGY

## 2023-08-01 PROCEDURE — 3078F DIAST BP <80 MM HG: CPT | Mod: CPTII,S$GLB,,

## 2023-08-01 PROCEDURE — 99999 PR PBB SHADOW E&M-EST. PATIENT-LVL III: CPT | Mod: PBBFAC,,, | Performed by: DERMATOLOGY

## 2023-08-01 PROCEDURE — 3075F PR MOST RECENT SYSTOLIC BLOOD PRESS GE 130-139MM HG: ICD-10-PCS | Mod: CPTII,S$GLB,,

## 2023-08-01 PROCEDURE — 1126F PR PAIN SEVERITY QUANTIFIED, NO PAIN PRESENT: ICD-10-PCS | Mod: CPTII,S$GLB,, | Performed by: DERMATOLOGY

## 2023-08-01 PROCEDURE — 3072F PR LOW RISK FOR RETINOPATHY: ICD-10-PCS | Mod: CPTII,S$GLB,,

## 2023-08-01 PROCEDURE — 4010F ACE/ARB THERAPY RXD/TAKEN: CPT | Mod: CPTII,S$GLB,,

## 2023-08-01 PROCEDURE — 3044F PR MOST RECENT HEMOGLOBIN A1C LEVEL <7.0%: ICD-10-PCS | Mod: CPTII,S$GLB,, | Performed by: DERMATOLOGY

## 2023-08-01 PROCEDURE — 4010F PR ACE/ARB THEARPY RXD/TAKEN: ICD-10-PCS | Mod: CPTII,S$GLB,,

## 2023-08-01 PROCEDURE — 1101F PT FALLS ASSESS-DOCD LE1/YR: CPT | Mod: CPTII,S$GLB,,

## 2023-08-01 PROCEDURE — 3044F HG A1C LEVEL LT 7.0%: CPT | Mod: CPTII,S$GLB,,

## 2023-08-01 PROCEDURE — 1159F MED LIST DOCD IN RCRD: CPT | Mod: CPTII,S$GLB,,

## 2023-08-01 PROCEDURE — 1126F PR PAIN SEVERITY QUANTIFIED, NO PAIN PRESENT: ICD-10-PCS | Mod: CPTII,S$GLB,,

## 2023-08-01 PROCEDURE — 1101F PT FALLS ASSESS-DOCD LE1/YR: CPT | Mod: CPTII,S$GLB,, | Performed by: DERMATOLOGY

## 2023-08-01 PROCEDURE — 99214 OFFICE O/P EST MOD 30 MIN: CPT | Mod: S$GLB,,,

## 2023-08-01 PROCEDURE — 87220 PR  TISSUE EXAM BY KOH: ICD-10-PCS | Mod: S$GLB,,, | Performed by: DERMATOLOGY

## 2023-08-01 PROCEDURE — 3288F FALL RISK ASSESSMENT DOCD: CPT | Mod: CPTII,S$GLB,, | Performed by: DERMATOLOGY

## 2023-08-01 PROCEDURE — 99999 PR PBB SHADOW E&M-EST. PATIENT-LVL V: ICD-10-PCS | Mod: PBBFAC,,,

## 2023-08-01 PROCEDURE — 4010F PR ACE/ARB THEARPY RXD/TAKEN: ICD-10-PCS | Mod: CPTII,S$GLB,, | Performed by: DERMATOLOGY

## 2023-08-01 PROCEDURE — 3072F LOW RISK FOR RETINOPATHY: CPT | Mod: CPTII,S$GLB,,

## 2023-08-01 PROCEDURE — 4010F ACE/ARB THERAPY RXD/TAKEN: CPT | Mod: CPTII,S$GLB,, | Performed by: DERMATOLOGY

## 2023-08-01 PROCEDURE — 1126F AMNT PAIN NOTED NONE PRSNT: CPT | Mod: CPTII,S$GLB,, | Performed by: DERMATOLOGY

## 2023-08-01 PROCEDURE — 99204 OFFICE O/P NEW MOD 45 MIN: CPT | Mod: S$GLB,,, | Performed by: DERMATOLOGY

## 2023-08-01 RX ORDER — CALCIUM CARB/VITAMIN D3/VIT K1 500-100-40
1 TABLET,CHEWABLE ORAL 2 TIMES DAILY
Qty: 100 EACH | Refills: 0 | Status: SHIPPED | OUTPATIENT
Start: 2023-08-01

## 2023-08-01 RX ORDER — CLOBETASOL PROPIONATE 0.5 MG/G
OINTMENT TOPICAL 2 TIMES DAILY
Qty: 45 G | Refills: 0 | Status: SHIPPED | OUTPATIENT
Start: 2023-08-01 | End: 2024-01-31 | Stop reason: SDUPTHER

## 2023-08-01 RX ORDER — SYRING-NEEDL,DISP,INSUL,0.3 ML 31 G X1/4"
1 SYRINGE, EMPTY DISPOSABLE MISCELLANEOUS 2 TIMES DAILY
Qty: 100 EACH | Refills: 5 | Status: SHIPPED | OUTPATIENT
Start: 2023-08-01 | End: 2023-08-01

## 2023-08-01 RX ORDER — PEN NEEDLE, DIABETIC 30 GX3/16"
1 NEEDLE, DISPOSABLE MISCELLANEOUS 2 TIMES DAILY
Qty: 200 EACH | Refills: 1 | Status: SHIPPED | OUTPATIENT
Start: 2023-08-01

## 2023-08-01 NOTE — PROGRESS NOTES
Subjective:      Patient ID:  Tom Tavarez is a 68 y.o. male who presents for     Referred for lower leg lesions/rashes noted at PCP appt 7/19 x at least 2 months (present since at least visit on 5/23). Rx'd doxy, culture showed skin michael.  + itching; no pain; reports he has intermittent legs swelling- not consistent with compression stocking use.  Reports rash on tops of both ankles and also on L thigh (newest spot).      prior tx:  bactrim 5/23 - seen in ER for angioedema and stopped bactrim; he notes that the tongue swelling started before he ever took bactrim and he is unsure if he even took the first dose of bactrim; reports recurrent episodes of tongue swelling since - referred to Allergy today by PCP    Has h/o BLE edema (saw podiatry 5/15), DM, obesity, HTN, HLD, CAD, CKD III       No h/o asthma/seasonal allergies/eczema        Review of Systems   Constitutional:  Negative for fever.   Skin:  Positive for itching and rash.       Objective:   Physical Exam   Constitutional: He appears well-developed and well-nourished. No distress.   Neurological: He is alert and oriented to person, place, and time. He is not disoriented.   Psychiatric: He has a normal mood and affect.   Skin:   Areas Examined (abnormalities noted in diagram):   RLE Inspected  LLE Inspection Performed            Diagram Legend     Erythematous scaling macule/papule c/w actinic keratosis       Vascular papule c/w angioma      Pigmented verrucoid papule/plaque c/w seborrheic keratosis      Yellow umbilicated papule c/w sebaceous hyperplasia      Irregularly shaped tan macule c/w lentigo     1-2 mm smooth white papules consistent with Milia      Movable subcutaneous cyst with punctum c/w epidermal inclusion cyst      Subcutaneous movable cyst c/w pilar cyst      Firm pink to brown papule c/w dermatofibroma      Pedunculated fleshy papule(s) c/w skin tag(s)      Evenly pigmented macule c/w junctional nevus     Mildly variegated pigmented,  slightly irregular-bordered macule c/w mildly atypical nevus      Flesh colored to evenly pigmented papule c/w intradermal nevus       Pink pearly papule/plaque c/w basal cell carcinoma      Erythematous hyperkeratotic cursted plaque c/w SCC      Surgical scar with no sign of skin cancer recurrence      Open and closed comedones      Inflammatory papules and pustules      Verrucoid papule consistent consistent with wart     Erythematous eczematous patches and plaques     Dystrophic onycholytic nail with subungual debris c/w onychomycosis     Umbilicated papule    Erythematous-base heme-crusted tan verrucoid plaque consistent with inflamed seborrheic keratosis     Erythematous Silvery Scaling Plaque c/w Psoriasis     See annotation      Assessment / Plan:        Disease of skin and subcutaneous tissue  - suspect venous stasis dermatitis in setting of longstanding BLE vs nummular eczema, possibly secondarily impetiginized  - Separate scrapings for KOH exam from foot, ankle, and thigh all negative for fungal elements  - discussed importance of avoiding swelling of legs - importance of compression stockings, keeping legs elevated  -     Aerobic culture  -     clobetasol 0.05% (TEMOVATE) 0.05 % Oint; Apply topically 2 (two) times daily. Apply at night under cling wrap to R lower leg. Apply twice daily to smaller areas on L lower leg and L thigh  Dispense: 45 g; Refill: 0    Side effect profile reviewed for topical steroids including atrophy, telangiectasia and striae development with prolonged usage.  Patient acknowledged understanding.               Follow up in about 8 weeks (around 9/26/2023).          LOS NUMBER AND COMPLEXITY OF PROBLEMS    COMPLEXITY OF DATA RISK TOTAL TIME (m)   06807  46804 [] 1 self-limited or minor problem [x] Minimal to none [] No treatment recommended or patient to monitor. Reassurance.  15-29  10-19   78209  [id number] Low  [] 2 or more self limited or minor problems  [] 1 stable chronic  illness  [] 1 acute, uncomplicated illness or injury Limited (2)  [] Prior external notes from each unique source  [] Review result of each unique test  [] Order each unique test  OR [] Independent historian Low  []  OTC medications   []  Discussed/Decision for minor skin surgery (no risk factors) 30-44  20-29   06133  99311 Moderate  [x]  1 or more chronic unstable illness (not at goal or progression or exacerbation) or SE of treatment  []  2 or more stable chronic illnesses  []  1 acute illness with systemic symptoms  []  1 acute complicated injury  []  1 undiagnosed new problem with uncertain prognosis Moderate (1/3 below)  []  3 or more data items        *Now includes independent historian  []  Independent interpretation of a test  []  Discuss management/test with another provider Moderate  [x]  Prescription drug mgmt  []  Discussed/Decision for Minor surgery with risk factors  []  Mgmt limited by social determinates 45-59  30-39   95498  09859 High  []  1 or more chronic illness with severe exacerbation, progression or SE of treatment  []  1 acute or chronic illness/injury that poses a threat to life or bodily function Extensive (2/3 below)  []  3 or more data items        *Now includes independent historian.  []  Independent interpretation of a test  []  Discuss management/test with another provider High  []  Major surgery with risk discussed  []  Drug therapy requiring intensive monitoring for toxicity  []  Hospitalization  []  Decision for DNR 60-74  40-54

## 2023-08-01 NOTE — Clinical Note
Good afternoon, I saw this patient in clinic and he Is having intermittent angioedema. He takes Losartan and Norvasc for BP control. I told  him to hold losartan and referred to Allergy to see if losartan is the cause. Patient wanted me to discuss BP med changes with you.

## 2023-08-01 NOTE — PROGRESS NOTES
Tom NAYLOR Tavarez  08/01/2023  [id number]    Adolfo Lemus MD  Patient Care Team:  Adolfo Lemus MD as PCP - General (Family Medicine)  Claribel Holloway MD as Consulting Physician (Sports Medicine)  Augie Sy OD (Optometry)  Bhupinder Sue MD as Consulting Physician (Cardiology)  Janna Lizama MD as Consulting Physician (Nephrology)  Rosas Macias DPM as Consulting Physician (Podiatry)          Visit Type:an urgent visit for a new problem    Chief Complaint:  Chief Complaint   Patient presents with    Follow-up        History of Present Illness:    Pt saw Dr. Lemus on 7/19  At visit noted skin lesion on right leg  Culture report did not grow any predominant organism   Prescribed doxy BID for 10 days     Pt states that wound has not healed  Dr. Lemus mentioned seeing derm     Tongue started swelling last week   Denies any new food or medicine    History:  Past Medical History:   Diagnosis Date    BMI 40.0-44.9, adult 07/21/2016    Cataract     Colon cancer screening 10/03/2017    Diabetes mellitus type 2 in obese 11/2006     am 05/09/2023 Insulin x 10 Years    Hyperlipidemia     Hypertension 1985    Obesity, Class III, BMI 40-49.9 (morbid obesity) 1985    Umbilical hernia      Past Surgical History:   Procedure Laterality Date    G U surgery  1982    biopsy of testicle infertility w/u     Family History   Problem Relation Age of Onset    Hypertension Mother     Heart failure Mother     Diabetes Brother      Social History     Socioeconomic History    Marital status:     Number of children: 0    Highest education level: Some college, no degree   Occupational History    Occupation: Retired-Sales     Comment: [employer]   Tobacco Use    Smoking status: Former     Current packs/day: 0.00    Smokeless tobacco: Never    Tobacco comments:     1998 quit    Substance and Sexual Activity    Alcohol use: No     Alcohol/week: 0.0 standard drinks of alcohol    Drug use: No    Sexual activity:  Yes     Partners: Female     Social Determinants of Health     Financial Resource Strain: Low Risk  (9/13/2022)    Overall Financial Resource Strain (CARDIA)     Difficulty of Paying Living Expenses: Not hard at all   Food Insecurity: No Food Insecurity (9/13/2022)    Hunger Vital Sign     Worried About Running Out of Food in the Last Year: Never true     Ran Out of Food in the Last Year: Never true   Transportation Needs: No Transportation Needs (9/13/2022)    PRAPARE - Transportation     Lack of Transportation (Medical): No     Lack of Transportation (Non-Medical): No   Physical Activity: Inactive (9/13/2022)    Exercise Vital Sign     Days of Exercise per Week: 0 days     Minutes of Exercise per Session: 0 min   Stress: No Stress Concern Present (9/13/2022)    [employer] - Occupational Stress Questionnaire     Feeling of Stress : Not at all   Social Connections: Moderately Integrated (9/13/2022)    Social Connection and Isolation Panel [NHANES]     Frequency of Communication with Friends and Family: More than three times a week     Frequency of Social Gatherings with Friends and Family: Three times a week     Attends Religion Services: More than 4 times per year     Active Member of Clubs or Organizations: No     Attends Club or Organization Meetings: Never     Marital Status:    Housing Stability: Low Risk  (9/13/2022)    Housing Stability Vital Sign     Unable to Pay for Housing in the Last Year: No     Number of Places Lived in the Last Year: 1     Unstable Housing in the Last Year: No     Patient Active Problem List   Diagnosis    Diabetes mellitus type 2 in obese    Hyperlipidemia    Primary hypertension    BMI 40.0-44.9, adult    Hypertension    Umbilical hernia    Controlled type 2 diabetes mellitus without complication, with long-term current use of insulin    Weakness    Immunization deficiency    Yeast dermatitis    Acute gout due to renal impairment involving foot     Onychomycosis of toenail    Acute cystitis with hematuria    Stage 3 chronic kidney disease    FH: thoracic aortic aneurysm    Chronic allergic rhinitis    GERD (gastroesophageal reflux disease)    Itchy skin    Scrotal swelling    Uncontrolled type 2 diabetes mellitus with hyperglycemia    Primary osteoarthritis of left hip    Atherosclerosis of aorta    Arteriosclerosis of abdominal aorta    Abdominal wall hernia    Nasal congestion    Abdominal aortic aneurysm (AAA) without rupture    Dorsalgia, unspecified    Hip pain    Hypotension    Shortness of breath    Diabetic polyneuropathy associated with type 2 diabetes mellitus    Colon cancer screening    Chronic gout without tophus    Edema    Obesity, Class III, BMI 40-49.9 (morbid obesity)    Insect bite of upper arm    Leg skin lesion, right    Lip swelling    Chest pain    Age-related nuclear cataract of both eyes    EKG abnormalities     Review of patient's allergies indicates:  No Active Allergies    The following were reviewed at this visit: active problem list, medication list, allergies, family history, social history, and health maintenance.    Medications:  Current Outpatient Medications on File Prior to Visit   Medication Sig Dispense Refill    amLODIPine (NORVASC) 10 MG tablet Take 1 tablet (10 mg total) by mouth once daily. 90 tablet 2    atorvastatin (LIPITOR) 40 MG tablet TAKE 1 TABLET(40 MG) BY MOUTH EVERY DAY 90 tablet 0    blood-glucose meter kit To check BG 3 times daily, to use with insurance preferred meter 1 each 0    colchicine (COLCRYS) 0.6 mg tablet Take 1 tablet (0.6 mg total) by mouth once daily. 30 tablet 11    doxycycline (VIBRA-TABS) 100 MG tablet Take 1 tablet (100 mg total) by mouth 2 (two) times daily. 20 tablet 0    empagliflozin (JARDIANCE) 10 mg tablet Take 1 tablet (10 mg total) by mouth once daily. 30 tablet 11    flash glucose scanning reader ([employer] KRISTY 14 DAY READER) Misc 1 each by Misc.(Non-Drug; Combo Route) route  "continuous. 6 each 3    flash glucose sensor (FREESTYLE KRISTY 14 DAY SENSOR) Kit 1 each by Misc.(Non-Drug; Combo Route) route continuous. 6 kit 3    gabapentin (NEURONTIN) 300 MG capsule Take 1 in the morning and 2 at bedtime 270 capsule 1    insulin syringe-needle U-100 0.3 mL 31 gauge x 1/4" Syrg 1 each by Misc.(Non-Drug; Combo Route) route 2 (two) times a day. 100 each 5    ketoconazole (NIZORAL) 2 % cream Apply topically once daily. 60 g 1    lancets [facility] To check BG 3 times daily, to use with insurance preferred meter 100 each 3    LANTUS SOLOSTAR U-100 INSULIN glargine 100 units/mL SubQ pen Inject 50 Units into the skin 2 (two) times daily. 90 mL 1    losartan (COZAAR) 50 MG tablet Take 1 tablet (50 mg total) by mouth once daily. 90 tablet 3    metFORMIN (GLUCOPHAGE) 500 MG tablet Take 1 tablet (500 mg total) by mouth 2 (two) times daily with meals. 180 tablet 3    mupirocin (BACTROBAN) 2 % ointment Apply topically once daily. 30 g 1    prednisoLONE acetate (PRED FORTE) 1 % DrpS Place 1 drop into the right eye 4 (four) times daily. 5 mL 1    triamcinolone acetonide 0.1% (KENALOG) 0.1 % cream Apply topically 2 (two) times daily. 28.4 g 0     No current facility-administered medications on file prior to visit.       Medications have been reviewed and reconciled with patient at this visit.  Barriers to medications reviewed with patient.    Adverse reactions to current medications reviewed with patient..    Over the counter medications reviewed and reconciled with patient.    Exam:  Wt Readings from Last 3 Encounters:   07/19/23 (!) 145.4 kg (320 lb 8.8 oz)   06/30/23 (!) 144.7 kg (319 lb)   06/26/23 (!) 145.4 kg (320 lb 8.8 oz)     Temp Readings from Last 3 Encounters:   06/06/23 97.7 °F (36.5 °C) (Temporal)   05/23/23 98.8 °F (37.1 °C) (Temporal)   11/08/22 98.6 °F (37 °C) (Temporal)     BP Readings from Last 3 Encounters:   07/19/23 132/78   06/30/23 (!) 178/90   06/26/23 132/70     Pulse Readings from Last 3 " Encounters:   07/19/23 61   06/30/23 (!) 57   06/26/23 74     There is no height or weight on file to calculate BMI.      Review of Systems   HENT:          Lip swelling   Respiratory:  Negative for shortness of breath.    Cardiovascular:  Negative for chest pain and palpitations.   Skin:         Skin lesion   Neurological:  Positive for weakness.     Physical Exam  Vitals and nursing note reviewed.   Constitutional:       General: He is not in acute distress.     Appearance: He is well-developed. He is morbidly obese. He is not diaphoretic.   HENT:      Head: Normocephalic and atraumatic.      Right Ear: External ear normal.      Left Ear: External ear normal.   Eyes:      General:         Right eye: No discharge.         Left eye: No discharge.      Conjunctiva/sclera: Conjunctivae normal.      Pupils: Pupils are equal, round, and reactive to light.   Neck:      Thyroid: No thyromegaly.   Cardiovascular:      Rate and Rhythm: Normal rate and regular rhythm.      Heart sounds: Normal heart sounds. No murmur heard.  Pulmonary:      Effort: Pulmonary effort is normal. No respiratory distress.      Breath sounds: Normal breath sounds. No wheezing.   Musculoskeletal:      Comments: Using assistive device at visit    Neurological:      Mental Status: He is alert and oriented to person, place, and time.      Gait: Gait abnormal.   Psychiatric:         Behavior: Behavior normal.         Thought Content: Thought content normal.         Judgment: Judgment normal.            Laboratory Reviewed ({Yes)  Lab Results   Component Value Date    WBC 7.56 06/22/2023    HGB 15.6 06/22/2023    HCT 48.7 06/22/2023     06/22/2023    CHOL 212 (H) 03/24/2023    TRIG 163 (H) 03/24/2023    HDL 32 (L) 03/24/2023    ALT 12 09/13/2022    AST 16 09/13/2022     06/22/2023    K 4.9 06/22/2023     06/22/2023    CREATININE 1.4 06/22/2023    BUN 21 06/22/2023    CO2 21 (L) 06/22/2023    TSH 2.012 10/08/2020    PSA 3.3 04/04/2019  "   GLUF 156 (H) 09/25/2019    HGBA1C 6.6 (H) 03/24/2023       Tom was seen today for follow-up.    Diagnoses and all orders for this visit:    Leg skin lesion, right  -     Ambulatory referral/consult to Dermatology; Future    Primary hypertension    Controlled type 2 diabetes mellitus without complication, with long-term current use of insulin  -     Discontinue: insulin syringe-needle U-100 0.3 mL 31 gauge x 1/4" Syrg; 1 each by Misc.(Non-Drug; Combo Route) route 2 (two) times a day.  -     insulin syringe-needle U-100 (BD INSULIN SYRINGE ULTRA-FINE) 0.3 mL 31 gauge x 5/16" Syrg; 1 each by Misc.(Non-Drug; Combo Route) route 2 (two) times a day.    Lip swelling    Obesity, Class III, BMI 40-49.9 (morbid obesity)  Encouraged healthy diet and exercise as tolerated to help bring BMI into normal range.      Stage 3 chronic kidney disease, unspecified whether stage 3a or 3b CKD  Will continue to monitor and avoid/limit renal toxic agents      Gait abnormality  Patient is using an assistive device at visit. Disused fall prevention        Cardiologist increased cozaar to 50 mg a month ago  Tongue swelling started before increase dose of cozaar     Hold losartan  Continue Norvasc   Will discuss medication with Dr. Sue  Refer to allergy as well to see if angioedema related to losartan     Spoke with Dr. Sue and his recommendations are:  Amlodipine can cause edema,   Advise to decrease Amlodipine to 5 mg   Continue losartan and add hctz 25 mg for HTN and PVD.   Check BMP in 2 weeks   Very uncommonly, losartan causes edema intermittently                 Care Plan/Goals: Reviewed    Goals    None         Follow up: No follow-ups on file.    After visit summary was printed and given to patient upon discharge today.  Patient goals and care plan are included in After Visit Summary.      "

## 2023-08-02 ENCOUNTER — PATIENT MESSAGE (OUTPATIENT)
Dept: INTERNAL MEDICINE | Facility: CLINIC | Age: 68
End: 2023-08-02
Payer: MEDICARE

## 2023-08-02 RX ORDER — AMLODIPINE BESYLATE 5 MG/1
5 TABLET ORAL DAILY
Qty: 30 TABLET | Refills: 11 | Status: SHIPPED | OUTPATIENT
Start: 2023-08-02 | End: 2023-12-30 | Stop reason: SDUPTHER

## 2023-08-02 RX ORDER — HYDROCHLOROTHIAZIDE 25 MG/1
25 TABLET ORAL DAILY
Qty: 30 TABLET | Refills: 11 | Status: SHIPPED | OUTPATIENT
Start: 2023-08-02 | End: 2024-08-01

## 2023-08-03 ENCOUNTER — OUTSIDE PLACE OF SERVICE (OUTPATIENT)
Dept: OPHTHALMOLOGY | Facility: CLINIC | Age: 68
End: 2023-08-03
Payer: MEDICARE

## 2023-08-03 PROCEDURE — 66984 PR REMOVAL, CATARACT, W/INSRT INTRAOC LENS, W/O ENDO CYCLO: ICD-10-PCS | Mod: RT,,, | Performed by: STUDENT IN AN ORGANIZED HEALTH CARE EDUCATION/TRAINING PROGRAM

## 2023-08-03 PROCEDURE — 66984 XCAPSL CTRC RMVL W/O ECP: CPT | Mod: RT,,, | Performed by: STUDENT IN AN ORGANIZED HEALTH CARE EDUCATION/TRAINING PROGRAM

## 2023-08-04 ENCOUNTER — OFFICE VISIT (OUTPATIENT)
Dept: OPHTHALMOLOGY | Facility: CLINIC | Age: 68
End: 2023-08-04
Payer: MEDICARE

## 2023-08-04 DIAGNOSIS — Z98.41 CATARACT EXTRACTION STATUS OF EYE, RIGHT: ICD-10-CM

## 2023-08-04 DIAGNOSIS — Z98.890 POST-OPERATIVE STATE: Primary | ICD-10-CM

## 2023-08-04 PROCEDURE — 3066F NEPHROPATHY DOC TX: CPT | Mod: CPTII,S$GLB,, | Performed by: STUDENT IN AN ORGANIZED HEALTH CARE EDUCATION/TRAINING PROGRAM

## 2023-08-04 PROCEDURE — 99024 PR POST-OP FOLLOW-UP VISIT: ICD-10-PCS | Mod: S$GLB,,, | Performed by: STUDENT IN AN ORGANIZED HEALTH CARE EDUCATION/TRAINING PROGRAM

## 2023-08-04 PROCEDURE — 3066F PR DOCUMENTATION OF TREATMENT FOR NEPHROPATHY: ICD-10-PCS | Mod: CPTII,S$GLB,, | Performed by: STUDENT IN AN ORGANIZED HEALTH CARE EDUCATION/TRAINING PROGRAM

## 2023-08-04 PROCEDURE — 1159F PR MEDICATION LIST DOCUMENTED IN MEDICAL RECORD: ICD-10-PCS | Mod: CPTII,S$GLB,, | Performed by: STUDENT IN AN ORGANIZED HEALTH CARE EDUCATION/TRAINING PROGRAM

## 2023-08-04 PROCEDURE — 99999 PR PBB SHADOW E&M-EST. PATIENT-LVL III: CPT | Mod: PBBFAC,,, | Performed by: STUDENT IN AN ORGANIZED HEALTH CARE EDUCATION/TRAINING PROGRAM

## 2023-08-04 PROCEDURE — 1160F RVW MEDS BY RX/DR IN RCRD: CPT | Mod: CPTII,S$GLB,, | Performed by: STUDENT IN AN ORGANIZED HEALTH CARE EDUCATION/TRAINING PROGRAM

## 2023-08-04 PROCEDURE — 99999 PR PBB SHADOW E&M-EST. PATIENT-LVL III: ICD-10-PCS | Mod: PBBFAC,,, | Performed by: STUDENT IN AN ORGANIZED HEALTH CARE EDUCATION/TRAINING PROGRAM

## 2023-08-04 PROCEDURE — 3044F HG A1C LEVEL LT 7.0%: CPT | Mod: CPTII,S$GLB,, | Performed by: STUDENT IN AN ORGANIZED HEALTH CARE EDUCATION/TRAINING PROGRAM

## 2023-08-04 PROCEDURE — 4010F ACE/ARB THERAPY RXD/TAKEN: CPT | Mod: CPTII,S$GLB,, | Performed by: STUDENT IN AN ORGANIZED HEALTH CARE EDUCATION/TRAINING PROGRAM

## 2023-08-04 PROCEDURE — 1159F MED LIST DOCD IN RCRD: CPT | Mod: CPTII,S$GLB,, | Performed by: STUDENT IN AN ORGANIZED HEALTH CARE EDUCATION/TRAINING PROGRAM

## 2023-08-04 PROCEDURE — 99024 POSTOP FOLLOW-UP VISIT: CPT | Mod: S$GLB,,, | Performed by: STUDENT IN AN ORGANIZED HEALTH CARE EDUCATION/TRAINING PROGRAM

## 2023-08-04 PROCEDURE — 4010F PR ACE/ARB THEARPY RXD/TAKEN: ICD-10-PCS | Mod: CPTII,S$GLB,, | Performed by: STUDENT IN AN ORGANIZED HEALTH CARE EDUCATION/TRAINING PROGRAM

## 2023-08-04 PROCEDURE — 3044F PR MOST RECENT HEMOGLOBIN A1C LEVEL <7.0%: ICD-10-PCS | Mod: CPTII,S$GLB,, | Performed by: STUDENT IN AN ORGANIZED HEALTH CARE EDUCATION/TRAINING PROGRAM

## 2023-08-04 PROCEDURE — 1160F PR REVIEW ALL MEDS BY PRESCRIBER/CLIN PHARMACIST DOCUMENTED: ICD-10-PCS | Mod: CPTII,S$GLB,, | Performed by: STUDENT IN AN ORGANIZED HEALTH CARE EDUCATION/TRAINING PROGRAM

## 2023-08-04 NOTE — PROGRESS NOTES
HPI     Post-op Evaluation     Additional comments: POD#1 s/p CEIOL OD. Has received appropriate post-op   drops. Denies any discomfort. No new ocular complaints.            Last edited by Jo Mauro on 8/4/2023  9:39 AM.            Assessment /Plan     For exam results, see Encounter Report.    Post-operative state    Cataract extraction status of eye, right    POD#1 S/P CEIOL OD Doing well. Mild edema    Continue gtts to operative eye:  PF QID       Reinstructed in importance of absolute compliance with Post-OP instructions including medications, shield at bedtime, protective glasses during the day, and limitation of activities. Follow up appointments in approximately one and six weeks or call immediately for increased pain, redness or vision loss.     RTC 1 week. MOCT if PH worse than 20/25

## 2023-08-05 LAB
BACTERIA SPEC AEROBE CULT: ABNORMAL
BACTERIA SPEC AEROBE CULT: ABNORMAL

## 2023-08-07 ENCOUNTER — TELEPHONE (OUTPATIENT)
Dept: DERMATOLOGY | Facility: CLINIC | Age: 68
End: 2023-08-07
Payer: MEDICARE

## 2023-08-07 RX ORDER — AMOXICILLIN AND CLAVULANATE POTASSIUM 875; 125 MG/1; MG/1
1 TABLET, FILM COATED ORAL 2 TIMES DAILY
Qty: 14 TABLET | Refills: 0 | Status: SHIPPED | OUTPATIENT
Start: 2023-08-07 | End: 2023-08-14

## 2023-08-07 NOTE — PROGRESS NOTES
CALL pt and inform him that I sent in an antibiotic for him based on his bacterial culture results (Augmentin).     Aerobic Bacterial Culture  Abnormal   KLEBSIELLA OXYTOCA   Many    Aerobic Bacterial Culture  Abnormal   PROTEUS MIRABILIS   Many   Skin michael also present

## 2023-08-07 NOTE — TELEPHONE ENCOUNTER
Called and notified patient of his culture results. Pt reports he will  the medication in AM due to having eye surgery  ----- Message from Nikki Bunch MD sent at 8/7/2023  8:49 AM CDT -----  CALL pt and inform him that I sent in an antibiotic for him based on his bacterial culture results (Augmentin).     Aerobic Bacterial Culture  Abnormal   KLEBSIELLA OXYTOCA   Many    Aerobic Bacterial Culture  Abnormal   PROTEUS MIRABILIS   Many   Skin michael also present

## 2023-08-09 ENCOUNTER — TELEPHONE (OUTPATIENT)
Dept: DERMATOLOGY | Facility: CLINIC | Age: 68
End: 2023-08-09
Payer: MEDICARE

## 2023-08-09 NOTE — TELEPHONE ENCOUNTER
Patient notified to let us know if things do not improve so we can see patient sooner. He verbalized understanding. Reports the area is feeling better.   ----- Message from Nikki Bunch MD sent at 8/9/2023  8:47 AM CDT -----  If it does not improve after the antibiotics then yes, please tell him to let us know and we will get him in sooner.   ----- Message -----  From: Faye Negron RN  Sent: 8/7/2023   6:08 PM CDT  To: Nikki Bunch MD    Called and notified patient of his culture results. Pt reports he will  the medication in AM due to having eye surgery.  Pt inquiring if he needs to come in sooner than his October appointment?

## 2023-08-10 ENCOUNTER — OFFICE VISIT (OUTPATIENT)
Dept: OPHTHALMOLOGY | Facility: CLINIC | Age: 68
End: 2023-08-10
Payer: MEDICARE

## 2023-08-10 DIAGNOSIS — H25.12 NUCLEAR SCLEROSIS OF LEFT EYE: ICD-10-CM

## 2023-08-10 DIAGNOSIS — Z98.41 CATARACT EXTRACTION STATUS OF EYE, RIGHT: ICD-10-CM

## 2023-08-10 DIAGNOSIS — Z98.890 POST-OPERATIVE STATE: Primary | ICD-10-CM

## 2023-08-10 PROCEDURE — 3066F PR DOCUMENTATION OF TREATMENT FOR NEPHROPATHY: ICD-10-PCS | Mod: CPTII,S$GLB,, | Performed by: STUDENT IN AN ORGANIZED HEALTH CARE EDUCATION/TRAINING PROGRAM

## 2023-08-10 PROCEDURE — 92136 IOL MASTER - OS - LEFT EYE: ICD-10-PCS | Mod: 26,LT,S$GLB, | Performed by: STUDENT IN AN ORGANIZED HEALTH CARE EDUCATION/TRAINING PROGRAM

## 2023-08-10 PROCEDURE — 1160F RVW MEDS BY RX/DR IN RCRD: CPT | Mod: CPTII,S$GLB,, | Performed by: STUDENT IN AN ORGANIZED HEALTH CARE EDUCATION/TRAINING PROGRAM

## 2023-08-10 PROCEDURE — 99024 POSTOP FOLLOW-UP VISIT: CPT | Mod: S$GLB,,, | Performed by: STUDENT IN AN ORGANIZED HEALTH CARE EDUCATION/TRAINING PROGRAM

## 2023-08-10 PROCEDURE — 4010F PR ACE/ARB THEARPY RXD/TAKEN: ICD-10-PCS | Mod: CPTII,S$GLB,, | Performed by: STUDENT IN AN ORGANIZED HEALTH CARE EDUCATION/TRAINING PROGRAM

## 2023-08-10 PROCEDURE — 99024 PR POST-OP FOLLOW-UP VISIT: ICD-10-PCS | Mod: S$GLB,,, | Performed by: STUDENT IN AN ORGANIZED HEALTH CARE EDUCATION/TRAINING PROGRAM

## 2023-08-10 PROCEDURE — 1159F PR MEDICATION LIST DOCUMENTED IN MEDICAL RECORD: ICD-10-PCS | Mod: CPTII,S$GLB,, | Performed by: STUDENT IN AN ORGANIZED HEALTH CARE EDUCATION/TRAINING PROGRAM

## 2023-08-10 PROCEDURE — 1159F MED LIST DOCD IN RCRD: CPT | Mod: CPTII,S$GLB,, | Performed by: STUDENT IN AN ORGANIZED HEALTH CARE EDUCATION/TRAINING PROGRAM

## 2023-08-10 PROCEDURE — 4010F ACE/ARB THERAPY RXD/TAKEN: CPT | Mod: CPTII,S$GLB,, | Performed by: STUDENT IN AN ORGANIZED HEALTH CARE EDUCATION/TRAINING PROGRAM

## 2023-08-10 PROCEDURE — 99999 PR PBB SHADOW E&M-EST. PATIENT-LVL III: CPT | Mod: PBBFAC,,, | Performed by: STUDENT IN AN ORGANIZED HEALTH CARE EDUCATION/TRAINING PROGRAM

## 2023-08-10 PROCEDURE — 3044F HG A1C LEVEL LT 7.0%: CPT | Mod: CPTII,S$GLB,, | Performed by: STUDENT IN AN ORGANIZED HEALTH CARE EDUCATION/TRAINING PROGRAM

## 2023-08-10 PROCEDURE — 3044F PR MOST RECENT HEMOGLOBIN A1C LEVEL <7.0%: ICD-10-PCS | Mod: CPTII,S$GLB,, | Performed by: STUDENT IN AN ORGANIZED HEALTH CARE EDUCATION/TRAINING PROGRAM

## 2023-08-10 PROCEDURE — 3066F NEPHROPATHY DOC TX: CPT | Mod: CPTII,S$GLB,, | Performed by: STUDENT IN AN ORGANIZED HEALTH CARE EDUCATION/TRAINING PROGRAM

## 2023-08-10 PROCEDURE — 1160F PR REVIEW ALL MEDS BY PRESCRIBER/CLIN PHARMACIST DOCUMENTED: ICD-10-PCS | Mod: CPTII,S$GLB,, | Performed by: STUDENT IN AN ORGANIZED HEALTH CARE EDUCATION/TRAINING PROGRAM

## 2023-08-10 PROCEDURE — 92136 OPHTHALMIC BIOMETRY: CPT | Mod: 26,LT,S$GLB, | Performed by: STUDENT IN AN ORGANIZED HEALTH CARE EDUCATION/TRAINING PROGRAM

## 2023-08-10 PROCEDURE — 99999 PR PBB SHADOW E&M-EST. PATIENT-LVL III: ICD-10-PCS | Mod: PBBFAC,,, | Performed by: STUDENT IN AN ORGANIZED HEALTH CARE EDUCATION/TRAINING PROGRAM

## 2023-08-10 RX ORDER — PREDNISOLONE ACETATE 10 MG/ML
1 SUSPENSION/ DROPS OPHTHALMIC EVERY 4 HOURS
Qty: 5 ML | Refills: 1 | Status: SHIPPED | OUTPATIENT
Start: 2023-08-10 | End: 2024-08-09

## 2023-08-10 NOTE — PROGRESS NOTES
HPI     Post-op Evaluation     Additional comments: Patient here today for POW#1 visit s/p CEIOL of the   right eye. Patient states vision is doing well and is using drops. Denies   any pain or discomfort.  No other ocular complaints            Comments    1. DM  2. PCIOL OD 8/3/23  NSC OS    OD- Pred QID            Last edited by Jo Mauro on 8/10/2023 10:15 AM.            Assessment /Plan     For exam results, see Encounter Report.    Post-operative state  Impression/Plan  POW#1 S/P CEIOL OD : Doing well with no evidence of infection.     Trace Cell. PF Taper 4-3-2-1 then stop    Pt given and instructed in one week postop instructions. Can resume normal activitites and d/c eye shield. OTC reading glasses can be used until evaluated for final MR. Follow up in one month or PRN pain, redness, vision loss, or other concerns.    Cataract extraction status of eye, right  As above    Nuclear sclerosis of left eye  Patient reports decreased vision in the fellow eye consistent with the clinical amount of lenticular opacity, which reaches the level of visual significance and affects activities of daily living including reading and glare. Risks, benefits, and alternatives to cataract surgery were discussed and pt desired to schedule cataract surgery. Pt was consented and the biometry and lens options were reviewed.     Phaco left eye,   Topical  Shugarcaine   +/- iring   Will aim for Monofocal - Distance IOL    Intraop Kenalog 40: No    Post op gtts:   Durezol or PF      Discussed that vision may be limited by:  none    Dilation: good  Alpha Blockers: none    SS record completed, IOL master reviewed, external referral completed.   Referral to [facility] for Ophthalmic [facility]  Prescriptions sent for preoperative medications  Explained that patient may need glasses after surgery.    RTC for POD#1

## 2023-08-14 ENCOUNTER — HOSPITAL ENCOUNTER (OUTPATIENT)
Dept: RADIOLOGY | Facility: HOSPITAL | Age: 68
Discharge: HOME | End: 2023-08-14
Attending: INTERNAL MEDICINE
Payer: MEDICARE

## 2023-08-14 ENCOUNTER — HOSPITAL ENCOUNTER (OUTPATIENT)
Dept: CARDIOLOGY | Facility: HOSPITAL | Age: 68
Discharge: HOME | End: 2023-08-14
Attending: INTERNAL MEDICINE
Payer: MEDICARE

## 2023-08-14 DIAGNOSIS — R07.9 CHEST PAIN, UNSPECIFIED TYPE: ICD-10-CM

## 2023-08-14 LAB
CV STRESS BASE HR: 52 BPM
DIASTOLIC BLOOD PRESSURE: 89 MMHG
NUC REST EJECTION FRACTION: 70
NUC STRESS EJECTION FRACTION: 60 %
OHS CV CPX 85 PERCENT MAX PREDICTED HEART RATE MALE: 128
OHS CV CPX MAX PREDICTED HEART RATE: 151
OHS CV CPX PATIENT IS FEMALE: 0
OHS CV CPX PATIENT IS MALE: 1
OHS CV CPX PEAK DIASTOLIC BLOOD PRESSURE: 66 MMHG
OHS CV CPX PEAK HEAR RATE: 64 BPM
OHS CV CPX PEAK RATE PRESSURE PRODUCT: 9920
OHS CV CPX PEAK SYSTOLIC BLOOD PRESSURE: 155 MMHG
OHS CV CPX PERCENT MAX PREDICTED HEART RATE ACHIEVED: 42
OHS CV CPX RATE PRESSURE PRODUCT PRESENTING: 7800
SYSTOLIC BLOOD PRESSURE: 150 MMHG

## 2023-08-14 PROCEDURE — 93018 NUCLEAR STRESS - CARDIOLOGY INTERPRETED (CUPID ONLY): ICD-10-PCS | Mod: ,,, | Performed by: INTERNAL MEDICINE

## 2023-08-14 PROCEDURE — 78452 NUCLEAR STRESS - CARDIOLOGY INTERPRETED (CUPID ONLY): ICD-10-PCS | Mod: 26,,, | Performed by: INTERNAL MEDICINE

## 2023-08-14 PROCEDURE — 63600175 PHARM REV CODE 636 W HCPCS: Performed by: INTERNAL MEDICINE

## 2023-08-14 PROCEDURE — 93016 CV STRESS TEST SUPVJ ONLY: CPT | Mod: ,,, | Performed by: INTERNAL MEDICINE

## 2023-08-14 PROCEDURE — 93016 NUCLEAR STRESS - CARDIOLOGY INTERPRETED (CUPID ONLY): ICD-10-PCS | Mod: ,,, | Performed by: INTERNAL MEDICINE

## 2023-08-14 PROCEDURE — 93018 CV STRESS TEST I&R ONLY: CPT | Mod: ,,, | Performed by: INTERNAL MEDICINE

## 2023-08-14 PROCEDURE — 78452 HT MUSCLE IMAGE SPECT MULT: CPT | Mod: 26,,, | Performed by: INTERNAL MEDICINE

## 2023-08-14 PROCEDURE — 93017 CV STRESS TEST TRACING ONLY: CPT

## 2023-08-14 PROCEDURE — 78452 HT MUSCLE IMAGE SPECT MULT: CPT

## 2023-08-14 RX ORDER — REGADENOSON 0.08 MG/ML
0.4 INJECTION, SOLUTION INTRAVENOUS ONCE
Status: COMPLETED | OUTPATIENT
Start: 2023-08-14 | End: 2023-08-14

## 2023-08-14 RX ADMIN — REGADENOSON 0.4 MG: 0.08 INJECTION, SOLUTION INTRAVENOUS at 12:08

## 2023-08-15 ENCOUNTER — TELEPHONE (OUTPATIENT)
Dept: CARDIOLOGY | Facility: CLINIC | Age: 68
End: 2023-08-15
Payer: MEDICARE

## 2023-08-15 NOTE — TELEPHONE ENCOUNTER
Spoke with pt in regards to stress test results. Informed pt stress test was normal. Pt should continue taking current rx and f/u as scheduled. Pt verbalized he understood information.           ----- Message from Bhupinder Sue MD sent at 8/15/2023  4:41 PM CDT -----  Nuke stress test normal.  Continue current Rx  F/U as scheduled

## 2023-08-29 ENCOUNTER — TELEPHONE (OUTPATIENT)
Dept: NEPHROLOGY | Facility: CLINIC | Age: 68
End: 2023-08-29
Payer: MEDICARE

## 2023-08-29 NOTE — TELEPHONE ENCOUNTER
----- Message from Liannaportia Cheng sent at 8/28/2023  3:09 PM CDT -----  Patient stated he went to  his Jardiance, he stated it is over his budget and peoples health mentioned an alternative to Jardiance which is acarbose. Call back at [phone]    [id number] - [place], [place] - [address] AT [facility] [address]  [address]  [place] [address]  Phone: [phone] Fax: [phone]

## 2023-08-29 NOTE — TELEPHONE ENCOUNTER
Returned call to patient who states that the Jardiance is too expensive he would like to know if there is something else he can try. Please advise

## 2023-08-30 PROBLEM — E11.22 TYPE 2 DIABETES MELLITUS WITH STAGE 3A CHRONIC KIDNEY DISEASE, WITH LONG-TERM CURRENT USE OF INSULIN: Status: ACTIVE | Noted: 2023-08-30

## 2023-08-30 PROBLEM — S40.869A INSECT BITE OF UPPER ARM: Status: RESOLVED | Noted: 2023-05-23 | Resolved: 2023-08-30

## 2023-08-30 PROBLEM — E11.36 DM CATARACT: Status: ACTIVE | Noted: 2023-08-30
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
## 2023-08-30 PROBLEM — E11.69 ONYCHOMYCOSIS OF MULTIPLE TOENAILS WITH TYPE 2 DIABETES MELLITUS: Status: ACTIVE | Noted: 2023-08-30

## 2023-08-30 PROBLEM — N18.31 TYPE 2 DIABETES MELLITUS WITH STAGE 3A CHRONIC KIDNEY DISEASE, WITH LONG-TERM CURRENT USE OF INSULIN: Status: ACTIVE | Noted: 2023-08-30

## 2023-08-30 PROBLEM — E11.8 DM (DIABETES MELLITUS) WITH COMPLICATIONS: Status: ACTIVE | Noted: 2018-09-17

## 2023-08-30 PROBLEM — E11.65 UNCONTROLLED TYPE 2 DIABETES MELLITUS WITH HYPERGLYCEMIA: Status: RESOLVED | Noted: 2020-10-08 | Resolved: 2023-08-30

## 2023-08-30 PROBLEM — R22.0 LIP SWELLING: Status: RESOLVED | Noted: 2023-06-06 | Resolved: 2023-08-30

## 2023-08-30 PROBLEM — B35.1 ONYCHOMYCOSIS OF MULTIPLE TOENAILS WITH TYPE 2 DIABETES MELLITUS: Status: ACTIVE | Noted: 2023-08-30

## 2023-08-30 PROBLEM — E11.69 TYPE 2 DIABETES MELLITUS WITH HYPERTRIGLYCERIDEMIA: Status: ACTIVE | Noted: 2023-08-30

## 2023-08-30 PROBLEM — E55.9 VITAMIN D DEFICIENCY: Status: ACTIVE | Noted: 2023-08-30

## 2023-08-30 PROBLEM — Z79.4 TYPE 2 DIABETES MELLITUS WITH STAGE 3A CHRONIC KIDNEY DISEASE, WITH LONG-TERM CURRENT USE OF INSULIN: Status: ACTIVE | Noted: 2023-08-30

## 2023-08-30 PROBLEM — E78.1 TYPE 2 DIABETES MELLITUS WITH HYPERTRIGLYCERIDEMIA: Status: ACTIVE | Noted: 2023-08-30

## 2023-09-01 NOTE — TELEPHONE ENCOUNTER
Insurance is recommending Acarbose instead of Jardiance. If this would work for patient can you send a new prescription in for him.

## 2023-09-03 ENCOUNTER — PATIENT MESSAGE (OUTPATIENT)
Dept: INTERNAL MEDICINE | Facility: CLINIC | Age: 68
End: 2023-09-03
Payer: MEDICARE

## 2023-09-14 ENCOUNTER — PATIENT MESSAGE (OUTPATIENT)
Dept: ADMINISTRATIVE | Facility: HOSPITAL | Age: 68
End: 2023-09-14
Payer: MEDICARE

## 2023-09-20 ENCOUNTER — OUTSIDE PLACE OF SERVICE (OUTPATIENT)
Dept: OPHTHALMOLOGY | Facility: CLINIC | Age: 68
End: 2023-09-20
Payer: MEDICARE

## 2023-09-20 PROCEDURE — 66984 XCAPSL CTRC RMVL W/O ECP: CPT | Mod: 79,LT,, | Performed by: STUDENT IN AN ORGANIZED HEALTH CARE EDUCATION/TRAINING PROGRAM

## 2023-09-20 PROCEDURE — 66984 PR REMOVAL, CATARACT, W/INSRT INTRAOC LENS, W/O ENDO CYCLO: ICD-10-PCS | Mod: 79,LT,, | Performed by: STUDENT IN AN ORGANIZED HEALTH CARE EDUCATION/TRAINING PROGRAM

## 2023-09-21 ENCOUNTER — OFFICE VISIT (OUTPATIENT)
Dept: OPHTHALMOLOGY | Facility: CLINIC | Age: 68
End: 2023-09-21
Payer: MEDICARE

## 2023-09-21 DIAGNOSIS — Z98.42 CATARACT EXTRACTION STATUS OF EYE, LEFT: ICD-10-CM

## 2023-09-21 DIAGNOSIS — Z98.890 POST-OPERATIVE STATE: Primary | ICD-10-CM

## 2023-09-21 PROCEDURE — 3044F HG A1C LEVEL LT 7.0%: CPT | Mod: CPTII,S$GLB,, | Performed by: STUDENT IN AN ORGANIZED HEALTH CARE EDUCATION/TRAINING PROGRAM

## 2023-09-21 PROCEDURE — 1159F MED LIST DOCD IN RCRD: CPT | Mod: CPTII,S$GLB,, | Performed by: STUDENT IN AN ORGANIZED HEALTH CARE EDUCATION/TRAINING PROGRAM

## 2023-09-21 PROCEDURE — 3066F PR DOCUMENTATION OF TREATMENT FOR NEPHROPATHY: ICD-10-PCS | Mod: CPTII,S$GLB,, | Performed by: STUDENT IN AN ORGANIZED HEALTH CARE EDUCATION/TRAINING PROGRAM

## 2023-09-21 PROCEDURE — 99999 PR PBB SHADOW E&M-EST. PATIENT-LVL III: ICD-10-PCS | Mod: PBBFAC,,, | Performed by: STUDENT IN AN ORGANIZED HEALTH CARE EDUCATION/TRAINING PROGRAM

## 2023-09-21 PROCEDURE — 1159F PR MEDICATION LIST DOCUMENTED IN MEDICAL RECORD: ICD-10-PCS | Mod: CPTII,S$GLB,, | Performed by: STUDENT IN AN ORGANIZED HEALTH CARE EDUCATION/TRAINING PROGRAM

## 2023-09-21 PROCEDURE — 3044F PR MOST RECENT HEMOGLOBIN A1C LEVEL <7.0%: ICD-10-PCS | Mod: CPTII,S$GLB,, | Performed by: STUDENT IN AN ORGANIZED HEALTH CARE EDUCATION/TRAINING PROGRAM

## 2023-09-21 PROCEDURE — 1160F PR REVIEW ALL MEDS BY PRESCRIBER/CLIN PHARMACIST DOCUMENTED: ICD-10-PCS | Mod: CPTII,S$GLB,, | Performed by: STUDENT IN AN ORGANIZED HEALTH CARE EDUCATION/TRAINING PROGRAM

## 2023-09-21 PROCEDURE — 4010F PR ACE/ARB THEARPY RXD/TAKEN: ICD-10-PCS | Mod: CPTII,S$GLB,, | Performed by: STUDENT IN AN ORGANIZED HEALTH CARE EDUCATION/TRAINING PROGRAM

## 2023-09-21 PROCEDURE — 99024 PR POST-OP FOLLOW-UP VISIT: ICD-10-PCS | Mod: S$GLB,,, | Performed by: STUDENT IN AN ORGANIZED HEALTH CARE EDUCATION/TRAINING PROGRAM

## 2023-09-21 PROCEDURE — 99024 POSTOP FOLLOW-UP VISIT: CPT | Mod: S$GLB,,, | Performed by: STUDENT IN AN ORGANIZED HEALTH CARE EDUCATION/TRAINING PROGRAM

## 2023-09-21 PROCEDURE — 1160F RVW MEDS BY RX/DR IN RCRD: CPT | Mod: CPTII,S$GLB,, | Performed by: STUDENT IN AN ORGANIZED HEALTH CARE EDUCATION/TRAINING PROGRAM

## 2023-09-21 PROCEDURE — 99999 PR PBB SHADOW E&M-EST. PATIENT-LVL III: CPT | Mod: PBBFAC,,, | Performed by: STUDENT IN AN ORGANIZED HEALTH CARE EDUCATION/TRAINING PROGRAM

## 2023-09-21 PROCEDURE — 4010F ACE/ARB THERAPY RXD/TAKEN: CPT | Mod: CPTII,S$GLB,, | Performed by: STUDENT IN AN ORGANIZED HEALTH CARE EDUCATION/TRAINING PROGRAM

## 2023-09-21 PROCEDURE — 3066F NEPHROPATHY DOC TX: CPT | Mod: CPTII,S$GLB,, | Performed by: STUDENT IN AN ORGANIZED HEALTH CARE EDUCATION/TRAINING PROGRAM

## 2023-09-21 NOTE — PROGRESS NOTES
Assessment /Plan     For exam results, see Encounter Report.    Post-operative state  Cataract extraction status of eye, left- POD#1 S/P CEIOL OS Doing well. Mild edema    Continue gtts to operative eye:  PF QID       Reinstructed in importance of absolute compliance with Post-OP instructions including medications, shield at bedtime, protective glasses during the day, and limitation of activities. Follow up appointments in approximately one and six weeks or call immediately for increased pain, redness or vision loss.     RTC 1 week. MOCT if PH worse than 20/25

## 2023-09-27 ENCOUNTER — TELEPHONE (OUTPATIENT)
Dept: INTERNAL MEDICINE | Facility: CLINIC | Age: 68
End: 2023-09-27
Payer: MEDICARE

## 2023-09-27 NOTE — TELEPHONE ENCOUNTER
----- Message from Kathleen Urania sent at 9/26/2023  4:13 PM CDT -----  Regarding: self [phone]    Type: Patient Call Back       Who called: Patient       What is the request in detail: Patient states he is experiencing a swollen tongue. Patient states after he ate popcorn it started to swell. Patient does not want to go to the ER he wants to wait for the office to call him. He states this is not the first time this has happened. Please advise, thank you       Can the clinic reply by [facility]?       Would the patient rather a call back or a response via [facility]? Call back       Best call back number: [phone]       Additional Information:

## 2023-09-27 NOTE — TELEPHONE ENCOUNTER
Returned call to patient who stated he was in the hospital for swollen tongue. States he was told it may be d/t his medication Losartan. Patient advised to schedule Hosp f/u when discharged. He voiced understanding.

## 2023-09-29 ENCOUNTER — OFFICE VISIT (OUTPATIENT)
Dept: OPHTHALMOLOGY | Facility: CLINIC | Age: 68
End: 2023-09-29
Payer: MEDICARE

## 2023-09-29 ENCOUNTER — EXTERNAL HOSPITAL ADMISSION (OUTPATIENT)
Dept: ADMINISTRATIVE | Facility: CLINIC | Age: 68
End: 2023-09-29
Payer: MEDICARE

## 2023-09-29 ENCOUNTER — PATIENT OUTREACH (OUTPATIENT)
Dept: ADMINISTRATIVE | Facility: CLINIC | Age: 68
End: 2023-09-29
Payer: MEDICARE

## 2023-09-29 ENCOUNTER — PATIENT MESSAGE (OUTPATIENT)
Dept: ADMINISTRATIVE | Facility: CLINIC | Age: 68
End: 2023-09-29
Payer: MEDICARE

## 2023-09-29 DIAGNOSIS — Z98.41 CATARACT EXTRACTION STATUS OF EYE, RIGHT: ICD-10-CM

## 2023-09-29 DIAGNOSIS — Z98.890 POST-OPERATIVE STATE: Primary | ICD-10-CM

## 2023-09-29 DIAGNOSIS — Z98.42 CATARACT EXTRACTION STATUS OF EYE, LEFT: ICD-10-CM

## 2023-09-29 PROCEDURE — 4010F ACE/ARB THERAPY RXD/TAKEN: CPT | Mod: CPTII,S$GLB,, | Performed by: STUDENT IN AN ORGANIZED HEALTH CARE EDUCATION/TRAINING PROGRAM

## 2023-09-29 PROCEDURE — 3044F HG A1C LEVEL LT 7.0%: CPT | Mod: CPTII,S$GLB,, | Performed by: STUDENT IN AN ORGANIZED HEALTH CARE EDUCATION/TRAINING PROGRAM

## 2023-09-29 PROCEDURE — 99999 PR PBB SHADOW E&M-EST. PATIENT-LVL III: ICD-10-PCS | Mod: PBBFAC,,, | Performed by: STUDENT IN AN ORGANIZED HEALTH CARE EDUCATION/TRAINING PROGRAM

## 2023-09-29 PROCEDURE — 99024 POSTOP FOLLOW-UP VISIT: CPT | Mod: S$GLB,,, | Performed by: STUDENT IN AN ORGANIZED HEALTH CARE EDUCATION/TRAINING PROGRAM

## 2023-09-29 PROCEDURE — 3066F NEPHROPATHY DOC TX: CPT | Mod: CPTII,S$GLB,, | Performed by: STUDENT IN AN ORGANIZED HEALTH CARE EDUCATION/TRAINING PROGRAM

## 2023-09-29 PROCEDURE — 1160F PR REVIEW ALL MEDS BY PRESCRIBER/CLIN PHARMACIST DOCUMENTED: ICD-10-PCS | Mod: CPTII,S$GLB,, | Performed by: STUDENT IN AN ORGANIZED HEALTH CARE EDUCATION/TRAINING PROGRAM

## 2023-09-29 PROCEDURE — 99024 PR POST-OP FOLLOW-UP VISIT: ICD-10-PCS | Mod: S$GLB,,, | Performed by: STUDENT IN AN ORGANIZED HEALTH CARE EDUCATION/TRAINING PROGRAM

## 2023-09-29 PROCEDURE — 4010F PR ACE/ARB THEARPY RXD/TAKEN: ICD-10-PCS | Mod: CPTII,S$GLB,, | Performed by: STUDENT IN AN ORGANIZED HEALTH CARE EDUCATION/TRAINING PROGRAM

## 2023-09-29 PROCEDURE — 1159F PR MEDICATION LIST DOCUMENTED IN MEDICAL RECORD: ICD-10-PCS | Mod: CPTII,S$GLB,, | Performed by: STUDENT IN AN ORGANIZED HEALTH CARE EDUCATION/TRAINING PROGRAM

## 2023-09-29 PROCEDURE — 3066F PR DOCUMENTATION OF TREATMENT FOR NEPHROPATHY: ICD-10-PCS | Mod: CPTII,S$GLB,, | Performed by: STUDENT IN AN ORGANIZED HEALTH CARE EDUCATION/TRAINING PROGRAM

## 2023-09-29 PROCEDURE — 3044F PR MOST RECENT HEMOGLOBIN A1C LEVEL <7.0%: ICD-10-PCS | Mod: CPTII,S$GLB,, | Performed by: STUDENT IN AN ORGANIZED HEALTH CARE EDUCATION/TRAINING PROGRAM

## 2023-09-29 PROCEDURE — 1159F MED LIST DOCD IN RCRD: CPT | Mod: CPTII,S$GLB,, | Performed by: STUDENT IN AN ORGANIZED HEALTH CARE EDUCATION/TRAINING PROGRAM

## 2023-09-29 PROCEDURE — 1160F RVW MEDS BY RX/DR IN RCRD: CPT | Mod: CPTII,S$GLB,, | Performed by: STUDENT IN AN ORGANIZED HEALTH CARE EDUCATION/TRAINING PROGRAM

## 2023-09-29 PROCEDURE — 99999 PR PBB SHADOW E&M-EST. PATIENT-LVL III: CPT | Mod: PBBFAC,,, | Performed by: STUDENT IN AN ORGANIZED HEALTH CARE EDUCATION/TRAINING PROGRAM

## 2023-09-29 RX ORDER — DIPHENHYDRAMINE HCL 25 MG
25 CAPSULE ORAL EVERY 6 HOURS PRN
COMMUNITY

## 2023-09-29 RX ORDER — ACETAMINOPHEN 325 MG/1
650 TABLET ORAL EVERY 8 HOURS PRN
COMMUNITY

## 2023-09-29 NOTE — PROGRESS NOTES
HPI    Patient here today for POW#1 visit s/p CEIOL of the left eye. Patient   states vision is doing well and is using drops. Pt states his left eye is   a little sore.  No other ocular complaints.    1. DM  2. PCIOL OD 8/3/23  PCIOL OS 9/20/23    OS- Pred QID    Last edited by Lizzette Edwards on 9/29/2023  2:34 PM.            Assessment /Plan     For exam results, see Encounter Report.    Post-operative state    Cataract extraction status of eye, left    Cataract extraction status of eye, right    Impression/Plan  POW#1 S/P CEIOL OS : Doing well with no evidence of infection.     Trace Cell. PF Taper 4-3-2-1 then stop    Pt given and instructed in one week postop instructions. Can resume normal activitites and d/c eye shield. OTC reading glasses can be used until evaluated for final MR. Follow up in 3 month or PRN pain, redness, vision loss, or other concerns.    RTC 3 mo w/ TRF

## 2023-09-29 NOTE — PROGRESS NOTES
C3 nurse attempted to contact Tom Tavarez for a TCC post hospital discharge follow up call. No answer. Left voicemail with callback information. The patient has a scheduled HOSFU appointment with Lee Tomas MD (Allergy/Immunology) new patient apt on 10/5/23 @ 9:00am. Does not have a PCP HOSFU scheduled. Message sent to PCP staff to assist in scheduling appointment.

## 2023-09-29 NOTE — PROGRESS NOTES
C3 nurse spoke with Tom Tavarez for a TCC post hospital discharge follow up call. The patient has a scheduled HOSFU appointment with Lee Tomas MD (Allergy/Immunology) new patient apt on 10/5/23 @ 9:00am. C3 nurse unable to schedule HOSFU with PCP. Message previously sent to PCP staff to assist in scheduling follow up.

## 2023-10-05 ENCOUNTER — OFFICE VISIT (OUTPATIENT)
Dept: ALLERGY | Facility: CLINIC | Age: 68
End: 2023-10-05
Payer: MEDICARE

## 2023-10-05 ENCOUNTER — LAB VISIT (OUTPATIENT)
Dept: LAB | Facility: HOSPITAL | Age: 68
End: 2023-10-05
Attending: FAMILY MEDICINE
Payer: MEDICARE

## 2023-10-05 VITALS
HEART RATE: 58 BPM | TEMPERATURE: 98 F | BODY MASS INDEX: 43.66 KG/M2 | SYSTOLIC BLOOD PRESSURE: 160 MMHG | DIASTOLIC BLOOD PRESSURE: 94 MMHG | WEIGHT: 313.06 LBS

## 2023-10-05 DIAGNOSIS — R53.82 CHRONIC FATIGUE AND MALAISE: ICD-10-CM

## 2023-10-05 DIAGNOSIS — J31.0 RHINITIS, UNSPECIFIED TYPE: Primary | ICD-10-CM

## 2023-10-05 DIAGNOSIS — T78.3XXA ANGIOEDEMA OF TONGUE: ICD-10-CM

## 2023-10-05 DIAGNOSIS — J40 BRONCHITIS: ICD-10-CM

## 2023-10-05 DIAGNOSIS — R53.81 CHRONIC FATIGUE AND MALAISE: ICD-10-CM

## 2023-10-05 DIAGNOSIS — I10 HYPERTENSION, UNSPECIFIED TYPE: ICD-10-CM

## 2023-10-05 DIAGNOSIS — J30.89 PERENNIAL ALLERGIC RHINITIS WITH SEASONAL VARIATION: ICD-10-CM

## 2023-10-05 DIAGNOSIS — J31.0 RHINITIS, UNSPECIFIED TYPE: ICD-10-CM

## 2023-10-05 DIAGNOSIS — J30.2 PERENNIAL ALLERGIC RHINITIS WITH SEASONAL VARIATION: ICD-10-CM

## 2023-10-05 LAB
C4 SERPL-MCNC: 39 MG/DL (ref 11–44)
CRP SERPL-MCNC: 17.1 MG/L (ref 0–8.2)
IGA SERPL-MCNC: 411 MG/DL (ref 40–350)
IGG SERPL-MCNC: 964 MG/DL (ref 650–1600)
IGM SERPL-MCNC: 79 MG/DL (ref 50–300)

## 2023-10-05 PROCEDURE — 86003 ALLG SPEC IGE CRUDE XTRC EA: CPT | Performed by: SPECIALIST

## 2023-10-05 PROCEDURE — 86162 COMPLEMENT TOTAL (CH50): CPT | Performed by: SPECIALIST

## 2023-10-05 PROCEDURE — 1160F RVW MEDS BY RX/DR IN RCRD: CPT | Mod: CPTII,S$GLB,, | Performed by: SPECIALIST

## 2023-10-05 PROCEDURE — 3044F HG A1C LEVEL LT 7.0%: CPT | Mod: CPTII,S$GLB,, | Performed by: SPECIALIST

## 2023-10-05 PROCEDURE — 86161 COMPLEMENT/FUNCTION ACTIVITY: CPT | Performed by: SPECIALIST

## 2023-10-05 PROCEDURE — 3044F PR MOST RECENT HEMOGLOBIN A1C LEVEL <7.0%: ICD-10-PCS | Mod: CPTII,S$GLB,, | Performed by: SPECIALIST

## 2023-10-05 PROCEDURE — 1159F PR MEDICATION LIST DOCUMENTED IN MEDICAL RECORD: ICD-10-PCS | Mod: CPTII,S$GLB,, | Performed by: SPECIALIST

## 2023-10-05 PROCEDURE — 1126F PR PAIN SEVERITY QUANTIFIED, NO PAIN PRESENT: ICD-10-PCS | Mod: CPTII,S$GLB,, | Performed by: SPECIALIST

## 2023-10-05 PROCEDURE — 3008F PR BODY MASS INDEX (BMI) DOCUMENTED: ICD-10-PCS | Mod: CPTII,S$GLB,, | Performed by: SPECIALIST

## 2023-10-05 PROCEDURE — 99417 PROLNG OP E/M EACH 15 MIN: CPT | Mod: S$GLB,,, | Performed by: SPECIALIST

## 2023-10-05 PROCEDURE — 1159F MED LIST DOCD IN RCRD: CPT | Mod: CPTII,S$GLB,, | Performed by: SPECIALIST

## 2023-10-05 PROCEDURE — 3066F PR DOCUMENTATION OF TREATMENT FOR NEPHROPATHY: ICD-10-PCS | Mod: CPTII,S$GLB,, | Performed by: SPECIALIST

## 2023-10-05 PROCEDURE — 3077F PR MOST RECENT SYSTOLIC BLOOD PRESSURE >= 140 MM HG: ICD-10-PCS | Mod: CPTII,S$GLB,, | Performed by: SPECIALIST

## 2023-10-05 PROCEDURE — 86334 IMMUNOFIX E-PHORESIS SERUM: CPT | Mod: 26,,, | Performed by: PATHOLOGY

## 2023-10-05 PROCEDURE — 3008F BODY MASS INDEX DOCD: CPT | Mod: CPTII,S$GLB,, | Performed by: SPECIALIST

## 2023-10-05 PROCEDURE — 99999 PR PBB SHADOW E&M-EST. PATIENT-LVL V: CPT | Mod: PBBFAC,,, | Performed by: SPECIALIST

## 2023-10-05 PROCEDURE — 82785 ASSAY OF IGE: CPT | Performed by: SPECIALIST

## 2023-10-05 PROCEDURE — 82784 ASSAY IGA/IGD/IGG/IGM EACH: CPT | Performed by: SPECIALIST

## 2023-10-05 PROCEDURE — 99999 PR PBB SHADOW E&M-EST. PATIENT-LVL V: ICD-10-PCS | Mod: PBBFAC,,, | Performed by: SPECIALIST

## 2023-10-05 PROCEDURE — 3288F PR FALLS RISK ASSESSMENT DOCUMENTED: ICD-10-PCS | Mod: CPTII,S$GLB,, | Performed by: SPECIALIST

## 2023-10-05 PROCEDURE — 3080F DIAST BP >= 90 MM HG: CPT | Mod: CPTII,S$GLB,, | Performed by: SPECIALIST

## 2023-10-05 PROCEDURE — 99205 OFFICE O/P NEW HI 60 MIN: CPT | Mod: S$GLB,,, | Performed by: SPECIALIST

## 2023-10-05 PROCEDURE — 4010F PR ACE/ARB THEARPY RXD/TAKEN: ICD-10-PCS | Mod: CPTII,S$GLB,, | Performed by: SPECIALIST

## 2023-10-05 PROCEDURE — 99417 PR PROLONGED SVC, OUTPT, W/WO DIRECT PT CONTACT,  EA ADDTL 15 MIN: ICD-10-PCS | Mod: S$GLB,,, | Performed by: SPECIALIST

## 2023-10-05 PROCEDURE — 1101F PR PT FALLS ASSESS DOC 0-1 FALLS W/OUT INJ PAST YR: ICD-10-PCS | Mod: CPTII,S$GLB,, | Performed by: SPECIALIST

## 2023-10-05 PROCEDURE — 3066F NEPHROPATHY DOC TX: CPT | Mod: CPTII,S$GLB,, | Performed by: SPECIALIST

## 2023-10-05 PROCEDURE — 1101F PT FALLS ASSESS-DOCD LE1/YR: CPT | Mod: CPTII,S$GLB,, | Performed by: SPECIALIST

## 2023-10-05 PROCEDURE — 3080F PR MOST RECENT DIASTOLIC BLOOD PRESSURE >= 90 MM HG: ICD-10-PCS | Mod: CPTII,S$GLB,, | Performed by: SPECIALIST

## 2023-10-05 PROCEDURE — 3288F FALL RISK ASSESSMENT DOCD: CPT | Mod: CPTII,S$GLB,, | Performed by: SPECIALIST

## 2023-10-05 PROCEDURE — 86317 IMMUNOASSAY INFECTIOUS AGENT: CPT | Performed by: SPECIALIST

## 2023-10-05 PROCEDURE — 86140 C-REACTIVE PROTEIN: CPT | Performed by: SPECIALIST

## 2023-10-05 PROCEDURE — 1160F PR REVIEW ALL MEDS BY PRESCRIBER/CLIN PHARMACIST DOCUMENTED: ICD-10-PCS | Mod: CPTII,S$GLB,, | Performed by: SPECIALIST

## 2023-10-05 PROCEDURE — 4010F ACE/ARB THERAPY RXD/TAKEN: CPT | Mod: CPTII,S$GLB,, | Performed by: SPECIALIST

## 2023-10-05 PROCEDURE — 86334 IMMUNOFIX E-PHORESIS SERUM: CPT | Performed by: SPECIALIST

## 2023-10-05 PROCEDURE — 1126F AMNT PAIN NOTED NONE PRSNT: CPT | Mod: CPTII,S$GLB,, | Performed by: SPECIALIST

## 2023-10-05 PROCEDURE — 86003 ALLG SPEC IGE CRUDE XTRC EA: CPT | Mod: 59 | Performed by: SPECIALIST

## 2023-10-05 PROCEDURE — 99205 PR OFFICE/OUTPT VISIT, NEW, LEVL V, 60-74 MIN: ICD-10-PCS | Mod: S$GLB,,, | Performed by: SPECIALIST

## 2023-10-05 PROCEDURE — 86334 PATHOLOGIST INTERPRETATION IFE: ICD-10-PCS | Mod: 26,,, | Performed by: PATHOLOGY

## 2023-10-05 PROCEDURE — 36415 COLL VENOUS BLD VENIPUNCTURE: CPT | Performed by: SPECIALIST

## 2023-10-05 PROCEDURE — 3077F SYST BP >= 140 MM HG: CPT | Mod: CPTII,S$GLB,, | Performed by: SPECIALIST

## 2023-10-05 PROCEDURE — 86160 COMPLEMENT ANTIGEN: CPT | Performed by: SPECIALIST

## 2023-10-05 NOTE — PROGRESS NOTES
Subjective:       Patient ID: Tom Tavarez is a 68 y.o. male.    Chief Complaint:  Angioedema  -- of the tongue-- TOTAL 5 EPISODES----  first episode treated at an ER in MAY 2023-- For the last episode , he was worked up and treated  with 2 days of hospitalization at the [facility]-  C4 and C1 Esterase Inhibitor-- Total ( protein ) and functions were normal.     HPI:    AA male with multiple medical issues-- seen as a new patient with 5 episodes of Angioedema-- of only the tongue-- between May and September 2023.  The May episode was treated at an ER.  At the September visit - he was treated at the [facility]-- 2 days stay-- LOSARTAN-- THE ARB WAS STOPPED. Since then he no longer has the angioedema.  While at  the hospital C4 and C 1 esterase inhibitor - total protein and functions werte talib-- That rules out potentially acquired C1 esterase inhibitor deficiency as a cause of tongue angioedema-- Joe must get C1 Q--  and repeat C4  and do c2 during acute episodes of angioedema.  He did not have swelling of lips, hands or feet or any other part of the anatomy. Also he did not have GI symptoms which would have indicated swelling of the viscera    HE HAS SEASONAL ALLERGIC RHINITIS- TREATED SYMPTOMATICALLY. NO PREVIOUS ALLERGY WORK UP.    If he is using tooth paste or mouth wash- that should be replaced with  non SLS- containing product-- such as [employer] [place]  tooth paste. I did not offer patch testing.  I am repeating CXRs.  I have ordered relevant allergy and immune evaluation.  He is an  ex cigarette smoker- smoked 20 cigarettes per day for 5 years and quit smoking 25 years ago.  Hobbies include playing and watching sports, grand child, walking and [employer].  Occupation : retired self employed .    Has seasonal hay fever-- No allergy work up done.    If having isolated future tongue swelling-- oral surgery consult and tongue biopsy may be done.  Has a past history of pneumonia.- Left lung base -- CXRs of  "01- 22- 2022 AND 02- 03- 2022.    NEVER HAD COLONOSCOPY- WILL DO colo guard. I HAVE ENCOURAGED HIM TO GET COLONOSCOPY ASAP.  His father had prostate cancer- mus follow PSAs  Is obese- BMI 43.66. HAS MULTIPLE MEDICAL PROBLEMS-- T2 DM, HTN, Metabolic syndrome                Outpatient Medications Marked as Taking for the 10/5/23 encounter (Office Visit) with Lee Tomas MD   Medication Sig Dispense Refill    acetaminophen (TYLENOL) 325 MG tablet Take 650 mg by mouth every 8 (eight) hours as needed.      amLODIPine (NORVASC) 5 MG tablet Take 1 tablet (5 mg total) by mouth once daily. 30 tablet 11    atorvastatin (LIPITOR) 40 MG tablet TAKE 1 TABLET(40 MG) BY MOUTH EVERY DAY 90 tablet 0    clobetasol 0.05% (TEMOVATE) 0.05 % Oint Apply topically 2 (two) times daily. Apply at night under cling wrap to R lower leg. Apply twice daily to smaller areas on L lower leg and L thigh 45 g 0    colchicine (COLCRYS) 0.6 mg tablet Take 1 tablet (0.6 mg total) by mouth once daily. 30 tablet 11    diphenhydrAMINE (BENADRYL) 25 mg capsule Take 25 mg by mouth every 6 (six) hours as needed.      empagliflozin (JARDIANCE) 10 mg tablet Take 1 tablet (10 mg total) by mouth once daily. 30 tablet 11    gabapentin (NEURONTIN) 300 MG capsule Take 1 in the morning and 2 at bedtime 270 capsule 1    hydroCHLOROthiazide (HYDRODIURIL) 25 MG tablet Take 1 tablet (25 mg total) by mouth once daily. 30 tablet 11    insulin syringe-needle U-100 (BD INSULIN SYRINGE ULTRA-FINE) 0.3 mL 31 gauge x 5/16" Syrg 1 each by Misc.(Non-Drug; Combo Route) route 2 (two) times a day. 100 each 0    ketoconazole (NIZORAL) 2 % cream Apply topically once daily. 60 g 1    lancets Misc To check BG 3 times daily, to use with insurance preferred meter 100 each 3    LANTUS SOLOSTAR U-100 INSULIN glargine 100 units/mL SubQ pen Inject 50 Units into the skin 2 (two) times daily. 90 mL 1    metFORMIN (GLUCOPHAGE) 500 MG tablet Take 1 tablet (500 mg total) by mouth 2 (two) times " "daily with meals. 180 tablet 3    mupirocin (BACTROBAN) 2 % ointment Apply topically once daily. 30 g 1    pen needle, diabetic (BD ULTRA-FINE MINI PEN NEEDLE) 31 gauge x 3/16" Ndle 1 each by Misc.(Non-Drug; Combo Route) route 2 (two) times a day. 200 each 1    prednisoLONE acetate (PRED FORTE) 1 % DrpS Place 1 drop into the right eye 4 (four) times daily. 5 mL 1    prednisoLONE acetate (PRED FORTE) 1 % DrpS Place 1 drop into the left eye every 4 (four) hours. 5 mL 1    triamcinolone acetonide 0.1% (KENALOG) 0.1 % cream Apply topically 2 (two) times daily. 28.4 g 0    VITAMIN B COMPLEX ORAL Take 1 tablet by mouth every morning.          Patient has no known allergies.     Past Medical History:   Diagnosis Date    BMI 40.0-44.9, adult 07/21/2016    Colon cancer screening 10/03/2017    Diabetes mellitus type 2 in obese 11/2006     am 05/09/2023 Insulin x 10 Years    Hyperlipidemia     Hypertension 1985    Obesity, Class III, BMI 40-49.9 (morbid obesity) 1985    Umbilical hernia        Family History   Problem Relation Age of Onset    Hypertension Mother     Heart failure Mother     Diabetes Brother        Environmental History: Dust Mite Controls: Dust mite controls are already in place.     Review of Systems   Constitutional:  Positive for fatigue. Negative for fever.   HENT:  Positive for congestion, postnasal drip and rhinorrhea. Negative for ear pain, sinus pressure, sinus pain, sneezing and sore throat.    Eyes:  Positive for itching. Negative for redness.   Respiratory: Negative.  Negative for cough, chest tightness, shortness of breath and wheezing.    Cardiovascular: Negative.  Negative for chest pain.   Gastrointestinal: Negative.  Negative for nausea.   Endocrine: Negative.  Negative for cold intolerance.   Genitourinary: Negative.  Negative for frequency.   Musculoskeletal: Negative.  Negative for myalgias.   Skin: Negative.  Negative for rash.   Allergic/Immunologic: Positive for environmental " allergies. Negative for food allergies and immunocompromised state.   Neurological: Negative.  Negative for dizziness and headaches.   Hematological: Negative.  Negative for adenopathy.   Psychiatric/Behavioral: Negative.  Negative for sleep disturbance.      Objective:     Visit Vitals  BP (!) 160/94 (BP Location: Left arm, Patient Position: Sitting)   Pulse (!) 58   Temp 98.2 °F (36.8 °C)   Wt (!) 142 kg (313 lb 0.9 oz)   BMI 43.66 kg/m²       Physical Exam  Vitals and nursing note reviewed.   Constitutional:       Appearance: Normal appearance. He is obese.   HENT:      Head: Normocephalic and atraumatic.      Right Ear: Tympanic membrane, ear canal and external ear normal.      Left Ear: Tympanic membrane, ear canal and external ear normal.      Nose: Congestion and rhinorrhea present.      Mouth/Throat:      Mouth: Mucous membranes are moist.      Pharynx: Oropharynx is clear.   Eyes:      Extraocular Movements: Extraocular movements intact.      Conjunctiva/sclera: Conjunctivae normal.      Pupils: Pupils are equal, round, and reactive to light.   Cardiovascular:      Rate and Rhythm: Normal rate and regular rhythm.      Pulses: Normal pulses.      Heart sounds: Normal heart sounds.   Pulmonary:      Effort: Pulmonary effort is normal.      Breath sounds: Normal breath sounds.   Abdominal:      General: Abdomen is flat. Bowel sounds are normal.      Palpations: Abdomen is soft.   Musculoskeletal:         General: Normal range of motion.      Cervical back: Normal range of motion and neck supple.   Skin:     General: Skin is warm and dry.      Capillary Refill: Capillary refill takes less than 2 seconds.   Neurological:      General: No focal deficit present.      Mental Status: He is alert and oriented to person, place, and time. Mental status is at baseline.   Psychiatric:         Mood and Affect: Mood normal.         Behavior: Behavior normal.         Thought Content: Thought content normal.          "Judgment: Judgment normal.       Assessment:      1. Rhinitis, unspecified type    2. Angioedema of tongue    3. Bronchitis    4. Perennial allergic rhinitis with seasonal variation    5. Chronic fatigue and malaise    6. Hypertension, unspecified type    7      Work up for amyloidosis of the tongue and rule out monoclonal gammopathy    Plan:     +  Could not do C1 q-- [facility].  Repeat C4, Do CH 50 and AH 50, do CRP, serum IgG , A and M -- work up for paraproteinemia-- Serum Immuno Fixation Electrophoresis    Stopped Losartan at the time of hospitalization in Sept 2023--  Losartan is an ARB-- not supposed to cause angioedema-- like the ACE 1 inhibitors 9 WHICH CAN CAUSE ANGIOEDEMA - TONGUE ).  May work up for amyloidosis-- if tongue swelling recur-- with tongue biopsy by an oral surgeon--   Do SERUM IMMUNOGLOBULINS AND AS NEEDED SERUM iMMUNO FIXATION ELETROPHORESIS.  Region - 6 inhalant aero allergen panel to screen for seasonal allergie-- Advised the patient " Unequivocably inhalant and food allergies ARE NOT RESPONSIBLE FOR THE ANGIOEDEMAOF HIS TONGUE "    DO COLO GUARD-- It can give useful or false positive or false negative results.  Never had colonoscopy in the past. Must get colonoscopy.    DO NOT TAKE AC 1 INHIBITORS-- JUST IN CASE STAY OFF OF ARB- LOSARTAN.  Follow up with the various specialists and the PCP.  Follow up in one month.                  Problems Address                                                 Amount and/or Complexity                                                                      Risk       3           [] 2 or more self-limited or minor problems                      [] Limited                                                                        [] Low                  [] 1 stable chronic illness                                                  Any combination of the two                                               OTC drugs                  []Acute, " uncomplicated illness or injury                            Review of prior external notes from unique source           Minor surgery with no risk factors                                                                                                               [] 1 []2  []3+                                                                                                              Review of results from each unique test                                                                                                               [] 1 []2  [] 3+                                                                                                              Order of each unique test                                                                                                               [] 1 []2  [] 3+                                                                                                              Or                                                                                                             [] Assessment requiring an independent historian      4            [] One or more chronic illness with exacerbation,              [] Moderate                                                                      [] Moderate                 Progression, or side effects of treatment                            -test documents or independent historians                        Prescription drug management                []  2 or more sable chronic illnesses                                    [] Independent interpretation of tests                              Minor surgery with identifiable risk                [] 1 undiagnosed new problem with uncertain prognosis    [] Discussion or management of test results                    elective major surgery                [] 1 acute illness with                systemic symptoms                                                                                                                                                               [] 1 acute complicated injury                                                                                                                                          Elective major surgery                                                                                                                                                                                                                                                                                                                                                                                                  5            [x] 1 or more chronic illnesses with severe exacerbation,     [x] Extensive(two from below)                                         [x] High                                                                                                               [] Independent interpretation of results                         Drug therapy requiring intensive                                                                                                               []Discussion of management or test interpretation           monitoring                                                                                                                                                                                                       Decision to de-escalate care                 [] 1 acute or chronic illness or injury that poses a threat                                                                                               Decision regarding hospitalization

## 2023-10-06 LAB
IGE SERPL-ACNC: 213 IU/ML (ref 0–100)
INTERPRETATION SERPL IFE-IMP: NORMAL

## 2023-10-07 ENCOUNTER — HOSPITAL ENCOUNTER (OUTPATIENT)
Dept: RADIOLOGY | Facility: HOSPITAL | Age: 68
Discharge: HOME | End: 2023-10-07
Attending: SPECIALIST
Payer: MEDICARE

## 2023-10-07 DIAGNOSIS — J40 BRONCHITIS: ICD-10-CM

## 2023-10-07 PROCEDURE — 71046 X-RAY EXAM CHEST 2 VIEWS: CPT | Mod: 26,,, | Performed by: RADIOLOGY

## 2023-10-07 PROCEDURE — 71046 X-RAY EXAM CHEST 2 VIEWS: CPT | Mod: TC

## 2023-10-07 PROCEDURE — 71046 XR CHEST PA AND LATERAL: ICD-10-PCS | Mod: 26,,, | Performed by: RADIOLOGY

## 2023-10-09 LAB
CH50 SERPL-ACNC: 72 U/ML (ref 42–95)
PATHOLOGIST INTERPRETATION IFE: NORMAL

## 2023-10-11 LAB
A FUMIGATUS IGE QN: <0.1 KU/L
ALLERGEN CHAETOMIUM GLOBOSUM IGE: <0.1 KU/L
ALLERGEN WHITE PINE TREE IGE: <0.1 KU/L
BAHIA GRASS IGE QN: <0.1 KU/L
BALD CYPRESS IGE QN: <0.1 KU/L
C HERBARUM IGE QN: <0.1 KU/L
C LUNATA IGE QN: <0.1 KU/L
CAT DANDER IGE QN: <0.1 KU/L
CHAETOMIUM GLOB. CLASS: NORMAL
COCKLEBUR IGE QN: <0.1 KU/L
COCKSFOOT IGE QN: <0.1 KU/L
COMMON RAGWEED IGE QN: <0.1 KU/L
COTTONWOOD IGE QN: <0.1 KU/L
D FARINAE IGE QN: 19.8 KU/L
D PTERONYSS IGE QN: 12.4 KU/L
DEPRECATED A FUMIGATUS IGE RAST QL: NORMAL
DEPRECATED BAHIA GRASS IGE RAST QL: NORMAL
DEPRECATED BALD CYPRESS IGE RAST QL: NORMAL
DEPRECATED C HERBARUM IGE RAST QL: NORMAL
DEPRECATED C LUNATA IGE RAST QL: NORMAL
DEPRECATED CAT DANDER IGE RAST QL: NORMAL
DEPRECATED COCKLEBUR IGE RAST QL: NORMAL
DEPRECATED COCKSFOOT IGE RAST QL: NORMAL
DEPRECATED COMMON RAGWEED IGE RAST QL: NORMAL
DEPRECATED COTTONWOOD IGE RAST QL: NORMAL
DEPRECATED D FARINAE IGE RAST QL: ABNORMAL
DEPRECATED D PTERONYSS IGE RAST QL: ABNORMAL
DEPRECATED DOG DANDER IGE RAST QL: NORMAL
DEPRECATED ELDER IGE RAST QL: NORMAL
DEPRECATED ENGL PLANTAIN IGE RAST QL: NORMAL
DEPRECATED GUM-TREE IGE RAST QL: NORMAL
DEPRECATED HACKBERRY TREE IGE RAST QL: NORMAL
DEPRECATED JOHNSON GRASS IGE RAST QL: NORMAL
DEPRECATED MUGWORT IGE RAST QL: NORMAL
DEPRECATED NETTLE IGE RAST QL: NORMAL
DEPRECATED PECAN/HICK TREE IGE RAST QL: NORMAL
DEPRECATED PER RYE GRASS IGE RAST QL: NORMAL
DEPRECATED PRIVET IGE RAST QL: NORMAL
DEPRECATED RED TOP GRASS IGE RAST QL: NORMAL
DEPRECATED ROACH IGE RAST QL: NORMAL
DEPRECATED SALTWORT IGE RAST QL: NORMAL
DEPRECATED TIMOTHY IGE RAST QL: NORMAL
DEPRECATED WHITE OAK IGE RAST QL: NORMAL
DEPRECATED WILLOW IGE RAST QL: NORMAL
DOG DANDER IGE QN: <0.1 KU/L
ELDER IGE QN: <0.1 KU/L
ELM CEDAR CLASS: NORMAL
ELM CEDAR, IGE: <0.1 KU/L
ENGL PLANTAIN IGE QN: <0.1 KU/L
GUM-TREE IGE QN: <0.1 KU/L
HACKBERRY TREE IGE QN: <0.1 KU/L
JOHNSON GRASS IGE QN: <0.1 KU/L
MUGWORT IGE QN: <0.1 KU/L
NETTLE IGE QN: <0.1 KU/L
PECAN/HICK TREE IGE QN: <0.1 KU/L
PER RYE GRASS IGE QN: <0.1 KU/L
PRIVET IGE QN: <0.1 KU/L
RED TOP GRASS IGE QN: <0.1 KU/L
ROACH IGE QN: <0.1 KU/L
SALTWORT IGE QN: <0.1 KU/L
STEMPHYLIUM HERBARUM CLASS: NORMAL
STEMPHYLLIUM, IGE: <0.1 KU/L
TIMOTHY IGE QN: <0.1 KU/L
WHITE OAK IGE QN: <0.1 KU/L
WHITE PINE CLASS: NORMAL
WILLOW IGE QN: <0.1 KU/L

## 2023-10-12 ENCOUNTER — OFFICE VISIT (OUTPATIENT)
Dept: DERMATOLOGY | Facility: CLINIC | Age: 68
End: 2023-10-12
Payer: MEDICARE

## 2023-10-12 DIAGNOSIS — L81.9 POST-INFLAMMATORY PIGMENTARY CHANGES: Primary | ICD-10-CM

## 2023-10-12 LAB — AH50 ACT/NOR SER IA: 97 %OF NORM

## 2023-10-12 PROCEDURE — 4010F ACE/ARB THERAPY RXD/TAKEN: CPT | Mod: CPTII,S$GLB,, | Performed by: DERMATOLOGY

## 2023-10-12 PROCEDURE — 99999 PR PBB SHADOW E&M-EST. PATIENT-LVL II: CPT | Mod: PBBFAC,,, | Performed by: DERMATOLOGY

## 2023-10-12 PROCEDURE — 3066F PR DOCUMENTATION OF TREATMENT FOR NEPHROPATHY: ICD-10-PCS | Mod: CPTII,S$GLB,, | Performed by: DERMATOLOGY

## 2023-10-12 PROCEDURE — 99212 OFFICE O/P EST SF 10 MIN: CPT | Mod: S$GLB,,, | Performed by: DERMATOLOGY

## 2023-10-12 PROCEDURE — 1159F MED LIST DOCD IN RCRD: CPT | Mod: CPTII,S$GLB,, | Performed by: DERMATOLOGY

## 2023-10-12 PROCEDURE — 3066F NEPHROPATHY DOC TX: CPT | Mod: CPTII,S$GLB,, | Performed by: DERMATOLOGY

## 2023-10-12 PROCEDURE — 99212 PR OFFICE/OUTPT VISIT, EST, LEVL II, 10-19 MIN: ICD-10-PCS | Mod: S$GLB,,, | Performed by: DERMATOLOGY

## 2023-10-12 PROCEDURE — 1159F PR MEDICATION LIST DOCUMENTED IN MEDICAL RECORD: ICD-10-PCS | Mod: CPTII,S$GLB,, | Performed by: DERMATOLOGY

## 2023-10-12 PROCEDURE — 1160F PR REVIEW ALL MEDS BY PRESCRIBER/CLIN PHARMACIST DOCUMENTED: ICD-10-PCS | Mod: CPTII,S$GLB,, | Performed by: DERMATOLOGY

## 2023-10-12 PROCEDURE — 3044F PR MOST RECENT HEMOGLOBIN A1C LEVEL <7.0%: ICD-10-PCS | Mod: CPTII,S$GLB,, | Performed by: DERMATOLOGY

## 2023-10-12 PROCEDURE — 3044F HG A1C LEVEL LT 7.0%: CPT | Mod: CPTII,S$GLB,, | Performed by: DERMATOLOGY

## 2023-10-12 PROCEDURE — 4010F PR ACE/ARB THEARPY RXD/TAKEN: ICD-10-PCS | Mod: CPTII,S$GLB,, | Performed by: DERMATOLOGY

## 2023-10-12 PROCEDURE — 99999 PR PBB SHADOW E&M-EST. PATIENT-LVL II: ICD-10-PCS | Mod: PBBFAC,,, | Performed by: DERMATOLOGY

## 2023-10-12 PROCEDURE — 1160F RVW MEDS BY RX/DR IN RCRD: CPT | Mod: CPTII,S$GLB,, | Performed by: DERMATOLOGY

## 2023-10-12 NOTE — PROGRESS NOTES
Subjective:      Patient ID:  Tom Tavarez is a 68 y.o. male who presents for   Chief Complaint   Patient presents with    Follow-up     Rash on lower legs. Sx improved. No concerns.      Last seen 8/1/23 for initial eval of BLE dermatitis, suspected venous stasis derm, in setting of longstanding BLE vs nummular eczema, possibly secondarily impetiginized. Scrapings for KOH exam neg for fungal elements. Rx'd clob ointment. Culture + klebsiella and proteus, sent rx for augmentin. Reports symptoms have resolved, just has some remaining discoloration.       From prior HPI:    Referred for lower leg lesions/rashes noted at PCP appt 7/19 x at least 2 months (present since at least visit on 5/23). Rx'd doxy, culture showed skin michael.  + itching; no pain; reports he has intermittent legs swelling- not consistent with compression stocking use.  Reports rash on tops of both ankles and also on L thigh (newest spot).      prior tx:  bactrim 5/23 - seen in ER for angioedema and stopped bactrim; he notes that the tongue swelling started before he ever took bactrim and he is unsure if he even took the first dose of bactrim; reports recurrent episodes of tongue swelling since - referred to Allergy today by PCP    Has h/o BLE edema (saw podiatry 5/15), DM, obesity, HTN, HLD, CAD, CKD III       No h/o asthma/seasonal allergies/eczema                Review of Systems   Skin:  Negative for itching and rash.       Objective:   Physical Exam   Constitutional: He appears well-developed and well-nourished. No distress.   Neurological: He is alert and oriented to person, place, and time. He is not disoriented.   Psychiatric: He has a normal mood and affect.   Skin:   Areas Examined (abnormalities noted in diagram):   RLE Inspected  LLE Inspection Performed            Diagram Legend     Erythematous scaling macule/papule c/w actinic keratosis       Vascular papule c/w angioma      Pigmented verrucoid papule/plaque c/w seborrheic  keratosis      Yellow umbilicated papule c/w sebaceous hyperplasia      Irregularly shaped tan macule c/w lentigo     1-2 mm smooth white papules consistent with Milia      Movable subcutaneous cyst with punctum c/w epidermal inclusion cyst      Subcutaneous movable cyst c/w pilar cyst      Firm pink to brown papule c/w dermatofibroma      Pedunculated fleshy papule(s) c/w skin tag(s)      Evenly pigmented macule c/w junctional nevus     Mildly variegated pigmented, slightly irregular-bordered macule c/w mildly atypical nevus      Flesh colored to evenly pigmented papule c/w intradermal nevus       Pink pearly papule/plaque c/w basal cell carcinoma      Erythematous hyperkeratotic cursted plaque c/w SCC      Surgical scar with no sign of skin cancer recurrence      Open and closed comedones      Inflammatory papules and pustules      Verrucoid papule consistent consistent with wart     Erythematous eczematous patches and plaques     Dystrophic onycholytic nail with subungual debris c/w onychomycosis     Umbilicated papule    Erythematous-base heme-crusted tan verrucoid plaque consistent with inflamed seborrheic keratosis     Erythematous Silvery Scaling Plaque c/w Psoriasis     See annotation      Assessment / Plan:        Post-inflammatory pigmentary changes    - residual pigmentary changes; symptoms resolved s/p augmentin, clob ointment.  Likely secondarily infected venous stasis derm vs nummular eczema.  - discussed importance of preventing leg swelling, recommended leg elevation and compression stockings  - recommend CeraVe cream BID         Follow up if symptoms worsen or fail to improve.

## 2023-10-13 LAB
IMMUNOLOGIST REVIEW: NORMAL
S PN DA SERO 19F IGG SER-MCNC: 0.6 MCG/ML
S PNEUM DA 1 IGG SER-MCNC: 0.8 MCG/ML
S PNEUM DA 10A IGG SER-MCNC: 3.4 MCG/ML
S PNEUM DA 11A IGG SER-MCNC: 5.1 MCG/ML
S PNEUM DA 12F IGG SER-MCNC: 0.1 MCG/ML
S PNEUM DA 14 IGG SER-MCNC: 1.9 MCG/ML
S PNEUM DA 15B IGG SER-MCNC: 1.5 MCG/ML
S PNEUM DA 17F IGG SER-MCNC: 1 MCG/ML
S PNEUM DA 18C IGG SER-MCNC: 0.3 MCG/ML
S PNEUM DA 19A IGG SER-MCNC: 2.7 MCG/ML
S PNEUM DA 2 IGG SER-MCNC: 1 MCG/ML
S PNEUM DA 20A IGG SER-MCNC: 0.6 MCG/ML
S PNEUM DA 22F IGG SER-MCNC: 0.4 MCG/ML
S PNEUM DA 23F IGG SER-MCNC: 0.4 MCG/ML
S PNEUM DA 3 IGG SER-MCNC: <0.1 MCG/ML
S PNEUM DA 33F IGG SER-MCNC: 2.8 MCG/ML
S PNEUM DA 4 IGG SER-MCNC: 0.2 MCG/ML
S PNEUM DA 5 IGG SER-MCNC: 1 MCG/ML
S PNEUM DA 6B IGG SER-MCNC: 0.1 MCG/ML
S PNEUM DA 7F IGG SER-MCNC: 1.1 MCG/ML
S PNEUM DA 8 IGG SER-MCNC: 0.5 MCG/ML
S PNEUM DA 9N IGG SER-MCNC: 0.7 MCG/ML
S PNEUM DA 9V IGG SER-MCNC: 0.1 MCG/ML

## 2023-10-16 DIAGNOSIS — E78.5 HYPERLIPIDEMIA, UNSPECIFIED HYPERLIPIDEMIA TYPE: ICD-10-CM

## 2023-10-16 NOTE — TELEPHONE ENCOUNTER
Care Due:                  Date            Visit Type   Department     Provider  --------------------------------------------------------------------------------                                EP -                              PRIMARY      [facility]  Last Visit: 07-      CARE ([facility])   MEDICINE       Adolfo Lemus  Next Visit: None Scheduled  None         None Found                                                            Last  Test          Frequency    Reason                     Performed    Due Date  --------------------------------------------------------------------------------    CMP.........  12 months..  atorvastatin.............  09- 09-    HBA1C.......  6 months...  LANTUS, metFORMIN........  03- 09-    Uric Acid...  12 months..  colchicine...............  10-   10-    [facility] Embedded Care Due Messages. Reference number: [id number].   10/16/2023 9:38:03 AM CDT

## 2023-10-17 RX ORDER — ATORVASTATIN CALCIUM 40 MG/1
TABLET, FILM COATED ORAL
Qty: 90 TABLET | Refills: 0 | Status: SHIPPED | OUTPATIENT
Start: 2023-10-17 | End: 2023-12-30 | Stop reason: SDUPTHER

## 2023-10-17 NOTE — TELEPHONE ENCOUNTER
Refill Routing Note   Medication(s) are not appropriate for processing by [facility] for the following reason(s):      Required labs outdated    [facility] action(s):  Defer Care Due:  Labs due            Appointments  past 12m or future 3m with PCP    Date Provider   Last Visit   7/19/2023 Adolfo Lemus MD   Next Visit   Visit date not found Adolfo Lemus MD   ED visits in past 90 days: 0        Note composed:3:15 AM 10/17/2023

## 2023-10-23 DIAGNOSIS — E11.69 DIABETES MELLITUS TYPE 2 IN OBESE: ICD-10-CM

## 2023-10-23 DIAGNOSIS — E66.9 DIABETES MELLITUS TYPE 2 IN OBESE: ICD-10-CM

## 2023-10-23 RX ORDER — METFORMIN HYDROCHLORIDE 500 MG/1
500 TABLET ORAL 2 TIMES DAILY WITH MEALS
Qty: 180 TABLET | Refills: 3 | Status: SHIPPED | OUTPATIENT
Start: 2023-10-23

## 2023-10-23 RX ORDER — GABAPENTIN 300 MG/1
CAPSULE ORAL
Qty: 270 CAPSULE | Refills: 1 | Status: SHIPPED | OUTPATIENT
Start: 2023-10-23

## 2023-10-23 NOTE — TELEPHONE ENCOUNTER
Refill Routing Note   Medication(s) are not appropriate for processing by [facility] for the following reason(s):      Medication outside of protocol  Drug-disease interaction    [facility] action(s):  Route  Defer Care Due:  None identified     Medication Therapy Plan: GABAPENTIN(OP); Acute gout due to renal impairment involving foot      Appointments  past 12m or future 3m with PCP    Date Provider   Last Visit   7/19/2023 Adolfo Lemus MD   Next Visit   Visit date not found Adolfo Lemus MD   ED visits in past 90 days: 0        Note composed:11:45 AM 10/23/2023

## 2023-10-23 NOTE — TELEPHONE ENCOUNTER
No care due was identified.  [facility] Embedded Care Due Messages. Reference number: [id number].   10/23/2023 8:08:22 AM CDT

## 2023-11-02 ENCOUNTER — OFFICE VISIT (OUTPATIENT)
Dept: ALLERGY | Facility: CLINIC | Age: 68
End: 2023-11-02
Payer: MEDICARE

## 2023-11-02 ENCOUNTER — TELEPHONE (OUTPATIENT)
Dept: ALLERGY | Facility: CLINIC | Age: 68
End: 2023-11-02
Payer: MEDICARE

## 2023-11-02 VITALS
WEIGHT: 312.38 LBS | HEART RATE: 76 BPM | SYSTOLIC BLOOD PRESSURE: 155 MMHG | BODY MASS INDEX: 43.57 KG/M2 | TEMPERATURE: 98 F | DIASTOLIC BLOOD PRESSURE: 94 MMHG

## 2023-11-02 DIAGNOSIS — J40 BRONCHITIS: ICD-10-CM

## 2023-11-02 DIAGNOSIS — T78.3XXD ANGIOEDEMA, SUBSEQUENT ENCOUNTER: Primary | ICD-10-CM

## 2023-11-02 DIAGNOSIS — R53.81 CHRONIC FATIGUE AND MALAISE: ICD-10-CM

## 2023-11-02 DIAGNOSIS — R53.82 CHRONIC FATIGUE AND MALAISE: ICD-10-CM

## 2023-11-02 DIAGNOSIS — J30.89 PERENNIAL ALLERGIC RHINITIS WITH SEASONAL VARIATION: ICD-10-CM

## 2023-11-02 DIAGNOSIS — I10 HYPERTENSION, UNSPECIFIED TYPE: ICD-10-CM

## 2023-11-02 DIAGNOSIS — J30.2 PERENNIAL ALLERGIC RHINITIS WITH SEASONAL VARIATION: ICD-10-CM

## 2023-11-02 PROCEDURE — 3080F DIAST BP >= 90 MM HG: CPT | Mod: CPTII,S$GLB,, | Performed by: SPECIALIST

## 2023-11-02 PROCEDURE — 90677 PCV20 VACCINE IM: CPT | Mod: S$GLB,,, | Performed by: SPECIALIST

## 2023-11-02 PROCEDURE — 1159F MED LIST DOCD IN RCRD: CPT | Mod: CPTII,S$GLB,, | Performed by: SPECIALIST

## 2023-11-02 PROCEDURE — 4010F ACE/ARB THERAPY RXD/TAKEN: CPT | Mod: CPTII,S$GLB,, | Performed by: SPECIALIST

## 2023-11-02 PROCEDURE — 3044F PR MOST RECENT HEMOGLOBIN A1C LEVEL <7.0%: ICD-10-PCS | Mod: CPTII,S$GLB,, | Performed by: SPECIALIST

## 2023-11-02 PROCEDURE — 99999 PR PBB SHADOW E&M-EST. PATIENT-LVL IV: CPT | Mod: PBBFAC,,, | Performed by: SPECIALIST

## 2023-11-02 PROCEDURE — 1160F PR REVIEW ALL MEDS BY PRESCRIBER/CLIN PHARMACIST DOCUMENTED: ICD-10-PCS | Mod: CPTII,S$GLB,, | Performed by: SPECIALIST

## 2023-11-02 PROCEDURE — G0009 ADMIN PNEUMOCOCCAL VACCINE: HCPCS | Mod: S$GLB,,, | Performed by: SPECIALIST

## 2023-11-02 PROCEDURE — 3077F PR MOST RECENT SYSTOLIC BLOOD PRESSURE >= 140 MM HG: ICD-10-PCS | Mod: CPTII,S$GLB,, | Performed by: SPECIALIST

## 2023-11-02 PROCEDURE — 3080F PR MOST RECENT DIASTOLIC BLOOD PRESSURE >= 90 MM HG: ICD-10-PCS | Mod: CPTII,S$GLB,, | Performed by: SPECIALIST

## 2023-11-02 PROCEDURE — 1159F PR MEDICATION LIST DOCUMENTED IN MEDICAL RECORD: ICD-10-PCS | Mod: CPTII,S$GLB,, | Performed by: SPECIALIST

## 2023-11-02 PROCEDURE — G0009 PNEUMOCOCCAL CONJUGATE VACCINE 20-VALENT: ICD-10-PCS | Mod: S$GLB,,, | Performed by: SPECIALIST

## 2023-11-02 PROCEDURE — 1160F RVW MEDS BY RX/DR IN RCRD: CPT | Mod: CPTII,S$GLB,, | Performed by: SPECIALIST

## 2023-11-02 PROCEDURE — 3008F BODY MASS INDEX DOCD: CPT | Mod: CPTII,S$GLB,, | Performed by: SPECIALIST

## 2023-11-02 PROCEDURE — 99417 PR PROLONGED SVC, OUTPT, W/WO DIRECT PT CONTACT,  EA ADDTL 15 MIN: ICD-10-PCS | Mod: S$GLB,,, | Performed by: SPECIALIST

## 2023-11-02 PROCEDURE — 3066F PR DOCUMENTATION OF TREATMENT FOR NEPHROPATHY: ICD-10-PCS | Mod: CPTII,S$GLB,, | Performed by: SPECIALIST

## 2023-11-02 PROCEDURE — 3008F PR BODY MASS INDEX (BMI) DOCUMENTED: ICD-10-PCS | Mod: CPTII,S$GLB,, | Performed by: SPECIALIST

## 2023-11-02 PROCEDURE — 99215 OFFICE O/P EST HI 40 MIN: CPT | Mod: 25,S$GLB,, | Performed by: SPECIALIST

## 2023-11-02 PROCEDURE — 90677 PNEUMOCOCCAL CONJUGATE VACCINE 20-VALENT: ICD-10-PCS | Mod: S$GLB,,, | Performed by: SPECIALIST

## 2023-11-02 PROCEDURE — 99417 PROLNG OP E/M EACH 15 MIN: CPT | Mod: S$GLB,,, | Performed by: SPECIALIST

## 2023-11-02 PROCEDURE — 4010F PR ACE/ARB THEARPY RXD/TAKEN: ICD-10-PCS | Mod: CPTII,S$GLB,, | Performed by: SPECIALIST

## 2023-11-02 PROCEDURE — 3077F SYST BP >= 140 MM HG: CPT | Mod: CPTII,S$GLB,, | Performed by: SPECIALIST

## 2023-11-02 PROCEDURE — 3044F HG A1C LEVEL LT 7.0%: CPT | Mod: CPTII,S$GLB,, | Performed by: SPECIALIST

## 2023-11-02 PROCEDURE — 99215 PR OFFICE/OUTPT VISIT, EST, LEVL V, 40-54 MIN: ICD-10-PCS | Mod: 25,S$GLB,, | Performed by: SPECIALIST

## 2023-11-02 PROCEDURE — 99999 PR PBB SHADOW E&M-EST. PATIENT-LVL IV: ICD-10-PCS | Mod: PBBFAC,,, | Performed by: SPECIALIST

## 2023-11-02 PROCEDURE — 3066F NEPHROPATHY DOC TX: CPT | Mod: CPTII,S$GLB,, | Performed by: SPECIALIST

## 2023-11-02 RX ORDER — FLUTICASONE PROPIONATE 50 MCG
1 SPRAY, SUSPENSION (ML) NASAL DAILY
Qty: 16 G | Refills: 6 | Status: SHIPPED | OUTPATIENT
Start: 2023-11-02 | End: 2023-12-02

## 2023-11-02 NOTE — TELEPHONE ENCOUNTER
Spoke with pt. Advised that if he could be here by 3:30pm, we would work him in in between patients. There may be a wait. Pt voiced understanding.

## 2023-11-02 NOTE — TELEPHONE ENCOUNTER
----- Message from Ángel Maria sent at 11/2/2023  2:19 PM CDT -----  Contact: patient  Tom Tavarez would like a call back at [phone], in regards to his appt on today. Pt states that he with his wife at an appt, and the appt is running over. He would like to know if it ok if he is 45 minutes late.

## 2023-11-02 NOTE — PROGRESS NOTES
WAS SSubjective:       Patient ID: Tom Tavarez is a 68 y.o. male.    Chief Complaint:    Was seen as a new patient on 10- 05- 2023 for immune and allergy evaluation because of ANGIOEDEMA TONGUE.  FOLLOW UP VISIT TODAY    HPI:    AA male 68 year old was seen as a new patient for evaluation of angioedema - tongue  - 5 episodes- first episode treated at the [facility] May 2023. Last episode in September 2023  Was at the ER - no more angioedema since then- including none  after his first visit to my office on 10- 05- 2023.. During the hospitalization, Losartan, an ARB was stopped.    When he was seen as a new patient on October 05, 2023-- allergy work up by [facility]-  inhalant aero allergens was done. He had  Class 4 Immuno Cap to Mook f and Class 3 to Mook .  Pneumococcal  antibody titers after one Pneumovax- 23  in September 2019 and one Prevnar- 13 in April 2019--- were non protective  to 17 / 23 serotypes. I immunized him with Prevnar- 20 today.    House Dust control / environmental control measures were discussed.    Has constant nasal allergy symptoms for which Flonase and Zyrtec were recommended.    Rest of the immune work up, C4, SERUM IFEP, IgG , IgM , Ig A , CH50 and AH 50 were normal. IgE was 213 K ( 0- 100 K ) CRP was 17.1 K ( 0- 8.2 K )    Patient has no known allergies.     Past Medical History:   Diagnosis Date    BMI 40.0-44.9, adult 07/21/2016    Colon cancer screening 10/03/2017    Diabetes mellitus type 2 in obese 11/2006     am 05/09/2023 Insulin x 10 Years    Hyperlipidemia     Hypertension 1985    Obesity, Class III, BMI 40-49.9 (morbid obesity) 1985    Umbilical hernia        Family History   Problem Relation Age of Onset    Hypertension Mother     Heart failure Mother     Diabetes Brother        Environmental History: Dust Mite Controls: Dust mite controls are not in place.     Review of Systems   Constitutional:  Positive for fatigue. Negative for fever.   HENT:  Positive for congestion and  postnasal drip. Negative for ear pain, rhinorrhea, sinus pressure, sinus pain, sneezing and sore throat.    Eyes: Negative.  Negative for redness and itching.   Respiratory: Negative.  Negative for cough, chest tightness, shortness of breath and wheezing.    Cardiovascular: Negative.  Negative for chest pain.   Gastrointestinal: Negative.  Negative for nausea.   Endocrine: Negative.  Negative for cold intolerance.   Genitourinary: Negative.  Negative for frequency.   Musculoskeletal: Negative.  Negative for myalgias.   Skin: Negative.  Negative for rash.   Allergic/Immunologic: Positive for environmental allergies. Negative for food allergies and immunocompromised state.   Neurological: Negative.  Negative for dizziness and headaches.   Hematological: Negative.  Negative for adenopathy.   Psychiatric/Behavioral: Negative.  Negative for sleep disturbance.      Objective:     There were no vitals taken for this visit.    Physical Exam  Vitals and nursing note reviewed.   Constitutional:       Appearance: Normal appearance. He is obese.   HENT:      Head: Normocephalic and atraumatic.      Right Ear: Tympanic membrane, ear canal and external ear normal.      Left Ear: Tympanic membrane, ear canal and external ear normal.      Nose: Congestion present.      Mouth/Throat:      Mouth: Mucous membranes are moist.      Pharynx: Oropharynx is clear.   Eyes:      Extraocular Movements: Extraocular movements intact.      Conjunctiva/sclera: Conjunctivae normal.      Pupils: Pupils are equal, round, and reactive to light.   Cardiovascular:      Rate and Rhythm: Normal rate and regular rhythm.      Pulses: Normal pulses.      Heart sounds: Normal heart sounds.   Pulmonary:      Effort: Pulmonary effort is normal.      Breath sounds: Normal breath sounds.   Abdominal:      General: Abdomen is flat. Bowel sounds are normal.      Palpations: Abdomen is soft.   Musculoskeletal:         General: Normal range of motion.      Cervical  back: Normal range of motion and neck supple.   Skin:     General: Skin is warm and dry.      Capillary Refill: Capillary refill takes less than 2 seconds.   Neurological:      General: No focal deficit present.      Mental Status: He is alert and oriented to person, place, and time. Mental status is at baseline.   Psychiatric:         Mood and Affect: Mood normal.         Behavior: Behavior normal.         Thought Content: Thought content normal.         Judgment: Judgment normal.       Assessment:      1. Angioedema, subsequent encounter    2. Perennial allergic rhinitis with seasonal variation    3. Chronic fatigue and malaise    4. Bronchitis    5. Hypertension, unspecified type        Plan:     Reviewed and discussed the blood tests results of 10- 05- 2023.  [email or web address] Mobile Encase mattress and pillows with allergy proof encasings  Region -6-- inhalant aero allergen panel-- revealed only allergies to house dust mites--- Mook p - class 3 and Mook f- class 4-- Serum IgE 213 K ( 0- 100 K )  --------------------------------------------------------------------------------------------------------------------------------------------------------------------------------------------------  PNEUMOCOCCAL ANIBODY TITERS NON PROTECTIVE 17 / 23 SERO TYPES.  RECOMMEND IMMUNIZATION WITH PNEUMOVAX- 23 and get post titers.  ---------------------------------------------------------------------------------------------------------------------------------------  Normal serum IFEP, C4, AH 50 AND CH 50  The laboratory [facility]- could not do C1 q    MAY GET COLOGUARD.  May do colonoscopy-- never had colonoscopy before.   .  Never take ACE 1 Blockers and avoid ? May avoid ARB.-- Stopped LOSARTAN, AN ARB after angioedema episode.     NORMAL SERUM IgG AND IgM. , elevated IgA  411 mg/ dl ( 40- 350 mg /dl ).    If having recurring angioedema tongue-- consult an oral surgeon -- -- for tongue biopsy to rule out  amyloidosis  ----------------------------------------------------------------------------------------------------------------------------------------------------    Had OPCV- 13 ON 04- 04- 2019 and PPSV- 23 on 09- 25- 2019  IMMUNIZED WITH PREVNAR-0 20 TODAY.  ----------------------------------------------------------------------------------------------    May get AREXVY- RSV - vaccine.  May get influenza vaccine.  FLONASE 50 mcg qd or bid  Follow up-----with the PCP and various physicians.  Follow up  as needed                  Problems Address                                                 Amount and/or Complexity                                                                      Risk       3           [] 2 or more self-limited or minor problems                      [] Limited                                                                        [] Low                  [] 1 stable chronic illness                                                  Any combination of the two                                               OTC drugs                  []Acute, uncomplicated illness or injury                            Review of prior external notes from unique source           Minor surgery with no risk factors                                                                                                               [] 1 []2  []3+                                                                                                              Review of results from each unique test                                                                                                               [] 1 []2  [] 3+                                                                                                              Order of each unique test                                                                                                               [] 1 []2  [] 3+                                                                                                               Or                                                                                                             [] Assessment requiring an independent historian      4            [] One or more chronic illness with exacerbation,              [] Moderate                                                                      [] Moderate                 Progression, or side effects of treatment                            -test documents or independent historians                        Prescription drug management                []  2 or more sable chronic illnesses                                    [] Independent interpretation of tests                              Minor surgery with identifiable risk                [] 1 undiagnosed new problem with uncertain prognosis    [] Discussion or management of test results                    elective major surgery                [] 1 acute illness with                systemic symptoms                                                                                                                                                              [] 1 acute complicated injury                                                                                                                                          Elective major surgery                                                                                                                                                                                                                                                                                                                                                                                                  5            [] 1 or more chronic illnesses with severe exacerbation,     [] Extensive(two from below)                                         [] High                                                                                                                [] Independent interpretation of results                         Drug therapy requiring intensive                                                                                                               []Discussion of management or test interpretation           monitoring                                                                                                                                                                                                       Decision to de-escalate care                 [] 1 acute or chronic illness or injury that poses a threat                                                                                               Decision regarding hospitalization

## 2023-11-22 DIAGNOSIS — E11.9 TYPE 2 DIABETES MELLITUS WITHOUT COMPLICATION: ICD-10-CM

## 2023-11-29 ENCOUNTER — PATIENT OUTREACH (OUTPATIENT)
Dept: ADMINISTRATIVE | Facility: HOSPITAL | Age: 68
End: 2023-11-29
Payer: MEDICARE

## 2023-11-29 NOTE — PROGRESS NOTES
Working HTN Report.    Requested an updated bp reading.  States he could check it and requests I call tomorrow around 10 am for reading. He would also like to schedule appt with Dr Lemus. Told him I would call in am for bp reading and schedule annual.

## 2023-11-30 ENCOUNTER — TELEPHONE (OUTPATIENT)
Dept: INTERNAL MEDICINE | Facility: CLINIC | Age: 68
End: 2023-11-30
Payer: MEDICARE

## 2023-11-30 VITALS — DIASTOLIC BLOOD PRESSURE: 80 MMHG | SYSTOLIC BLOOD PRESSURE: 138 MMHG

## 2023-11-30 NOTE — PROGRESS NOTES
Assisted to schedule annual exam, 12/04/23.  Requested bp reading.  /80  Pulse 62, remote entry made.

## 2023-12-04 ENCOUNTER — OFFICE VISIT (OUTPATIENT)
Dept: INTERNAL MEDICINE | Facility: CLINIC | Age: 68
End: 2023-12-04
Payer: MEDICARE

## 2023-12-04 ENCOUNTER — HOSPITAL ENCOUNTER (OUTPATIENT)
Dept: RADIOLOGY | Facility: HOSPITAL | Age: 68
Discharge: HOME | End: 2023-12-04
Attending: FAMILY MEDICINE
Payer: MEDICARE

## 2023-12-04 VITALS
TEMPERATURE: 98 F | OXYGEN SATURATION: 97 % | DIASTOLIC BLOOD PRESSURE: 74 MMHG | WEIGHT: 313.25 LBS | HEIGHT: 71 IN | HEART RATE: 54 BPM | SYSTOLIC BLOOD PRESSURE: 132 MMHG | BODY MASS INDEX: 43.85 KG/M2

## 2023-12-04 DIAGNOSIS — G89.29 CHRONIC RIGHT-SIDED LOW BACK PAIN WITHOUT SCIATICA: ICD-10-CM

## 2023-12-04 DIAGNOSIS — I10 HYPERTENSION, UNSPECIFIED TYPE: ICD-10-CM

## 2023-12-04 DIAGNOSIS — E78.5 HYPERLIPIDEMIA ASSOCIATED WITH TYPE 2 DIABETES MELLITUS: ICD-10-CM

## 2023-12-04 DIAGNOSIS — E11.42 DIABETIC POLYNEUROPATHY ASSOCIATED WITH TYPE 2 DIABETES MELLITUS: Primary | ICD-10-CM

## 2023-12-04 DIAGNOSIS — M54.50 CHRONIC RIGHT-SIDED LOW BACK PAIN WITHOUT SCIATICA: ICD-10-CM

## 2023-12-04 DIAGNOSIS — Z12.11 COLON CANCER SCREENING: ICD-10-CM

## 2023-12-04 DIAGNOSIS — E55.9 VITAMIN D DEFICIENCY DISEASE: ICD-10-CM

## 2023-12-04 DIAGNOSIS — M1A.9XX0 CHRONIC GOUT WITHOUT TOPHUS, UNSPECIFIED CAUSE, UNSPECIFIED SITE: ICD-10-CM

## 2023-12-04 DIAGNOSIS — I70.0 ATHEROSCLEROSIS OF AORTA: ICD-10-CM

## 2023-12-04 DIAGNOSIS — K21.9 GASTROESOPHAGEAL REFLUX DISEASE, UNSPECIFIED WHETHER ESOPHAGITIS PRESENT: ICD-10-CM

## 2023-12-04 DIAGNOSIS — E55.9 VITAMIN D DEFICIENCY: ICD-10-CM

## 2023-12-04 DIAGNOSIS — E11.69 HYPERLIPIDEMIA ASSOCIATED WITH TYPE 2 DIABETES MELLITUS: ICD-10-CM

## 2023-12-04 PROCEDURE — 3066F PR DOCUMENTATION OF TREATMENT FOR NEPHROPATHY: ICD-10-PCS | Mod: CPTII,S$GLB,, | Performed by: FAMILY MEDICINE

## 2023-12-04 PROCEDURE — 72100 XR LUMBAR SPINE AP AND LATERAL: ICD-10-PCS | Mod: 26,,, | Performed by: RADIOLOGY

## 2023-12-04 PROCEDURE — 3008F PR BODY MASS INDEX (BMI) DOCUMENTED: ICD-10-PCS | Mod: CPTII,S$GLB,, | Performed by: FAMILY MEDICINE

## 2023-12-04 PROCEDURE — 4010F PR ACE/ARB THEARPY RXD/TAKEN: ICD-10-PCS | Mod: CPTII,S$GLB,, | Performed by: FAMILY MEDICINE

## 2023-12-04 PROCEDURE — 3288F FALL RISK ASSESSMENT DOCD: CPT | Mod: CPTII,S$GLB,, | Performed by: FAMILY MEDICINE

## 2023-12-04 PROCEDURE — 4010F ACE/ARB THERAPY RXD/TAKEN: CPT | Mod: CPTII,S$GLB,, | Performed by: FAMILY MEDICINE

## 2023-12-04 PROCEDURE — 99999 PR PBB SHADOW E&M-EST. PATIENT-LVL V: ICD-10-PCS | Mod: PBBFAC,,, | Performed by: FAMILY MEDICINE

## 2023-12-04 PROCEDURE — 1125F PR PAIN SEVERITY QUANTIFIED, PAIN PRESENT: ICD-10-PCS | Mod: CPTII,S$GLB,, | Performed by: FAMILY MEDICINE

## 2023-12-04 PROCEDURE — 99999 PR PBB SHADOW E&M-EST. PATIENT-LVL V: CPT | Mod: PBBFAC,,, | Performed by: FAMILY MEDICINE

## 2023-12-04 PROCEDURE — 3075F PR MOST RECENT SYSTOLIC BLOOD PRESS GE 130-139MM HG: ICD-10-PCS | Mod: CPTII,S$GLB,, | Performed by: FAMILY MEDICINE

## 2023-12-04 PROCEDURE — 3008F BODY MASS INDEX DOCD: CPT | Mod: CPTII,S$GLB,, | Performed by: FAMILY MEDICINE

## 2023-12-04 PROCEDURE — 3288F PR FALLS RISK ASSESSMENT DOCUMENTED: ICD-10-PCS | Mod: CPTII,S$GLB,, | Performed by: FAMILY MEDICINE

## 2023-12-04 PROCEDURE — 99214 PR OFFICE/OUTPT VISIT, EST, LEVL IV, 30-39 MIN: ICD-10-PCS | Mod: S$GLB,,, | Performed by: FAMILY MEDICINE

## 2023-12-04 PROCEDURE — 1101F PR PT FALLS ASSESS DOC 0-1 FALLS W/OUT INJ PAST YR: ICD-10-PCS | Mod: CPTII,S$GLB,, | Performed by: FAMILY MEDICINE

## 2023-12-04 PROCEDURE — 3066F NEPHROPATHY DOC TX: CPT | Mod: CPTII,S$GLB,, | Performed by: FAMILY MEDICINE

## 2023-12-04 PROCEDURE — 1101F PT FALLS ASSESS-DOCD LE1/YR: CPT | Mod: CPTII,S$GLB,, | Performed by: FAMILY MEDICINE

## 2023-12-04 PROCEDURE — 3044F PR MOST RECENT HEMOGLOBIN A1C LEVEL <7.0%: ICD-10-PCS | Mod: CPTII,S$GLB,, | Performed by: FAMILY MEDICINE

## 2023-12-04 PROCEDURE — 99214 OFFICE O/P EST MOD 30 MIN: CPT | Mod: S$GLB,,, | Performed by: FAMILY MEDICINE

## 2023-12-04 PROCEDURE — 72100 X-RAY EXAM L-S SPINE 2/3 VWS: CPT | Mod: 26,,, | Performed by: RADIOLOGY

## 2023-12-04 PROCEDURE — 3078F DIAST BP <80 MM HG: CPT | Mod: CPTII,S$GLB,, | Performed by: FAMILY MEDICINE

## 2023-12-04 PROCEDURE — 3078F PR MOST RECENT DIASTOLIC BLOOD PRESSURE < 80 MM HG: ICD-10-PCS | Mod: CPTII,S$GLB,, | Performed by: FAMILY MEDICINE

## 2023-12-04 PROCEDURE — 3075F SYST BP GE 130 - 139MM HG: CPT | Mod: CPTII,S$GLB,, | Performed by: FAMILY MEDICINE

## 2023-12-04 PROCEDURE — 1125F AMNT PAIN NOTED PAIN PRSNT: CPT | Mod: CPTII,S$GLB,, | Performed by: FAMILY MEDICINE

## 2023-12-04 PROCEDURE — 1159F MED LIST DOCD IN RCRD: CPT | Mod: CPTII,S$GLB,, | Performed by: FAMILY MEDICINE

## 2023-12-04 PROCEDURE — 1159F PR MEDICATION LIST DOCUMENTED IN MEDICAL RECORD: ICD-10-PCS | Mod: CPTII,S$GLB,, | Performed by: FAMILY MEDICINE

## 2023-12-04 PROCEDURE — 3044F HG A1C LEVEL LT 7.0%: CPT | Mod: CPTII,S$GLB,, | Performed by: FAMILY MEDICINE

## 2023-12-04 PROCEDURE — 72100 X-RAY EXAM L-S SPINE 2/3 VWS: CPT | Mod: TC

## 2023-12-04 NOTE — PROGRESS NOTES
Subjective:       Patient ID: Tom Tavarez is a 68 y.o. male.    Chief Complaint: Annual Exam    Follow-up diabetes with polyneuropathy hypertension chronic gout vitamin-D deficiency hyperlipidemia.  He was followed allergy for angioedema.  Losartan was discontinued.  He reports one-month duration right lower back pain associated with motion.  He is also had about one-month duration of cloudy urine and dysuria.  He denies headache chest pain palpitations edema.      Review of Systems   Constitutional:  Negative for activity change, appetite change, chills, fever and unexpected weight change.   Respiratory:  Negative for cough, chest tightness, shortness of breath and wheezing.    Cardiovascular:  Negative for chest pain, palpitations and leg swelling.   Gastrointestinal:  Negative for abdominal distention and abdominal pain.   Endocrine: Negative for polydipsia and polyuria.   Genitourinary:  Positive for dysuria. Negative for frequency, hematuria, penile discharge and urgency.   Musculoskeletal:  Positive for back pain.   Neurological:  Negative for dizziness, weakness, light-headedness and headaches.       Objective:      Physical Exam  Constitutional:       General: He is not in acute distress.     Appearance: He is not ill-appearing or diaphoretic.   Cardiovascular:      Rate and Rhythm: Normal rate and regular rhythm.      Heart sounds: No murmur heard.     No gallop.   Pulmonary:      Effort: Pulmonary effort is normal. No respiratory distress.      Breath sounds: No wheezing, rhonchi or rales.   Abdominal:      General: There is no distension.      Palpations: There is no mass.      Tenderness: There is no abdominal tenderness.      Hernia: A hernia is present.      Comments: Soft umbilical hernia   Lymphadenopathy:      Cervical: No cervical adenopathy.   Skin:     General: Skin is warm and dry.   Neurological:      Mental Status: He is alert.   Psychiatric:         Mood and Affect: Mood normal.          Behavior: Behavior normal.         Lab Visit on 10/05/2023   Component Date Value Ref Range Status    CRP 10/05/2023 17.1 (H)  0.0 - 8.2 mg/L Final    Complement (C-4) 10/05/2023 39  11 - 44 mg/dL Final    Complement,Total, Serum 10/05/2023 72  42 - 95 U/mL Final    Complement, Alternate Pathway (AH5* 10/05/2023 97  >=46 %of norm Final    IgG 10/05/2023 964  650 - 1600 mg/dL Final    IgA 10/05/2023 411 (H)  40 - 350 mg/dL Final    IgM 10/05/2023 79  50 - 300 mg/dL Final    S.pneumoniae Type 1 10/05/2023 0.8  >=1.0 mcg/mL Final    Pneumococcal Serotype 2 IgG (PNX) 10/05/2023 1.0  >=1.0 mcg/mL Final    S.pneumoniae Type 3 10/05/2023 <0.1  >=1.0 mcg/mL Final    Pneumococcal Serotype 4 IgG  (P7,P* 10/05/2023 0.2  >=1.0 mcg/mL Final    S.pneumoniae Type 5 10/05/2023 1.0  >=1.0 mcg/mL Final    S.pneumoniae Type 8 10/05/2023 0.5  >=1.0 mcg/mL Final    S.pneumoniae Type 9N 10/05/2023 0.7  >=1.0 mcg/mL Final    S.pneumoniae Type 12F 10/05/2023 0.1  >=1.0 mcg/mL Final    Pneumococcal Serotype 14 IgG (P7,P* 10/05/2023 1.9  >=1.0 mcg/mL Final    Pneumococcal Serotype 17F IgG (PNX) 10/05/2023 1.0  >=1.0 mcg/mL Final    S.pneumoniae Type 19F 10/05/2023 0.6  >=1.0 mcg/mL Final    Pneumococcal Serotype 20 IgG (PNX) 10/05/2023 0.6  >=1.0 mcg/mL Final    Pneumococcal Serotype 17F IgG (PNX) 10/05/2023 0.4  >=1.0 mcg/mL Final    S.pneumoniae Type 23F 10/05/2023 0.4  >=1.0 mcg/mL Final    S.pneumoniae Type 6B 10/05/2023 0.1  >=1.0 mcg/mL Final    Pneumococcal Serotype 10A IgG (PNX) 10/05/2023 3.4  >=1.0 mcg/mL Final    Pneumococcal Serotype 11A IgG (PNX) 10/05/2023 5.1  >=1.0 mcg/mL Final    S.pneumoniae Type 7F 10/05/2023 1.1  >=1.0 mcg/mL Final    Pneumococcal Serotype 15B IgG (PNX) 10/05/2023 1.5  >=1.0 mcg/mL Final    S.pneumoniae Type 18C 10/05/2023 0.3  >=1.0 mcg/mL Final    Pneumococcal Serotype 19A IgG (P13* 10/05/2023 2.7  >=1.0 mcg/mL Final    S.pneumoniae Type 9V Abs 10/05/2023 0.1  >=1.0 mcg/mL Final    Pneumococcal Serotype  33 IgG (PNX) 10/05/2023 2.8  >=1.0 mcg/mL Final    PN23 Interpretation 10/05/2023 SEE BELOW   Final    IgE 10/05/2023 213 (H)  0 - 100 IU/mL Final    Bahia Grass 10/05/2023 <0.10  <0.10 kU/L Final    [place]ia Class 10/05/2023 CLASS 0   Final    Aspergillus Fumigatus IgE 10/05/2023 <0.10  <0.10 kU/L Final    A. fumigatus Class 10/05/2023 CLASS 0   Final    Chaetomium 10/05/2023 <0.10  <0.10 kU/L Final    Chaetomium Glob. Class 10/05/2023 CLASS 0   Final    Cockroach, IgE 10/05/2023 <0.10  <0.10 kU/L Final    Cockroach, IgE 10/05/2023 CLASS 0   Final    Cladosporium, IgE 10/05/2023 <0.10  <0.10 kU/L Final    Cladosporium Class 10/05/2023 CLASS 0   Final    Curvularia lunata 10/05/2023 <0.10  <0.10 kU/L Final    Curvularia Lunata Class 10/05/2023 CLASS 0   Final    D. farinae 10/05/2023 19.80 (H)  <0.10 kU/L Final    D. farinae Class 10/05/2023 CLASS 4   Final    Mite Dust Pteronyssinus IgE 10/05/2023 12.40 (H)  <0.10 kU/L Final    D. pteronyssinus Class 10/05/2023 CLASS 3   Final    Dog Dander, IgE 10/05/2023 <0.10  <0.10 kU/L Final    Dog Dander Class 10/05/2023 CLASS 0   Final    Plantain 10/05/2023 <0.10  <0.10 kU/L Final    English Plantain Class 10/05/2023 CLASS 0   Final    Eucalyptus 10/05/2023 <0.10  <0.10 kU/L Final    Eucalyptus Class 10/05/2023 CLASS 0   Final    Marshelder IgE 10/05/2023 <0.10  <0.10 kU/L Final    Marshelder Class 10/05/2023 CLASS 0   Final    Mugwort 10/05/2023 <0.10  <0.10 kU/L Final    Mugwort Class 10/05/2023 CLASS 0   Final    Nettle 10/05/2023 <0.10  <0.10 kU/L Final    Nettle Class 10/05/2023 CLASS 0   Final    Orchard Grass 10/05/2023 <0.10  <0.10 kU/L Final    Orchard Grass Class 10/05/2023 CLASS 0   Final    Culebra, IgE 10/05/2023 <0.10  <0.10 kU/L Final    Culebra Class 10/05/2023 CLASS 0   Final    Privet Tree, IGE 10/05/2023 <0.10  <0.10 kU/L Final    Privet Class 10/05/2023 CLASS 0   Final    Ragweed, Short, IgE 10/05/2023 <0.10  <0.10 kU/L Final    Ragweed, Short, Class  10/05/2023 CLASS 0   Final    Red Top, IgE 10/05/2023 <0.10  <0.10 kU/L Final    Red Top Class 10/05/2023 CLASS 0   Final    Rye Grass, IgE 10/05/2023 <0.10  <0.10 kU/L Final    Britton Grass Class 10/05/2023 CLASS 0   Final    Russian Thistle IgE 10/05/2023 <0.10  <0.10 kU/L Final    Russian Thistle Class 10/05/2023 CLASS 0   Final    Stemphyllium, IgE 10/05/2023 <0.10  <0.10 kU/L Final    Stemphylium Herbarum Class 10/05/2023 CLASS 0   Final    Dann Grass 10/05/2023 <0.10  <0.10 kU/L Final    Dann Grass Class 10/05/2023 CLASS 0   Final    Artem Grass 10/05/2023 <0.10  <0.10 kU/L Final    Artem Grass Class 10/05/2023 CLASS 0   Final    Pecan [place] Tree 10/05/2023 <0.10  <0.10 kU/L Final    Pecan, Class 10/05/2023 CLASS 0   Final    [place], IgE 10/05/2023 <0.10  <0.10 kU/L Final    Auburn Class 10/05/2023 CLASS 0   Final    [place] 10/05/2023 <0.10  <0.10 kU/L Final    Bald [place] Class 10/05/2023 CLASS 0   Final    Fortescue(Quercus alba) IgE 10/05/2023 <0.10  <0.10 kU/L Final    [place], Class 10/05/2023 CLASS 0   Final    Cocklebur, IgE 10/05/2023 <0.10  <0.10 kU/L Final    Cocklebur Class 10/05/2023 CLASS 0   Final    Cat Dander 10/05/2023 <0.10  <0.10 kU/L Final    Cat Epithelium Class 10/05/2023 CLASS 0   Final    Hackberry Celtis, IgE 10/05/2023 <0.10  <0.10 kU/L Final    Hackberry Celtis Class 10/05/2023 CLASS 0   Final    [place], IgE 10/05/2023 <0.10  <0.10 kU/L Final    [place]m [place] Class 10/05/2023 CLASS 0   Final    Dardanelle IgE 10/05/2023 <0.10  <0.10 kU/L Final    D[place]anelle Class 10/05/2023 CLASS 0   Final    Immunofix Interp. 10/05/2023 SEE COMMENT   Final    Pathologist Interpretation JACKELYN 10/05/2023 REVIEWED   Final     Assessment:       1. Diabetic polyneuropathy associated with type 2 diabetes mellitus    2. Hypertension, unspecified type    3. Hyperlipidemia associated with type 2 diabetes mellitus    4. Atherosclerosis of aorta    5. Vitamin D deficiency    6. Gastroesophageal reflux  disease, unspecified whether esophagitis present    7. Chronic gout without tophus, unspecified cause, unspecified site    8. Vitamin D deficiency disease    9. Chronic right-sided low back pain without sciatica    10. Colon cancer screening        Plan:     Blood pressure controlled lab was ordered reflux controlled with medication no current gout attacks x-ray lumbar spine as requested discussed need for colon screening.  Reports he has no one to drive him for colonoscopy wants to do Cologuard atherosclerotic of the aorta statin.  Follow-up in 6 weeks    Diabetic polyneuropathy associated with type 2 diabetes mellitus  -     Hemoglobin A1C; Future; Expected date: 12/04/2023  -     Lipid Panel; Future; Expected date: 12/04/2023  -     Microalbumin/Creatinine Ratio, Urine; Future; Expected date: 12/04/2023    Hypertension, unspecified type  -     CBC Auto Differential; Future; Expected date: 12/04/2023  -     Urinalysis; Future; Expected date: 12/04/2023  -     Comprehensive Metabolic Panel; Future; Expected date: 12/04/2023  -     TSH; Future; Expected date: 12/04/2023    Hyperlipidemia associated with type 2 diabetes mellitus    Atherosclerosis of aorta    Vitamin D deficiency    Gastroesophageal reflux disease, unspecified whether esophagitis present    Chronic gout without tophus, unspecified cause, unspecified site  -     Uric Acid; Future; Expected date: 12/04/2023    Vitamin D deficiency disease  -     Vitamin D; Future; Expected date: 12/04/2023    Chronic right-sided low back pain without sciatica  -     X-Ray Lumbar Spine 5 View; Future; Expected date: 12/04/2023    Colon cancer screening  -     Cologuard Screening (Multitarget Stool DNA); Future; Expected date: 12/04/2023

## 2023-12-05 DIAGNOSIS — R82.81 PYURIA: Primary | ICD-10-CM

## 2023-12-05 RX ORDER — ERGOCALCIFEROL 1.25 MG/1
50000 CAPSULE ORAL
Qty: 12 CAPSULE | Refills: 1 | Status: SHIPPED | OUTPATIENT
Start: 2023-12-05

## 2023-12-06 ENCOUNTER — TELEPHONE (OUTPATIENT)
Dept: INTERNAL MEDICINE | Facility: CLINIC | Age: 68
End: 2023-12-06
Payer: MEDICARE

## 2023-12-06 NOTE — TELEPHONE ENCOUNTER
----- Message from Melia Washington sent at 12/6/2023  3:29 PM CST -----  Contact: Tom  .Type:  Needs Medical Advice    Who Called:  Tom   Symptoms (please be specific):  Swollen feet/ forgot to mention at last visit   How long has patient had these symptoms:   Note specific date, states they're swollen everyday   Pharmacy name and phone #:  .  [id number] - [place], [place] - [address] AT [facility] [address]  [address]  [place] [address]  Phone: [phone] Fax: [phone]  Would the patient rather a call back or a response via [facility]?  Call   Best Call Back Number:  [phone]   Additional Information:

## 2023-12-06 NOTE — TELEPHONE ENCOUNTER
Swollen feet/ forgot to mention at last visit  Please advise. States he forgot to mention on last visit.

## 2023-12-11 ENCOUNTER — PATIENT MESSAGE (OUTPATIENT)
Dept: INTERNAL MEDICINE | Facility: CLINIC | Age: 68
End: 2023-12-11
Payer: MEDICARE

## 2023-12-20 ENCOUNTER — PATIENT OUTREACH (OUTPATIENT)
Dept: ADMINISTRATIVE | Facility: HOSPITAL | Age: 68
End: 2023-12-20
Payer: MEDICARE

## 2023-12-21 RX ORDER — AMLODIPINE BESYLATE 10 MG/1
10 TABLET ORAL DAILY
Qty: 90 TABLET | Refills: 2 | OUTPATIENT
Start: 2023-12-21

## 2023-12-21 NOTE — TELEPHONE ENCOUNTER
No care due was identified.  [facility] Embedded Care Due Messages. Reference number: [id number].   12/21/2023 11:50:55 AM CST

## 2023-12-28 NOTE — TELEPHONE ENCOUNTER
No care due was identified.  [facility] Embedded Care Due Messages. Reference number: [id number].   12/28/2023 3:03:07 PM CST

## 2023-12-29 RX ORDER — AMLODIPINE BESYLATE 10 MG/1
10 TABLET ORAL DAILY
Qty: 90 TABLET | Refills: 2 | OUTPATIENT
Start: 2023-12-29 | End: 2024-05-01

## 2023-12-30 DIAGNOSIS — I10 PRIMARY HYPERTENSION: ICD-10-CM

## 2023-12-30 DIAGNOSIS — E78.5 HYPERLIPIDEMIA, UNSPECIFIED HYPERLIPIDEMIA TYPE: ICD-10-CM

## 2024-01-02 RX ORDER — ATORVASTATIN CALCIUM 40 MG/1
40 TABLET, FILM COATED ORAL DAILY
Qty: 90 TABLET | Refills: 3 | Status: SHIPPED | OUTPATIENT
Start: 2024-01-02

## 2024-01-02 RX ORDER — AMLODIPINE BESYLATE 5 MG/1
5 TABLET ORAL DAILY
Qty: 90 TABLET | Refills: 1 | Status: SHIPPED | OUTPATIENT
Start: 2024-01-02

## 2024-01-02 NOTE — TELEPHONE ENCOUNTER
Refill Routing Note   Medication(s) are not appropriate for processing by [facility] for the following reason(s):        No active prescription written by provider    [facility] action(s):  Defer        Medication Therapy Plan: The provider responsible for managing the medication isnt PCP      Appointments  past 12m or future 3m with PCP    Date Provider   Last Visit   12/4/2023 Adolfo Lemus MD   Next Visit   12/30/2023 Adolfo Lemus MD   ED visits in past 90 days: 0        Note composed:10:03 AM 01/02/2024

## 2024-01-02 NOTE — TELEPHONE ENCOUNTER
No care due was identified.  [facility] Embedded Care Due Messages. Reference number: [id number].   1/02/2024 9:58:01 AM CST

## 2024-01-14 NOTE — TELEPHONE ENCOUNTER
No care due was identified.  [facility] Embedded Care Due Messages. Reference number: [id number].   1/14/2024 6:22:39 AM CST

## 2024-01-16 RX ORDER — INSULIN GLARGINE 100 [IU]/ML
INJECTION, SOLUTION SUBCUTANEOUS
Qty: 90 ML | Refills: 1 | Status: SHIPPED | OUTPATIENT
Start: 2024-01-16 | End: 2024-04-26 | Stop reason: SDUPTHER

## 2024-01-16 NOTE — TELEPHONE ENCOUNTER
Refill Decision Note   Tom Corby  is requesting a refill authorization.  Brief Assessment and Rationale for Refill:  Approve     Medication Therapy Plan:         Comments:     Note composed:4:03 AM 01/16/2024

## 2024-01-18 ENCOUNTER — PATIENT MESSAGE (OUTPATIENT)
Dept: DERMATOLOGY | Facility: CLINIC | Age: 69
End: 2024-01-18
Payer: MEDICARE

## 2024-01-31 ENCOUNTER — OFFICE VISIT (OUTPATIENT)
Dept: DERMATOLOGY | Facility: CLINIC | Age: 69
End: 2024-01-31
Payer: MEDICARE

## 2024-01-31 DIAGNOSIS — L30.9 DERMATITIS: ICD-10-CM

## 2024-01-31 PROCEDURE — 88312 SPECIAL STAINS GROUP 1: CPT | Performed by: PATHOLOGY

## 2024-01-31 PROCEDURE — 88305 TISSUE EXAM BY PATHOLOGIST: CPT | Mod: 26,,, | Performed by: PATHOLOGY

## 2024-01-31 PROCEDURE — 88312 SPECIAL STAINS GROUP 1: CPT | Mod: 26,,, | Performed by: PATHOLOGY

## 2024-01-31 PROCEDURE — 11104 PUNCH BX SKIN SINGLE LESION: CPT | Mod: S$GLB,,, | Performed by: STUDENT IN AN ORGANIZED HEALTH CARE EDUCATION/TRAINING PROGRAM

## 2024-01-31 PROCEDURE — 88305 TISSUE EXAM BY PATHOLOGIST: CPT | Performed by: PATHOLOGY

## 2024-01-31 PROCEDURE — 99214 OFFICE O/P EST MOD 30 MIN: CPT | Mod: 25,S$GLB,, | Performed by: STUDENT IN AN ORGANIZED HEALTH CARE EDUCATION/TRAINING PROGRAM

## 2024-01-31 PROCEDURE — 99999 PR PBB SHADOW E&M-EST. PATIENT-LVL IV: CPT | Mod: PBBFAC,,, | Performed by: STUDENT IN AN ORGANIZED HEALTH CARE EDUCATION/TRAINING PROGRAM

## 2024-01-31 RX ORDER — CLOBETASOL PROPIONATE 0.5 MG/G
OINTMENT TOPICAL 2 TIMES DAILY
Qty: 45 G | Refills: 2 | Status: SHIPPED | OUTPATIENT
Start: 2024-01-31

## 2024-01-31 NOTE — PROGRESS NOTES
Patient Information  Name: Tom Tavarez  : 1955  MRN: [id number]     Referring Physician:  Dr. Hackett ref. provider found   Primary Care Physician:  Adolfo King MD   Date of Visit: 2024      Subjective:       Tom Tavarez is a 68 y.o. male who presents for   Chief Complaint   Patient presents with    Rash     C/o bumps on leg and hips      Rash    Hx of dermatitis, here for follow up. Last seen Dr. Bunch. Felt to be c/w nummular eczema vs stasis dermatitis. He has been using topical clobetasol without much improvement.    Patient was last seen:10/12/2023     Prior notes by myself reviewed.   Clinical documentation obtained by nursing staff reviewed.    Review of Systems   Skin:  Positive for rash.        Objective:    Physical Exam   Constitutional: He appears well-developed and well-nourished. No distress.   Neurological: He is alert and oriented to person, place, and time. He is not disoriented.   Psychiatric: He has a normal mood and affect.   Skin:   Areas Examined (abnormalities noted in diagram):   RLE Inspected  LLE Inspection Performed              Diagram Legend     Erythematous scaling macule/papule c/w actinic keratosis       Vascular papule c/w angioma      Pigmented verrucoid papule/plaque c/w seborrheic keratosis      Yellow umbilicated papule c/w sebaceous hyperplasia      Irregularly shaped tan macule c/w lentigo     1-2 mm smooth white papules consistent with Milia      Movable subcutaneous cyst with punctum c/w epidermal inclusion cyst      Subcutaneous movable cyst c/w pilar cyst      Firm pink to brown papule c/w dermatofibroma      Pedunculated fleshy papule(s) c/w skin tag(s)      Evenly pigmented macule c/w junctional nevus     Mildly variegated pigmented, slightly irregular-bordered macule c/w mildly atypical nevus      Flesh colored to evenly pigmented papule c/w intradermal nevus       Pink pearly papule/plaque c/w basal cell carcinoma      Erythematous  hyperkeratotic cursted plaque c/w SCC      Surgical scar with no sign of skin cancer recurrence      Open and closed comedones      Inflammatory papules and pustules      Verrucoid papule consistent consistent with wart     Erythematous eczematous patches and plaques     Dystrophic onycholytic nail with subungual debris c/w onychomycosis     Umbilicated papule    Erythematous-base heme-crusted tan verrucoid plaque consistent with inflamed seborrheic keratosis     Erythematous Silvery Scaling Plaque c/w Psoriasis     See annotation          [] Data reviewed  [] Independent review of test  [] Management discussed with another provider    Assessment / Plan:      Pathology Orders:       Normal Orders This Visit    Specimen to Pathology, Dermatology     Comments:    Number of Specimens:->1  ------------------------->-------------------------  Spec 1 Procedure:->Biopsy  Spec 1 Clinical Impression:->eczema vs other  Spec 1 Source:->right thigh  Release to patient->Immediate    Questions:    Procedure Type: Dermatology and skin neoplasms    Number of Specimens: 1    ------------------------: -------------------------    Spec 1 Procedure: Biopsy    Spec 1 Clinical Impression: eczema vs other    Spec 1 Source: right thigh    Release to patient: Immediate          Dermatitis  -     clobetasol 0.05% (TEMOVATE) 0.05 % Oint; Apply topically 2 (two) times daily. Apply at night under cling wrap to R lower leg. Apply twice daily to smaller areas on L lower leg and L thigh  Dispense: 45 g; Refill: 2  -     Specimen to Pathology, Dermatology  Punch biopsy procedure note:  Punch biopsy performed after verbal consent obtained. Area marked and prepped with alcohol. Approximately 1cc of 1% lidocaine with epinephrine injected. 4 mm disposable punch used to remove lesion. Hemostasis obtained and biopsy site closed with 1 - 2 ethilon sutures. Wound care instructions reviewed with patient and handout given.             LOS NUMBER AND  COMPLEXITY OF PROBLEMS    COMPLEXITY OF DATA RISK TOTAL TIME (m)   68942  43105 [] 1 self-limited or minor problem [x] Minimal to none [] No treatment recommended or patient to monitor 15-29  10-19   83325  89544 Low  [] 2 or > self limited or minor problems  [] 1 stable chronic illness  [] 1 acute, uncomplicated illness or injury Limited (2)  [] Prior external notes from each unique source  [] Review result of each unique test  [] Order each unique test []  Low  OTC medications, minor skin biopsy 30-44  20-29   93778  62082 Moderate  [x]  1 or > chronic illness with progression, exacerbation or SE of treatment  []  2 or more stable chronic illnesses  []  1 acute illness with systemic symptoms  []  1 acute complicated injury  []  1 undiagnosed new problem with uncertain prognosis Moderate (1/3 below)  []  3 or more data items        *Now includes assessment requiring independent historian  []  Independent interpretation of a test  []  Discuss management/test with another provider Moderate  [x]  Prescription drug mgmt  []  Minor surgery with risk discussed  []  Mgmt limited by social determinates 45-59  30-39   96399  58877 High  []  1 or more chronic illness with severe exacerbation, progression or SE of treatment  []  1 acute or chronic illness/injury that poses a threat to life or bodily function Extensive (2/3 below)  []  3 or more data items        *Now includes assessment requiring independent historian.  []  Independent interpretation of a test  []  Discuss management/test with another provider High  []  Major surgery with risk discussed  []  Drug therapy requiring intensive monitoring for toxicity  []  Hospitalization  []  Decision for DNR 60-74  40-54      No follow-ups on file.    Lisha Parrish MD, FAAD  [facility]

## 2024-01-31 NOTE — PATIENT INSTRUCTIONS

## 2024-02-06 LAB
FINAL PATHOLOGIC DIAGNOSIS: NORMAL
GROSS: NORMAL
Lab: NORMAL
MICROSCOPIC EXAM: NORMAL

## 2024-02-14 NOTE — TELEPHONE ENCOUNTER
----- Message from Susan [place] sent at 6/1/2018 12:43 PM CDT -----  Contact: pt  Pt stated he was calling about his needles for medications, he can be reached at  [phone] Thanks       [id number] - [place], [place] - [address] AT [facility] OF [address]  [address]  [place] [address]  Phone: [phone] Fax: [phone]      
See previous note. Message left for the patient to return the call in regards to gathering more information about which type of needles he needs a refill on.  
No

## 2024-02-20 ENCOUNTER — CLINICAL SUPPORT (OUTPATIENT)
Dept: DERMATOLOGY | Facility: CLINIC | Age: 69
End: 2024-02-20
Payer: MEDICARE

## 2024-02-20 DIAGNOSIS — Z48.02 VISIT FOR SUTURE REMOVAL: Primary | ICD-10-CM

## 2024-02-20 PROCEDURE — 99024 POSTOP FOLLOW-UP VISIT: CPT | Mod: S$GLB,,, | Performed by: STUDENT IN AN ORGANIZED HEALTH CARE EDUCATION/TRAINING PROGRAM

## 2024-02-20 NOTE — PROGRESS NOTES
Patient presents for suture removal. The wound has healed well, without signs of infection. The sutures were removed. Wound care and activity instructions given. Return PRN.

## 2024-02-22 ENCOUNTER — OFFICE VISIT (OUTPATIENT)
Dept: PODIATRY | Facility: CLINIC | Age: 69
End: 2024-02-22
Payer: MEDICARE

## 2024-02-22 VITALS — HEIGHT: 71 IN | WEIGHT: 313.25 LBS | BODY MASS INDEX: 43.85 KG/M2

## 2024-02-22 DIAGNOSIS — E11.42 DIABETIC POLYNEUROPATHY ASSOCIATED WITH TYPE 2 DIABETES MELLITUS: ICD-10-CM

## 2024-02-22 DIAGNOSIS — E11.9 ENCOUNTER FOR DIABETIC FOOT EXAM: Primary | ICD-10-CM

## 2024-02-22 DIAGNOSIS — R60.0 BILATERAL LOWER EXTREMITY EDEMA: ICD-10-CM

## 2024-02-22 PROCEDURE — 99213 OFFICE O/P EST LOW 20 MIN: CPT | Mod: S$GLB,,, | Performed by: PODIATRIST

## 2024-02-22 PROCEDURE — 99999 PR PBB SHADOW E&M-EST. PATIENT-LVL IV: CPT | Mod: PBBFAC,,, | Performed by: PODIATRIST

## 2024-03-04 NOTE — PROGRESS NOTES
Subjective:     Patient ID: Tom Tavarez is a 68 y.o. male.    Chief Complaint: Diabetic Foot Exam (Pt c/o left foot swelling, diabetic pt wears tennis shoes, PCP Dr. Lemus lat seen 12-4-23) and Foot Problem    Tom is a 68 y.o. male who presents to the clinic for evaluation and treatment of high risk feet. Tom has a past medical history of BMI 40.0-44.9, adult (07/21/2016), Colon cancer screening (10/03/2017), Diabetes mellitus type 2 in obese (11/2006), Hyperlipidemia, Hypertension (1985), Obesity, Class III, BMI 40-49.9 (morbid obesity) (1985), and Umbilical hernia. The patient's chief complaint is follow up of left foot swelling and neuropathy pain. Patient states the pain is better in the feet. Patient states he has not been wearing the tubi . This patient has documented high risk feet requiring routine maintenance secondary to diabetes mellitis and those secondary complications of diabetes, as mentioned..    PCP: Adolfo Lemus MD    Date Last Seen by PCP: 12/04/2023    Current shoe gear:  Affected Foot: Tennis shoes     Unaffected Foot: Tennis shoes    Hemoglobin A1C   Date Value Ref Range Status   12/04/2023 6.7 (H) 4.0 - 5.6 % Final     Comment:     ADA Screening Guidelines:  5.7-6.4%  Consistent with prediabetes  >or=6.5%  Consistent with diabetes    High levels of fetal hemoglobin interfere with the HbA1C  assay. Heterozygous hemoglobin variants (HbS, HgC, etc)do  not significantly interfere with this assay.   However, presence of multiple variants may affect accuracy.     09/26/2023 6.7 (H) <=6.5 % Final   03/24/2023 6.6 (H) 4.0 - 5.6 % Final     Comment:     ADA Screening Guidelines:  5.7-6.4%  Consistent with prediabetes  >or=6.5%  Consistent with diabetes    High levels of fetal hemoglobin interfere with the HbA1C  assay. Heterozygous hemoglobin variants (HbS, HgC, etc)do  not significantly interfere with this assay.   However, presence of multiple variants may affect accuracy.      09/13/2022 5.8 (H) 4.0 - 5.6 % Final     Comment:     ADA Screening Guidelines:  5.7-6.4%  Consistent with prediabetes  >or=6.5%  Consistent with diabetes    High levels of fetal hemoglobin interfere with the HbA1C  assay. Heterozygous hemoglobin variants (HbS, HgC, etc)do  not significantly interfere with this assay.   However, presence of multiple variants may affect accuracy.         Patient Active Problem List   Diagnosis    Diabetes mellitus type 2 in obese    Hyperlipidemia associated with type 2 diabetes mellitus    Hypertension    Umbilical hernia    Controlled type 2 diabetes mellitus without complication, with long-term current use of insulin    Weakness    Immunization deficiency    Yeast dermatitis    Acute gout due to renal impairment involving foot    Acute cystitis with hematuria    DM (diabetes mellitus) with complications    FH: thoracic aortic aneurysm    Rhinitis    GERD (gastroesophageal reflux disease)    Itchy skin    Scrotal swelling    Primary osteoarthritis of left hip    Atherosclerosis of aorta    Abdominal wall hernia    Abdominal aortic aneurysm (AAA) without rupture    Dorsalgia, unspecified    Hip pain    Hypotension    Shortness of breath    Diabetic polyneuropathy associated with type 2 diabetes mellitus    Colon cancer screening    Chronic gout without tophus    Edema    Obesity, Class III, BMI 40-49.9 (morbid obesity)    Leg skin lesion, right    Chest pain    Age-related nuclear cataract of both eyes    EKG abnormalities    Type 2 diabetes mellitus with stage 3a chronic kidney disease, with long-term current use of insulin    DM cataract    Onychomycosis of multiple toenails with type 2 diabetes mellitus    Type 2 diabetes mellitus with hypertriglyceridemia    Vitamin D deficiency    Angio-edema    Bronchitis    Perennial allergic rhinitis with seasonal variation    Chronic fatigue and malaise    Chronic right-sided low back pain without sciatica    Vitamin D deficiency disease  "      Medication List with Changes/Refills   Current Medications    ACETAMINOPHEN (TYLENOL) 325 MG TABLET    Take 650 mg by mouth every 8 (eight) hours as needed.    AMLODIPINE (NORVASC) 5 MG TABLET    Take 1 tablet (5 mg total) by mouth once daily.    ATORVASTATIN (LIPITOR) 40 MG TABLET    Take 1 tablet (40 mg total) by mouth once daily.    BLOOD-GLUCOSE METER KIT    To check BG 3 times daily, to use with insurance preferred meter    CLOBETASOL 0.05% (TEMOVATE) 0.05 % OINT    Apply topically 2 (two) times daily. Apply at night under cling wrap to R lower leg. Apply twice daily to smaller areas on L lower leg and L thigh    COLCHICINE (COLCRYS) 0.6 MG TABLET    Take 1 tablet (0.6 mg total) by mouth once daily.    DIPHENHYDRAMINE (BENADRYL) 25 MG CAPSULE    Take 25 mg by mouth every 6 (six) hours as needed.    DOXYCYCLINE (VIBRA-TABS) 100 MG TABLET    Take 1 tablet (100 mg total) by mouth 2 (two) times daily.    EMPAGLIFLOZIN (JARDIANCE) 10 MG TABLET    Take 1 tablet (10 mg total) by mouth once daily.    ERGOCALCIFEROL (VITAMIN D2) 50,000 UNIT CAP    Take 1 capsule (50,000 Units total) by mouth every 7 days.    FLASH GLUCOSE SCANNING READER (FREESTYLE KRISTY 14 DAY READER) MISC    1 each by Misc.(Non-Drug; Combo Route) route continuous.    FLASH GLUCOSE SENSOR (FREESTYLE KRISTY 14 DAY SENSOR) KIT    1 each by Misc.(Non-Drug; Combo Route) route continuous.    GABAPENTIN (NEURONTIN) 300 MG CAPSULE    TAKE ONE CAPSULE BY MOUTH EVERY MORNING AND 2 CAPSULES AT BEDTIME    HYDROCHLOROTHIAZIDE (HYDRODIURIL) 25 MG TABLET    Take 1 tablet (25 mg total) by mouth once daily.    INSULIN SYRINGE-NEEDLE U-100 (BD INSULIN SYRINGE ULTRA-FINE) 0.3 ML 31 GAUGE X 5/16" SYRG    1 each by Misc.(Non-Drug; Combo Route) route 2 (two) times a day.    KETOCONAZOLE (NIZORAL) 2 % CREAM    Apply topically once daily.    LANCETS MISC    To check BG 3 times daily, to use with insurance preferred meter    LANTUS SOLOSTAR U-100 INSULIN GLARGINE 100 " "UNITS/ML SUBQ PEN    INJECT SUBCUTANEOUSLY 50 UNITS  TWICE DAILY    METFORMIN (GLUCOPHAGE) 500 MG TABLET    TAKE 1 TABLET(500 MG) BY MOUTH TWICE DAILY WITH MEALS    MUPIROCIN (BACTROBAN) 2 % OINTMENT    Apply topically once daily.    PEN NEEDLE, DIABETIC (BD ULTRA-FINE MINI PEN NEEDLE) 31 GAUGE X 3/16" NDLE    1 each by Misc.(Non-Drug; Combo Route) route 2 (two) times a day.    PREDNISOLONE ACETATE (PRED FORTE) 1 % DRPS    Place 1 drop into the right eye 4 (four) times daily.    PREDNISOLONE ACETATE (PRED FORTE) 1 % DRPS    Place 1 drop into the left eye every 4 (four) hours.    TRIAMCINOLONE ACETONIDE 0.1% (KENALOG) 0.1 % CREAM    Apply topically 2 (two) times daily.    VITAMIN B COMPLEX ORAL    Take 1 tablet by mouth every morning.       Review of patient's allergies indicates:  No Known Allergies    Past Surgical History:   Procedure Laterality Date    CATARACT EXTRACTION      G U surgery  01/01/1982    biopsy of testicle infertility w/u       Family History   Problem Relation Age of Onset    Hypertension Mother     Heart failure Mother     Diabetes Brother        Social History     Socioeconomic History    Marital status:     Number of children: 0    Highest education level: Some college, no degree   Occupational History    Occupation: Retired-Sales     Comment: [employer]   Tobacco Use    Smoking status: Former    Smokeless tobacco: Never    Tobacco comments:     1998 quit    Substance and Sexual Activity    Alcohol use: No     Alcohol/week: 0.0 standard drinks of alcohol    Drug use: No    Sexual activity: Yes     Partners: Female     Social Determinants of Health     Financial Resource Strain: Low Risk  (11/30/2023)    Overall Financial Resource Strain (CARDIA)     Difficulty of Paying Living Expenses: Not hard at all   Food Insecurity: No Food Insecurity (11/30/2023)    Hunger Vital Sign     Worried About Running Out of Food in the Last Year: Never true     Ran Out of Food in the Last " "Year: Never true   Transportation Needs: No Transportation Needs (11/30/2023)    PRAPARE - Transportation     Lack of Transportation (Medical): No     Lack of Transportation (Non-Medical): No   Physical Activity: Inactive (11/30/2023)    Exercise Vital Sign     Days of Exercise per Week: 0 days     Minutes of Exercise per Session: 0 min   Stress: No Stress Concern Present (11/30/2023)    [employer] [place] [employer] - Occupational Stress Questionnaire     Feeling of Stress : Not at all   Social Connections: Unknown (11/30/2023)    Social Connection and Isolation Panel [NHANES]     Frequency of Communication with Friends and Family: More than three times a week     Frequency of Social Gatherings with Friends and Family: Patient declined     Active Member of Clubs or Organizations: Yes     Attends Club or Organization Meetings: 1 to 4 times per year     Marital Status:    Housing Stability: Low Risk  (11/30/2023)    Housing Stability Vital Sign     Unable to Pay for Housing in the Last Year: No     Number of Places Lived in the Last Year: 1     Unstable Housing in the Last Year: No       Vitals:    02/22/24 1039   Weight: (!) 142.1 kg (313 lb 4.4 oz)   Height: 5' 11" (1.803 m)   PainSc: 0-No pain       Hemoglobin A1C   Date Value Ref Range Status   12/04/2023 6.7 (H) 4.0 - 5.6 % Final     Comment:     ADA Screening Guidelines:  5.7-6.4%  Consistent with prediabetes  >or=6.5%  Consistent with diabetes    High levels of fetal hemoglobin interfere with the HbA1C  assay. Heterozygous hemoglobin variants (HbS, HgC, etc)do  not significantly interfere with this assay.   However, presence of multiple variants may affect accuracy.     09/26/2023 6.7 (H) <=6.5 % Final   03/24/2023 6.6 (H) 4.0 - 5.6 % Final     Comment:     ADA Screening Guidelines:  5.7-6.4%  Consistent with prediabetes  >or=6.5%  Consistent with diabetes    High levels of fetal hemoglobin interfere with the HbA1C  assay. Heterozygous " hemoglobin variants (HbS, HgC, etc)do  not significantly interfere with this assay.   However, presence of multiple variants may affect accuracy.     09/13/2022 5.8 (H) 4.0 - 5.6 % Final     Comment:     ADA Screening Guidelines:  5.7-6.4%  Consistent with prediabetes  >or=6.5%  Consistent with diabetes    High levels of fetal hemoglobin interfere with the HbA1C  assay. Heterozygous hemoglobin variants (HbS, HgC, etc)do  not significantly interfere with this assay.   However, presence of multiple variants may affect accuracy.         Review of Systems   Constitutional:  Negative for chills and fever.   Respiratory:  Negative for shortness of breath.    Cardiovascular:  Positive for leg swelling. Negative for chest pain, palpitations, orthopnea and claudication.   Gastrointestinal:  Negative for diarrhea, nausea and vomiting.   Musculoskeletal:  Negative for joint pain.   Skin:  Negative for rash.   Neurological:  Positive for tingling and sensory change.   Psychiatric/Behavioral: Negative.           Objective:      PHYSICAL EXAM: Apperance: Alert and orient in no distress,well developed, and with good attention to grooming and body habits  Patient presents ambulating in tennis shoes.   LOWER EXTREMITY EXAM:  VASCULAR: Dorsalis pedis pulses 2/4 bilateral and Posterior Tibial pulses 2/4 bilateral. Capillary fill time <4 seconds  bilateral. Minimal edema observed bilateral. Varicosities absent bilateral. Skin temperature of the lower extremities is warm to warm, proximal to distal. Hair growth WNL bilateral.  DERMATOLOGICAL: No skin rashes, subcutaneous nodules, lesions, or ulcers observed bilateral. Nails 1,2,3,4,5 bilateral normal length. Webspaces 1,2,3,4 bilateral clean, dry and without evidence of break in skin integrity. Minimal  dry and peeling skin noted plantarly in moccasin distribution.   NEUROLOGICAL: Light touch, sharp-dull, proprioception all present and equal bilaterally.  Vibratory sensation diminished  at bilateral hallux. Protective sensation intact at all 10 sites as tested with a [place] 5.07 monofilament.   MUSCULOSKELETAL: Muscle strength is 5/5 for foot inverters, everters, plantarflexors, and dorsiflexors. Muscle tone is normal. No pain on palpation or ROM of left ankle.           Assessment:       ICD-10-CM ICD-9-CM   1. Encounter for diabetic foot exam  E11.9 250.00   2. Diabetic polyneuropathy associated with type 2 diabetes mellitus  E11.42 250.60     357.2   3. Bilateral lower extremity edema  R60.0 782.3         Plan:   Encounter for diabetic foot exam    Diabetic polyneuropathy associated with type 2 diabetes mellitus    Bilateral lower extremity edema    I counseled the patient on his conditions, regarding findings of my examination, my impressions, and usual treatment plan.   Reviewed venous ultrasound results with patient.   Patient counseled on ways to improve swelling in lower extremities including decreasing/eliminating salt intake, elevating lower extremities, compression stocking, or oral medications. Patient states she understands.   Patient instructed to resume use of tubi .   Dispensed size F tubi  to be worn daily, and not to sleep in.   Patient  will continue to monitor the areas daily, inspect feet, wear protective shoe gear when ambulatory, moisturizer to maintain skin integrity. Patient reminded of the importance of good nutrition and blood sugar control to help prevent podiatric complications of diabetes.  Patient to return 6 months or sooner if needed.        Rosas Macias DPM  [facility]

## 2024-03-28 DIAGNOSIS — M1A.9XX0 CHRONIC GOUT WITHOUT TOPHUS, UNSPECIFIED CAUSE, UNSPECIFIED SITE: ICD-10-CM

## 2024-03-28 RX ORDER — COLCHICINE 0.6 MG/1
TABLET ORAL
Qty: 90 TABLET | Refills: 2 | Status: SHIPPED | OUTPATIENT
Start: 2024-03-28

## 2024-03-28 NOTE — TELEPHONE ENCOUNTER
Care Due:                  Date            Visit Type   Department     Provider  --------------------------------------------------------------------------------                                EP -                              PRIMARY      [facility] INTERNAL  Last Visit: 12-      CARE ([facility])   LLOYD Lemus                              EP -                              PRIMARY      [facility] INTERNAL  Next Visit: 04-      CARE ([facility])   LLOYD Lemus                                                            Last  Test          Frequency    Reason                     Performed    Due Date  --------------------------------------------------------------------------------    HBA1C.......  6 months...  LANTUS, metFORMIN........  12- 06-    [facility] Embedded Care Due Messages. Reference number: [id number].   3/28/2024 9:09:48 AM CDT

## 2024-03-29 NOTE — TELEPHONE ENCOUNTER
Provider Staff:  Action required for this patient     Please see care gap opportunities below in Care Due Message.    Thanks!  [facility]     Appointments      Date Provider   Last Visit   12/4/2023 Adolfo Lemus MD   Next Visit   4/15/2024 Adolfo Lemus MD      Refill Decision Note   Tom Tavarez  is requesting a refill authorization.  Brief Assessment and Rationale for Refill:  Approve     Medication Therapy Plan:         Comments:     Note composed:10:22 PM 03/28/2024

## 2024-04-15 ENCOUNTER — OFFICE VISIT (OUTPATIENT)
Dept: INTERNAL MEDICINE | Facility: CLINIC | Age: 69
End: 2024-04-15
Payer: MEDICARE

## 2024-04-15 ENCOUNTER — PATIENT MESSAGE (OUTPATIENT)
Dept: SPORTS MEDICINE | Facility: CLINIC | Age: 69
End: 2024-04-15
Payer: MEDICARE

## 2024-04-15 ENCOUNTER — LAB VISIT (OUTPATIENT)
Dept: LAB | Facility: HOSPITAL | Age: 69
End: 2024-04-15
Attending: FAMILY MEDICINE
Payer: MEDICARE

## 2024-04-15 VITALS
HEIGHT: 71 IN | RESPIRATION RATE: 20 BRPM | BODY MASS INDEX: 44.1 KG/M2 | TEMPERATURE: 98 F | DIASTOLIC BLOOD PRESSURE: 80 MMHG | HEART RATE: 58 BPM | OXYGEN SATURATION: 97 % | SYSTOLIC BLOOD PRESSURE: 130 MMHG | WEIGHT: 315 LBS

## 2024-04-15 DIAGNOSIS — E11.42 DIABETIC POLYNEUROPATHY ASSOCIATED WITH TYPE 2 DIABETES MELLITUS: ICD-10-CM

## 2024-04-15 DIAGNOSIS — N18.31 TYPE 2 DIABETES MELLITUS WITH STAGE 3A CHRONIC KIDNEY DISEASE, WITH LONG-TERM CURRENT USE OF INSULIN: ICD-10-CM

## 2024-04-15 DIAGNOSIS — E66.9 DIABETES MELLITUS TYPE 2 IN OBESE: ICD-10-CM

## 2024-04-15 DIAGNOSIS — R35.0 URINARY FREQUENCY: ICD-10-CM

## 2024-04-15 DIAGNOSIS — M25.552 PAIN OF LEFT HIP: ICD-10-CM

## 2024-04-15 DIAGNOSIS — I10 HYPERTENSION, UNSPECIFIED TYPE: ICD-10-CM

## 2024-04-15 DIAGNOSIS — E11.9 CONTROLLED TYPE 2 DIABETES MELLITUS WITHOUT COMPLICATION, WITH LONG-TERM CURRENT USE OF INSULIN: Primary | ICD-10-CM

## 2024-04-15 DIAGNOSIS — N18.32 CHRONIC KIDNEY DISEASE, STAGE 3B: ICD-10-CM

## 2024-04-15 DIAGNOSIS — Z79.4 CONTROLLED TYPE 2 DIABETES MELLITUS WITHOUT COMPLICATION, WITH LONG-TERM CURRENT USE OF INSULIN: Primary | ICD-10-CM

## 2024-04-15 DIAGNOSIS — E66.01 OBESITY, CLASS III, BMI 40-49.9 (MORBID OBESITY): ICD-10-CM

## 2024-04-15 DIAGNOSIS — E55.9 VITAMIN D DEFICIENCY DISEASE: ICD-10-CM

## 2024-04-15 DIAGNOSIS — Z28.39 IMMUNIZATION DEFICIENCY: ICD-10-CM

## 2024-04-15 DIAGNOSIS — I70.0 ATHEROSCLEROSIS OF AORTA: ICD-10-CM

## 2024-04-15 DIAGNOSIS — Z79.4 TYPE 2 DIABETES MELLITUS WITH STAGE 3A CHRONIC KIDNEY DISEASE, WITH LONG-TERM CURRENT USE OF INSULIN: ICD-10-CM

## 2024-04-15 DIAGNOSIS — E11.22 TYPE 2 DIABETES MELLITUS WITH STAGE 3A CHRONIC KIDNEY DISEASE, WITH LONG-TERM CURRENT USE OF INSULIN: ICD-10-CM

## 2024-04-15 DIAGNOSIS — E11.69 DIABETES MELLITUS TYPE 2 IN OBESE: ICD-10-CM

## 2024-04-15 PROCEDURE — 99214 OFFICE O/P EST MOD 30 MIN: CPT | Mod: S$GLB,,, | Performed by: FAMILY MEDICINE

## 2024-04-15 PROCEDURE — 99999 PR PBB SHADOW E&M-EST. PATIENT-LVL V: CPT | Mod: PBBFAC,,, | Performed by: FAMILY MEDICINE

## 2024-04-15 PROCEDURE — 3075F SYST BP GE 130 - 139MM HG: CPT | Mod: CPTII,S$GLB,, | Performed by: FAMILY MEDICINE

## 2024-04-15 PROCEDURE — 3072F LOW RISK FOR RETINOPATHY: CPT | Mod: CPTII,S$GLB,, | Performed by: FAMILY MEDICINE

## 2024-04-15 PROCEDURE — 1125F AMNT PAIN NOTED PAIN PRSNT: CPT | Mod: CPTII,S$GLB,, | Performed by: FAMILY MEDICINE

## 2024-04-15 PROCEDURE — 3079F DIAST BP 80-89 MM HG: CPT | Mod: CPTII,S$GLB,, | Performed by: FAMILY MEDICINE

## 2024-04-15 PROCEDURE — 3008F BODY MASS INDEX DOCD: CPT | Mod: CPTII,S$GLB,, | Performed by: FAMILY MEDICINE

## 2024-04-15 PROCEDURE — 3044F HG A1C LEVEL LT 7.0%: CPT | Mod: CPTII,S$GLB,, | Performed by: FAMILY MEDICINE

## 2024-04-15 PROCEDURE — 1101F PT FALLS ASSESS-DOCD LE1/YR: CPT | Mod: CPTII,S$GLB,, | Performed by: FAMILY MEDICINE

## 2024-04-15 PROCEDURE — 3288F FALL RISK ASSESSMENT DOCD: CPT | Mod: CPTII,S$GLB,, | Performed by: FAMILY MEDICINE

## 2024-04-15 PROCEDURE — 81001 URINALYSIS AUTO W/SCOPE: CPT | Performed by: FAMILY MEDICINE

## 2024-04-15 PROCEDURE — 1159F MED LIST DOCD IN RCRD: CPT | Mod: CPTII,S$GLB,, | Performed by: FAMILY MEDICINE

## 2024-04-15 RX ORDER — METFORMIN HYDROCHLORIDE 500 MG/1
500 TABLET ORAL 2 TIMES DAILY WITH MEALS
Qty: 180 TABLET | Refills: 3 | Status: SHIPPED | OUTPATIENT
Start: 2024-04-15

## 2024-04-15 RX ORDER — PEN NEEDLE, DIABETIC 30 GX3/16"
1 NEEDLE, DISPOSABLE MISCELLANEOUS 2 TIMES DAILY
Qty: 200 EACH | Refills: 1 | Status: SHIPPED | OUTPATIENT
Start: 2024-04-15

## 2024-04-15 RX ORDER — ERGOCALCIFEROL 1.25 MG/1
50000 CAPSULE ORAL
Qty: 12 CAPSULE | Refills: 3 | Status: SHIPPED | OUTPATIENT
Start: 2024-04-15

## 2024-04-15 NOTE — PROGRESS NOTES
Subjective:       Patient ID: Tom Tavarez is a 69 y.o. male.    Chief Complaint: Follow-up    Three-month follow-up diabetes diabetic polyneuropathy hypertension vitamin-D deficiency aortic atherosclerosis  chronic gout left hip pain.  Denies headache chest pain palpitations shortness of breath or edema.  He denies polyuria but has had some increase urine frequency lately.  He is vitamin-D supplement.  He needs refill.  Aortic atherosclerosis being medically treated.  Gout is fairly well controlled.  He has ongoing left hip pain like orthopedic referral.    Follow-up  Associated symptoms include arthralgias. Pertinent negatives include no abdominal pain, chest pain, coughing, fatigue, headaches or weakness.     Review of Systems   Constitutional:  Negative for activity change, appetite change, fatigue and unexpected weight change.   Respiratory:  Negative for cough, chest tightness, shortness of breath and wheezing.    Cardiovascular:  Negative for chest pain, palpitations and leg swelling.   Gastrointestinal:  Negative for abdominal distention and abdominal pain.   Endocrine: Negative for polydipsia.   Genitourinary:  Positive for frequency. Negative for dysuria, hematuria and urgency.   Musculoskeletal:  Positive for arthralgias.        Ongoing left hip pain.  X-ray left hip was reviewed   Neurological:  Negative for dizziness, weakness, light-headedness and headaches.   Psychiatric/Behavioral:          He reports some stress dealing with his wife's current cancer diagnosis       Objective:      Physical Exam  Constitutional:       General: He is not in acute distress.     Appearance: He is not ill-appearing or diaphoretic.   Cardiovascular:      Rate and Rhythm: Normal rate and regular rhythm.      Heart sounds: No murmur heard.     No gallop.   Pulmonary:      Effort: Pulmonary effort is normal. No respiratory distress.      Breath sounds: No wheezing, rhonchi or rales.   Abdominal:      General: There is  no distension.   Lymphadenopathy:      Cervical: No cervical adenopathy.   Skin:     General: Skin is warm and dry.   Neurological:      Mental Status: He is alert.   Psychiatric:         Mood and Affect: Mood normal.         Behavior: Behavior normal.         Office Visit on 01/31/2024   Component Date Value Ref Range Status    Final Pathologic Diagnosis 01/31/2024    Final                    Value:Skin, right thigh, punch biopsy:  -SPONGIOTIC DERMATITIS WITH EOSINOPHILS, see comment    COMMENT:  Clinical images were reviewed in epic and differential diagnosis noted.  The histological findings (see microscopic description) support the clinical impression of an eczematous process.  Given the rare eosinophils within the epidermis,   allergic contact dermatitis could also be considered. Nummular dermatitis could also be considered in the correct clinical context.  Correlation is recommended.      Gross 01/31/2024    Final                    Value:Pathology ID:  [id number]  Patient ID:  [id number]     The specimen is received in formalin labeled &quot;right thigh&quot;.  The specimen consists of one punch biopsy of brown-gray skin measuring 0.5 x 0.5 cm and excised to a depth of 0.6 cm.  The specimen is inked black at the resection margin, bisected,   and submitted entirely in cassette OWM--1-A.     Neli Nicholson MS  Grossing Technologist      Microscopic Exam 01/31/2024    Final                    Value:Punch biopsy sections show epidermal acanthosis with spongiosis, lymphocyte exocytosis, and spongiotic vesicles.  The overlying granular layer overall appears preserved with overlying mild hyperkeratosis, parakeratosis, in areas with serum deposition and   inflammation.  There are rare eosinophils within the epidermis.  PAS stain is negative for fungal organisms.  There is a superficial and mid perivascular and interstitial lymphohistiocytic infiltrate with scattered eosinophils and some scattered   neutrophils.   "Stain was reviewed with adequate positive control.      Disclaimer 01/31/2024 Unless the case is a 'gross only' or additional testing only, the final diagnosis for each specimen is based on a microscopic examination of appropriate tissue sections.   Final     Assessment:       1. Controlled type 2 diabetes mellitus without complication, with long-term current use of insulin    2. Pain of left hip    3. Diabetes mellitus type 2 in obese    4. Vitamin D deficiency disease    5. Urinary frequency    6. Diabetic polyneuropathy associated with type 2 diabetes mellitus    7. Hypertension, unspecified type    8. Immunization deficiency    9. Chronic kidney disease, stage 3b    10. Obesity, Class III, BMI 40-49.9 (morbid obesity)    11. Atherosclerosis of aorta    12. Type 2 diabetes mellitus with stage 3a chronic kidney disease, with long-term current use of insulin        Plan:   Blood pressure controlled.  BMI elevated.  Diet discussed.  Medications refilled.  He is due for Cologuard screening.  Has a kit at home already.  He is interested in Shingrix immunization which is being ordered.  Orthopedic referral for hip pain.  He has ongoing diabetic polyneuropathy symptoms.  He is currently on gabapentin 600 mg in the morning 600 mg in the evening.  Increase evening gabapentin to 900 mg.  Follow-up in 3 months aortic atherosclerosis being medically treated.  Chronic renal disease reviewed.  Continue to avoid NSAIDs      Controlled type 2 diabetes mellitus without complication, with long-term current use of insulin  -     Comprehensive Metabolic Panel; Future; Expected date: 04/15/2024  -     Hemoglobin A1C; Future; Expected date: 04/15/2024  -     pen needle, diabetic (BD ULTRA-FINE MINI PEN NEEDLE) 31 gauge x 3/16" Ndle; 1 each by Misc.(Non-Drug; Combo Route) route 2 (two) times a day.  Dispense: 200 each; Refill: 1    Pain of left hip  -     Ambulatory referral/consult to Orthopedics; Future; Expected date: " 04/22/2024    Diabetes mellitus type 2 in obese  -     metFORMIN (GLUCOPHAGE) 500 MG tablet; Take 1 tablet (500 mg total) by mouth 2 (two) times daily with meals.  Dispense: 180 tablet; Refill: 3    Vitamin D deficiency disease  -     Vitamin D; Future; Expected date: 04/15/2024    Urinary frequency  -     Urinalysis; Future; Expected date: 04/15/2024    Diabetic polyneuropathy associated with type 2 diabetes mellitus    Hypertension, unspecified type    Immunization deficiency    Chronic kidney disease, stage 3b    Obesity, Class III, BMI 40-49.9 (morbid obesity)    Atherosclerosis of aorta    Type 2 diabetes mellitus with stage 3a chronic kidney disease, with long-term current use of insulin    Other orders  -     ergocalciferol (VITAMIN D2) 50,000 unit Cap; Take 1 capsule (50,000 Units total) by mouth every 7 days.  Dispense: 12 capsule; Refill: 3

## 2024-04-16 LAB
BACTERIA #/AREA URNS AUTO: ABNORMAL /HPF
BILIRUB UR QL STRIP: NEGATIVE
CLARITY UR REFRACT.AUTO: CLEAR
COLOR UR AUTO: YELLOW
GLUCOSE UR QL STRIP: ABNORMAL
HGB UR QL STRIP: NEGATIVE
KETONES UR QL STRIP: NEGATIVE
LEUKOCYTE ESTERASE UR QL STRIP: ABNORMAL
MICROSCOPIC COMMENT: ABNORMAL
NITRITE UR QL STRIP: NEGATIVE
PH UR STRIP: 6 [PH] (ref 5–8)
PROT UR QL STRIP: NEGATIVE
SP GR UR STRIP: 1.02 (ref 1–1.03)
URN SPEC COLLECT METH UR: ABNORMAL
WBC #/AREA URNS AUTO: 28 /HPF (ref 0–5)
YEAST UR QL AUTO: ABNORMAL

## 2024-04-16 RX ORDER — NITROFURANTOIN (MACROCRYSTALS) 100 MG/1
100 CAPSULE ORAL 4 TIMES DAILY
Qty: 28 CAPSULE | Refills: 0 | Status: SHIPPED | OUTPATIENT
Start: 2024-04-16

## 2024-04-17 DIAGNOSIS — M25.552 LEFT HIP PAIN: Primary | ICD-10-CM

## 2024-04-23 ENCOUNTER — OFFICE VISIT (OUTPATIENT)
Dept: SPORTS MEDICINE | Facility: CLINIC | Age: 69
End: 2024-04-23
Payer: MEDICARE

## 2024-04-23 ENCOUNTER — HOSPITAL ENCOUNTER (OUTPATIENT)
Dept: RADIOLOGY | Facility: HOSPITAL | Age: 69
Discharge: HOME | End: 2024-04-23
Attending: STUDENT IN AN ORGANIZED HEALTH CARE EDUCATION/TRAINING PROGRAM
Payer: MEDICARE

## 2024-04-23 VITALS — HEIGHT: 71 IN | WEIGHT: 315 LBS | BODY MASS INDEX: 44.1 KG/M2

## 2024-04-23 DIAGNOSIS — M25.552 LEFT HIP PAIN: ICD-10-CM

## 2024-04-23 DIAGNOSIS — M54.50 LUMBAR SPINE PAIN: Primary | ICD-10-CM

## 2024-04-23 DIAGNOSIS — M25.552 PAIN OF LEFT HIP: ICD-10-CM

## 2024-04-23 PROCEDURE — 1125F AMNT PAIN NOTED PAIN PRSNT: CPT | Mod: CPTII,S$GLB,, | Performed by: STUDENT IN AN ORGANIZED HEALTH CARE EDUCATION/TRAINING PROGRAM

## 2024-04-23 PROCEDURE — 99999 PR PBB SHADOW E&M-EST. PATIENT-LVL V: CPT | Mod: PBBFAC,,, | Performed by: STUDENT IN AN ORGANIZED HEALTH CARE EDUCATION/TRAINING PROGRAM

## 2024-04-23 PROCEDURE — 1159F MED LIST DOCD IN RCRD: CPT | Mod: CPTII,S$GLB,, | Performed by: STUDENT IN AN ORGANIZED HEALTH CARE EDUCATION/TRAINING PROGRAM

## 2024-04-23 PROCEDURE — 3072F LOW RISK FOR RETINOPATHY: CPT | Mod: CPTII,S$GLB,, | Performed by: STUDENT IN AN ORGANIZED HEALTH CARE EDUCATION/TRAINING PROGRAM

## 2024-04-23 PROCEDURE — 73502 X-RAY EXAM HIP UNI 2-3 VIEWS: CPT | Mod: TC,PN,LT

## 2024-04-23 PROCEDURE — 3288F FALL RISK ASSESSMENT DOCD: CPT | Mod: CPTII,S$GLB,, | Performed by: STUDENT IN AN ORGANIZED HEALTH CARE EDUCATION/TRAINING PROGRAM

## 2024-04-23 PROCEDURE — 73502 X-RAY EXAM HIP UNI 2-3 VIEWS: CPT | Mod: 26,LT,, | Performed by: RADIOLOGY

## 2024-04-23 PROCEDURE — G2211 COMPLEX E/M VISIT ADD ON: HCPCS | Mod: S$GLB,,, | Performed by: STUDENT IN AN ORGANIZED HEALTH CARE EDUCATION/TRAINING PROGRAM

## 2024-04-23 PROCEDURE — 99204 OFFICE O/P NEW MOD 45 MIN: CPT | Mod: S$GLB,,, | Performed by: STUDENT IN AN ORGANIZED HEALTH CARE EDUCATION/TRAINING PROGRAM

## 2024-04-23 PROCEDURE — 1101F PT FALLS ASSESS-DOCD LE1/YR: CPT | Mod: CPTII,S$GLB,, | Performed by: STUDENT IN AN ORGANIZED HEALTH CARE EDUCATION/TRAINING PROGRAM

## 2024-04-23 PROCEDURE — 3044F HG A1C LEVEL LT 7.0%: CPT | Mod: CPTII,S$GLB,, | Performed by: STUDENT IN AN ORGANIZED HEALTH CARE EDUCATION/TRAINING PROGRAM

## 2024-04-23 PROCEDURE — 3008F BODY MASS INDEX DOCD: CPT | Mod: CPTII,S$GLB,, | Performed by: STUDENT IN AN ORGANIZED HEALTH CARE EDUCATION/TRAINING PROGRAM

## 2024-04-23 NOTE — PROGRESS NOTES
Patient ID: Tom Tavarez  YOB: 1955  MRN: [id number]    Chief Complaint: Pain of the Left Hip    Referred By: Adolfo Lemus MD for left hip    History of Present Illness: Tom Tavarez is a 69 y.o. male who presents today with left hip pain.     The patient is active in none.  Occupation: Retired    Tom Tavarez states it is Chronic in nature and there was not a specific mechanism. Years of left hip and low back pain dating back at least 4 years that is progressively worsening.   Tom Tavarez describes the pain as a continuous at posterolateral hip and groin. Current pain level at rest is 6/10(Numeric Pain Rating Scale).  Associated symptoms include: Swelling No, Instability No, Pain that affects your sleep Yes, Mechanical Yes, locking/catching No, Neurological No, limited range of motion Yes. Aggravating activities include prolong standing, sleeping on left side. They have tried  tylenol arthritis 650mg so far for this. They believe that they are unchanged with this treatment. They denies formal physical therapy for this. Did see Sports Medicine provider Claribel Holloway here four years ago for similar type of pain which was attributed to the low back and SI. Was offered and referred to therapy but did not pursue this.     Hemoglobin A1C   Date Value Ref Range Status   04/15/2024 6.5 (H) 4.0 - 5.6 % Final     Comment:     ADA Screening Guidelines:  5.7-6.4%  Consistent with prediabetes  >or=6.5%  Consistent with diabetes    High levels of fetal hemoglobin interfere with the HbA1C  assay. Heterozygous hemoglobin variants (HbS, HgC, etc)do  not significantly interfere with this assay.   However, presence of multiple variants may affect accuracy.     12/04/2023 6.7 (H) 4.0 - 5.6 % Final     Comment:     ADA Screening Guidelines:  5.7-6.4%  Consistent with prediabetes  >or=6.5%  Consistent with diabetes    High levels of fetal hemoglobin interfere with the HbA1C  assay. Heterozygous  hemoglobin variants (HbS, HgC, etc)do  not significantly interfere with this assay.   However, presence of multiple variants may affect accuracy.     09/26/2023 6.7 (H) <=6.5 % Final   03/24/2023 6.6 (H) 4.0 - 5.6 % Final     Comment:     ADA Screening Guidelines:  5.7-6.4%  Consistent with prediabetes  >or=6.5%  Consistent with diabetes    High levels of fetal hemoglobin interfere with the HbA1C  assay. Heterozygous hemoglobin variants (HbS, HgC, etc)do  not significantly interfere with this assay.   However, presence of multiple variants may affect accuracy.         Past Medical History:   Past Medical History:   Diagnosis Date    BMI 40.0-44.9, adult 07/21/2016    Colon cancer screening 10/03/2017    Diabetes mellitus type 2 in obese 11/2006     am 05/09/2023 Insulin x 10 Years    Hyperlipidemia     Hypertension 1985    Obesity, Class III, BMI 40-49.9 (morbid obesity) 1985    Umbilical hernia      Past Surgical History:   Procedure Laterality Date    CATARACT EXTRACTION      G U surgery  01/01/1982    biopsy of testicle infertility w/u     Family History   Problem Relation Name Age of Onset    Hypertension Mother      Heart failure Mother      Diabetes Brother       Social History     Socioeconomic History    Marital status:     Number of children: 0    Highest education level: Some college, no degree   Occupational History    Occupation: Retired-Sales     Comment: [employer]   Tobacco Use    Smoking status: Former    Smokeless tobacco: Never    Tobacco comments:     1998 quit    Substance and Sexual Activity    Alcohol use: No     Alcohol/week: 0.0 standard drinks of alcohol    Drug use: No    Sexual activity: Yes     Partners: Female     Social Determinants of Health     Financial Resource Strain: Low Risk  (11/30/2023)    Overall Financial Resource Strain (CARDIA)     Difficulty of Paying Living Expenses: Not hard at all   Food Insecurity: No Food Insecurity (11/30/2023)    Hunger  Vital Sign     Worried About Running Out of Food in the Last Year: Never true     Ran Out of Food in the Last Year: Never true   Transportation Needs: No Transportation Needs (11/30/2023)    PRAPARE - Transportation     Lack of Transportation (Medical): No     Lack of Transportation (Non-Medical): No   Physical Activity: Inactive (11/30/2023)    Exercise Vital Sign     Days of Exercise per Week: 0 days     Minutes of Exercise per Session: 0 min   Stress: No Stress Concern Present (11/30/2023)    Citizen of [place] of Occupational Health - Occupational Stress Questionnaire     Feeling of Stress : Not at all   Social Connections: Unknown (11/30/2023)    Social Connection and Isolation Panel [NHANES]     Frequency of Communication with Friends and Family: More than three times a week     Frequency of Social Gatherings with Friends and Family: Patient declined     Active Member of Clubs or Organizations: Yes     Attends Club or Organization Meetings: 1 to 4 times per year     Marital Status:    Housing Stability: Low Risk  (11/30/2023)    Housing Stability Vital Sign     Unable to Pay for Housing in the Last Year: No     Number of Places Lived in the Last Year: 1     Unstable Housing in the Last Year: No     Medication List with Changes/Refills   Current Medications    ACETAMINOPHEN (TYLENOL) 325 MG TABLET    Take 650 mg by mouth every 8 (eight) hours as needed.    AMLODIPINE (NORVASC) 5 MG TABLET    Take 1 tablet (5 mg total) by mouth once daily.    ATORVASTATIN (LIPITOR) 40 MG TABLET    Take 1 tablet (40 mg total) by mouth once daily.    BLOOD-GLUCOSE METER KIT    To check BG 3 times daily, to use with insurance preferred meter    CLOBETASOL 0.05% (TEMOVATE) 0.05 % OINT    Apply topically 2 (two) times daily. Apply at night under cling wrap to R lower leg. Apply twice daily to smaller areas on L lower leg and L thigh    COLCHICINE (COLCRYS) 0.6 MG TABLET    TAKE 1 TABLET(0.6 MG) BY MOUTH EVERY DAY     "DIPHENHYDRAMINE (BENADRYL) 25 MG CAPSULE    Take 25 mg by mouth every 6 (six) hours as needed.    DOXYCYCLINE (VIBRA-TABS) 100 MG TABLET    Take 1 tablet (100 mg total) by mouth 2 (two) times daily.    EMPAGLIFLOZIN (JARDIANCE) 10 MG TABLET    Take 1 tablet (10 mg total) by mouth once daily.    ERGOCALCIFEROL (VITAMIN D2) 50,000 UNIT CAP    Take 1 capsule (50,000 Units total) by mouth every 7 days.    FLASH GLUCOSE SCANNING READER (FREESTYLE KRISTY 14 DAY READER) MISC    1 each by Misc.(Non-Drug; Combo Route) route continuous.    FLASH GLUCOSE SENSOR (FREESTYLE KRISTY 14 DAY SENSOR) KIT    1 each by Misc.(Non-Drug; Combo Route) route continuous.    GABAPENTIN (NEURONTIN) 300 MG CAPSULE    TAKE ONE CAPSULE BY MOUTH EVERY MORNING AND 2 CAPSULES AT BEDTIME    HYDROCHLOROTHIAZIDE (HYDRODIURIL) 25 MG TABLET    Take 1 tablet (25 mg total) by mouth once daily.    INSULIN SYRINGE-NEEDLE U-100 (BD INSULIN SYRINGE ULTRA-FINE) 0.3 ML 31 GAUGE X 5/16" SYRG    1 each by Misc.(Non-Drug; Combo Route) route 2 (two) times a day.    KETOCONAZOLE (NIZORAL) 2 % CREAM    Apply topically once daily.    [place] [facility]    To check BG 3 times daily, to use with insurance preferred meter    LANTUS SOLOSTAR U-100 INSULIN GLARGINE 100 UNITS/ML SUBQ PEN    INJECT SUBCUTANEOUSLY 50 UNITS  TWICE DAILY    METFORMIN (GLUCOPHAGE) 500 MG TABLET    Take 1 tablet (500 mg total) by mouth 2 (two) times daily with meals.    MUPIROCIN (BACTROBAN) 2 % OINTMENT    Apply topically once daily.    NITROFURANTOIN (MACRODANTIN) 100 MG CAPSULE    Take 1 capsule (100 mg total) by mouth 4 (four) times daily.    PEN NEEDLE, DIABETIC (BD ULTRA-FINE MINI PEN NEEDLE) 31 GAUGE X 3/16" NDLE    1 each by Misc.(Non-Drug; Combo Route) route 2 (two) times a day.    PREDNISOLONE ACETATE (PRED FORTE) 1 % DRPS    Place 1 drop into the left eye every 4 (four) hours.    TRIAMCINOLONE ACETONIDE 0.1% (KENALOG) 0.1 % CREAM    Apply topically 2 (two) times daily.    VITAMIN B COMPLEX ORAL "    Take 1 tablet by mouth every morning.     Review of patient's allergies indicates:  No Known Allergies    Physical Exam:   Body mass index is 43.94 kg/m².    GENERAL: Well appearing, in no acute distress.  HEAD: Normocephalic and atraumatic.  ENT: External ears and nose grossly normal.  EYES: EOMI bilaterally  PULMONARY: Respirations are grossly even and non-labored.  NEURO: Awake, alert, and oriented x 3.  SKIN: No obvious rashes appreciated.  PSYCH: Mood & affect are appropriate.    Detailed MSK exam:     R hip: flexion 120, ER 40, IR 25  L hip: flexion 100, ER 25, IR 10     No significant tender palpation over the left greater trochanter.  Equivocal straight leg raise for reproduction of symptoms.  Glute weakness appreciated    Imaging:  X-Ray Hip 2 or 3 views Left (with Pelvis when performed)  Narrative: EXAMINATION:  XR HIP WITH PELVIS WHEN PERFORMED, 2 OR 3 VIEWS LEFT    CLINICAL HISTORY:  Pain in left hip    TECHNIQUE:  AP view of the pelvis and frog leg lateral view of the left hip were performed.    COMPARISON:  11/09/2020    FINDINGS:  No acute osseous abnormality.  Hip joint spaces maintained with mild acetabular degenerative findings.  More prevalent degenerative changes noted of the lower lumbar spine.  Soft tissues unremarkable.  Impression: As above    Electronically signed by: Barrera Peñaloza MD  Date:    04/23/2024  Time:    11:00      Relevant imaging results were reviewed and interpreted by me and per my read  as above.  This was discussed with the patient and / or family today.     Assessment:  Tom Tavarez is a 69 y.o. male  presents today for left lower buttocks and lateral leg and hip pain.  Exam more congruent with probable low back etiology possible lumbar radiculopathy, x-rays show more degenerative changes in lumbar spine.  Has slight loss of range of motion of the left hip and some mild arthritic changes noted, but his symptoms are not congruent with that diagnosis.  He has not  very tender over the lateral hip either, we will defer any corticosteroid injections as he is currently on an anti biotic, and unable to take NSAIDs secondary to CKD.  Discussed moving forward with scheduled Tylenol topicals and formal physical therapy.  We will send to therapy in [facility] PT follow-up in 3 months.    Lumbar spine pain  -     Ambulatory referral/consult to Physical/Occupational Therapy; Future; Expected date: 04/30/2024    Pain of left hip  -     Ambulatory referral/consult to Orthopedics  -     Ambulatory referral/consult to Physical/Occupational Therapy; Future; Expected date: 04/30/2024       Today's visit is associated with current or anticipated ongoing medical care related to this patient's diagnosis of osteoarthritis.  Currently there is no cure of osteoarthritis and the patient will require regular follow up to manage symptoms and progression.  The patient is to return to the office within a minimum of 3-6 months to review symptoms and function at that time.   CPT code     A copy of today's visit note has been sent to the referring provider.       Eusebio España MD    Disclaimer: This note was prepared using a voice recognition system and is likely to have sound alike errors within the text.

## 2024-04-26 RX ORDER — INSULIN GLARGINE 100 [IU]/ML
50 INJECTION, SOLUTION SUBCUTANEOUS 2 TIMES DAILY
Qty: 90 ML | Refills: 1 | Status: SHIPPED | OUTPATIENT
Start: 2024-04-26

## 2024-04-26 NOTE — TELEPHONE ENCOUNTER
No care due was identified.  [facility] Embedded Care Due Messages. Reference number: [id number].   4/26/2024 9:58:41 AM CDT

## 2024-06-21 ENCOUNTER — PATIENT MESSAGE (OUTPATIENT)
Dept: OPHTHALMOLOGY | Facility: CLINIC | Age: 69
End: 2024-06-21

## 2024-06-21 ENCOUNTER — OFFICE VISIT (OUTPATIENT)
Dept: OPHTHALMOLOGY | Facility: CLINIC | Age: 69
End: 2024-06-21
Payer: MEDICARE

## 2024-06-21 DIAGNOSIS — E11.9 DIABETES MELLITUS TYPE 2 WITHOUT RETINOPATHY: ICD-10-CM

## 2024-06-21 DIAGNOSIS — Z96.1 PSEUDOPHAKIA OF BOTH EYES: Primary | ICD-10-CM

## 2024-06-21 DIAGNOSIS — H52.7 REFRACTIVE ERRORS: ICD-10-CM

## 2024-06-21 PROCEDURE — 99999 PR PBB SHADOW E&M-EST. PATIENT-LVL III: CPT | Mod: PBBFAC,,, | Performed by: OPTOMETRIST

## 2024-06-21 NOTE — PROGRESS NOTES
SUBJECTIVE  Tom Tavarez is 69 y.o. male  Uncorrected distance visual acuity was 20/20 in the right eye and 20/20 in the left eye. Uncorrected near visual acuity was J2 in the right eye and J2 in the left eye.   Chief Complaint   Patient presents with    Diabetic Eye Exam     Lab Results       Component                Value               Date                       HGBA1C                   6.5 (H)             04/15/2024                    HPI     Diabetic Eye Exam     Additional comments: Lab Results       Component                Value               Date                       HGBA1C                   6.5 (H)             04/15/2024                     Comments    Patient states no visual complaints.  Eyes feel heavy and sometimes seeing spots/floaters with both eyes but no   flashes of light. Using otc drops prn  Wear otc readers     1. PCIOL OD 8/3/23  2. PCIOL OS 9/20/23           Last edited by Sofiya García on 6/21/2024 10:47 AM.         Assessment /Plan :  1. Pseudophakia of both eyes   Well-centered, stable IOL OU. Monitor annually.      2. Diabetes mellitus type 2 without retinopathy   No Background Diabetic Retinopathy  Strict BG control, f/u w/ PCP, and annual DFE  Stressed importance of DM control to preserve vision      3. Refractive errors   Dispense Final Rx for glasses.  RTC 1 year  Discussed above and answered questions.

## 2024-07-15 ENCOUNTER — OFFICE VISIT (OUTPATIENT)
Dept: INTERNAL MEDICINE | Facility: CLINIC | Age: 69
End: 2024-07-15
Payer: MEDICARE

## 2024-07-15 VITALS
BODY MASS INDEX: 43.94 KG/M2 | OXYGEN SATURATION: 98 % | TEMPERATURE: 99 F | HEART RATE: 66 BPM | DIASTOLIC BLOOD PRESSURE: 90 MMHG | SYSTOLIC BLOOD PRESSURE: 168 MMHG | HEIGHT: 71 IN

## 2024-07-15 DIAGNOSIS — R60.9 EDEMA, UNSPECIFIED TYPE: ICD-10-CM

## 2024-07-15 DIAGNOSIS — I10 HYPERTENSION, UNSPECIFIED TYPE: ICD-10-CM

## 2024-07-15 DIAGNOSIS — N18.32 CHRONIC KIDNEY DISEASE, STAGE 3B: ICD-10-CM

## 2024-07-15 DIAGNOSIS — E11.42 DIABETIC POLYNEUROPATHY ASSOCIATED WITH TYPE 2 DIABETES MELLITUS: Primary | ICD-10-CM

## 2024-07-15 PROCEDURE — 1126F AMNT PAIN NOTED NONE PRSNT: CPT | Mod: CPTII,S$GLB,, | Performed by: FAMILY MEDICINE

## 2024-07-15 PROCEDURE — 1101F PT FALLS ASSESS-DOCD LE1/YR: CPT | Mod: CPTII,S$GLB,, | Performed by: FAMILY MEDICINE

## 2024-07-15 PROCEDURE — 3044F HG A1C LEVEL LT 7.0%: CPT | Mod: CPTII,S$GLB,, | Performed by: FAMILY MEDICINE

## 2024-07-15 PROCEDURE — 3077F SYST BP >= 140 MM HG: CPT | Mod: CPTII,S$GLB,, | Performed by: FAMILY MEDICINE

## 2024-07-15 PROCEDURE — 99999 PR PBB SHADOW E&M-EST. PATIENT-LVL IV: CPT | Mod: PBBFAC,,, | Performed by: FAMILY MEDICINE

## 2024-07-15 PROCEDURE — G2211 COMPLEX E/M VISIT ADD ON: HCPCS | Mod: S$GLB,,, | Performed by: FAMILY MEDICINE

## 2024-07-15 PROCEDURE — 99214 OFFICE O/P EST MOD 30 MIN: CPT | Mod: S$GLB,,, | Performed by: FAMILY MEDICINE

## 2024-07-15 PROCEDURE — 1159F MED LIST DOCD IN RCRD: CPT | Mod: CPTII,S$GLB,, | Performed by: FAMILY MEDICINE

## 2024-07-15 PROCEDURE — 3080F DIAST BP >= 90 MM HG: CPT | Mod: CPTII,S$GLB,, | Performed by: FAMILY MEDICINE

## 2024-07-15 PROCEDURE — 3288F FALL RISK ASSESSMENT DOCD: CPT | Mod: CPTII,S$GLB,, | Performed by: FAMILY MEDICINE

## 2024-07-15 PROCEDURE — 3008F BODY MASS INDEX DOCD: CPT | Mod: CPTII,S$GLB,, | Performed by: FAMILY MEDICINE

## 2024-07-15 RX ORDER — CLONIDINE HYDROCHLORIDE 0.1 MG/1
0.1 TABLET ORAL 2 TIMES DAILY
Qty: 60 TABLET | Refills: 11 | Status: SHIPPED | OUTPATIENT
Start: 2024-07-15 | End: 2025-07-15

## 2024-07-15 RX ORDER — GABAPENTIN 300 MG/1
CAPSULE ORAL
Qty: 180 CAPSULE | Refills: 1 | Status: SHIPPED | OUTPATIENT
Start: 2024-07-15

## 2024-07-15 NOTE — PROGRESS NOTES
Subjective:       Patient ID: Tom Tavarez is a 69 y.o. male.    Chief Complaint: Follow-up    Follow-up hypertension chronic renal diabetes with diabetic neuropathy edema status post angioedema.  He denies headache chest pain palpitations.  Has ongoing swelling of his lower extremities he is currently on gabapentin 300 mg 2 3 times a day for polyneuropathy.  He is also on amlodipine 5 mg a day.  Losartan was previously discontinued angioedema.  He is currently Lantus 50 mg twice a day and Januvia 10 mg a day for diabetic control.  He denies any hypoglycemic symptoms.  He is concerned about his weight of 315 lb.    Follow-up  Pertinent negatives include no abdominal pain, chest pain, chills, coughing, fever, headaches, nausea, vomiting or weakness.     Review of Systems   Constitutional:  Negative for activity change, appetite change, chills, fever and unexpected weight change.   Respiratory:  Negative for cough, chest tightness, shortness of breath and wheezing.    Cardiovascular:  Positive for leg swelling. Negative for chest pain and palpitations.   Gastrointestinal:  Negative for abdominal distention, abdominal pain, constipation, diarrhea, nausea and vomiting.   Neurological:  Negative for dizziness, weakness, light-headedness and headaches.       Objective:      Physical Exam  Constitutional:       General: He is not in acute distress.     Appearance: He is not ill-appearing or diaphoretic.   Cardiovascular:      Rate and Rhythm: Normal rate and regular rhythm.      Heart sounds: No murmur heard.     No gallop.      Comments: 1+ 2+ edema lower extremities  Pulmonary:      Effort: Pulmonary effort is normal. No respiratory distress.      Breath sounds: No wheezing, rhonchi or rales.   Abdominal:      General: There is no distension.   Lymphadenopathy:      Cervical: No cervical adenopathy.   Skin:     General: Skin is warm and dry.   Neurological:      Mental Status: He is alert.   Psychiatric:          Mood and Affect: Mood normal.         Lab Visit on 04/15/2024   Component Date Value Ref Range Status    Sodium 04/15/2024 140  136 - 145 mmol/L Final    Potassium 04/15/2024 4.3  3.5 - 5.1 mmol/L Final    Chloride 04/15/2024 104  95 - 110 mmol/L Final    CO2 04/15/2024 29  23 - 29 mmol/L Final    Glucose 04/15/2024 69 (L)  70 - 110 mg/dL Final    BUN 04/15/2024 18  8 - 23 mg/dL Final    Creatinine 04/15/2024 1.6 (H)  0.5 - 1.4 mg/dL Final    Calcium 04/15/2024 9.7  8.7 - 10.5 mg/dL Final    Total Protein 04/15/2024 7.0  6.0 - 8.4 g/dL Final    Albumin 04/15/2024 3.5  3.5 - 5.2 g/dL Final    Total Bilirubin 04/15/2024 0.6  0.1 - 1.0 mg/dL Final    Alkaline Phosphatase 04/15/2024 59  55 - 135 U/L Final    AST 04/15/2024 17  10 - 40 U/L Final    ALT 04/15/2024 21  10 - 44 U/L Final    eGFR 04/15/2024 46.1 (A)  >60 mL/min/1.73 m^2 Final    Anion Gap 04/15/2024 7 (L)  8 - 16 mmol/L Final    Hemoglobin A1C 04/15/2024 6.5 (H)  4.0 - 5.6 % Final    Estimated Avg Glucose 04/15/2024 140 (H)  68 - 131 mg/dL Final    Vit D, 25-Hydroxy 04/15/2024 38  30 - 96 ng/mL Final     Assessment:       1. Diabetic polyneuropathy associated with type 2 diabetes mellitus    2. BMI 40.0-44.9, adult    3. Hypertension, unspecified type    4. Chronic kidney disease, stage 3b    5. Edema, unspecified type        Plan:     Blood pressure elevated.  Had angioedema with losartan.  Continue amlodipine 5 mg a day.  Add clonidine 0.1 mg twice a day.  Will try eliminate amlodipine due to edema if possible.  Also gabapentin 300 mg 2 3 times a day currently.  Will decrease to 2 at bedtime regards possible edema effect.  He is interested in Ozempic.  Will start 0.25 mg weekly.  Potential side effects discussed.  Follow-up one-month.  Continue hydration and avoid NSAIDs due to chronic renal disease.    Diabetic polyneuropathy associated with type 2 diabetes mellitus  -     semaglutide (OZEMPIC) 0.25 mg or 0.5 mg (2 mg/3 mL) pen injector; Inject 0.25 mg  into the skin every 7 days.  Dispense: 3 mL; Refill: 0    BMI 40.0-44.9, adult  -     semaglutide (OZEMPIC) 0.25 mg or 0.5 mg (2 mg/3 mL) pen injector; Inject 0.25 mg into the skin every 7 days.  Dispense: 3 mL; Refill: 0    Hypertension, unspecified type    Chronic kidney disease, stage 3b    Edema, unspecified type    Other orders  -     cloNIDine (CATAPRES) 0.1 MG tablet; Take 1 tablet (0.1 mg total) by mouth 2 (two) times daily.  Dispense: 60 tablet; Refill: 11  -     gabapentin (NEURONTIN) 300 MG capsule; TAKE 2 CAPSULES AT BEDTIME  Dispense: 180 capsule; Refill: 1

## 2024-07-24 ENCOUNTER — PATIENT MESSAGE (OUTPATIENT)
Dept: NEPHROLOGY | Facility: CLINIC | Age: 69
End: 2024-07-24
Payer: MEDICARE

## 2024-08-13 DIAGNOSIS — I10 HYPERTENSION, UNSPECIFIED TYPE: Primary | ICD-10-CM

## 2024-08-13 DIAGNOSIS — I71.40 ABDOMINAL AORTIC ANEURYSM (AAA) WITHOUT RUPTURE, UNSPECIFIED PART: ICD-10-CM

## 2024-08-16 ENCOUNTER — HOSPITAL ENCOUNTER (OUTPATIENT)
Dept: CARDIOLOGY | Facility: HOSPITAL | Age: 69
Discharge: HOME | End: 2024-08-16
Attending: INTERNAL MEDICINE
Payer: MEDICARE

## 2024-08-16 ENCOUNTER — OFFICE VISIT (OUTPATIENT)
Dept: CARDIOLOGY | Facility: CLINIC | Age: 69
End: 2024-08-16
Payer: MEDICARE

## 2024-08-16 VITALS
SYSTOLIC BLOOD PRESSURE: 144 MMHG | WEIGHT: 315 LBS | DIASTOLIC BLOOD PRESSURE: 78 MMHG | HEART RATE: 44 BPM | BODY MASS INDEX: 44.1 KG/M2 | HEIGHT: 71 IN | OXYGEN SATURATION: 98 %

## 2024-08-16 DIAGNOSIS — I70.0 ATHEROSCLEROSIS OF AORTA: ICD-10-CM

## 2024-08-16 DIAGNOSIS — E11.9 CONTROLLED TYPE 2 DIABETES MELLITUS WITHOUT COMPLICATION, WITH LONG-TERM CURRENT USE OF INSULIN: ICD-10-CM

## 2024-08-16 DIAGNOSIS — I73.9 PVD (PERIPHERAL VASCULAR DISEASE): Primary | ICD-10-CM

## 2024-08-16 DIAGNOSIS — I71.40 ABDOMINAL AORTIC ANEURYSM (AAA) WITHOUT RUPTURE, UNSPECIFIED PART: ICD-10-CM

## 2024-08-16 DIAGNOSIS — Z79.4 CONTROLLED TYPE 2 DIABETES MELLITUS WITHOUT COMPLICATION, WITH LONG-TERM CURRENT USE OF INSULIN: ICD-10-CM

## 2024-08-16 DIAGNOSIS — R60.0 LOCALIZED EDEMA: ICD-10-CM

## 2024-08-16 DIAGNOSIS — Z82.49 FH: THORACIC AORTIC ANEURYSM: ICD-10-CM

## 2024-08-16 DIAGNOSIS — I10 HYPERTENSION, UNSPECIFIED TYPE: ICD-10-CM

## 2024-08-16 LAB
OHS QRS DURATION: 80 MS
OHS QTC CALCULATION: 435 MS

## 2024-08-16 PROCEDURE — 3077F SYST BP >= 140 MM HG: CPT | Mod: CPTII,S$GLB,, | Performed by: INTERNAL MEDICINE

## 2024-08-16 PROCEDURE — 1160F RVW MEDS BY RX/DR IN RCRD: CPT | Mod: CPTII,S$GLB,, | Performed by: INTERNAL MEDICINE

## 2024-08-16 PROCEDURE — 3008F BODY MASS INDEX DOCD: CPT | Mod: CPTII,S$GLB,, | Performed by: INTERNAL MEDICINE

## 2024-08-16 PROCEDURE — G2211 COMPLEX E/M VISIT ADD ON: HCPCS | Mod: S$GLB,,, | Performed by: INTERNAL MEDICINE

## 2024-08-16 PROCEDURE — 99214 OFFICE O/P EST MOD 30 MIN: CPT | Mod: S$GLB,,, | Performed by: INTERNAL MEDICINE

## 2024-08-16 PROCEDURE — 93010 ELECTROCARDIOGRAM REPORT: CPT | Mod: ,,, | Performed by: INTERNAL MEDICINE

## 2024-08-16 PROCEDURE — 1101F PT FALLS ASSESS-DOCD LE1/YR: CPT | Mod: CPTII,S$GLB,, | Performed by: INTERNAL MEDICINE

## 2024-08-16 PROCEDURE — 1126F AMNT PAIN NOTED NONE PRSNT: CPT | Mod: CPTII,S$GLB,, | Performed by: INTERNAL MEDICINE

## 2024-08-16 PROCEDURE — 93005 ELECTROCARDIOGRAM TRACING: CPT

## 2024-08-16 PROCEDURE — 1159F MED LIST DOCD IN RCRD: CPT | Mod: CPTII,S$GLB,, | Performed by: INTERNAL MEDICINE

## 2024-08-16 PROCEDURE — 3078F DIAST BP <80 MM HG: CPT | Mod: CPTII,S$GLB,, | Performed by: INTERNAL MEDICINE

## 2024-08-16 PROCEDURE — 3288F FALL RISK ASSESSMENT DOCD: CPT | Mod: CPTII,S$GLB,, | Performed by: INTERNAL MEDICINE

## 2024-08-16 PROCEDURE — 99999 PR PBB SHADOW E&M-EST. PATIENT-LVL V: CPT | Mod: PBBFAC,,, | Performed by: INTERNAL MEDICINE

## 2024-08-16 PROCEDURE — 3044F HG A1C LEVEL LT 7.0%: CPT | Mod: CPTII,S$GLB,, | Performed by: INTERNAL MEDICINE

## 2024-08-16 RX ORDER — FUROSEMIDE 40 MG/1
40 TABLET ORAL
Qty: 30 TABLET | Refills: 11 | Status: SHIPPED | OUTPATIENT
Start: 2024-08-16

## 2024-08-16 RX ORDER — HYDRALAZINE HYDROCHLORIDE 50 MG/1
50 TABLET, FILM COATED ORAL EVERY 12 HOURS
Qty: 60 TABLET | Refills: 11 | Status: SHIPPED | OUTPATIENT
Start: 2024-08-16 | End: 2025-08-16

## 2024-08-16 NOTE — PROGRESS NOTES
Subjective:   Patient ID:  Tom Tavarez is a 69 y.o. male who presents for follow up of Shortness of Breath (Some occasional sob with exertion)      70 yo male, 1 yr f/u  PMH HTN HKD DM since 2006, obesity    ekg NSR and PAC  Recent medicine visit showed HR at 50's  No dizziness faint chest pain and dyspnea  Occasional fatigue after h/o COVID 19 in .   Will start [employer] work  No smoking/drinking    07/23 visit  C/o chest pain twice a month, dull pain, minutes, caused by emotional stress.   BP high and fluctuating. BP controlled at home  med compliance\  Ekg today NSR  poor R progression on precordial leads.     Interval history  Episode of hypotension and ER staying o/n.   No chest pain dyspnea faint orthopnea   Leg swelling L > R   EKG reviewed by myself today reveals NSR HR 52 bpm, PAC, nonspecific STT change  08/23 Phar MPI showed no ischemia            Past Medical History:   Diagnosis Date    BMI 40.0-44.9, adult 07/21/2016    Colon cancer screening 10/03/2017    Diabetes mellitus type 2 in obese 11/2006     am 05/09/2023 Insulin x 10 Years    Hyperlipidemia     Hypertension 1985    Obesity, Class III, BMI 40-49.9 (morbid obesity) 1985    Umbilical hernia        Past Surgical History:   Procedure Laterality Date    CATARACT EXTRACTION      G U surgery  01/01/1982    biopsy of testicle infertility w/u       Social History     Tobacco Use    Smoking status: Former    Smokeless tobacco: Never    Tobacco comments:     1998 quit    Substance Use Topics    Alcohol use: No     Alcohol/week: 0.0 standard drinks of alcohol    Drug use: No       Family History   Problem Relation Name Age of Onset    Hypertension Mother      Heart failure Mother      Diabetes Brother           ROS    Objective:   Physical Exam  HENT:      Head: Normocephalic.   Eyes:      Pupils: Pupils are equal, round, and reactive to light.   Neck:      Thyroid: No thyromegaly.      Vascular: Normal carotid pulses. No carotid  bruit or JVD.   Cardiovascular:      Rate and Rhythm: Normal rate and regular rhythm. No extrasystoles are present.     Chest Wall: PMI is not displaced.      Pulses: Normal pulses.      Heart sounds: Normal heart sounds. No murmur heard.     No gallop. No S3 sounds.   Pulmonary:      Effort: No respiratory distress.      Breath sounds: Normal breath sounds. No stridor.   Abdominal:      General: Bowel sounds are normal.      Palpations: Abdomen is soft.      Tenderness: There is no abdominal tenderness. There is no rebound.   Musculoskeletal:         General: Swelling present. Normal range of motion.   Skin:     Findings: No rash.   Neurological:      Mental Status: He is alert and oriented to person, place, and time.   Psychiatric:         Behavior: Behavior normal.         Lab Results   Component Value Date    CHOL 169 12/04/2023    CHOL 212 (H) 03/24/2023    CHOL 115 (L) 01/18/2022     Lab Results   Component Value Date    HDL 31 (L) 12/04/2023    HDL 32 (L) 03/24/2023    HDL 38 (L) 01/18/2022     Lab Results   Component Value Date    LDLCALC 111.8 12/04/2023    LDLCALC 147.4 03/24/2023    LDLCALC 56.2 (L) 01/18/2022     Lab Results   Component Value Date    TRIG 131 12/04/2023    TRIG 163 (H) 03/24/2023    TRIG 104 01/18/2022     Lab Results   Component Value Date    CHOLHDL 18.3 (L) 12/04/2023    CHOLHDL 15.1 (L) 03/24/2023    CHOLHDL 33.0 01/18/2022       Chemistry        Component Value Date/Time     04/15/2024 1039    K 4.3 04/15/2024 1039     04/15/2024 1039    CO2 29 04/15/2024 1039    BUN 18 04/15/2024 1039    CREATININE 1.6 (H) 04/15/2024 1039    GLU 69 (L) 04/15/2024 1039        Component Value Date/Time    CALCIUM 9.7 04/15/2024 1039    ALKPHOS 59 04/15/2024 1039    AST 17 04/15/2024 1039    ALT 21 04/15/2024 1039    BILITOT 0.6 04/15/2024 1039    ESTGFRAFRICA 55 09/28/2023 0637    ESTGFRAFRICA >60.0 06/03/2022 1335    EGFRNONAA >60.0 06/03/2022 1335          Lab Results   Component Value  "Date    HGBA1C 6.5 (H) 04/15/2024     Lab Results   Component Value Date    TSH 1.823 12/04/2023     No results found for: "INR", "PROTIME"  Lab Results   Component Value Date    WBC 6.73 12/04/2023    HGB 17.2 12/04/2023    HCT 52.6 12/04/2023    MCV 89 12/04/2023     12/04/2023     BMP  Sodium   Date Value Ref Range Status   04/15/2024 140 136 - 145 mmol/L Final     Potassium   Date Value Ref Range Status   04/15/2024 4.3 3.5 - 5.1 mmol/L Final     Chloride   Date Value Ref Range Status   04/15/2024 104 95 - 110 mmol/L Final     CO2   Date Value Ref Range Status   04/15/2024 29 23 - 29 mmol/L Final     BUN   Date Value Ref Range Status   04/15/2024 18 8 - 23 mg/dL Final     Creatinine   Date Value Ref Range Status   04/15/2024 1.6 (H) 0.5 - 1.4 mg/dL Final     Calcium   Date Value Ref Range Status   04/15/2024 9.7 8.7 - 10.5 mg/dL Final     Anion Gap   Date Value Ref Range Status   04/15/2024 7 (L) 8 - 16 mmol/L Final     eGFR if    Date Value Ref Range Status   06/03/2022 >60.0 >60 mL/min/1.73 m^2 Final     eGFR    Date Value Ref Range Status   09/28/2023 55 mL/min/1.73mSq Final     Comment:     In accordance with NKF-ASN Task Force recommendation, calculation based on the Chronic Kidney Disease Epidemiology Collaboration (CKD-EPI) equation without adjustment for race. eGFR adjusted for gender and age and calculated in ml/min/1.73mSquared. eGFR cannot be calculated if patient is under 18 years of age.     Reference Range:   >= 60 ml/min/1.73mSquared.     eGFR if non    Date Value Ref Range Status   06/03/2022 >60.0 >60 mL/min/1.73 m^2 Final     Comment:     Calculation used to obtain the estimated glomerular filtration  rate (eGFR) is the CKD-EPI equation.        BNP  @LABRCNTIP(BNP,BNPTRIAGEBLO)@  @LABRCNTIP(troponini)@  CrCl cannot be calculated (Patient's most recent lab result is older than the maximum 7 days allowed.).  No results found in the last 24 " hours.  No results found in the last 24 hours.  No results found in the last 24 hours.    Assessment:      1. PVD (peripheral vascular disease)    2. Atherosclerosis of aorta    3. FH: thoracic aortic aneurysm    4. Hypertension, unspecified type    5. BMI 40.0-44.9, adult    6. Controlled type 2 diabetes mellitus without complication, with long-term current use of insulin    7. Localized edema    8. Abdominal aortic aneurysm (AAA) without rupture, unspecified part        Plan:   D/c HCTZ 25 mg daily   Add Hydralazine 50 mg bid for HTN and lasix 40 mg 5 days a week for PVD  Continue amlodipine 5 mg and Clondine 0.1 mg bid  LE venous US r/o DVT  BMP in 2 weeks   RTC in 2m

## 2024-08-23 ENCOUNTER — HOSPITAL ENCOUNTER (OUTPATIENT)
Dept: CARDIOLOGY | Facility: HOSPITAL | Age: 69
Discharge: HOME | End: 2024-08-23
Attending: INTERNAL MEDICINE
Payer: MEDICARE

## 2024-08-23 VITALS
BODY MASS INDEX: 44.1 KG/M2 | DIASTOLIC BLOOD PRESSURE: 78 MMHG | WEIGHT: 315 LBS | SYSTOLIC BLOOD PRESSURE: 144 MMHG | HEIGHT: 71 IN

## 2024-08-23 DIAGNOSIS — I73.9 PVD (PERIPHERAL VASCULAR DISEASE): ICD-10-CM

## 2024-08-23 DIAGNOSIS — R60.0 LOCALIZED EDEMA: ICD-10-CM

## 2024-08-23 PROCEDURE — 93970 EXTREMITY STUDY: CPT

## 2024-08-23 PROCEDURE — 93970 EXTREMITY STUDY: CPT | Mod: 26,,, | Performed by: INTERNAL MEDICINE

## 2024-08-27 ENCOUNTER — PATIENT MESSAGE (OUTPATIENT)
Dept: CARDIOLOGY | Facility: CLINIC | Age: 69
End: 2024-08-27
Payer: MEDICARE

## 2024-08-30 ENCOUNTER — LAB VISIT (OUTPATIENT)
Dept: LAB | Facility: HOSPITAL | Age: 69
End: 2024-08-30
Attending: INTERNAL MEDICINE
Payer: MEDICARE

## 2024-08-30 DIAGNOSIS — I73.9 PVD (PERIPHERAL VASCULAR DISEASE): ICD-10-CM

## 2024-08-30 LAB
ANION GAP SERPL CALC-SCNC: 11 MMOL/L (ref 8–16)
BUN SERPL-MCNC: 22 MG/DL (ref 8–23)
CALCIUM SERPL-MCNC: 9 MG/DL (ref 8.7–10.5)
CHLORIDE SERPL-SCNC: 106 MMOL/L (ref 95–110)
CO2 SERPL-SCNC: 24 MMOL/L (ref 23–29)
CREAT SERPL-MCNC: 1.3 MG/DL (ref 0.5–1.4)
EST. GFR  (NO RACE VARIABLE): 59.1 ML/MIN/1.73 M^2
GLUCOSE SERPL-MCNC: 141 MG/DL (ref 70–110)
POTASSIUM SERPL-SCNC: 4 MMOL/L (ref 3.5–5.1)
SODIUM SERPL-SCNC: 141 MMOL/L (ref 136–145)

## 2024-08-30 PROCEDURE — 80048 BASIC METABOLIC PNL TOTAL CA: CPT | Performed by: INTERNAL MEDICINE

## 2024-08-30 PROCEDURE — 36415 COLL VENOUS BLD VENIPUNCTURE: CPT | Mod: PN | Performed by: INTERNAL MEDICINE

## 2024-09-26 ENCOUNTER — LAB VISIT (OUTPATIENT)
Dept: LAB | Facility: HOSPITAL | Age: 69
End: 2024-09-26
Attending: FAMILY MEDICINE
Payer: MEDICARE

## 2024-09-26 ENCOUNTER — OFFICE VISIT (OUTPATIENT)
Dept: INTERNAL MEDICINE | Facility: CLINIC | Age: 69
End: 2024-09-26
Payer: MEDICARE

## 2024-09-26 VITALS
RESPIRATION RATE: 18 BRPM | HEART RATE: 58 BPM | HEIGHT: 71 IN | BODY MASS INDEX: 43.93 KG/M2 | TEMPERATURE: 97 F | SYSTOLIC BLOOD PRESSURE: 136 MMHG | OXYGEN SATURATION: 96 % | DIASTOLIC BLOOD PRESSURE: 88 MMHG

## 2024-09-26 DIAGNOSIS — I10 HYPERTENSION, UNSPECIFIED TYPE: ICD-10-CM

## 2024-09-26 DIAGNOSIS — N18.32 CHRONIC KIDNEY DISEASE, STAGE 3B: ICD-10-CM

## 2024-09-26 DIAGNOSIS — E11.42 DIABETIC POLYNEUROPATHY ASSOCIATED WITH TYPE 2 DIABETES MELLITUS: Primary | ICD-10-CM

## 2024-09-26 DIAGNOSIS — R60.9 EDEMA, UNSPECIFIED TYPE: ICD-10-CM

## 2024-09-26 DIAGNOSIS — E11.42 DIABETIC POLYNEUROPATHY ASSOCIATED WITH TYPE 2 DIABETES MELLITUS: ICD-10-CM

## 2024-09-26 DIAGNOSIS — Z23 NEED FOR VACCINATION: ICD-10-CM

## 2024-09-26 LAB
ESTIMATED AVG GLUCOSE: 131 MG/DL (ref 68–131)
HBA1C MFR BLD: 6.2 % (ref 4–5.6)

## 2024-09-26 PROCEDURE — 83036 HEMOGLOBIN GLYCOSYLATED A1C: CPT | Performed by: FAMILY MEDICINE

## 2024-09-26 PROCEDURE — 36415 COLL VENOUS BLD VENIPUNCTURE: CPT | Performed by: FAMILY MEDICINE

## 2024-09-26 PROCEDURE — 99999 PR PBB SHADOW E&M-EST. PATIENT-LVL IV: CPT | Mod: PBBFAC,,, | Performed by: FAMILY MEDICINE

## 2024-09-26 NOTE — PROGRESS NOTES
"Patient ID: Tom Tavarez is a 69 y.o. male.    Chief Complaint: Follow-up    History of Present Illness    Mr. Tavarez presents today for follow up.    He reports ongoing equilibrium issues, experiencing dizziness upon standing. While he has never fallen, he needs to gather his bearings when getting up. He also reports ongoing neuropathy in his feet. Gabapentin 300mg twice daily (two tablets per dose) is working well, particularly at night, providing relief for his feet symptoms during sleep. He denies the need to increase the dosage at this time.    He reports shortness of breath and coughing up phlegm. He mentions having a cold and expresses concern about these respiratory symptoms.    He reports ongoing back pain that radiates down his leg, stopping approximately mid-thigh.    He reports ongoing left leg swelling due to fluid retention. The right leg is less affected.    He expresses interest in exploring weight management medications, as he did not start Ozempic as previously planned due to cost constraints. He is agreeable to contacting his insurance provider to inquire about coverage for alternative weight loss medications.    Current medications include Lasix and Gabapentin.    He is due for a flu vaccine, which he agrees to receive during the current visit. His last flu vaccine was in October of last year. He is considering the RSV vaccine after being informed about it, and is open to receiving it based on the provider's recommendation, given his medical history.    He has a history of hypertension, diabetes, and obesity. He describes himself as "way overweight". He denies any new or concerning symptoms related to these chronic conditions during this visit.    He is currently insured with [employer] and is considering changing his insurance during the next open enrollment period in October.      ROS:  General: -fever, -chills, -fatigue, -weight gain, -weight loss  Eyes: -vision changes, -redness, " -discharge  ENT: -ear pain, +nasal congestion, -sore throat  Cardiovascular: -chest pain, -palpitations, -lower extremity edema  Respiratory: +cough, +shortness of breath  Gastrointestinal: -abdominal pain, -nausea, -vomiting, -diarrhea, -constipation, -blood in stool  Genitourinary: -dysuria, -hematuria, -frequency  Musculoskeletal: -joint pain, -muscle pain, +back pain  Skin: -rash, -lesion  Neurological: -headache, +dizziness, -numbness, -tingling         Physical Exam    General: In no acute distress. Not ill-appearing or diaphoretic.  Nasal congestion  Neck: Normal thyroid. No cervical lymphadenopathy. 2+ carotid pulses.  Cardiovascular: Normal rate and regular  rhythm. No murmur heard. No gallop.  1+ edema lower ankle  Pulmonary: Pulmonary effort is normal. No respiratory distress. No wheezing. No rhonchi. No rales.  Abdominal: Soft. Nontender. Nondistended. No mass.  Musculoskeletal: No swelling. No tenderness. No deformity.  Neurological: Alert.  Psychiatric: Mood normal. Behavior normal.  Vitals: Blood pressure: 138/88.  Extremities: Edema in left lower extremity.         Assessment & Plan    EQUILIBRIUM ISSUES:  - Assessed patient's equilibrium issues.    SHORTNESS OF BREATH:  - Assessed patient's shortness of breath.    BACK PAIN:  - Assessed patient's back pain.    NEUROPATHY:  - Assessed patient's neuropathy.  - Evaluated effectiveness of gabapentin for neuropathy management.  - Continued gabapentin 300mg, two tablets twice daily.    WEIGHT MANAGEMENT:  - Considered weight management medication options due to patient's obesity, hypertension, and diabetes.  - Contacted insurance to inquire about coverage for weight management medications (Ozempic alternatives).  - Informed insurance of being significantly overweight with hypertension and diabetes when inquiring about medication coverage.    FLUID RETENTION:  - Reviewed fluid retention status.    KIDNEY FUNCTION:  - Noted improvement in kidney  function.    DIABETES:  - Evaluated A1C results (6.5) for diabetes management.  - Ordered A1C test.    RSV VACCINATION:  - Considered RSV vaccination due to patient's health conditions.  - Explained RSV as a viral infection similar to flu but caused by a different virus.  - Described potential severity of RSV, ranging from common cold symptoms to hospitalization.  - Discussed potential side effects of RSV vaccine, including arm soreness.  - Ordered RSV vaccine to be administered at pharmacy.    FLU VACCINATION:  - Discussed potential side effects of flu vaccine, including arm soreness.  - Administered flu vaccine in office.          1. Diabetic polyneuropathy associated with type 2 diabetes mellitus  Hemoglobin A1C      2. Hypertension, unspecified type        3. Edema, unspecified type        4. BMI 40.0-44.9, adult        5. Chronic kidney disease, stage 3b              Follow up in about 6 weeks (around 11/7/2024).    This note was generated with the assistance of ambient listening technology. Verbal consent was obtained by the patient and accompanying visitor(s) for the recording of patient appointment to facilitate this note. I attest to having reviewed and edited the generated note for accuracy, though some syntax or spelling errors may persist. Please contact the author of this note for any clarification.

## 2024-10-13 DIAGNOSIS — E78.5 HYPERLIPIDEMIA, UNSPECIFIED HYPERLIPIDEMIA TYPE: ICD-10-CM

## 2024-10-13 RX ORDER — ATORVASTATIN CALCIUM 40 MG/1
40 TABLET, FILM COATED ORAL
Qty: 90 TABLET | Refills: 1 | Status: SHIPPED | OUTPATIENT
Start: 2024-10-13

## 2024-10-13 NOTE — TELEPHONE ENCOUNTER
Care Due:                  Date            Visit Type   Department     Provider  --------------------------------------------------------------------------------                                EP -                              PRIMARY      [facility]  Last Visit: 09-      CARE ([facility])   MEDICINE       Adolfo Lemus  Next Visit: None Scheduled  None         None Found                                                            Last  Test          Frequency    Reason                     Performed    Due Date  --------------------------------------------------------------------------------    Lipid Panel.  12 months..  atorvastatin.............  12- 11-    Uric Acid...  12 months..  colchicine...............  12- 11-    [employer] Embedded Care Due Messages. Reference number: [id number].   10/13/2024 2:08:15 PM CDT

## 2024-10-14 NOTE — TELEPHONE ENCOUNTER
Provider Staff:  Action required for this patient    Requires labs      Please see care gap opportunities below in Care Due Message.    Thanks!  [facility]     Appointments      Date Provider   Last Visit   9/26/2024 Adolfo Lemus MD   Next Visit   Visit date not found Adolfo Lemus MD     Refill Decision Note   Tom Tavarez  is requesting a refill authorization.  Brief Assessment and Rationale for Refill:  Approve     Medication Therapy Plan:         Comments:     Note composed:9:14 PM 10/13/2024

## 2024-10-15 RX ORDER — GABAPENTIN 300 MG/1
CAPSULE ORAL
Qty: 180 CAPSULE | Refills: 1 | Status: SHIPPED | OUTPATIENT
Start: 2024-10-15

## 2024-10-15 NOTE — TELEPHONE ENCOUNTER
No care due was identified.  [facility] Embedded Care Due Messages. Reference number: [id number].   10/15/2024 8:08:24 AM CDT

## 2024-11-23 DIAGNOSIS — I10 PRIMARY HYPERTENSION: ICD-10-CM

## 2024-11-23 RX ORDER — AMLODIPINE BESYLATE 5 MG/1
5 TABLET ORAL
Qty: 90 TABLET | Refills: 3 | Status: SHIPPED | OUTPATIENT
Start: 2024-11-23

## 2024-11-23 NOTE — TELEPHONE ENCOUNTER
No care due was identified.  [facility] Embedded Care Due Messages. Reference number: [id number].   11/23/2024 1:30:21 PM CST

## 2024-11-24 NOTE — TELEPHONE ENCOUNTER
Refill Decision Note   Hamlin Corby  is requesting a refill authorization.  Brief Assessment and Rationale for Refill:  Approve     Medication Therapy Plan:        Comments:     Note composed:11:36 PM 11/23/2024

## 2024-11-25 ENCOUNTER — HOSPITAL ENCOUNTER (OUTPATIENT)
Dept: RADIOLOGY | Facility: HOSPITAL | Age: 69
Discharge: HOME | End: 2024-11-25
Payer: MEDICARE

## 2024-11-25 ENCOUNTER — OFFICE VISIT (OUTPATIENT)
Dept: INTERNAL MEDICINE | Facility: CLINIC | Age: 69
End: 2024-11-25
Payer: MEDICARE

## 2024-11-25 VITALS
WEIGHT: 309.31 LBS | TEMPERATURE: 97 F | HEART RATE: 63 BPM | DIASTOLIC BLOOD PRESSURE: 84 MMHG | SYSTOLIC BLOOD PRESSURE: 138 MMHG | BODY MASS INDEX: 43.14 KG/M2 | OXYGEN SATURATION: 96 % | RESPIRATION RATE: 20 BRPM

## 2024-11-25 DIAGNOSIS — Z79.4 CONTROLLED TYPE 2 DIABETES MELLITUS WITHOUT COMPLICATION, WITH LONG-TERM CURRENT USE OF INSULIN: ICD-10-CM

## 2024-11-25 DIAGNOSIS — R06.09 DOE (DYSPNEA ON EXERTION): ICD-10-CM

## 2024-11-25 DIAGNOSIS — E66.01 MORBID OBESITY: ICD-10-CM

## 2024-11-25 DIAGNOSIS — R06.02 SOB (SHORTNESS OF BREATH): Primary | ICD-10-CM

## 2024-11-25 DIAGNOSIS — Z12.5 SCREENING FOR MALIGNANT NEOPLASM OF PROSTATE: ICD-10-CM

## 2024-11-25 DIAGNOSIS — E11.9 CONTROLLED TYPE 2 DIABETES MELLITUS WITHOUT COMPLICATION, WITH LONG-TERM CURRENT USE OF INSULIN: ICD-10-CM

## 2024-11-25 DIAGNOSIS — Z12.11 COLON CANCER SCREENING: ICD-10-CM

## 2024-11-25 DIAGNOSIS — R06.02 SOB (SHORTNESS OF BREATH): ICD-10-CM

## 2024-11-25 PROCEDURE — 1160F RVW MEDS BY RX/DR IN RCRD: CPT | Mod: CPTII,S$GLB,,

## 2024-11-25 PROCEDURE — 71046 X-RAY EXAM CHEST 2 VIEWS: CPT | Mod: 26,,, | Performed by: RADIOLOGY

## 2024-11-25 PROCEDURE — 3075F SYST BP GE 130 - 139MM HG: CPT | Mod: CPTII,S$GLB,,

## 2024-11-25 PROCEDURE — 71046 X-RAY EXAM CHEST 2 VIEWS: CPT | Mod: TC

## 2024-11-25 PROCEDURE — 1125F AMNT PAIN NOTED PAIN PRSNT: CPT | Mod: CPTII,S$GLB,,

## 2024-11-25 PROCEDURE — 99999 PR PBB SHADOW E&M-EST. PATIENT-LVL V: CPT | Mod: PBBFAC,,,

## 2024-11-25 PROCEDURE — 99214 OFFICE O/P EST MOD 30 MIN: CPT | Mod: S$GLB,,,

## 2024-11-25 PROCEDURE — 1159F MED LIST DOCD IN RCRD: CPT | Mod: CPTII,S$GLB,,

## 2024-11-25 PROCEDURE — 3008F BODY MASS INDEX DOCD: CPT | Mod: CPTII,S$GLB,,

## 2024-11-25 PROCEDURE — 3288F FALL RISK ASSESSMENT DOCD: CPT | Mod: CPTII,S$GLB,,

## 2024-11-25 PROCEDURE — 3044F HG A1C LEVEL LT 7.0%: CPT | Mod: CPTII,S$GLB,,

## 2024-11-25 PROCEDURE — 3079F DIAST BP 80-89 MM HG: CPT | Mod: CPTII,S$GLB,,

## 2024-11-25 PROCEDURE — 1101F PT FALLS ASSESS-DOCD LE1/YR: CPT | Mod: CPTII,S$GLB,,

## 2024-11-25 PROCEDURE — G2211 COMPLEX E/M VISIT ADD ON: HCPCS | Mod: S$GLB,,,

## 2024-11-25 NOTE — PROGRESS NOTES
Tom Tavarez  11/25/2024  [id number]    Adolfo Lemus MD  Patient Care Team:  Adolfo Lemus MD as PCP - General (Family Medicine)  Claribel Holloway MD as Consulting Physician (Sports Medicine)  Augie Sy OD (Optometry)  Bhupinder Sue MD as Consulting Physician (Cardiology)  Janna Lizama MD as Consulting Physician (Nephrology)  Rosas Macias DPM as Consulting Physician (Podiatry)          Visit Type:a scheduled routine follow-up visit    Chief Complaint:  Chief Complaint   Patient presents with    Follow-up          History of Present Illness:    History of Present Illness    CHIEF COMPLAINT:  Mr. Tavarez presents today for a six-week follow-up.    SHORTNESS OF BREATH:  He reports experiencing dyspnea on exertion for the last few months. He has a history of shortness of breath and was previously evaluated by cardiology for this issue.    PERIPHERAL EDEMA:  He reports swelling in his legs, particularly in the ankles, with the left ankle more affected than the right. The swelling improves with leg elevation. He has been watching his salt intake to manage the fluid retention. He states that compression socks have not been helpful in reducing the swelling.    MEDICATION MANAGEMENT:  He is not taking the prescribed Lasix medication. He was previously prescribed hydralazine 50 mg twice daily for blood pressure control and Lasix five days a week for peripheral vascular disease (PVD). He expresses a strong preference for minimizing medication use and is hesitant to try new medications. He prefers holistic approaches to managing chronic illness and seeks out research studies and scientific evidence before considering any new medication regimens.    PREVIOUS DIAGNOSTIC TESTS:  A prior CV ultrasound Doppler of bilateral leg veins showed no evidence of DVT.      ROS:  General: -fever, -chills, -fatigue, -weight gain, -weight loss  Eyes: -vision changes, -redness, -discharge  ENT: -ear pain, -nasal  congestion, -sore throat  Cardiovascular: -chest pain, -palpitations, -lower extremity edema  Respiratory: -cough, +shortness of breath  Gastrointestinal: -abdominal pain, -nausea, -vomiting, -diarrhea, -constipation, -blood in stool  Genitourinary: -dysuria, -hematuria, -frequency  Musculoskeletal: -joint pain, -muscle pain  Skin: -rash, -lesion  Neurological: -headache, -dizziness, -numbness, -tingling  Psychiatric: -anxiety, -depression, -sleep difficulty            History:  Past Medical History:   Diagnosis Date    BMI 40.0-44.9, adult 07/21/2016    Colon cancer screening 10/03/2017    Diabetes mellitus type 2 in obese 11/2006     am 05/09/2023 Insulin x 10 Years    Hyperlipidemia     Hypertension 1985    Obesity, Class III, BMI 40-49.9 (morbid obesity) 1985    Umbilical hernia      Past Surgical History:   Procedure Laterality Date    CATARACT EXTRACTION      G U surgery  01/01/1982    biopsy of testicle infertility w/u     Family History   Problem Relation Name Age of Onset    Hypertension Mother      Heart failure Mother      Diabetes Brother       Social History     Socioeconomic History    Marital status:     Number of children: 0    Highest education level: Some college, no degree   Occupational History    Occupation: Retired-Sales     Comment: [employer]   Tobacco Use    Smoking status: Former    Smokeless tobacco: Never    Tobacco comments:     1998 quit    Substance and Sexual Activity    Alcohol use: No     Alcohol/week: 0.0 standard drinks of alcohol    Drug use: No    Sexual activity: Yes     Partners: Female     Social Drivers of Health     Financial Resource Strain: Low Risk  (11/30/2023)    Overall Financial Resource Strain (CARDIA)     Difficulty of Paying Living Expenses: Not hard at all   Food Insecurity: No Food Insecurity (11/30/2023)    Hunger Vital Sign     Worried About Running Out of Food in the Last Year: Never true     Ran Out of Food in the Last Year: Never  true   Transportation Needs: No Transportation Needs (11/30/2023)    PRAPARE - Transportation     Lack of Transportation (Medical): No     Lack of Transportation (Non-Medical): No   Physical Activity: Inactive (11/30/2023)    Exercise Vital Sign     Days of Exercise per Week: 0 days     Minutes of Exercise per Session: 0 min   Stress: No Stress Concern Present (11/30/2023)    [employer] - Occupational Stress Questionnaire     Feeling of Stress : Not at all   Housing Stability: Low Risk  (11/30/2023)    Housing Stability Vital Sign     Unable to Pay for Housing in the Last Year: No     Number of Places Lived in the Last Year: 1     Unstable Housing in the Last Year: No     Patient Active Problem List   Diagnosis    Diabetes mellitus type 2 in obese    Hyperlipidemia associated with type 2 diabetes mellitus    Hypertension    Umbilical hernia    Controlled type 2 diabetes mellitus without complication, with long-term current use of insulin    Weakness    Immunization deficiency    Yeast dermatitis    Acute gout due to renal impairment involving foot    Acute cystitis with hematuria    DM (diabetes mellitus) with complications    FH: thoracic aortic aneurysm    Rhinitis    GERD (gastroesophageal reflux disease)    Itchy skin    Scrotal swelling    Primary osteoarthritis of left hip    Atherosclerosis of aorta    Abdominal wall hernia    Abdominal aortic aneurysm (AAA) without rupture    Dorsalgia, unspecified    Hip pain    Hypotension    Shortness of breath    Diabetic polyneuropathy associated with type 2 diabetes mellitus    Colon cancer screening    Chronic gout without tophus    Edema    Obesity, Class III, BMI 40-49.9 (morbid obesity)    Leg skin lesion, right    Chest pain    Age-related nuclear cataract of both eyes    EKG abnormalities    Type 2 diabetes mellitus with stage 3a chronic kidney disease, with long-term current use of insulin    DM cataract    Onychomycosis of multiple  "toenails with type 2 diabetes mellitus    Type 2 diabetes mellitus with hypertriglyceridemia    Vitamin D deficiency    Angio-edema    Bronchitis    Perennial allergic rhinitis with seasonal variation    Chronic fatigue and malaise    Chronic right-sided low back pain without sciatica    Vitamin D deficiency disease    Urinary frequency    Chronic kidney disease, stage 3b    BMI 40.0-44.9, adult    PVD (peripheral vascular disease)     Review of patient's allergies indicates:  No Known Allergies    The following were reviewed at this visit: active problem list, medication list, allergies, family history, social history, and health maintenance.    Medications:  Current Outpatient Medications on File Prior to Visit   Medication Sig Dispense Refill    acetaminophen (TYLENOL) 325 MG tablet Take 650 mg by mouth every 8 (eight) hours as needed.      amLODIPine (NORVASC) 5 MG tablet TAKE 1 TABLET(5 MG) BY MOUTH EVERY DAY 90 tablet 3    atorvastatin (LIPITOR) 40 MG tablet TAKE 1 TABLET(40 MG) BY MOUTH EVERY DAY 90 tablet 1    clobetasol 0.05% (TEMOVATE) 0.05 % Oint Apply topically 2 (two) times daily. Apply at night under cling wrap to R lower leg. Apply twice daily to smaller areas on L lower leg and L thigh 45 g 2    cloNIDine (CATAPRES) 0.1 MG tablet Take 1 tablet (0.1 mg total) by mouth 2 (two) times daily. 60 tablet 11    colchicine (COLCRYS) 0.6 mg tablet TAKE 1 TABLET(0.6 MG) BY MOUTH EVERY DAY 90 tablet 2    diphenhydrAMINE (BENADRYL) 25 mg capsule Take 25 mg by mouth every 6 (six) hours as needed.      ergocalciferol (VITAMIN D2) 50,000 unit Cap Take 1 capsule (50,000 Units total) by mouth every 7 days. 12 capsule 3    gabapentin (NEURONTIN) 300 MG capsule TAKE 2 CAPSULES BY MOUTH AT BEDTIME 180 capsule 1    insulin syringe-needle U-100 (BD INSULIN SYRINGE ULTRA-FINE) 0.3 mL 31 gauge x 5/16" Syrg 1 each by Misc.(Non-Drug; Combo Route) route 2 (two) times a day. 100 each 0    ketoconazole (NIZORAL) 2 % cream Apply " "topically once daily. 60 g 1    lancets [facility] To check BG 3 times daily, to use with insurance preferred meter 100 each 3    LANTUS SOLOSTAR U-100 INSULIN glargine 100 units/mL SubQ pen Inject 50 Units into the skin 2 (two) times a day. 90 mL 1    metFORMIN (GLUCOPHAGE) 500 MG tablet Take 1 tablet (500 mg total) by mouth 2 (two) times daily with meals. 180 tablet 3    pen needle, diabetic (BD ULTRA-FINE MINI PEN NEEDLE) 31 gauge x 3/16" Ndle 1 each by Misc.(Non-Drug; Combo Route) route 2 (two) times a day. 200 each 1    triamcinolone acetonide 0.1% (KENALOG) 0.1 % cream Apply topically 2 (two) times daily. 28.4 g 0    VITAMIN B COMPLEX ORAL Take 1 tablet by mouth every morning.      blood-glucose meter kit To check BG 3 times daily, to use with insurance preferred meter 1 each 0    flash glucose scanning reader (FREESTYLE KRISTY 14 DAY READER) Misc 1 each by Misc.(Non-Drug; Combo Route) route continuous. (Patient not taking: Reported on 11/25/2024) 6 each 3    flash glucose sensor (FREESTYLE KRISTY 14 DAY SENSOR) Kit 1 each by Misc.(Non-Drug; Combo Route) route continuous. (Patient not taking: Reported on 11/25/2024) 6 kit 3    furosemide (LASIX) 40 MG tablet Take 1 tablet (40 mg total) by mouth every Mon, Tues, Wed, Thurs, Fri. (Patient not taking: Reported on 11/25/2024) 30 tablet 11    hydrALAZINE (APRESOLINE) 50 MG tablet Take 1 tablet (50 mg total) by mouth every 12 (twelve) hours. (Patient not taking: Reported on 11/25/2024) 60 tablet 11    [DISCONTINUED] doxycycline (VIBRA-TABS) 100 MG tablet Take 1 tablet (100 mg total) by mouth 2 (two) times daily. (Patient not taking: Reported on 11/25/2024) 20 tablet 0    [DISCONTINUED] nitrofurantoin (MACRODANTIN) 100 MG capsule Take 1 capsule (100 mg total) by mouth 4 (four) times daily. (Patient not taking: Reported on 11/25/2024) 28 capsule 0    [DISCONTINUED] RSVPreF3 antigen-AS01E, PF, (AREXVY, PF,) 120 mcg/0.5 mL SusR vaccine Inject into the muscle. (Patient not " taking: Reported on 11/25/2024) 0.5 mL 0    [DISCONTINUED] semaglutide (OZEMPIC) 0.25 mg or 0.5 mg (2 mg/3 mL) pen injector Inject 0.25 mg into the skin every 7 days. (Patient not taking: Reported on 11/25/2024) 3 mL 0     No current facility-administered medications on file prior to visit.       Medications have been reviewed and reconciled with patient at this visit.  Barriers to medications reviewed with patient.    Adverse reactions to current medications reviewed with patient..    Over the counter medications reviewed and reconciled with patient.    Exam:  Wt Readings from Last 3 Encounters:   11/25/24 (!) 140.3 kg (309 lb 4.9 oz)   08/23/24 (!) 142.9 kg (315 lb)   08/16/24 (!) 143 kg (315 lb 4.1 oz)     Temp Readings from Last 3 Encounters:   11/25/24 96.6 °F (35.9 °C) (Tympanic)   09/26/24 97.1 °F (36.2 °C) (Tympanic)   07/15/24 98.8 °F (37.1 °C) (Temporal)     BP Readings from Last 3 Encounters:   11/25/24 138/84   09/26/24 136/88   08/23/24 (!) 144/78     Pulse Readings from Last 3 Encounters:   11/25/24 63   09/26/24 (!) 58   08/16/24 (!) 44     Body mass index is 43.14 kg/m².    Physical Exam  Nursing note reviewed.   Constitutional:       Appearance: He is morbidly obese.   HENT:      Head: Normocephalic and atraumatic.   Cardiovascular:      Rate and Rhythm: Normal rate and regular rhythm.      Heart sounds: Normal heart sounds.   Pulmonary:      Effort: Pulmonary effort is normal. No respiratory distress.      Breath sounds: Normal breath sounds.   Musculoskeletal:      Right lower leg: Edema present.      Left lower leg: Edema present.   Neurological:      Mental Status: He is alert and oriented to person, place, and time.   Psychiatric:         Mood and Affect: Mood normal.         Behavior: Behavior normal.         Thought Content: Thought content normal.         Judgment: Judgment normal.           Laboratory Reviewed ({Yes)  Lab Results   Component Value Date    WBC 6.73 12/04/2023    HGB 17.2  12/04/2023    HCT 52.6 12/04/2023     12/04/2023    CHOL 169 12/04/2023    TRIG 131 12/04/2023    HDL 31 (L) 12/04/2023    ALT 21 04/15/2024    AST 17 04/15/2024     08/30/2024    K 4.0 08/30/2024     08/30/2024    CREATININE 1.3 08/30/2024    BUN 22 08/30/2024    CO2 24 08/30/2024    TSH 1.823 12/04/2023    PSA 3.3 04/04/2019    GLUF 156 (H) 09/25/2019    HGBA1C 6.2 (H) 09/26/2024       Tom was seen today for follow-up.    Diagnoses and all orders for this visit:    SOB (shortness of breath)  -     X-Ray Chest PA And Lateral; Future  -     B-TYPE NATRIURETIC PEPTIDE; Future    TUTTLE (dyspnea on exertion)  -     X-Ray Chest PA And Lateral; Future  -     B-TYPE NATRIURETIC PEPTIDE; Future    Controlled type 2 diabetes mellitus without complication, with long-term current use of insulin  -     Lipid Panel; Future  -     Microalbumin/Creatinine Ratio, Urine; Future  -     BASIC METABOLIC PANEL; Future  -     CBC Auto Differential; Future    Screening for malignant neoplasm of prostate  -     PSA, SCREENING; Future    Colon cancer screening  -     Cologuard Screening (Multitarget Stool DNA); Future  -     Cologuard Screening (Multitarget Stool DNA)    Morbid obesity  -     Lipid Panel; Future  -     BASIC METABOLIC PANEL; Future  -     CBC Auto Differential; Future          Assessment & Plan      - Evaluated effectiveness of current medications and considered potential fluid retention issues.  - Reviewed recent cardiology visit findings and recommendations.  - Discussed importance of managing fluid retention and its impact on leg swelling.  - Mr. Tavarez to monitor and reduce salt intake.  - Restarted Lasix (furosemide) to help with fluid buildup.    DYSPNEA:  - Assessed patient's shortness of breath and leg swelling.  - Assessed need for additional diagnostic tests to investigate dyspnea.  - Chest XR ordered.  - Consider referral to pulmonology if shortness of breath continues.    EDEMA:  -  Explained benefits of elevating legs and using compression socks for edema management.  - Mr. Tavarez to elevate legs, especially at night.  - Mr. Tavarez to wear compression socks.    HYPERTENSION:  - BMP (Basic Metabolic Panel), CBC (Complete Blood Count), prostate level, and urine microalbumin ordered.    PERIPHERAL VASCULAR DISEASE:  - Provided information on natural supplements like CoQ10 for muscle pain relief.  - Recommend considering CoQ10 and turmeric for inflammation.  - Started CoQ10 for muscle pain relief.    DIETARY COUNSELING:  - Mr. Tavarez to monitor and reduce salt intake.    FOLLOW-UP EXAMINATION:  - Follow up in 6 months with Dr. Adolfo Lemus.  - Return to clinic before scheduled appointment if needed.  - Results will be available through [facility] portal.  - Clinic will contact patient with test results.  - Colorectal screening ordered.        Visit today included increased complexity associated with the care of the episodic problem SOB , which was addressed while instituting co-management of the longitudinal care of the patient due to the serious and/or complex managed problem(s) .    I have evaluated and discussed management associated with medical care services that serve as the continuing focal point for all needed health care services and/or with medical care services that are part of ongoing care related to my patient's single, serious condition or a complex condition(s).    I am providing ongoing care and I am the primary care provider for this patient, and they are being managed, monitored, and/or observed for their chronic conditions over time.     I have addressed their ongoing health maintenance requirements and needs for all health care services and reviewed co-management plans provided by specialty providers when available.    Health Maintenance Due   Topic Date Due    Colorectal Cancer Screening  Never done    COVID-19 Vaccine (1 - 2024-25 season) Never done    Shingles Vaccine (2  of 2) 09/09/2024    Diabetes Urine Screening  12/04/2024    Lipid Panel  12/04/2024        Care Plan/Goals: Reviewed    Goals    None         Follow up: No follow-ups on file.    After visit summary was printed and given to patient upon discharge today.  Patient goals and care plan are included in After Visit Summary.

## 2024-11-26 DIAGNOSIS — Z79.4 CONTROLLED TYPE 2 DIABETES MELLITUS WITHOUT COMPLICATION, WITH LONG-TERM CURRENT USE OF INSULIN: Primary | ICD-10-CM

## 2024-11-26 DIAGNOSIS — E11.9 CONTROLLED TYPE 2 DIABETES MELLITUS WITHOUT COMPLICATION, WITH LONG-TERM CURRENT USE OF INSULIN: Primary | ICD-10-CM

## 2024-11-27 ENCOUNTER — LAB VISIT (OUTPATIENT)
Dept: LAB | Facility: HOSPITAL | Age: 69
End: 2024-11-27
Payer: MEDICARE

## 2024-11-27 DIAGNOSIS — E11.9 CONTROLLED TYPE 2 DIABETES MELLITUS WITHOUT COMPLICATION, WITH LONG-TERM CURRENT USE OF INSULIN: ICD-10-CM

## 2024-11-27 DIAGNOSIS — Z79.4 CONTROLLED TYPE 2 DIABETES MELLITUS WITHOUT COMPLICATION, WITH LONG-TERM CURRENT USE OF INSULIN: ICD-10-CM

## 2024-11-27 LAB
ALBUMIN/CREAT UR: 7.4 UG/MG (ref 0–30)
CREAT UR-MCNC: 95 MG/DL (ref 23–375)
MICROALBUMIN UR DL<=1MG/L-MCNC: 7 UG/ML

## 2024-11-27 PROCEDURE — 82043 UR ALBUMIN QUANTITATIVE: CPT

## 2025-03-24 DIAGNOSIS — Z00.00 ENCOUNTER FOR MEDICARE ANNUAL WELLNESS EXAM: ICD-10-CM

## 2025-04-02 DIAGNOSIS — I10 HYPERTENSION, UNSPECIFIED TYPE: Primary | ICD-10-CM

## 2025-04-02 DIAGNOSIS — R94.31 EKG ABNORMALITIES: ICD-10-CM

## 2025-04-03 ENCOUNTER — HOSPITAL ENCOUNTER (OUTPATIENT)
Dept: CARDIOLOGY | Facility: HOSPITAL | Age: 70
Discharge: HOME | End: 2025-04-03
Attending: INTERNAL MEDICINE
Payer: MEDICARE

## 2025-04-03 ENCOUNTER — OFFICE VISIT (OUTPATIENT)
Dept: CARDIOLOGY | Facility: CLINIC | Age: 70
End: 2025-04-03
Payer: MEDICARE

## 2025-04-03 ENCOUNTER — TELEPHONE (OUTPATIENT)
Dept: CARDIOLOGY | Facility: CLINIC | Age: 70
End: 2025-04-03

## 2025-04-03 VITALS
DIASTOLIC BLOOD PRESSURE: 90 MMHG | HEART RATE: 58 BPM | BODY MASS INDEX: 44.1 KG/M2 | SYSTOLIC BLOOD PRESSURE: 172 MMHG | WEIGHT: 315 LBS | OXYGEN SATURATION: 95 % | HEIGHT: 71 IN

## 2025-04-03 DIAGNOSIS — Z79.4 TYPE 2 DIABETES MELLITUS WITH STAGE 3A CHRONIC KIDNEY DISEASE, WITH LONG-TERM CURRENT USE OF INSULIN: ICD-10-CM

## 2025-04-03 DIAGNOSIS — E78.5 HYPERLIPIDEMIA ASSOCIATED WITH TYPE 2 DIABETES MELLITUS: ICD-10-CM

## 2025-04-03 DIAGNOSIS — E66.01 OBESITY, CLASS III, BMI 40-49.9 (MORBID OBESITY): ICD-10-CM

## 2025-04-03 DIAGNOSIS — I73.9 PVD (PERIPHERAL VASCULAR DISEASE): Primary | ICD-10-CM

## 2025-04-03 DIAGNOSIS — E66.01 MORBID OBESITY: ICD-10-CM

## 2025-04-03 DIAGNOSIS — I10 HYPERTENSION, UNSPECIFIED TYPE: ICD-10-CM

## 2025-04-03 DIAGNOSIS — E11.22 TYPE 2 DIABETES MELLITUS WITH STAGE 3A CHRONIC KIDNEY DISEASE, WITH LONG-TERM CURRENT USE OF INSULIN: ICD-10-CM

## 2025-04-03 DIAGNOSIS — R94.31 EKG ABNORMALITIES: ICD-10-CM

## 2025-04-03 DIAGNOSIS — I70.0 ATHEROSCLEROSIS OF AORTA: ICD-10-CM

## 2025-04-03 DIAGNOSIS — E11.69 HYPERLIPIDEMIA ASSOCIATED WITH TYPE 2 DIABETES MELLITUS: ICD-10-CM

## 2025-04-03 DIAGNOSIS — I71.40 ABDOMINAL AORTIC ANEURYSM (AAA) WITHOUT RUPTURE, UNSPECIFIED PART: ICD-10-CM

## 2025-04-03 DIAGNOSIS — N18.31 TYPE 2 DIABETES MELLITUS WITH STAGE 3A CHRONIC KIDNEY DISEASE, WITH LONG-TERM CURRENT USE OF INSULIN: ICD-10-CM

## 2025-04-03 DIAGNOSIS — I10 PRIMARY HYPERTENSION: ICD-10-CM

## 2025-04-03 PROCEDURE — 93005 ELECTROCARDIOGRAM TRACING: CPT

## 2025-04-03 PROCEDURE — 99999 PR PBB SHADOW E&M-EST. PATIENT-LVL IV: CPT | Mod: PBBFAC,,, | Performed by: INTERNAL MEDICINE

## 2025-04-03 PROCEDURE — 93010 ELECTROCARDIOGRAM REPORT: CPT | Mod: ,,, | Performed by: INTERNAL MEDICINE

## 2025-04-03 RX ORDER — HYDROCHLOROTHIAZIDE 25 MG/1
25 TABLET ORAL DAILY
Qty: 30 TABLET | Refills: 11 | Status: SHIPPED | OUTPATIENT
Start: 2025-04-03

## 2025-04-03 NOTE — PROGRESS NOTES
Subjective:   Patient ID:  Tom Tavarez is a 70 y.o. male who presents for follow up of Shortness of Breath      69 yo male, 6m f/u  PMH HTN HLD DM since 2006, obesity    ekg NSR and PAC  Recent medicine visit showed HR at 50's  No dizziness faint chest pain and dyspnea  Occasional fatigue after h/o COVID 19 in .   Will start "[employer] work  No smoking/drinking    07/23 visit  C/o chest pain twice a month, dull pain, minutes, caused by emotional stress.   BP high and fluctuating. BP controlled at home  med compliance\  Ekg today NSR  poor R progression on precordial leads.     08/24 visit  Episode of hypotension and ER staying o/n.   No chest pain dyspnea faint orthopnea   Leg swelling L > R   EKG reviewed by myself today reveals NSR HR 52 bpm, PAC, nonspecific STT change  08/23 Phar MPI showed no ischemia    Interval history  BP at 140 mmHG at home and uncontrolled  Remains leg swelling  Denied chest pain, dyspnea on exertion, palpitation, fainting, PND, orthopnea, syncope and claudication.   EKG reviewed by myself today reveals NSR     HLD controlled LDL 95  DM controled A1c 6.2  Obesity stable BMI 44.8                          Past Medical History:   Diagnosis Date    BMI 40.0-44.9, adult 07/21/2016    Colon cancer screening 10/03/2017    Diabetes mellitus type 2 in obese 11/2006     am 05/09/2023 Insulin x 10 Years    Hyperlipidemia     Hypertension 1985    Obesity, Class III, BMI 40-49.9 (morbid obesity) 1985    Umbilical hernia        Past Surgical History:   Procedure Laterality Date    CATARACT EXTRACTION      G U surgery  01/01/1982    biopsy of testicle infertility w/u       Social History[1]    Family History   Problem Relation Name Age of Onset    Hypertension Mother      Heart failure Mother      Diabetes Brother           ROS    Objective:   Physical Exam  HENT:      Head: Normocephalic.   Eyes:      Pupils: Pupils are equal, round, and reactive to light.   Neck:      Thyroid:  No thyromegaly.      Vascular: Normal carotid pulses. No carotid bruit or JVD.   Cardiovascular:      Rate and Rhythm: Normal rate and regular rhythm. No extrasystoles are present.     Chest Wall: PMI is not displaced.      Pulses: Normal pulses.      Heart sounds: Normal heart sounds. No murmur heard.     No gallop. No S3 sounds.   Pulmonary:      Effort: No respiratory distress.      Breath sounds: Normal breath sounds. No stridor.   Abdominal:      General: Bowel sounds are normal.      Palpations: Abdomen is soft.      Tenderness: There is no abdominal tenderness. There is no rebound.   Musculoskeletal:         General: Swelling present. Normal range of motion.   Skin:     Findings: No rash.   Neurological:      Mental Status: He is alert and oriented to person, place, and time.   Psychiatric:         Behavior: Behavior normal.         Lab Results   Component Value Date    CHOL 145 11/25/2024    CHOL 169 12/04/2023    CHOL 212 (H) 03/24/2023     Lab Results   Component Value Date    HDL 28 (L) 11/25/2024    HDL 31 (L) 12/04/2023    HDL 32 (L) 03/24/2023     Lab Results   Component Value Date    LDLCALC 95.4 11/25/2024    LDLCALC 111.8 12/04/2023    LDLCALC 147.4 03/24/2023     Lab Results   Component Value Date    TRIG 108 11/25/2024    TRIG 131 12/04/2023    TRIG 163 (H) 03/24/2023     Lab Results   Component Value Date    CHOLHDL 19.3 (L) 11/25/2024    CHOLHDL 18.3 (L) 12/04/2023    CHOLHDL 15.1 (L) 03/24/2023       Chemistry        Component Value Date/Time     11/25/2024 1343    K 4.1 11/25/2024 1343     11/25/2024 1343    CO2 21 (L) 11/25/2024 1343    BUN 17 11/25/2024 1343    CREATININE 1.3 11/25/2024 1343    GLU 92 11/25/2024 1343        Component Value Date/Time    CALCIUM 8.8 11/25/2024 1343    ALKPHOS 59 04/15/2024 1039    AST 17 04/15/2024 1039    ALT 21 04/15/2024 1039    BILITOT 0.6 04/15/2024 1039    ESTGFRAFRICA 55 09/28/2023 0637    ESTGFRAFRICA >60.0 06/03/2022 1335    EGFRNONAA  ">60.0 06/03/2022 1335          Lab Results   Component Value Date    HGBA1C 6.2 (H) 09/26/2024     Lab Results   Component Value Date    TSH 1.823 12/04/2023     No results found for: "INR", "PROTIME"  Lab Results   Component Value Date    WBC 6.56 11/25/2024    HGB 15.3 11/25/2024    HCT 48.4 11/25/2024    MCV 92 11/25/2024     11/25/2024     BMP  Sodium   Date Value Ref Range Status   11/25/2024 141 136 - 145 mmol/L Final     Potassium   Date Value Ref Range Status   11/25/2024 4.1 3.5 - 5.1 mmol/L Final     Chloride   Date Value Ref Range Status   11/25/2024 110 95 - 110 mmol/L Final     CO2   Date Value Ref Range Status   11/25/2024 21 (L) 23 - 29 mmol/L Final     BUN   Date Value Ref Range Status   11/25/2024 17 8 - 23 mg/dL Final     Creatinine   Date Value Ref Range Status   11/25/2024 1.3 0.5 - 1.4 mg/dL Final     Calcium   Date Value Ref Range Status   11/25/2024 8.8 8.7 - 10.5 mg/dL Final     Anion Gap   Date Value Ref Range Status   11/25/2024 10 8 - 16 mmol/L Final     eGFR if    Date Value Ref Range Status   06/03/2022 >60.0 >60 mL/min/1.73 m^2 Final     eGFR    Date Value Ref Range Status   09/28/2023 55 mL/min/1.73mSq Final     Comment:     In accordance with NKF-ASN Task Force recommendation, calculation based on the Chronic Kidney Disease Epidemiology Collaboration (CKD-EPI) equation without adjustment for race. eGFR adjusted for gender and age and calculated in ml/min/1.73mSquared. eGFR cannot be calculated if patient is under 18 years of age.     Reference Range:   >= 60 ml/min/1.73mSquared.     eGFR if non    Date Value Ref Range Status   06/03/2022 >60.0 >60 mL/min/1.73 m^2 Final     Comment:     Calculation used to obtain the estimated glomerular filtration  rate (eGFR) is the CKD-EPI equation.        BNP  @LABRCNTIP(BNP,BNPTRIAGEBLO)@  @LABRCNTIP(troponini)@  CrCl cannot be calculated (Patient's most recent lab result is older than the " maximum 7 days allowed.).  No results found in the last 24 hours.  No results found in the last 24 hours.  No results found in the last 24 hours.    Assessment:      1. PVD (peripheral vascular disease)    2. Morbid obesity    3. Primary hypertension    4. Hyperlipidemia associated with type 2 diabetes mellitus    5. Atherosclerosis of aorta    6. Abdominal aortic aneurysm (AAA) without rupture, unspecified part    7. EKG abnormalities    8. Type 2 diabetes mellitus with stage 3a chronic kidney disease, with long-term current use of insulin    9. Obesity, Class III, BMI 40-49.9 (morbid obesity)        Plan:   Add HCTZ 25 mg daily for HTN and PVD  Continue Amlodipine and Clonidine for HTN  Continue lasix as needed for PVD  Continue Statin for HLD   Continue metformin Insulin for DM  RTC in 3m                [1]   Social History  Tobacco Use    Smoking status: Former    Smokeless tobacco: Never    Tobacco comments:     1998 quit    Substance Use Topics    Alcohol use: No     Alcohol/week: 0.0 standard drinks of alcohol    Drug use: No

## 2025-04-03 NOTE — TELEPHONE ENCOUNTER
Spoke with pt in regards to coming in sooner apppt.               ----- Message from Rohini sent at 4/3/2025  1:17 PM CDT -----  Contact: self  Patient is requesting a call back regarding appts today - asking can he been seen for a earlier time. Please call back at [phone]

## 2025-04-04 LAB
OHS QRS DURATION: 96 MS
OHS QTC CALCULATION: 403 MS

## 2025-04-12 NOTE — TELEPHONE ENCOUNTER
Care Due:                  Date            Visit Type   Department     Provider  --------------------------------------------------------------------------------                                EP -                              PRIMARY      [facility] INTERNAL  Last Visit: 09-      CARE ([facility])   LLOYD Lemus                              EP -                              PRIMARY      [facility] INTERNAL  Next Visit: 05-      CARE ([facility])   LLOYD Lemus                                                            Last  Test          Frequency    Reason                     Performed    Due Date  --------------------------------------------------------------------------------    HBA1C.......  6 months...  LANTUS, metFORMIN........  09- 03-    Uric Acid...  12 months..  colchicine...............  12- 11-    Vitamin D...  12 months..  ergocalciferol...........  04- 04-    [facility] Embedded Care Due Messages. Reference number: [id number].   4/12/2025 8:08:20 AM CDT

## 2025-04-14 RX ORDER — CLONIDINE HYDROCHLORIDE 0.1 MG/1
0.1 TABLET ORAL 2 TIMES DAILY
Qty: 60 TABLET | Refills: 5 | Status: SHIPPED | OUTPATIENT
Start: 2025-04-14

## 2025-04-14 NOTE — TELEPHONE ENCOUNTER
Refill Routing Note   Medication(s) are not appropriate for processing by [facility] for the following reason(s):        Outside of protocol    [facility] action(s):  Route     Requires labs : Yes             Appointments  past 12m or future 3m with PCP    Date Provider   Last Visit   9/26/2024 Adolfo Lemus MD   Next Visit   5/26/2025 Adolfo Lemus MD   ED visits in past 90 days: 0        Note composed:6:36 AM 04/14/2025

## 2025-04-19 NOTE — TELEPHONE ENCOUNTER
No care due was identified.  [facility] Embedded Care Due Messages. Reference number: [id number].   4/19/2025 5:05:19 PM CDT

## 2025-04-22 RX ORDER — ERGOCALCIFEROL 1.25 MG/1
50000 CAPSULE ORAL
Qty: 12 CAPSULE | Refills: 3 | Status: SHIPPED | OUTPATIENT
Start: 2025-04-22

## 2025-04-22 NOTE — TELEPHONE ENCOUNTER
Refill Routing Note   Medication(s) are not appropriate for processing by [facility] for the following reason(s):        Outside of protocol    [facility] action(s):  Route               Appointments  past 12m or future 3m with PCP    Date Provider   Last Visit   9/26/2024 Adolfo Lemus MD   Next Visit   5/26/2025 Adolfo Lemus MD   ED visits in past 90 days: 0        Note composed:6:44 AM 04/22/2025

## 2025-04-25 ENCOUNTER — PATIENT OUTREACH (OUTPATIENT)
Dept: ADMINISTRATIVE | Facility: HOSPITAL | Age: 70
End: 2025-04-25
Payer: MEDICARE

## 2025-04-25 DIAGNOSIS — E11.8 DM (DIABETES MELLITUS) WITH COMPLICATIONS: Primary | ICD-10-CM

## 2025-04-25 NOTE — PROGRESS NOTES
VB Score: 4     Colon Cancer Screening  Hemoglobin A1c  Foot Exam  Uncontrolled BP    Shingles/Zoster Vaccine            Pt has follow up scheduled, 5/26/25. Note placed for dm foot exam.   A1C ordered and scheduled prior to appt, 5/23/25 at the [place] location per pt request.  Assisted to schedule eye exam 7/30/25.  Discussed cologuard. He states he does still have it. Encouraged to complete it and return at earliest convenience.  Requested an updated bp reading. Said he has not checked it in a while and is not sure how accurate it is. Asked if he would check it over the weekend and send readings back through pt portal. Mess sent as reminder.  Also requested he bring his machine to his appt, so accuracy could be checked.

## 2025-05-17 DIAGNOSIS — M1A.9XX0 CHRONIC GOUT WITHOUT TOPHUS, UNSPECIFIED CAUSE, UNSPECIFIED SITE: ICD-10-CM

## 2025-05-17 NOTE — TELEPHONE ENCOUNTER
No care due was identified.  [facility] Embedded Care Due Messages. Reference number: [id number].   5/17/2025 9:59:54 AM CDT

## 2025-05-18 RX ORDER — COLCHICINE 0.6 MG/1
TABLET ORAL
Qty: 90 TABLET | Refills: 0 | Status: SHIPPED | OUTPATIENT
Start: 2025-05-18

## 2025-05-18 NOTE — TELEPHONE ENCOUNTER
Refill Routing Note   Medication(s) are not appropriate for processing by [facility] for the following reason(s):        Required labs outdated    [facility] action(s):  Defer               Appointments  past 12m or future 3m with PCP    Date Provider   Last Visit   9/26/2024 Adolfo Lemus MD   Next Visit   5/26/2025 Adolfo Lemus MD   ED visits in past 90 days: 0        Note composed:12:22 PM 05/18/2025

## 2025-05-20 ENCOUNTER — PATIENT OUTREACH (OUTPATIENT)
Dept: ADMINISTRATIVE | Facility: HOSPITAL | Age: 70
End: 2025-05-20
Payer: MEDICARE

## 2025-05-23 ENCOUNTER — LAB VISIT (OUTPATIENT)
Dept: LAB | Facility: HOSPITAL | Age: 70
End: 2025-05-23
Attending: FAMILY MEDICINE
Payer: MEDICARE

## 2025-05-23 DIAGNOSIS — E11.8 DM (DIABETES MELLITUS) WITH COMPLICATIONS: ICD-10-CM

## 2025-05-23 PROCEDURE — 83036 HEMOGLOBIN GLYCOSYLATED A1C: CPT

## 2025-05-23 PROCEDURE — 36415 COLL VENOUS BLD VENIPUNCTURE: CPT | Mod: PN

## 2025-05-24 LAB
EAG (OHS): 140 MG/DL (ref 68–131)
HBA1C MFR BLD: 6.5 % (ref 4–5.6)

## 2025-05-26 ENCOUNTER — RESULTS FOLLOW-UP (OUTPATIENT)
Dept: INTERNAL MEDICINE | Facility: CLINIC | Age: 70
End: 2025-05-26

## 2025-05-29 ENCOUNTER — OFFICE VISIT (OUTPATIENT)
Dept: INTERNAL MEDICINE | Facility: CLINIC | Age: 70
End: 2025-05-29
Payer: MEDICARE

## 2025-05-29 VITALS
OXYGEN SATURATION: 94 % | SYSTOLIC BLOOD PRESSURE: 140 MMHG | WEIGHT: 315 LBS | HEIGHT: 71 IN | BODY MASS INDEX: 44.1 KG/M2 | DIASTOLIC BLOOD PRESSURE: 80 MMHG | HEART RATE: 73 BPM | TEMPERATURE: 97 F

## 2025-05-29 DIAGNOSIS — N18.32 CHRONIC KIDNEY DISEASE, STAGE 3B: ICD-10-CM

## 2025-05-29 DIAGNOSIS — Z28.39 IMMUNIZATION DEFICIENCY: ICD-10-CM

## 2025-05-29 DIAGNOSIS — E66.01 MORBID OBESITY: ICD-10-CM

## 2025-05-29 DIAGNOSIS — I10 HYPERTENSION, UNSPECIFIED TYPE: ICD-10-CM

## 2025-05-29 DIAGNOSIS — R06.02 SOB (SHORTNESS OF BREATH): ICD-10-CM

## 2025-05-29 DIAGNOSIS — E11.42 DIABETIC POLYNEUROPATHY ASSOCIATED WITH TYPE 2 DIABETES MELLITUS: Primary | ICD-10-CM

## 2025-05-29 DIAGNOSIS — Z12.11 COLON CANCER SCREENING: ICD-10-CM

## 2025-05-29 PROCEDURE — 3077F SYST BP >= 140 MM HG: CPT | Mod: CPTII,S$GLB,, | Performed by: FAMILY MEDICINE

## 2025-05-29 PROCEDURE — 99214 OFFICE O/P EST MOD 30 MIN: CPT | Mod: S$GLB,,, | Performed by: FAMILY MEDICINE

## 2025-05-29 PROCEDURE — 3008F BODY MASS INDEX DOCD: CPT | Mod: CPTII,S$GLB,, | Performed by: FAMILY MEDICINE

## 2025-05-29 PROCEDURE — 3072F LOW RISK FOR RETINOPATHY: CPT | Mod: CPTII,S$GLB,, | Performed by: FAMILY MEDICINE

## 2025-05-29 PROCEDURE — 3288F FALL RISK ASSESSMENT DOCD: CPT | Mod: CPTII,S$GLB,, | Performed by: FAMILY MEDICINE

## 2025-05-29 PROCEDURE — 3044F HG A1C LEVEL LT 7.0%: CPT | Mod: CPTII,S$GLB,, | Performed by: FAMILY MEDICINE

## 2025-05-29 PROCEDURE — 1126F AMNT PAIN NOTED NONE PRSNT: CPT | Mod: CPTII,S$GLB,, | Performed by: FAMILY MEDICINE

## 2025-05-29 PROCEDURE — 1101F PT FALLS ASSESS-DOCD LE1/YR: CPT | Mod: CPTII,S$GLB,, | Performed by: FAMILY MEDICINE

## 2025-05-29 PROCEDURE — 3079F DIAST BP 80-89 MM HG: CPT | Mod: CPTII,S$GLB,, | Performed by: FAMILY MEDICINE

## 2025-05-29 PROCEDURE — 99999 PR PBB SHADOW E&M-EST. PATIENT-LVL V: CPT | Mod: PBBFAC,,, | Performed by: FAMILY MEDICINE

## 2025-05-29 PROCEDURE — 1159F MED LIST DOCD IN RCRD: CPT | Mod: CPTII,S$GLB,, | Performed by: FAMILY MEDICINE

## 2025-05-29 NOTE — PROGRESS NOTES
Patient ID: Tom Tavarez is a 70 y.o. male.    Chief Complaint: Follow-up    History of Present Illness    Mr. Tavarez presents today for follow up.    He experiences intermittent leg weakness and occasional unsteadiness, requiring him to walk slower at times. He denies any falls. These gait and balance issues have decreased his motivation to exercise. He reports unchanged neuropathy in his feet.    He experiences shortness of breath with extended distance walking. He denies nocturnal dyspnea.    His weight fluctuates between 318-321 lbs with clothes on, increased from 314 lbs two weeks ago. He has previously been on Jardiance for weight management.    He denies experiencing low blood sugars.    He reports scaly and dry feet with swelling, managed with compression socks. He has scheduled a podiatry appointment in August for these concerns.      ROS:  General: -fever, -chills, -fatigue, -weight gain, -weight loss  Eyes: -vision changes, -redness, -discharge  ENT: -ear pain, -nasal congestion, -sore throat  Cardiovascular: -chest pain, -palpitations, +lower extremity edema  Respiratory: -cough, -shortness of breath, +exertional dyspnea  Gastrointestinal: -abdominal pain, -nausea, -vomiting, -diarrhea, -constipation, -blood in stool  Genitourinary: -dysuria, -hematuria, -frequency  Musculoskeletal: -joint pain, -muscle pain  Skin: -rash, -lesion, +dry skin  Neurological: -headache, -dizziness, -numbness, -tingling, +abnormal gait, +burning sensation         Physical Exam    General: In no acute distress. Not ill-appearing or diaphoretic.  Neck: Normal thyroid. No cervical lymphadenopathy. 2+ carotid pulses.  Cardiovascular: Normal rate and regular  rhythm. No murmur heard. No gallop.  Pulmonary: Pulmonary effort is normal. No respiratory distress. No wheezing. No rhonchi. No rales.  Abdominal: Soft. Nontender. Nondistended. No mass.  Musculoskeletal: No swelling. No tenderness. No deformity.  Skin: Flakiness  noted. Skin dry. Dry skin noted. Scaling noted.  Neurological: Alert.  Psychiatric: Mood normal. Behavior normal.  Vitals: Weight: 321 lbs.     Diabetic foot exam 10/10 monofilament testing has dryness scaling of his feet.  Hypertrophic nails.  Good capillary filling.    Assessment & Plan    CHRONIC KIDNEY DISEASE, STAGE 3B:  - Monitored patient's fluctuating weight (recent measurements of 314-321 lbs) which may impact kidney function.  - Mr. Tavarez reports shortness of breath when walking distances.  - A1C is well-controlled at 6.5, which is beneficial for kidney management.  - Discussed weight management options including Mounjaro or Ozempic, which patient declined.  - Referred to podiatrist Dr. Macias for foot care, which is essential for patients with chronic kidney disease.    DIABETIC NEUROPATHY:  - Monitored patient's reports of occasional burning or pain in feet.  - Neuropathy status remains unchanged per patient report.  - Discussed this condition with patient and its management.    GAIT AND MOBILITY ISSUES:  - Assessed patient's reports of occasional leg weakness affecting mobility and causing slower walking.  - Referred patient to podiatry at [facility] for earlier appointment to address these concerns.    LOCALIZED EDEMA:  - Monitored patient's reports of continued foot swelling.  - Advised patient to wear compression socks to help manage this edema.    XEROSIS CUTIS:  - Noted patient's feet appearing dry and scaly with alligator skin appearance.  - Advised daily application of moisturizing cream after bathing.  - Educated patient on proper foot care, including wearing socks to retain natural oils.    FOLLOW-UP AND SCREENING:  - Discussed colon cancer screening options and ordered at-home stool test kit.          Diet exercise weight reduction discussed.  He is due for Shingrix 2.  Follow-up in 6 months    Follow up in about 6 months (around 11/29/2025).    This note was generated with the assistance of  ambient listening technology. Verbal consent was obtained by the patient and accompanying visitor(s) for the recording of patient appointment to facilitate this note. I attest to having reviewed and edited the generated note for accuracy, though some syntax or spelling errors may persist. Please contact the author of this note for any clarification.

## 2025-06-13 ENCOUNTER — TELEPHONE (OUTPATIENT)
Dept: INTERNAL MEDICINE | Facility: CLINIC | Age: 70
End: 2025-06-13
Payer: MEDICARE

## 2025-06-13 RX ORDER — LANCETS
EACH MISCELLANEOUS
Qty: 100 EACH | Refills: 3 | Status: SHIPPED | OUTPATIENT
Start: 2025-06-13

## 2025-06-13 NOTE — TELEPHONE ENCOUNTER
Copied from CRM #[id number]. Topic: Medications - Medication Refill  >> Jun 13, 2025  9:19 AM Marlena wrote:  Type:  RX Refill Request    Who Called: [facility]  Refill or New Rx:refill  RX Name and Strength:lancets Misc  How is the patient currently taking it? (ex. 1XDay):  Is this a 30 day or 90 day RX:  Preferred Pharmacy with phone number:  Local or Mail Order:  Ordering Provider:Adolfo Lemus  Would the patient rather a call back or a response via [facility]? Callback  Best Call Back Number:[phone]  or fax [phone]  Additional Information: Need new prescription

## 2025-06-17 ENCOUNTER — TELEPHONE (OUTPATIENT)
Dept: INTERNAL MEDICINE | Facility: CLINIC | Age: 70
End: 2025-06-17
Payer: MEDICARE

## 2025-06-17 RX ORDER — INSULIN PUMP SYRINGE, 3 ML
EACH MISCELLANEOUS
Qty: 1 EACH | Refills: 0 | Status: SHIPPED | OUTPATIENT
Start: 2025-06-17 | End: 2026-06-17

## 2025-06-17 RX ORDER — LANCETS
EACH MISCELLANEOUS
Qty: 300 EACH | Refills: 3 | Status: SHIPPED | OUTPATIENT
Start: 2025-06-17

## 2025-06-17 NOTE — TELEPHONE ENCOUNTER
Patient needed new diabetes supply to send it to DME. Meter, lancets and strips. Order pended. Please advise.

## 2025-06-17 NOTE — TELEPHONE ENCOUNTER
Copied from CRM #[id number]. Topic: Medications - Medication Refill  >> Jun 17, 2025  8:25 AM Marlena wrote:  Type:  RX Refill Request    Who Called: [facility]  Refill or New Rx:Refill  RX Name and Strength:Test Strips  How is the patient currently taking it? (ex. 1XDay):  Is this a 30 day or 90 day RX:  Preferred Pharmacy with phone number:     Local or Mail Order:Mail Order  Ordering Provider:Adolfo Lemus  Would the patient rather a call back or a response via [facility]? Call back  Best Call Back Number:[phone]  Additional Information: Need  it faxed to [phone]

## 2025-06-27 ENCOUNTER — OFFICE VISIT (OUTPATIENT)
Dept: PODIATRY | Facility: CLINIC | Age: 70
End: 2025-06-27
Payer: MEDICARE

## 2025-06-27 DIAGNOSIS — R60.0 BILATERAL LOWER EXTREMITY EDEMA: ICD-10-CM

## 2025-06-27 DIAGNOSIS — Z79.4 TYPE 2 DIABETES MELLITUS WITH STAGE 3A CHRONIC KIDNEY DISEASE, WITH LONG-TERM CURRENT USE OF INSULIN: ICD-10-CM

## 2025-06-27 DIAGNOSIS — L85.3 XEROSIS CUTIS: ICD-10-CM

## 2025-06-27 DIAGNOSIS — L60.3 ONYCHODYSTROPHY: ICD-10-CM

## 2025-06-27 DIAGNOSIS — E11.42 DIABETIC POLYNEUROPATHY ASSOCIATED WITH TYPE 2 DIABETES MELLITUS: ICD-10-CM

## 2025-06-27 DIAGNOSIS — E11.9 ENCOUNTER FOR DIABETIC FOOT EXAM: Primary | ICD-10-CM

## 2025-06-27 DIAGNOSIS — N18.31 TYPE 2 DIABETES MELLITUS WITH STAGE 3A CHRONIC KIDNEY DISEASE, WITH LONG-TERM CURRENT USE OF INSULIN: ICD-10-CM

## 2025-06-27 DIAGNOSIS — E11.22 TYPE 2 DIABETES MELLITUS WITH STAGE 3A CHRONIC KIDNEY DISEASE, WITH LONG-TERM CURRENT USE OF INSULIN: ICD-10-CM

## 2025-06-27 PROCEDURE — 99999 PR PBB SHADOW E&M-EST. PATIENT-LVL III: CPT | Mod: PBBFAC,,, | Performed by: PODIATRIST

## 2025-06-27 NOTE — PROGRESS NOTES
Subjective:       Patient ID: Tom Tavarez is a 70 y.o. male.    Chief Complaint: Nail Care (Nail Care: c/o pain rate 3/10, pt is diabetic, pt states of having neuropathy pains, pt is also due for a foot exam,  pt was last seen on 5.29.25 by Adolfo Lemus )      HPI: Tom Tavarez presents to the office today, under referral by , Adolfo Lemus MD, for his annual diabetic foot assessment and risk evaluation.  Patient is a DMII.   Patient does relate history of increase in swelling to the lower extremities.  Also concerned about the dried scaling skin to the plantar aspect of the right foot and left foot. This patient last saw his/her internal/family medicine physician on   05/29/2025.     Hemoglobin A1C   Date Value Ref Range Status   09/26/2024 6.2 (H) 4.0 - 5.6 % Final     Comment:     ADA Screening Guidelines:  5.7-6.4%  Consistent with prediabetes  >or=6.5%  Consistent with diabetes    High levels of fetal hemoglobin interfere with the HbA1C  assay. Heterozygous hemoglobin variants (HbS, HgC, etc)do  not significantly interfere with this assay.   However, presence of multiple variants may affect accuracy.     04/15/2024 6.5 (H) 4.0 - 5.6 % Final     Comment:     ADA Screening Guidelines:  5.7-6.4%  Consistent with prediabetes  >or=6.5%  Consistent with diabetes    High levels of fetal hemoglobin interfere with the HbA1C  assay. Heterozygous hemoglobin variants (HbS, HgC, etc)do  not significantly interfere with this assay.   However, presence of multiple variants may affect accuracy.     12/04/2023 6.7 (H) 4.0 - 5.6 % Final     Comment:     ADA Screening Guidelines:  5.7-6.4%  Consistent with prediabetes  >or=6.5%  Consistent with diabetes    High levels of fetal hemoglobin interfere with the HbA1C  assay. Heterozygous hemoglobin variants (HbS, HgC, etc)do  not significantly interfere with this assay.   However, presence of multiple variants may affect accuracy.       Hemoglobin A1c   Date Value Ref  Range Status   05/23/2025 6.5 (H) 4.0 - 5.6 % Final     Comment:     ADA Screening Guidelines:  5.7-6.4%  Consistent with prediabetes  >=6.5%  Consistent with diabetes    High levels of fetal hemoglobin interfere with the HbA1C  assay. Heterozygous hemoglobin variants (HbS, HgC, etc)do  not significantly interfere with this assay.   However, presence of multiple variants may affect accuracy.   .    Review of patient's allergies indicates:   Allergen Reactions    Losartan Swelling       Past Medical History:   Diagnosis Date    BMI 40.0-44.9, adult 07/21/2016    Colon cancer screening 10/03/2017    Diabetes mellitus type 2 in obese 11/2006     am 05/09/2023 Insulin x 10 Years    Hyperlipidemia     Hypertension 1985    Obesity, Class III, BMI 40-49.9 (morbid obesity) 1985    Umbilical hernia        Family History   Problem Relation Name Age of Onset    Hypertension Mother      Heart failure Mother      Diabetes Brother         Social History[1]    Past Surgical History:   Procedure Laterality Date    CATARACT EXTRACTION      G U surgery  01/01/1982    biopsy of testicle infertility w/u       Review of Systems        Objective:   There were no vitals taken for this visit.    Physical Exam  LOWER EXTREMITY PHYSICAL EXAMINATION    ORTHOPEDIC:  No pain on palpation of the foot or ankle.  Intrinsic and extrinsic musculature intact to the bilateral lower extremities.  No history of amputations.  No pain with ambulation or gait    VASCULAR:  The right dorsalis pedis pulse 2/4 and the right posterior tibial pulse 2/4.  The left dorsalis pedis pulse 2/4 and posterior tibial pulse on the left is 2/4.  Capillary refill is intact.  Pedal hair growth intact    NEUROLOGY:  Protective sensation is intact with [place] Lindsay monofilament. Proprioception is intact. Intact sensation to light touch.  Vibratory sensation is diminished to the 1st metatarsal phalangeal joint.     DERMATOLOGY: Skin is   Dry and xerotic.  No evidence  of callusing, ulcerations, or cancerous lesions identified.  The R1, 2, 5 and left L1,2, 5 are thickened, discolored dystrophic.  There is subungual debris.  Nail plates have area of dark discoloration.  The remaining nails on the right foot and the left foot are elongated but of normal color, thickness, and texture.   There is no signs of ingrowing into the medial or lateral borders.  There is no evidence of wounds or skin breakdown.   Increased swelling noted of the bilateral lower extremities. No obvious lacerations or fissuring.  Interdigital spaces are clean, dry, intact.  No rashes or scars appreciated.    Assessment:     1. Encounter for diabetic foot exam    2. Type 2 diabetes mellitus with stage 3a chronic kidney disease, with long-term current use of insulin    3. Diabetic polyneuropathy associated with type 2 diabetes mellitus    4. Bilateral lower extremity edema    5. Xerosis cutis    6. Onychodystrophy        Plan:     Encounter for diabetic foot exam    Type 2 diabetes mellitus with stage 3a chronic kidney disease, with long-term current use of insulin    Diabetic polyneuropathy associated with type 2 diabetes mellitus    Bilateral lower extremity edema    Xerosis cutis    Onychodystrophy           Thorough discussion is had with the patient today, concerning the diagnosis, its etiology, and the treatment algorithm at present.     Diabetic education was discussed and provided. Discussed appropriate foot care. Encourage compliance with diet and A1c.     Thorough discussion is had with the patient concerning the diagnosis, its etiology, and the treatment algorithm at present. Shoe inspection. Foot Education. Patient reminded of the importance of good nutrition and blood sugar control to help prevent podiatric complications of diabetes. Patient instructed on proper foot hygeine. We discussed wearing proper and supportive shoe gear, daily foot inspections, never walking barefooted or sock footed, never  putting sharp instruments to feet which can cause major complications associated with infection, ulcers, lacerations.      Recommend 1-2 times daily topical emollient following bathing/showering were designated foot soaking.  Recommend utilizes product following bathing or showering as this traps the fluid into the skin and helps to reduce overall dryness.  Did discuss with the patient concerning dry and thin skin in relation to complications due to comorbid states.  Recommend patient to soak the foot in the regular shower or bath, following this pat dry the area appropriately and cover with a thin layer of Rx Amlactin vs OTC Vaseline/Aquaphor to help keep the water trapped within the skin to help hydrate the areas.  Patient to utilize socks to prevent slipping or falls.  If there is no significant improvement, would recommend application of Saran wrap/cellophane to hold the fluid in place with socks overlying      Recommend use of compression socks during the day to decrease fluid accumulation and pooling to the lower extremities.     Dystrophic nail plates, as outlined above (R#1,2,5  ; L#1,2,5 ), are sharply debrided with double action nail nipper, and/or with the assistance of a mechanical rotary cecily, with removal of all offending nail and nail border(s), for reduction of pains. Nails are reduced in terms of length, width and girth with removal of subungual debris to facilitate pain free weight bearing and ambulation. The elongated nails as outlined in the objective portion of this note, were trimmed to appropriate length, with a double action nail nipper, for alleviation/reduction of pains as well.       [1]   Social History  Socioeconomic History    Marital status:     Number of children: 0    Highest education level: Some college, no degree   Occupational History    Occupation: Retired-Sales     Comment: [employer]   Tobacco Use    Smoking status: Former    Smokeless tobacco: Never     Tobacco comments:     1998 quit    Substance and Sexual Activity    Alcohol use: No     Alcohol/week: 0.0 standard drinks of alcohol    Drug use: No    Sexual activity: Yes     Partners: Female     Social Drivers of Health     Financial Resource Strain: Low Risk  (1/11/2025)    Overall Financial Resource Strain (CARDIA)     Difficulty of Paying Living Expenses: Not hard at all   Food Insecurity: No Food Insecurity (1/11/2025)    Hunger Vital Sign     Worried About Running Out of Food in the Last Year: Never true     Ran Out of Food in the Last Year: Never true   Transportation Needs: No Transportation Needs (11/30/2023)    PRAPARE - Transportation     Lack of Transportation (Medical): No     Lack of Transportation (Non-Medical): No   Physical Activity: Insufficiently Active (1/11/2025)    Exercise Vital Sign     Days of Exercise per Week: 1 day     Minutes of Exercise per Session: 20 min   Stress: No Stress Concern Present (1/11/2025)    [employer] - Occupational Stress Questionnaire     Feeling of Stress : Not at all   Housing Stability: Low Risk  (11/30/2023)    Housing Stability Vital Sign     Unable to Pay for Housing in the Last Year: No     Number of Places Lived in the Last Year: 1     Unstable Housing in the Last Year: No

## 2025-08-06 ENCOUNTER — PATIENT OUTREACH (OUTPATIENT)
Dept: ADMINISTRATIVE | Facility: HOSPITAL | Age: 70
End: 2025-08-06
Payer: MEDICARE

## 2025-09-04 ENCOUNTER — OFFICE VISIT (OUTPATIENT)
Dept: OPHTHALMOLOGY | Facility: CLINIC | Age: 70
End: 2025-09-04
Payer: MEDICARE

## 2025-09-04 DIAGNOSIS — E11.9 DIABETES MELLITUS TYPE 2 WITHOUT RETINOPATHY: Primary | ICD-10-CM

## 2025-09-04 DIAGNOSIS — H52.7 REFRACTIVE ERRORS: ICD-10-CM

## 2025-09-04 DIAGNOSIS — Z96.1 PSEUDOPHAKIA OF BOTH EYES: ICD-10-CM

## 2025-09-04 PROCEDURE — 1159F MED LIST DOCD IN RCRD: CPT | Mod: CPTII,S$GLB,, | Performed by: OPTOMETRIST

## 2025-09-04 PROCEDURE — 92014 COMPRE OPH EXAM EST PT 1/>: CPT | Mod: S$GLB,,, | Performed by: OPTOMETRIST

## 2025-09-04 PROCEDURE — 3044F HG A1C LEVEL LT 7.0%: CPT | Mod: CPTII,S$GLB,, | Performed by: OPTOMETRIST

## 2025-09-04 PROCEDURE — 2023F DILAT RTA XM W/O RTNOPTHY: CPT | Mod: CPTII,S$GLB,, | Performed by: OPTOMETRIST

## 2025-09-04 PROCEDURE — 92015 DETERMINE REFRACTIVE STATE: CPT | Mod: S$GLB,,, | Performed by: OPTOMETRIST

## 2025-09-04 PROCEDURE — 99999 PR PBB SHADOW E&M-EST. PATIENT-LVL III: CPT | Mod: PBBFAC,,, | Performed by: OPTOMETRIST
